# Patient Record
Sex: FEMALE | Race: BLACK OR AFRICAN AMERICAN | NOT HISPANIC OR LATINO | Employment: OTHER | URBAN - METROPOLITAN AREA
[De-identification: names, ages, dates, MRNs, and addresses within clinical notes are randomized per-mention and may not be internally consistent; named-entity substitution may affect disease eponyms.]

---

## 2017-01-02 ENCOUNTER — PATIENT MESSAGE (OUTPATIENT)
Dept: FAMILY MEDICINE | Facility: CLINIC | Age: 52
End: 2017-01-02

## 2017-01-02 ENCOUNTER — PATIENT MESSAGE (OUTPATIENT)
Dept: ENDOCRINOLOGY | Facility: CLINIC | Age: 52
End: 2017-01-02

## 2017-01-03 ENCOUNTER — HOSPITAL ENCOUNTER (OUTPATIENT)
Dept: RADIOLOGY | Facility: HOSPITAL | Age: 52
Discharge: HOME | End: 2017-01-03
Attending: FAMILY MEDICINE
Payer: COMMERCIAL

## 2017-01-03 DIAGNOSIS — Z12.31 ENCOUNTER FOR SCREENING MAMMOGRAM FOR BREAST CANCER: ICD-10-CM

## 2017-01-03 PROCEDURE — 77067 SCR MAMMO BI INCL CAD: CPT | Mod: TC

## 2017-01-03 PROCEDURE — 77067 SCR MAMMO BI INCL CAD: CPT | Mod: 26,,, | Performed by: RADIOLOGY

## 2017-01-03 PROCEDURE — 77063 BREAST TOMOSYNTHESIS BI: CPT | Mod: 26,,, | Performed by: RADIOLOGY

## 2017-01-05 ENCOUNTER — PATIENT MESSAGE (OUTPATIENT)
Dept: ENDOCRINOLOGY | Facility: CLINIC | Age: 52
End: 2017-01-05

## 2017-01-06 DIAGNOSIS — E04.1 THYROID NODULE: Primary | ICD-10-CM

## 2017-01-07 RX ORDER — IBUPROFEN 600 MG/1
TABLET ORAL
Qty: 60 TABLET | Refills: 0 | Status: SHIPPED | OUTPATIENT
Start: 2017-01-07 | End: 2017-01-30 | Stop reason: SDUPTHER

## 2017-01-09 RX ORDER — AMLODIPINE BESYLATE 10 MG/1
TABLET ORAL
Qty: 90 TABLET | Refills: 1 | Status: SHIPPED | OUTPATIENT
Start: 2017-01-09 | End: 2017-07-06 | Stop reason: SDUPTHER

## 2017-01-09 NOTE — TELEPHONE ENCOUNTER
----- Message from Hodan Pope sent at 1/4/2017  4:24 PM CST -----  Contact: self/[phone]  Patient is following up on her previous message in regards to scheduling a Endocrinology appointment. Thank you.

## 2017-01-18 ENCOUNTER — PATIENT MESSAGE (OUTPATIENT)
Dept: FAMILY MEDICINE | Facility: CLINIC | Age: 52
End: 2017-01-18

## 2017-01-25 DIAGNOSIS — E87.6 HYPOKALEMIA: ICD-10-CM

## 2017-01-25 RX ORDER — POTASSIUM BICARBONATE 978 MG/1
TABLET, EFFERVESCENT ORAL
Qty: 120 TABLET | Refills: 0 | Status: SHIPPED | OUTPATIENT
Start: 2017-01-25 | End: 2017-02-25 | Stop reason: SDUPTHER

## 2017-01-30 RX ORDER — IBUPROFEN 600 MG/1
TABLET ORAL
Qty: 60 TABLET | Refills: 0 | Status: SHIPPED | OUTPATIENT
Start: 2017-01-30 | End: 2017-02-18 | Stop reason: SDUPTHER

## 2017-02-09 ENCOUNTER — PATIENT MESSAGE (OUTPATIENT)
Dept: FAMILY MEDICINE | Facility: CLINIC | Age: 52
End: 2017-02-09

## 2017-02-18 RX ORDER — IBUPROFEN 600 MG/1
TABLET ORAL
Qty: 60 TABLET | Refills: 0 | Status: SHIPPED | OUTPATIENT
Start: 2017-02-18 | End: 2017-10-03 | Stop reason: SDUPTHER

## 2017-02-23 DIAGNOSIS — E87.6 HYPOKALEMIA: ICD-10-CM

## 2017-02-23 RX ORDER — SPIRONOLACTONE 25 MG/1
TABLET ORAL
Qty: 180 TABLET | Refills: 0 | Status: SHIPPED | OUTPATIENT
Start: 2017-02-23 | End: 2018-05-14 | Stop reason: SDUPTHER

## 2017-02-25 DIAGNOSIS — E87.6 HYPOKALEMIA: ICD-10-CM

## 2017-02-27 ENCOUNTER — HOSPITAL ENCOUNTER (EMERGENCY)
Facility: OTHER | Age: 52
Discharge: HOME | End: 2017-02-27
Attending: EMERGENCY MEDICINE
Payer: COMMERCIAL

## 2017-02-27 VITALS
SYSTOLIC BLOOD PRESSURE: 125 MMHG | HEART RATE: 89 BPM | WEIGHT: 245 LBS | RESPIRATION RATE: 20 BRPM | OXYGEN SATURATION: 99 % | TEMPERATURE: 99 F | HEIGHT: 64 IN | BODY MASS INDEX: 41.83 KG/M2 | DIASTOLIC BLOOD PRESSURE: 80 MMHG

## 2017-02-27 DIAGNOSIS — R11.2 NON-INTRACTABLE VOMITING WITH NAUSEA, UNSPECIFIED VOMITING TYPE: Primary | ICD-10-CM

## 2017-02-27 PROCEDURE — 99283 EMERGENCY DEPT VISIT LOW MDM: CPT

## 2017-02-27 RX ORDER — ONDANSETRON 4 MG/1
4 TABLET, ORALLY DISINTEGRATING ORAL EVERY 8 HOURS PRN
Qty: 14 TABLET | Refills: 1 | Status: SHIPPED | OUTPATIENT
Start: 2017-02-27 | End: 2017-10-04

## 2017-02-27 RX ORDER — POTASSIUM BICARBONATE 978 MG/1
TABLET, EFFERVESCENT ORAL
Qty: 120 TABLET | Refills: 0 | Status: SHIPPED | OUTPATIENT
Start: 2017-02-27 | End: 2017-03-26 | Stop reason: SDUPTHER

## 2017-02-27 NOTE — DISCHARGE INSTRUCTIONS
"  Vomiting (Adult)  Vomiting is a common symptom that may be due to different causes. These include gastroenteritis ("stomach flu"), food poisoning and gastritis. There are other more serious causes of vomiting which may be hard to diagnose early in the illness. Therefore, it is important to watch for the warning signs listed below.  The main danger from repeated vomiting is dehydration. This is due to excess loss of water and minerals from the body. When this occurs, body fluids must be replaced.  Home care  · If symptoms are severe, rest at home for the next 24 hours.  · Because your symptoms may be from an infection, wash your hands frequently and well, and use alcohol-based  to avoid spreading the infection to others.  · Wash your hands for at least 20 seconds. Hum the happy birthday song twice for the correct length of time.  · Wash your hands after using the toilet, before and after preparing food, before eating food, after changing a diaper, cleaning a wound, caring for a sick person, and blowing your nose, coughing, or sneezing. You should also wash your hands after caring for someone who is sick, touching pet food, or treats, and touching an animal, or animal waste.  · You may use acetaminophen or NSAID medicines like ibuprofen or naproxen to control fever, unless another medicine was prescribed. If you have chronic liver or kidney disease or ever had a stomach ulcer or GI bleeding, talk with your doctor before using these medicines. Aspirin should never be used in anyone under 18 years of age who is ill with a fever. It may cause severe liver damage. Don't use NSAID medicines if you are already taking one for another condition (like arthritis) or are on aspirin (such as for heart disease, or after a stroke)  · Avoid tobacco and alcohol use, which may worsen your symptoms.  · If medicines for vomiting were prescribed, take as directed.  · Once vomiting stops, then follow these guidelines:  During " the first 12 to 24 hours follow the diet below:  · Fruit juices. Apple, grape juice, clear fruit drinks, and electrolyte replacement drinks.  · Beverages. Soft drinks without caffeine; mineral water (plain or flavored), decaffeinated tea and coffee.  · Soups. Clear broth, consommé and bouillon  · Desserts. Plain gelatin, popsicles and fruit juice bars. As you feel better, you may add 6-8 ounces of yogurt per day.  During the next 24 hours you may add the following to the above:  · Hot cereal, plain toast, bread, rolls, crackers  · Plain noodles, rice, mashed potatoes, chicken noodle or rice soup  · Unsweetened canned fruit (avoid pineapple), bananas  · Limit caffeine and chocolate. No spices or seasonings except salt.  During the next 24 hours:  Gradually resume a normal diet, as you feel better and your symptoms lessen.  Follow-up care  Follow up with your healthcare provider, or as advised.  When to seek medical advice  Call your healthcare provider right away if any of these occur:  · Constant right-sided lower abdominal pain or increasing general abdominal pain  · Continued vomiting (unable to keep liquids down) for 24 hours  · Frequent diarrhea (more than 5 times a day); blood (red or black color) or mucus in diarrhea  · Reduced urine output or extreme thirst  · Weakness, dizziness or fainting  · Unusually drowsy or confused  · Fever of 100.4°F (38°C) oral or higher, or as directed  · Yellow color of the eyes or skin  Date Last Reviewed: 11/16/2015 © 2000-2016 [employer]. [address] [place] [address], [place] [address]. All rights reserved. This information is not intended as a substitute for professional medical care. Always follow your healthcare professional's instructions.

## 2017-02-27 NOTE — ED AVS SNAPSHOT
[facility] EMERGENCY DEPARTMENT  [address] [facility] [address]               Niharika Gutierrez   2017 11:30 AM   ED    Description:  Female : 1965   Department:  [facility] Emergency Department           Your Care was Coordinated By:     Provider Role From To    Hollie Valdez MD Attending Provider 17 1137 --      Reason for Visit     Nausea           Diagnoses this Visit        Comments    Non-intractable vomiting with nausea, unspecified vomiting type    -  Primary       ED Disposition     None           To Do List           Follow-up Information     Schedule an appointment as soon as possible for a visit with Janae Schwartz MD.    Specialty:  Family Medicine    Contact information:    [address] [facility] [address]  [phone]          Follow up with [facility] Emergency Department.    Specialty:  Emergency Medicine    Why:  If symptoms worsen    Contact information:    [address] [facility] [address]  [phone]       These Medications        Disp Refills Start End    ondansetron (ZOFRAN-ODT) 4 MG TbDL 14 tablet 1 2017     Take 1 tablet (4 mg total) by mouth every 8 (eight) hours as needed. - Oral    Pharmacy: [facility] [address] [place] [address] AT [facility] Serena Espitia  #: [phone]         [facility]     [facility] Nurse Care Line -  Assistance  Registered nurses in the [facility] On Call Center provide clinical advisement, health education, appointment booking, and other advisory services.  Call for this free service at [phone].             Medications           Message regarding Medications     Verify the changes and/or additions to your medication regime listed below are the same as discussed with your clinician today.  If any of these changes or additions are incorrect, please notify your healthcare provider.        START taking these NEW medications        Refills     ondansetron (ZOFRAN-ODT) 4 MG TbDL 1    Sig: Take 1 tablet (4 mg total) by mouth every 8 (eight) hours as needed.    Class: Print    Route: Oral           Verify that the below list of medications is an accurate representation of the medications you are currently taking.  If none reported, the list may be blank. If incorrect, please contact your healthcare provider. Carry this list with you in case of emergency.           Current Medications     amlodipine (NORVASC) 10 MG tablet TAKE 1 TABLET BY MOUTH DAILY    azelastine (ASTELIN) 137 mcg (0.1 %) nasal spray 1 spray by Nasal route 2 (two) times daily.    cetirizine (ZYRTEC) 10 MG tablet Take 10 mg by mouth daily as needed.    conjugated estrogens (PREMARIN) vaginal cream Place 0.5 g vaginally once daily. X 1 week, then decrease to twice weekly    esomeprazole (NEXIUM) 20 MG capsule Take 1 capsule (20 mg total) by mouth before breakfast.    fexofenadine (ALLEGRA ALLERGY) 180 MG tablet Take 180 mg by mouth daily as needed.    ibuprofen (ADVIL,MOTRIN) 600 MG tablet TAKE 1 TABLET BY MOUTH EVERY 8 HOURS AS NEEDED FOR PAIN    KLOR-CON/EF disintegrating tablet DISSOLVE ONE TABLET IN WATER, THEN DRINK FOUR TIMES DAILY    ondansetron (ZOFRAN-ODT) 4 MG TbDL Take 1 tablet (4 mg total) by mouth every 8 (eight) hours as needed.    potassium chloride SA (K-DUR,KLOR-CON) 20 MEQ tablet TAKE 1 TABLET BY MOUTH THREE TIMES DAILY    spironolactone (ALDACTONE) 25 MG tablet TAKE 1 TABLET BY MOUTH TWICE DAILY    triamcinolone (NASACORT) 55 mcg nasal inhaler 2 sprays by Nasal route once daily.           Clinical Reference Information           Your Vitals Were     BP                   125/80           Allergies as of 2/27/2017     No Known Allergies      Immunizations Administered on Date of Encounter - 2/27/2017     None      ED Micro, Lab, POCT     None      ED Imaging Orders     None        Discharge Instructions         Vomiting (Adult)  Vomiting is a common symptom that may be due  "to different causes. These include gastroenteritis ("stomach flu"), food poisoning and gastritis. There are other more serious causes of vomiting which may be hard to diagnose early in the illness. Therefore, it is important to watch for the warning signs listed below.  The main danger from repeated vomiting is dehydration. This is due to excess loss of water and minerals from the body. When this occurs, body fluids must be replaced.  Home care  · If symptoms are severe, rest at home for the next 24 hours.  · Because your symptoms may be from an infection, wash your hands frequently and well, and use alcohol-based  to avoid spreading the infection to others.  · Wash your hands for at least 20 seconds. Hum the happy birthday song twice for the correct length of time.  · Wash your hands after using the toilet, before and after preparing food, before eating food, after changing a diaper, cleaning a wound, caring for a sick person, and blowing your nose, coughing, or sneezing. You should also wash your hands after caring for someone who is sick, touching pet food, or treats, and touching an animal, or animal waste.  · You may use acetaminophen or NSAID medicines like ibuprofen or naproxen to control fever, unless another medicine was prescribed. If you have chronic liver or kidney disease or ever had a stomach ulcer or GI bleeding, talk with your doctor before using these medicines. Aspirin should never be used in anyone under 18 years of age who is ill with a fever. It may cause severe liver damage. Don't use NSAID medicines if you are already taking one for another condition (like arthritis) or are on aspirin (such as for heart disease, or after a stroke)  · Avoid tobacco and alcohol use, which may worsen your symptoms.  · If medicines for vomiting were prescribed, take as directed.  · Once vomiting stops, then follow these guidelines:  During the first 12 to 24 hours follow the diet below:  · Fruit juices. " Apple, grape juice, clear fruit drinks, and electrolyte replacement drinks.  · Beverages. Soft drinks without caffeine; mineral water (plain or flavored), decaffeinated tea and coffee.  · Soups. Clear broth, consommé and bouillon  · Desserts. Plain gelatin, popsicles and fruit juice bars. As you feel better, you may add 6-8 ounces of yogurt per day.  During the next 24 hours you may add the following to the above:  · Hot cereal, plain toast, bread, rolls, crackers  · Plain noodles, rice, mashed potatoes, chicken noodle or rice soup  · Unsweetened canned fruit (avoid pineapple), bananas  · Limit caffeine and chocolate. No spices or seasonings except salt.  During the next 24 hours:  Gradually resume a normal diet, as you feel better and your symptoms lessen.  Follow-up care  Follow up with your healthcare provider, or as advised.  When to seek medical advice  Call your healthcare provider right away if any of these occur:  · Constant right-sided lower abdominal pain or increasing general abdominal pain  · Continued vomiting (unable to keep liquids down) for 24 hours  · Frequent diarrhea (more than 5 times a day); blood (red or black color) or mucus in diarrhea  · Reduced urine output or extreme thirst  · Weakness, dizziness or fainting  · Unusually drowsy or confused  · Fever of 100.4°F (38°C) oral or higher, or as directed  · Yellow color of the eyes or skin  Date Last Reviewed: 11/16/2015 © 2000-2016 [employer]. [address] [place] [address], [place] [address]. All rights reserved. This information is not intended as a substitute for professional medical care. Always follow your healthcare professional's instructions.          Your Scheduled Appointments     Mar 01, 2017  1:00 PM CST   New Patient Endocrinology with Mary Potts MD   [facility]    [address] [place] [address]  [address] [place] [id number] [phone]               [facility] Emergency Department complies with  applicable Federal civil rights laws and does not discriminate on the basis of race, color, national origin, age, disability, or sex.        Language Assistance Services     ATTENTION: Language assistance services are available, free of charge. Please call [phone].      ATENCIÓN: Si habla viral, tiene a cotton disposición servicios gratuitos de asistencia lingüística. Llame al [phone].     CHÚ Ý: N?u b?n nói Ti?ng Vi?t, có các d?ch v? h? tr? ngôn ng? mi?n phí dành cho b?n. G?i s? [phone].

## 2017-02-27 NOTE — ED PROVIDER NOTES
Encounter Date: 2017       History     Chief Complaint   Patient presents with    Nausea     Pt here with c/o (1) episode of N/V today     Review of patient's allergies indicates:  No Known Allergies  The history is provided by the patient.    52-year-old who had one episode of vomiting and diarrhea around 9 AM.  She said that her symptoms resolved after that.  She said that she had a friend who  from a heart attack after having similar symptoms a few years ago so wanted to be checked.  She denies chest pain.  Patient says that she did have one episode of chest pain to her midsternal area  last night that lasted for about 1 hour that resolved.  However she has history of reflux and says that this is similar.  Patient has no complaints at this time.  Past Medical History:   Diagnosis Date    Hypertension     Hypokalemic syndrome     Thyroid disease     Vitamin D deficiency disease      Past Surgical History:   Procedure Laterality Date     SECTION  2004    X 1    CHOLECYSTECTOMY  2008    LAPAROSCOPIC HYSTERECTOMY  2010    LSO     Family History   Problem Relation Age of Onset    Diabetes Mother     Heart disease Father      CHF    Colon cancer Neg Hx     Colon polyps Neg Hx      Social History   Substance Use Topics    Smoking status: Never Smoker    Smokeless tobacco: Never Used    Alcohol use No     Review of Systems   Constitutional: Negative for fever.   HENT: Positive for postnasal drip.    Respiratory: Positive for cough. Negative for shortness of breath.    Cardiovascular: Positive for chest pain (resolved).   Gastrointestinal: Positive for diarrhea (resolved), nausea (resolved) and vomiting (Resolved).   Genitourinary: Negative for dysuria.   Skin: Positive for rash (face).   Neurological: Negative for dizziness and headaches.       Physical Exam   Initial Vitals   BP Pulse Resp Temp SpO2   17 1059 17 1059 17 1059 17 1059 17 1059   125/80 89 20  99.2 °F (37.3 °C) 99 %     Physical Exam    Nursing note and vitals reviewed.  Constitutional: She appears well-developed and well-nourished.   HENT:   Head: Normocephalic and atraumatic.   Mouth/Throat: No oropharyngeal exudate.   Eyes: Conjunctivae and EOM are normal. Pupils are equal, round, and reactive to light.   Neck: Normal range of motion. Neck supple.   Cardiovascular: Normal rate and regular rhythm.   Pulmonary/Chest: Breath sounds normal.   Abdominal: Soft. There is no tenderness. There is no rebound and no guarding.   Musculoskeletal: Normal range of motion.   Neurological: She is alert and oriented to person, place, and time. She has normal strength.   Skin: Skin is warm and dry.   Psychiatric: She has a normal mood and affect.         ED Course   Procedures  Labs Reviewed - No data to display  EKG Readings: (Independently Interpreted)   Initial Reading: No STEMI. Previous EKG: Compared with most recent EKG Previous EKG Date: 2015.   Normal sinus rhythm, no ST segment elevation, heart rate 78, normal QT, normal axis          Medical Decision Making:   Initial Assessment:   52-year-old lady who complains of nausea, vomiting, diarrhea.  Patient had one episode this morning and then her symptoms completely resolved.  She has no complaints at this time.  ED Management:  No evidence of STEMI on EKG.  I was able to review her old EKG and there were no changes that were significant.  Patient was reassured and prescribed Zofran.  She was given strict return precautions.  She had no chest pain today.                   ED Course     Clinical Impression:   The encounter diagnosis was Non-intractable vomiting with nausea, unspecified vomiting type.          Hollie Valdez MD  02/27/17 2449

## 2017-03-26 DIAGNOSIS — E87.6 HYPOKALEMIA: ICD-10-CM

## 2017-03-26 RX ORDER — POTASSIUM BICARBONATE 978 MG/1
TABLET, EFFERVESCENT ORAL
Qty: 120 TABLET | Refills: 0 | Status: SHIPPED | OUTPATIENT
Start: 2017-03-26 | End: 2017-04-29 | Stop reason: SDUPTHER

## 2017-04-29 DIAGNOSIS — E87.6 HYPOKALEMIA: ICD-10-CM

## 2017-04-29 RX ORDER — POTASSIUM BICARBONATE 978 MG/1
TABLET, EFFERVESCENT ORAL
Qty: 120 TABLET | Refills: 0 | Status: SHIPPED | OUTPATIENT
Start: 2017-04-29 | End: 2017-10-04

## 2017-05-01 ENCOUNTER — HOSPITAL ENCOUNTER (EMERGENCY)
Facility: OTHER | Age: 52
Discharge: HOME | End: 2017-05-01
Attending: EMERGENCY MEDICINE
Payer: COMMERCIAL

## 2017-05-01 VITALS
SYSTOLIC BLOOD PRESSURE: 136 MMHG | BODY MASS INDEX: 40.97 KG/M2 | HEIGHT: 64 IN | RESPIRATION RATE: 18 BRPM | DIASTOLIC BLOOD PRESSURE: 61 MMHG | HEART RATE: 70 BPM | TEMPERATURE: 98 F | OXYGEN SATURATION: 100 % | WEIGHT: 240 LBS

## 2017-05-01 DIAGNOSIS — R19.7 NAUSEA VOMITING AND DIARRHEA: ICD-10-CM

## 2017-05-01 DIAGNOSIS — R42 VERTIGO: Primary | ICD-10-CM

## 2017-05-01 DIAGNOSIS — R11.2 NAUSEA VOMITING AND DIARRHEA: ICD-10-CM

## 2017-05-01 DIAGNOSIS — R07.89 CHEST TIGHTNESS: ICD-10-CM

## 2017-05-01 DIAGNOSIS — R42 DIZZY: ICD-10-CM

## 2017-05-01 LAB
ALBUMIN SERPL-MCNC: 3.3 G/DL (ref 3.3–5.5)
ALP SERPL-CCNC: 59 U/L (ref 42–141)
BILIRUB SERPL-MCNC: 0.4 MG/DL (ref 0.2–1.6)
BUN SERPL-MCNC: 19 MG/DL (ref 7–22)
CALCIUM SERPL-MCNC: 9.4 MG/DL (ref 8–10.3)
CHLORIDE SERPL-SCNC: 105 MMOL/L (ref 98–108)
CREAT SERPL-MCNC: 0.7 MG/DL (ref 0.6–1.2)
GLUCOSE SERPL-MCNC: 104 MG/DL (ref 73–118)
POC ALT (SGPT): 16 U/L (ref 10–47)
POC AST (SGOT): 18 U/L (ref 11–38)
POC CARDIAC TROPONIN I: 0 NG/ML
POC TCO2: 28 MMOL/L (ref 18–33)
POTASSIUM BLD-SCNC: 3.4 MMOL/L (ref 3.6–5.1)
PROTEIN, POC: 8.4 G/DL (ref 6.4–8.1)
SAMPLE: NORMAL
SODIUM BLD-SCNC: 139 MMOL/L (ref 128–145)

## 2017-05-01 PROCEDURE — 99284 EMERGENCY DEPT VISIT MOD MDM: CPT | Mod: 25

## 2017-05-01 PROCEDURE — 80053 COMPREHEN METABOLIC PANEL: CPT

## 2017-05-01 PROCEDURE — 84484 ASSAY OF TROPONIN QUANT: CPT

## 2017-05-01 PROCEDURE — 93005 ELECTROCARDIOGRAM TRACING: CPT

## 2017-05-01 PROCEDURE — 25000003 PHARM REV CODE 250: Performed by: EMERGENCY MEDICINE

## 2017-05-01 PROCEDURE — 93010 ELECTROCARDIOGRAM REPORT: CPT | Mod: ,,, | Performed by: INTERNAL MEDICINE

## 2017-05-01 PROCEDURE — 85025 COMPLETE CBC W/AUTO DIFF WBC: CPT

## 2017-05-01 RX ORDER — ONDANSETRON 4 MG/1
4 TABLET, ORALLY DISINTEGRATING ORAL
Status: COMPLETED | OUTPATIENT
Start: 2017-05-01 | End: 2017-05-01

## 2017-05-01 RX ORDER — MECLIZINE HYDROCHLORIDE 25 MG/1
25 TABLET ORAL
Status: COMPLETED | OUTPATIENT
Start: 2017-05-01 | End: 2017-05-01

## 2017-05-01 RX ORDER — ONDANSETRON 4 MG/1
4 TABLET, ORALLY DISINTEGRATING ORAL EVERY 8 HOURS PRN
Qty: 14 TABLET | Refills: 1 | Status: SHIPPED | OUTPATIENT
Start: 2017-05-01 | End: 2017-10-04

## 2017-05-01 RX ORDER — MECLIZINE HYDROCHLORIDE 25 MG/1
25 TABLET ORAL 3 TIMES DAILY PRN
Qty: 20 TABLET | Refills: 0 | Status: SHIPPED | OUTPATIENT
Start: 2017-05-01 | End: 2017-10-04

## 2017-05-01 RX ADMIN — MECLIZINE HYDROCHLORIDE 25 MG: 25 TABLET ORAL at 08:05

## 2017-05-01 RX ADMIN — ONDANSETRON 4 MG: 4 TABLET, ORALLY DISINTEGRATING ORAL at 08:05

## 2017-05-01 RX ADMIN — LIDOCAINE HYDROCHLORIDE: 20 SOLUTION ORAL; TOPICAL at 08:05

## 2017-05-01 NOTE — ED PROVIDER NOTES
"Encounter Date: 2017       History     Chief Complaint   Patient presents with    Nausea     NVD, states NVD, chest pain started on arrival     Chest Pain     Review of patient's allergies indicates:  No Known Allergies  The history is provided by the patient and medical records.    52-year-old who complains of feeling dizzy.  Patient says that she feels the room spinning.  This began this morning.  She vomited twice and had one episode of diarrhea.  Patient has tightness to her chest as well.  This began this morning as well.  No shortness of breath.  Patient's dizziness worsens with quick movements of her head.  She denies headache or change in vision.  She said that she feels "flushed every other day for the last 2 months.  Patient was seen here 2 months ago for nausea as well as chest pain at that time.  She did not follow-up with her primary care physician regarding the symptoms as directed.  She has a history of reflux and stopped taking her Nexium 2 weeks ago.    Past Medical History:   Diagnosis Date    Hypertension     Hypokalemic syndrome     Thyroid disease     Vitamin D deficiency disease      Past Surgical History:   Procedure Laterality Date     SECTION  2004    X 1    CHOLECYSTECTOMY  2008    LAPAROSCOPIC HYSTERECTOMY  2010    LSO     Family History   Problem Relation Age of Onset    Diabetes Mother     Heart disease Father      CHF    Colon cancer Neg Hx     Colon polyps Neg Hx      Social History   Substance Use Topics    Smoking status: Never Smoker    Smokeless tobacco: Never Used    Alcohol use No     Review of Systems   Constitutional: Negative for fever.   HENT: Negative for sore throat.    Eyes: Negative for pain.   Respiratory: Positive for chest tightness. Negative for shortness of breath.    Cardiovascular: Positive for chest pain.   Gastrointestinal: Positive for diarrhea, nausea and vomiting. Negative for abdominal pain.   Genitourinary: Negative for dysuria. "   Musculoskeletal: Positive for gait problem. Negative for back pain.   Skin: Negative for rash.   Neurological: Positive for dizziness. Negative for headaches.   Hematological:        No bleeding       Physical Exam   Initial Vitals   BP Pulse Resp Temp SpO2   05/01/17 0729 05/01/17 0729 05/01/17 0729 05/01/17 0729 05/01/17 0729   145/75 72 18 97.6 °F (36.4 °C) 100 %     Physical Exam    Nursing note and vitals reviewed.  Constitutional: She appears well-developed and well-nourished.   HENT:   Head: Normocephalic and atraumatic.   Dull TM with wax but no impaction   Eyes: Conjunctivae and EOM are normal. Pupils are equal, round, and reactive to light.   Neck: Normal range of motion. Neck supple.   Cardiovascular: Normal rate and regular rhythm.   Pulmonary/Chest: Breath sounds normal.   Abdominal: Soft. There is no tenderness. There is no rebound and no guarding.   Musculoskeletal: Normal range of motion. She exhibits no edema or tenderness.   Neurological: She is alert and oriented to person, place, and time. She has normal strength. No cranial nerve deficit.   Skin: Skin is warm and dry.   Psychiatric: She has a normal mood and affect.         ED Course   Procedures  Labs Reviewed   POCT CBC   POCT CMP   POCT TROPONIN     EKG Readings: (Independently Interpreted)   Previous EKG Date: 02/2017.   NSR: 75 no ST segment elevation, normal QT, normal NV, normal axis          Medical Decision Making:   Initial Assessment:   52 yr old who complains of feeling the room spin, nausea, diarrhea, chest tightness.  Patient vomited twice.  On exam she has no neurological deficits.  EKG is unchanged from previous.  No STEMI  Differential Diagnosis:   ACS, vertigo, viral syndrome, anxiety, GERD  ED Management:  No evidence of ischemia on patient's EKG.  I saw this patient 2 months ago for almost the same symptoms except for the dizziness.  She reports anxiety secondary to a friend of her dying from the same symptoms years ago.   She had the same concern in February.  She did not follow up with her primary care physician as instructed.  Patient will be given Zofran and meclizine.  CBC, CMP and troponin will be checked.  No significant lab abnormalities were found.  Patient was told that she must follow-up with her primary care physician.  She will be discharged on meclizine and Zofran and advised she should start her Nexium again.  She will be referred back to her GI doctor as well.                   ED Course     Clinical Impression:   The primary encounter diagnosis was Vertigo. Diagnoses of Dizzy, Nausea vomiting and diarrhea, and Chest tightness were also pertinent to this visit.          Hollie Valdez MD  05/01/17 0870

## 2017-05-01 NOTE — ED AVS SNAPSHOT
[facility] EMERGENCY DEPARTMENT  [address] [facility] [address]               Niharika Gutierrez   2017  7:12 AM   ED    Description:  Female : 1965   Department:  [facility] Emergency Department           Your Care was Coordinated By:     Provider Role From To    Hollie Valdez MD Attending Provider 17 0712 --      Reason for Visit     Nausea     Chest Pain           Diagnoses this Visit        Comments    Vertigo    -  Primary     Dizzy         Nausea vomiting and diarrhea         Chest tightness           ED Disposition     None           To Do List           Follow-up Information     Follow up with Janae Schwartz MD. Call today.    Specialty:  Family Medicine    Contact information:    [address] [facility] [address]  [phone]          Follow up with [facility] Emergency Department.    Specialty:  Emergency Medicine    Why:  If symptoms worsen    Contact information:    [address] [facility] [facility] [address]  [phone]        Follow up with Gonsalo Johnson PA-C. Call today.    Specialty:  Gastroenterology    Contact information:    [address]  [facility] [address]  [phone]         These Medications        Disp Refills Start End    meclizine (ANTIVERT) 25 mg tablet 20 tablet 0 2017     Take 1 tablet (25 mg total) by mouth 3 (three) times daily as needed. - Oral    Pharmacy: [facility] [address] [place] [address] [facility] [facility] Ph #: [phone]       ondansetron (ZOFRAN-ODT) 4 MG TbDL 14 tablet 1 2017     Take 1 tablet (4 mg total) by mouth every 8 (eight) hours as needed. - Oral    Pharmacy: [facility] [address] [place] [address] [address] [facility] AT [facility] Ph #: [phone]         [facility]     [facility] Nurse Care Line -  Assistance  Unless otherwise directed by your provider, please contact [facility], our nurse care  line that is available for 24/7 assistance.     Registered nurses in the [facility] provide: appointment scheduling, clinical advisement, health education, and other advisory services.  Call: [phone] (toll free)               Medications           Message regarding Medications     Verify the changes and/or additions to your medication regime listed below are the same as discussed with your clinician today.  If any of these changes or additions are incorrect, please notify your healthcare provider.        START taking these NEW medications        Refills    meclizine (ANTIVERT) 25 mg tablet 0    Sig: Take 1 tablet (25 mg total) by mouth 3 (three) times daily as needed.    Class: Print    Route: Oral    ondansetron (ZOFRAN-ODT) 4 MG TbDL 1    Sig: Take 1 tablet (4 mg total) by mouth every 8 (eight) hours as needed.    Class: Print    Route: Oral      These medications were administered today        Dose Freq    meclizine tablet 25 mg 25 mg ED 1 Time    Sig: Take 1 tablet (25 mg total) by mouth ED 1 Time.    Class: Normal    Route: Oral    ondansetron disintegrating tablet 4 mg 4 mg ED 1 Time    Sig: Take 1 tablet (4 mg total) by mouth ED 1 Time.    Class: Normal    Route: Oral    (pyxis) gi cocktail (mylanta 30 mL, lidocaine 2 % viscous 10 mL, dicyclomine 10 mL) 50 mL  ED 1 Time    Sig: Take by mouth ED 1 Time.    Class: Normal    Route: Oral           Verify that the below list of medications is an accurate representation of the medications you are currently taking.  If none reported, the list may be blank. If incorrect, please contact your healthcare provider. Carry this list with you in case of emergency.           Current Medications     amlodipine (NORVASC) 10 MG tablet TAKE 1 TABLET BY MOUTH DAILY    azelastine (ASTELIN) 137 mcg (0.1 %) nasal spray 1 spray by Nasal route 2 (two) times daily.    cetirizine (ZYRTEC) 10 MG tablet Take 10 mg by mouth daily as needed.    conjugated estrogens  "(PREMARIN) vaginal cream Place 0.5 g vaginally once daily. X 1 week, then decrease to twice weekly    esomeprazole (NEXIUM) 20 MG capsule Take 1 capsule (20 mg total) by mouth before breakfast.    fexofenadine (ALLEGRA ALLERGY) 180 MG tablet Take 180 mg by mouth daily as needed.    ibuprofen (ADVIL,MOTRIN) 600 MG tablet TAKE 1 TABLET BY MOUTH EVERY 8 HOURS AS NEEDED FOR PAIN    KLOR-CON/EF disintegrating tablet DISSOLVE ONE TABLET IN WATER AND DRINK FOUR TIMES DAILY    meclizine (ANTIVERT) 25 mg tablet Take 1 tablet (25 mg total) by mouth 3 (three) times daily as needed.    meclizine tablet 25 mg Take 1 tablet (25 mg total) by mouth ED 1 Time.    ondansetron (ZOFRAN-ODT) 4 MG TbDL Take 1 tablet (4 mg total) by mouth every 8 (eight) hours as needed.    ondansetron (ZOFRAN-ODT) 4 MG TbDL Take 1 tablet (4 mg total) by mouth every 8 (eight) hours as needed.    potassium chloride SA (K-DUR,KLOR-CON) 20 MEQ tablet TAKE 1 TABLET BY MOUTH THREE TIMES DAILY    spironolactone (ALDACTONE) 25 MG tablet TAKE 1 TABLET BY MOUTH TWICE DAILY    triamcinolone (NASACORT) 55 mcg nasal inhaler 2 sprays by Nasal route once daily.           Clinical Reference Information           Your Vitals Were     BP Pulse Temp Resp Height Weight    144/71 (BP Location: Right arm, Patient Position: Standing, BP Method: Automatic) 71 97.6 °F (36.4 °C) (Temporal) 18 5' 4" (1.626 m) 108.9 kg (240 lb)    SpO2 BMI             100% 41.2 kg/m2         Allergies as of 5/1/2017     No Known Allergies      Immunizations Administered on Date of Encounter - 5/1/2017     None      ED Micro, Lab, POCT     Start Ordered       Status Ordering Provider    05/01/17 0811 05/01/17 0811  Troponin ISTAT  Once      Final result     05/01/17 0802 05/01/17 0802  POCT CMP  Once      Final result     05/01/17 0740 05/01/17 0739  POCT CBC  Once      Acknowledged     05/01/17 0740 05/01/17 0739  POCT CMP  Once      Completed     05/01/17 0740 05/01/17 0739  POCT Troponin  Once   " "   Completed       ED Imaging Orders     None        Discharge Instructions       START NEXIUM TODAY  Vomiting (Adult)  Vomiting is a common symptom that may be due to different causes. These include gastroenteritis ("stomach flu"), food poisoning and gastritis. There are other more serious causes of vomiting which may be hard to diagnose early in the illness. Therefore, it is important to watch for the warning signs listed below.  The main danger from repeated vomiting is dehydration. This is due to excess loss of water and minerals from the body. When this occurs, body fluids must be replaced.  Home care  · If symptoms are severe, rest at home for the next 24 hours.  · Because your symptoms may be from an infection, wash your hands frequently and well, and use alcohol-based  to avoid spreading the infection to others.  · Wash your hands for at least 20 seconds. Hum the happy birthday song twice for the correct length of time.  · Wash your hands after using the toilet, before and after preparing food, before eating food, after changing a diaper, cleaning a wound, caring for a sick person, and blowing your nose, coughing, or sneezing. You should also wash your hands after caring for someone who is sick, touching pet food, or treats, and touching an animal, or animal waste.  · You may use acetaminophen or NSAID medicines like ibuprofen or naproxen to control fever, unless another medicine was prescribed. If you have chronic liver or kidney disease or ever had a stomach ulcer or GI bleeding, talk with your doctor before using these medicines. Aspirin should never be used in anyone under 18 years of age who is ill with a fever. It may cause severe liver damage. Don't use NSAID medicines if you are already taking one for another condition (like arthritis) or are on aspirin (such as for heart disease, or after a stroke)  · Avoid tobacco and alcohol use, which may worsen your symptoms.  · If medicines for " vomiting were prescribed, take as directed.  · Once vomiting stops, then follow these guidelines:  During the first 12 to 24 hours follow the diet below:  · Fruit juices. Apple, grape juice, clear fruit drinks, and electrolyte replacement drinks.  · Beverages. Soft drinks without caffeine; mineral water (plain or flavored), decaffeinated tea and coffee.  · Soups. Clear broth, consommé and bouillon  · Desserts. Plain gelatin, popsicles and fruit juice bars. As you feel better, you may add 6-8 ounces of yogurt per day.  During the next 24 hours you may add the following to the above:  · Hot cereal, plain toast, bread, rolls, crackers  · Plain noodles, rice, mashed potatoes, chicken noodle or rice soup  · Unsweetened canned fruit (avoid pineapple), bananas  · Limit caffeine and chocolate. No spices or seasonings except salt.  During the next 24 hours:  Gradually resume a normal diet, as you feel better and your symptoms lessen.  Follow-up care  Follow up with your healthcare provider, or as advised.  When to seek medical advice  Call your healthcare provider right away if any of these occur:  · Constant right-sided lower abdominal pain or increasing general abdominal pain  · Continued vomiting (unable to keep liquids down) for 24 hours  · Frequent diarrhea (more than 5 times a day); blood (red or black color) or mucus in diarrhea  · Reduced urine output or extreme thirst  · Weakness, dizziness or fainting  · Unusually drowsy or confused  · Fever of 100.4°F (38°C) oral or higher, or as directed  · Yellow color of the eyes or skin  Date Last Reviewed: 11/16/2015  © 9578-8890 [employer]. [address] [place] [address], [place] [address]. All rights reserved. This information is not intended as a substitute for professional medical care. Always follow your healthcare professional's instructions.          Vertigo (Unknown Cause)    In addition to helping with hearing, the inner ear is part of the balance center of  your body. Problems with the inner ear can a false feeling of motion. This is called vertigo. Often, it feels as if you or the room is spinning. A vertigo attack may cause sudden nausea, vomiting and heavy sweating. Severe vertigo causes a loss of balance and can cause you to fall. During vertigo, small head movements and changes in body position will often make the symptoms worse. You may also have ringing in the ears called tinnitus.  An episode of vertigo may last seconds, minutes or hours. Once you are over the first episode, it may never come back. However, symptoms may return off and on.  The cause of your vertigo is not yet known. Possible causes of vertigo include:  · Inflammation of the inner ear  · Disease of the nerves to the inner ear  · Movement of calcium particles in the inner ear  · Poor blood flow to the balance centers of the brain  · Migraine headaches  Home care  · If symptoms are severe, rest quietly in bed. Change positions very slowly. There is usually one position that will feel best, such as lying on one side or lying on your back with your head slightly raised on pillows.  · Do not drive a car or work with dangerous machinery until symptoms have been gone for at least one week.  · Take medicine as prescribed to relieve your symptoms. Unless another medicine was prescribed for symptoms of nausea, vomiting, and dizziness, you may use over-the-counter motion sickness pills. Ask your pharmacist for suggestions.  Follow-up care  Follow up with your healthcare provider or as directed. If you are referred to a specialist or for testing, make the appointment promptly.  When to seek medical advice  Call your healthcare provider if any of the following occur:  · Fever of 100.4°F (38ºC) or higher, or as directed by your healthcare provider  · Vertigo worsens or is not controlled by prescribed medicine   · Repeated vomiting not relieved by prescribed medicine   · Severe  headache  · Confusion  · Weakness of an arm or leg or one side of the face  · Difficulty with speech or vision  · Loss of consciousness   · Seizure  Date Last Reviewed: 8/16/2015  © 2472-1938 [employer]. [address] [place] [address], [place] [address]. All rights reserved. This information is not intended as a substitute for professional medical care. Always follow your healthcare professional's instructions.           [facility] complies with applicable Federal civil rights laws and does not discriminate on the basis of race, color, national origin, age, disability, or sex.        Language Assistance Services     ATTENTION: Language assistance services are available, free of charge. Please call [phone].      ATENCIÓN: Si habla español, tiene a cotton disposición servicios gratuitos de asistencia lingüística. Llame al [phone].     CHÚ Ý: N?u b?n nói Ti?ng Vi?t, có các d?ch v? h? tr? ngôn ng? mi?n phí dành cho b?n. G?i s? [phone].

## 2017-05-01 NOTE — DISCHARGE INSTRUCTIONS
"START NEXIUM TODAY  Vomiting (Adult)  Vomiting is a common symptom that may be due to different causes. These include gastroenteritis ("stomach flu"), food poisoning and gastritis. There are other more serious causes of vomiting which may be hard to diagnose early in the illness. Therefore, it is important to watch for the warning signs listed below.  The main danger from repeated vomiting is dehydration. This is due to excess loss of water and minerals from the body. When this occurs, body fluids must be replaced.  Home care  · If symptoms are severe, rest at home for the next 24 hours.  · Because your symptoms may be from an infection, wash your hands frequently and well, and use alcohol-based  to avoid spreading the infection to others.  · Wash your hands for at least 20 seconds. Hum the happy birthday song twice for the correct length of time.  · Wash your hands after using the toilet, before and after preparing food, before eating food, after changing a diaper, cleaning a wound, caring for a sick person, and blowing your nose, coughing, or sneezing. You should also wash your hands after caring for someone who is sick, touching pet food, or treats, and touching an animal, or animal waste.  · You may use acetaminophen or NSAID medicines like ibuprofen or naproxen to control fever, unless another medicine was prescribed. If you have chronic liver or kidney disease or ever had a stomach ulcer or GI bleeding, talk with your doctor before using these medicines. Aspirin should never be used in anyone under 18 years of age who is ill with a fever. It may cause severe liver damage. Don't use NSAID medicines if you are already taking one for another condition (like arthritis) or are on aspirin (such as for heart disease, or after a stroke)  · Avoid tobacco and alcohol use, which may worsen your symptoms.  · If medicines for vomiting were prescribed, take as directed.  · Once vomiting stops, then follow these " guidelines:  During the first 12 to 24 hours follow the diet below:  · Fruit juices. Apple, grape juice, clear fruit drinks, and electrolyte replacement drinks.  · Beverages. Soft drinks without caffeine; mineral water (plain or flavored), decaffeinated tea and coffee.  · Soups. Clear broth, consommé and bouillon  · Desserts. Plain gelatin, popsicles and fruit juice bars. As you feel better, you may add 6-8 ounces of yogurt per day.  During the next 24 hours you may add the following to the above:  · Hot cereal, plain toast, bread, rolls, crackers  · Plain noodles, rice, mashed potatoes, chicken noodle or rice soup  · Unsweetened canned fruit (avoid pineapple), bananas  · Limit caffeine and chocolate. No spices or seasonings except salt.  During the next 24 hours:  Gradually resume a normal diet, as you feel better and your symptoms lessen.  Follow-up care  Follow up with your healthcare provider, or as advised.  When to seek medical advice  Call your healthcare provider right away if any of these occur:  · Constant right-sided lower abdominal pain or increasing general abdominal pain  · Continued vomiting (unable to keep liquids down) for 24 hours  · Frequent diarrhea (more than 5 times a day); blood (red or black color) or mucus in diarrhea  · Reduced urine output or extreme thirst  · Weakness, dizziness or fainting  · Unusually drowsy or confused  · Fever of 100.4°F (38°C) oral or higher, or as directed  · Yellow color of the eyes or skin  Date Last Reviewed: 11/16/2015 © 2000-2016 [employer]. [address] [place] [address]. All rights reserved. This information is not intended as a substitute for professional medical care. Always follow your healthcare professional's instructions.          Vertigo (Unknown Cause)    In addition to helping with hearing, the inner ear is part of the balance center of your body. Problems with the inner ear can a false feeling of motion. This is called  vertigo. Often, it feels as if you or the room is spinning. A vertigo attack may cause sudden nausea, vomiting and heavy sweating. Severe vertigo causes a loss of balance and can cause you to fall. During vertigo, small head movements and changes in body position will often make the symptoms worse. You may also have ringing in the ears called tinnitus.  An episode of vertigo may last seconds, minutes or hours. Once you are over the first episode, it may never come back. However, symptoms may return off and on.  The cause of your vertigo is not yet known. Possible causes of vertigo include:  · Inflammation of the inner ear  · Disease of the nerves to the inner ear  · Movement of calcium particles in the inner ear  · Poor blood flow to the balance centers of the brain  · Migraine headaches  Home care  · If symptoms are severe, rest quietly in bed. Change positions very slowly. There is usually one position that will feel best, such as lying on one side or lying on your back with your head slightly raised on pillows.  · Do not drive a car or work with dangerous machinery until symptoms have been gone for at least one week.  · Take medicine as prescribed to relieve your symptoms. Unless another medicine was prescribed for symptoms of nausea, vomiting, and dizziness, you may use over-the-counter motion sickness pills. Ask your pharmacist for suggestions.  Follow-up care  Follow up with your healthcare provider or as directed. If you are referred to a specialist or for testing, make the appointment promptly.  When to seek medical advice  Call your healthcare provider if any of the following occur:  · Fever of 100.4°F (38ºC) or higher, or as directed by your healthcare provider  · Vertigo worsens or is not controlled by prescribed medicine   · Repeated vomiting not relieved by prescribed medicine   · Severe headache  · Confusion  · Weakness of an arm or leg or one side of the face  · Difficulty with speech or  vision  · Loss of consciousness   · Seizure  Date Last Reviewed: 8/16/2015  © 9490-5381 [employer], [employer]. [address] [place] [address], [place] [address]. All rights reserved. This information is not intended as a substitute for professional medical care. Always follow your healthcare professional's instructions.

## 2017-05-01 NOTE — ED NOTES
"BP (S) (!) 144/71 (BP Location: (S) Right arm, Patient Position: (S) Standing, BP Method: (S) Automatic)  Pulse (S) 71  Temp 97.6 °F (36.4 °C) (Temporal)   Resp 18  Ht 5' 4" (1.626 m)  Wt 108.9 kg (240 lb)  SpO2 100%  BMI 41.2 kg/m2    General Appearance:  Patient to room 7, c/o NVD, dizziness, intermittent chest pressure, onset this am.    Alert, awake, calm, cooperative, no distress, appears stated age   Head:    Normocephalic, without obvious abnormality, atraumatic   Eyes:    PERRL, conjunctiva/corneas clear, EOM's intact, fundi     benign, both eyes   Ears:    Normal TM's and external ear canals, both ears   Nose:   Nares normal, septum midline, mucosa normal, no drainage     or sinus tenderness   Throat:   Lips, mucosa, and tongue normal; teeth and gums normal   Neck:   Supple, symmetrical, trachea midline, no adenopathy;     thyroid:  no enlargement/tenderness/nodules; no carotid    bruit or JVD   Back:     Symmetric, no curvature, ROM normal, no CVA tenderness   Lungs:     Clear to auscultation bilaterally in all lobes, respirations unlabored   Chest Wall:    No tenderness or deformity    Heart:    Regular rate and rhythm, S1 and S2 normal, no murmur, rub    or gallop to auscultation, denies chest pressure at present.   Abdomen:     C/O nausea, vomiting, and diarrhea, abd soft, non-tender to touch, bowel sounds active all four quadrants, no vomiting, no diarrhea at present.    no masses, no organomegaly   Genitalia:    Normal female without lesion, discharge or tenderness   Rectal:    Normal tone, normal prostate, no masses or tenderness;    guaiac negative stool   Extremities:   Extremities normal, atraumatic, no cyanosis or edema   Pulses:   2+ and symmetric all extremities   Skin:   Skin color, texture, turgor normal, no rashes or lesions   Neurologic:   PERRLA, no dizziness at present, normal strength, sensation and reflexes in all ext, negative orthostatics in ER            "

## 2017-06-16 DIAGNOSIS — E87.6 HYPOKALEMIA: ICD-10-CM

## 2017-06-18 RX ORDER — POTASSIUM BICARBONATE 978 MG/1
TABLET, EFFERVESCENT ORAL
Qty: 120 TABLET | Refills: 0 | Status: SHIPPED | OUTPATIENT
Start: 2017-06-18 | End: 2017-07-28 | Stop reason: SDUPTHER

## 2017-07-06 RX ORDER — AMLODIPINE BESYLATE 10 MG/1
TABLET ORAL
Qty: 90 TABLET | Refills: 0 | Status: SHIPPED | OUTPATIENT
Start: 2017-07-06 | End: 2017-10-02 | Stop reason: SDUPTHER

## 2017-07-28 DIAGNOSIS — E87.6 HYPOKALEMIA: ICD-10-CM

## 2017-07-28 RX ORDER — POTASSIUM BICARBONATE 978 MG/1
TABLET, EFFERVESCENT ORAL
Qty: 120 TABLET | Refills: 0 | Status: SHIPPED | OUTPATIENT
Start: 2017-07-28 | End: 2017-10-04

## 2017-09-12 DIAGNOSIS — E87.6 HYPOKALEMIA: ICD-10-CM

## 2017-09-12 RX ORDER — POTASSIUM BICARBONATE 978 MG/1
TABLET, EFFERVESCENT ORAL
Qty: 120 TABLET | Refills: 0 | Status: SHIPPED | OUTPATIENT
Start: 2017-09-12 | End: 2017-10-04

## 2017-10-02 ENCOUNTER — LAB VISIT (OUTPATIENT)
Dept: LAB | Facility: HOSPITAL | Age: 52
End: 2017-10-02
Attending: INTERNAL MEDICINE
Payer: COMMERCIAL

## 2017-10-02 ENCOUNTER — OFFICE VISIT (OUTPATIENT)
Dept: FAMILY MEDICINE | Facility: CLINIC | Age: 52
End: 2017-10-02
Payer: COMMERCIAL

## 2017-10-02 VITALS
WEIGHT: 249.25 LBS | TEMPERATURE: 98 F | DIASTOLIC BLOOD PRESSURE: 88 MMHG | HEIGHT: 64 IN | SYSTOLIC BLOOD PRESSURE: 130 MMHG | BODY MASS INDEX: 42.55 KG/M2 | HEART RATE: 86 BPM | OXYGEN SATURATION: 98 %

## 2017-10-02 DIAGNOSIS — Q89.2 THYROGLOSSAL DUCT CYST: Primary | ICD-10-CM

## 2017-10-02 DIAGNOSIS — R31.21 ASYMPTOMATIC MICROSCOPIC HEMATURIA: ICD-10-CM

## 2017-10-02 LAB
BACTERIA #/AREA URNS AUTO: ABNORMAL /HPF
BILIRUB UR QL STRIP: NEGATIVE
CLARITY UR REFRACT.AUTO: ABNORMAL
COLOR UR AUTO: YELLOW
GLUCOSE UR QL STRIP: NEGATIVE
HGB UR QL STRIP: ABNORMAL
KETONES UR QL STRIP: NEGATIVE
LEUKOCYTE ESTERASE UR QL STRIP: NEGATIVE
MICROSCOPIC COMMENT: ABNORMAL
NITRITE UR QL STRIP: NEGATIVE
PH UR STRIP: 7 [PH] (ref 5–8)
PROT UR QL STRIP: NEGATIVE
RBC #/AREA URNS AUTO: 7 /HPF (ref 0–4)
SP GR UR STRIP: 1.02 (ref 1–1.03)
SQUAMOUS #/AREA URNS AUTO: 3 /HPF
URN SPEC COLLECT METH UR: ABNORMAL
UROBILINOGEN UR STRIP-ACNC: NEGATIVE EU/DL
WBC #/AREA URNS AUTO: 1 /HPF (ref 0–5)

## 2017-10-02 PROCEDURE — 99999 PR PBB SHADOW E&M-EST. PATIENT-LVL III: CPT | Mod: PBBFAC,,, | Performed by: INTERNAL MEDICINE

## 2017-10-02 PROCEDURE — 87086 URINE CULTURE/COLONY COUNT: CPT

## 2017-10-02 PROCEDURE — 3075F SYST BP GE 130 - 139MM HG: CPT | Mod: S$GLB,,, | Performed by: INTERNAL MEDICINE

## 2017-10-02 PROCEDURE — 81001 URINALYSIS AUTO W/SCOPE: CPT

## 2017-10-02 PROCEDURE — 3079F DIAST BP 80-89 MM HG: CPT | Mod: S$GLB,,, | Performed by: INTERNAL MEDICINE

## 2017-10-02 PROCEDURE — 3008F BODY MASS INDEX DOCD: CPT | Mod: S$GLB,,, | Performed by: INTERNAL MEDICINE

## 2017-10-02 PROCEDURE — 99214 OFFICE O/P EST MOD 30 MIN: CPT | Mod: S$GLB,,, | Performed by: INTERNAL MEDICINE

## 2017-10-02 RX ORDER — CEPHALEXIN 500 MG/1
500 CAPSULE ORAL EVERY 8 HOURS
Qty: 21 CAPSULE | Refills: 0 | Status: SHIPPED | OUTPATIENT
Start: 2017-10-02 | End: 2017-10-09

## 2017-10-02 RX ORDER — AMLODIPINE BESYLATE 10 MG/1
10 TABLET ORAL DAILY
Qty: 90 TABLET | Refills: 0 | Status: SHIPPED | OUTPATIENT
Start: 2017-10-02 | End: 2018-01-04 | Stop reason: SDUPTHER

## 2017-10-02 NOTE — PROGRESS NOTES
This note was created by combination of typed  and Dragon dictation.  Transcription errors may be present.  If there are any questions, please contact me.    Assessment & Plan  Thyroglossal duct cyst - improving, rx sent for keflex but she would like to see if she has UTI first and see if an abx can cover both.  In the interim, cook packs, avoid irritating it.  -     cephALEXin (KEFLEX) 500 MG capsule; Take 1 capsule (500 mg total) by mouth every 8 (eight) hours.  Dispense: 21 capsule; Refill: 0    Asymptomatic microscopic hematuria - check UA UCx. Hx of hematuria with hx of renal stone. No symptoms c/w stone currently.  If UA abn and cx negative, needs to f/u with urology.  -     Urinalysis; Future; Expected date: 10/02/2017  -     Urine culture; Future; Expected date: 10/02/2017    There are no discontinued medications.    Follow-up: No Follow-up on file.      =================================================================      Chief Complaint   Patient presents with    lump under neck       HPI  SALONI is a 52 y.o. female, last appointment with this clinic was Visit date not found.    No LMP recorded. Patient has had a hysterectomy.    Saturday awoke with painful lump in the submandibular area.  Was initially more painful and swollen and seems to be reducing in size.  No fever no chills.  But does feel hot.  No pain with chewing or swallowing.  9/22 Td and flu shot.  No new medications.     And now with some dysuria, chalked it up to dehydration.  Darker than normal. Urinary issues started about 2 weeks ago.  No hx of stones; no abd pain; yes some worsening of chronic pedal edema.  Hx of renal stones in the past, hx of urology evaluation for hematuria 2014.    Recent abx for dental procedure; 10 day course of abx in September.    Notes acute worsening of edema.  Does not have chest pain or dyspnea, possibly a bit more elevated salt with prepackaged dinners but not severe.  Hx of compression  stockings, not using.    Patient Care Team:  Janae Schwartz MD as PCP - General (Family Medicine)    Patient Active Problem List    Diagnosis Date Noted    Gastroesophageal reflux disease without esophagitis 2015    Thyroid nodule 2014    Morbid obesity with BMI of 40.0-44.9, adult 2014    Vitamin D deficiency disease 2013    Essential hypertension        PAST MEDICAL HISTORY:  Past Medical History:   Diagnosis Date    Hypertension     Hypokalemic syndrome     Thyroid disease     Vitamin D deficiency disease        PAST SURGICAL HISTORY:  Past Surgical History:   Procedure Laterality Date     SECTION  2004    X 1    CHOLECYSTECTOMY  2008    LAPAROSCOPIC HYSTERECTOMY  2010    LSO       SOCIAL HISTORY:  Social History     Social History    Marital status:      Spouse name: N/A    Number of children: N/A    Years of education: N/A     Occupational History     [employer]     Social History Main Topics    Smoking status: Never Smoker    Smokeless tobacco: Never Used    Alcohol use No    Drug use: No    Sexual activity: Yes     Partners: Male     Birth control/ protection: Surgical     Other Topics Concern    Not on file     Social History Narrative    No narrative on file       ALLERGIES AND MEDICATIONS: updated and reviewed.  Review of patient's allergies indicates:  No Known Allergies  Current Outpatient Prescriptions   Medication Sig Dispense Refill    amlodipine (NORVASC) 10 MG tablet Take 1 tablet (10 mg total) by mouth once daily. 90 tablet 0    azelastine (ASTELIN) 137 mcg (0.1 %) nasal spray 1 spray by Nasal route 2 (two) times daily.      KLOR-CON/EF disintegrating tablet DISSOLVE ONE TABLET IN WATER AND DINK FOUR TIMES DAILY 120 tablet 0    spironolactone (ALDACTONE) 25 MG tablet TAKE 1 TABLET BY MOUTH TWICE DAILY 180 tablet 0    triamcinolone (NASACORT) 55 mcg nasal inhaler 2 sprays by Nasal route once daily. 17 g 3    cetirizine  "(ZYRTEC) 10 MG tablet Take 10 mg by mouth daily as needed.      conjugated estrogens (PREMARIN) vaginal cream Place 0.5 g vaginally once daily. X 1 week, then decrease to twice weekly 30 g 5    esomeprazole (NEXIUM) 20 MG capsule Take 1 capsule (20 mg total) by mouth before breakfast. 30 capsule 2    fexofenadine (ALLEGRA ALLERGY) 180 MG tablet Take 180 mg by mouth daily as needed.      ibuprofen (ADVIL,MOTRIN) 600 MG tablet TAKE 1 TABLET BY MOUTH EVERY 8 HOURS AS NEEDED FOR PAIN 60 tablet 0    KLOR-CON/EF disintegrating tablet DISSOLVE ONE TABLET IN WATER AND DRINK FOUR TIMES DAILY 120 tablet 0    KLOR-CON/EF disintegrating tablet DISSOLVE ONE TABLET IN WATER AND DINK FOUR TIMES DAILY 120 tablet 0    meclizine (ANTIVERT) 25 mg tablet Take 1 tablet (25 mg total) by mouth 3 (three) times daily as needed. 20 tablet 0    ondansetron (ZOFRAN-ODT) 4 MG TbDL Take 1 tablet (4 mg total) by mouth every 8 (eight) hours as needed. 14 tablet 1    ondansetron (ZOFRAN-ODT) 4 MG TbDL Take 1 tablet (4 mg total) by mouth every 8 (eight) hours as needed. 14 tablet 1    potassium chloride SA (K-DUR,KLOR-CON) 20 MEQ tablet TAKE 1 TABLET BY MOUTH THREE TIMES DAILY 270 tablet 5     No current facility-administered medications for this visit.        Review of Systems   All other systems reviewed and are negative.      Physical Exam   Vitals:    10/02/17 1429   BP: 130/88   Pulse: 86   Temp: 98.1 °F (36.7 °C)   SpO2: 98%   Weight: 113 kg (249 lb 3.7 oz)   Height: 5' 4" (1.626 m)    Body mass index is 42.78 kg/m².  Weight: 113 kg (249 lb 3.7 oz)   Height: 5' 4" (162.6 cm)     Physical Exam   Constitutional: She is oriented to person, place, and time. She appears well-developed and well-nourished. No distress.   Eyes: EOM are normal.   Neck:   Submandibular area with firm but mobile subcutaneous nodule, midline, nonfluctuant, no overlying comedone or skin erythema   Cardiovascular: Normal rate, regular rhythm and normal heart " sounds.    No murmur heard.  Pulmonary/Chest: Effort normal and breath sounds normal.   Abdominal: Soft. She exhibits no distension. There is no tenderness.   Musculoskeletal: Normal range of motion. She exhibits edema.   1+ to ankles bilaterally   Neurological: She is alert and oriented to person, place, and time. Coordination normal.   Skin: Skin is warm and dry.   Psychiatric: She has a normal mood and affect. Her behavior is normal. Thought content normal.     POC UA with protein, 250+ blood, LE positive. Nitrite negative.

## 2017-10-02 NOTE — TELEPHONE ENCOUNTER
----- Message from Alejandra Lawton sent at 10/2/2017  9:16 AM CDT -----  Contact: self  Pt requesting to speak to a nurse regarding thyroid. Please call [phone] or [phone].

## 2017-10-03 ENCOUNTER — PATIENT MESSAGE (OUTPATIENT)
Dept: FAMILY MEDICINE | Facility: CLINIC | Age: 52
End: 2017-10-03

## 2017-10-03 LAB
BACTERIA UR CULT: NORMAL
BACTERIA UR CULT: NORMAL

## 2017-10-03 RX ORDER — IBUPROFEN 600 MG/1
TABLET ORAL
Qty: 60 TABLET | Refills: 0 | Status: SHIPPED | OUTPATIENT
Start: 2017-10-03 | End: 2017-10-22 | Stop reason: SDUPTHER

## 2017-10-04 ENCOUNTER — PATIENT MESSAGE (OUTPATIENT)
Dept: FAMILY MEDICINE | Facility: CLINIC | Age: 52
End: 2017-10-04

## 2017-10-04 DIAGNOSIS — R31.29 MICROHEMATURIA: Primary | ICD-10-CM

## 2017-10-04 NOTE — TELEPHONE ENCOUNTER
Problems   This clinical information was not verified.   Medications   This clinical information was not verified, but there are updates pending review:   No Longer Applicable Medications Start Date Reported By  Comments   fexofenadine 180 MG tablet  Niharikasyeda Mcdaniel [place]    cetirizine 10 MG tablet  Niharika Jonas [place]    triamcinolone 55 mcg nasal inhaler 4/3/2013 Niharika Jonas [place]    potassium chloride SA 20 MEQ tablet 11/11/2014 Niharika [place]    conjugated estrogens vaginal cream 11/23/2015 Niharika Mcdaniel [place]    azelastine 137 mcg (0.1 %) nasal spray  Niharika Jonas [place]    ondansetron 4 MG Tbdl 2/27/2017 Niharika Jonas [place]    ondansetron 4 MG Tbdl 5/1/2017 Niharika Mcdaniel [place]    meclizine 25 mg tablet 5/1/2017 Niharika Jonas [place]    KLOR-CON/EF disintegrating tablet 4/29/2017 Niharika Jonas [place]    KLOR-CON/EF disintegrating tablet 7/28/2017 Niharikasyeda Mcdaniel [place]    Allergies   This clinical information was not verified.      medcard updated.

## 2017-10-10 ENCOUNTER — TELEPHONE (OUTPATIENT)
Dept: FAMILY MEDICINE | Facility: CLINIC | Age: 52
End: 2017-10-10

## 2017-10-10 NOTE — LETTER
October 10, 2017    Niharika Gutierrez  [address]  [place] [address]             [facility]  [address] [facility] [place] [address]  Phone: [phone]  Fax: [phone] Dear Ms. Gutierrez:    Sorry we were unable to contact you to schedule your urology appointment. Please give the referral office a call to schedule. [phone].        If you have any questions or concerns, please don't hesitate to call.    Sincerely,        Ashwini Mayers MA

## 2017-10-12 ENCOUNTER — OFFICE VISIT (OUTPATIENT)
Dept: UROLOGY | Facility: CLINIC | Age: 52
End: 2017-10-12
Payer: COMMERCIAL

## 2017-10-12 VITALS
WEIGHT: 254 LBS | HEIGHT: 64 IN | SYSTOLIC BLOOD PRESSURE: 128 MMHG | DIASTOLIC BLOOD PRESSURE: 72 MMHG | TEMPERATURE: 99 F | BODY MASS INDEX: 43.36 KG/M2

## 2017-10-12 DIAGNOSIS — N20.0 NEPHROLITHIASIS: ICD-10-CM

## 2017-10-12 DIAGNOSIS — R31.29 MICROHEMATURIA: Primary | ICD-10-CM

## 2017-10-12 LAB
BILIRUB SERPL-MCNC: NEGATIVE MG/DL
BLOOD URINE, POC: ABNORMAL
COLOR, POC UA: ABNORMAL
GLUCOSE UR QL STRIP: NORMAL
KETONES UR QL STRIP: NEGATIVE
LEUKOCYTE ESTERASE URINE, POC: NEGATIVE
NITRITE, POC UA: NEGATIVE
PH, POC UA: 8
PROTEIN, POC: ABNORMAL
SPECIFIC GRAVITY, POC UA: 1
UROBILINOGEN, POC UA: 1

## 2017-10-12 PROCEDURE — 88112 CYTOPATH CELL ENHANCE TECH: CPT | Mod: 26,,,

## 2017-10-12 PROCEDURE — 88112 CYTOPATH CELL ENHANCE TECH: CPT

## 2017-10-12 PROCEDURE — 81002 URINALYSIS NONAUTO W/O SCOPE: CPT | Mod: S$GLB,,, | Performed by: UROLOGY

## 2017-10-12 PROCEDURE — 99214 OFFICE O/P EST MOD 30 MIN: CPT | Mod: 25,S$GLB,, | Performed by: UROLOGY

## 2017-10-12 PROCEDURE — 99999 PR PBB SHADOW E&M-EST. PATIENT-LVL III: CPT | Mod: PBBFAC,,, | Performed by: UROLOGY

## 2017-10-12 NOTE — LETTER
October 12, 2017      Jensen Jane MD  [address] [place] [address]           [facility]  [address] [facility] [address]  [facility] [address]  Phone: [phone]          Patient: Niharika Gutierrez   MR Number: [id number]   YOB: 1965   Date of Visit: 10/12/2017       Dear Dr. Jensen Jane:    Thank you for referring Niharika Gutierrez to me for evaluation. Attached you will find relevant portions of my assessment and plan of care.    If you have questions, please do not hesitate to call me. I look forward to following Niharika Gutierrez along with you.    Sincerely,    Daina Romero MD    Enclosure  CC:  No Recipients    If you would like to receive this communication electronically, please contact [email or web address] or [phone] to request more information on [employer] Link access.    For providers and/or their staff who would like to refer a patient to [facility], please contact us through our one-stop-shop provider referral line, [facility], at [phone].    If you feel you have received this communication in error or would no longer like to receive these types of communications, please e-mail [email or web address]

## 2017-10-12 NOTE — PROGRESS NOTES
"  Subjective:       Niharika Gutierrez is a 52 y.o. female who is an established patient with Dr De La Torre, though new to me was referred by Dr Jane for evaluation of microhematuria.      She is a pt of Dr De La Torre who was referred back to urology for microhematuria. Dr De La Torre performed cystoscopy with b/l RPG in OR on 2/14 that was normal. At time of her workup, she was noted to have small R LP calculi. KUB in 5/15 did not show any radio-opaque stones.     She remains low risk for  malignancy - nonsmoker, no environmental exposure, no family history.    UA micro 10/17 - 7 RBCs. UCx - contaminant.      The following portions of the patient's history were reviewed and updated as appropriate: allergies, current medications, past family history, past medical history, past social history, past surgical history and problem list.    Review of Systems  Constitutional: no fever or chills  ENT: no nasal congestion or sore throat  Respiratory: no cough or shortness of breath  Cardiovascular: no chest pain or palpitations  Gastrointestinal: no nausea or vomiting, tolerating diet  Genitourinary: as per HPI  Hematologic/Lymphatic: no easy bruising or lymphadenopathy  Musculoskeletal: no arthralgias or myalgias  Skin: no rashes or lesions  Neurological: no seizures or tremors  Behavioral/Psych: no auditory or visual hallucinations       Objective:    Vitals: /72 (BP Location: Right arm, Patient Position: Sitting, BP Method: Large (Manual))   Temp 98.6 °F (37 °C) (Oral)   Ht 5' 4" (1.626 m)   Wt 115.2 kg (253 lb 15.5 oz)   BMI 43.59 kg/m²     Physical Exam   General: well developed, well nourished in no acute distress  Head: normocephalic, atraumatic  Neck: supple, trachea midline, no obvious enlargement of thyroid  HEENT: EOMI, mucus membranes moist, sclera anicteric, no hearing impairment  Lungs: symmetric expansion, non-labored breathing  Cardiovascular: regular rate and rhythm, normal pulses  Abdomen: soft, non " tender, non distended, no palpable masses, no hepatosplenomegaly, no hernias, no CVA tenderness  Musculoskeletal: no peripheral edema, normal ROM in bilateral upper and lower extremities  Lymphatics: no cervical or inguinal lymphadenopathy  Skin: no rashes or lesions  Neuro: alert and oriented x 3, no gross deficits  Psych: normal judgment and insight, normal mood/affect and non-anxious  Genitourinary:   patient declined exam      Lab Review   Urine analysis today in clinic shows positive for red blood cells 250, protein 30    Lab Results   Component Value Date    WBC 9.65 12/02/2016    HGB 12.0 12/02/2016    HCT 37.1 12/02/2016    MCV 88 12/02/2016     (H) 12/02/2016     Lab Results   Component Value Date    CREATININE 0.8 12/02/2016    BUN 12 12/02/2016         Imaging  Images and reports were personally reviewed by me and discussed with patient  KUB reviewed        Assessment/Plan:      1. Microhematuria    - Discussed etiology and workup of hematuria   - UCx - recently neg (contaminated specimen)   - Cytology - will check for surveillance   - She is s/p complete workup in 2015 by Dr De La Torre. Due to her low risk for  malignancy, no need for repeat workup today.     2. Nephrolithiasis   - Reported R LP stones on previous imaging, unsure size   - Offered KUB/ALBAN to better assess stones - pt declined      Follow up PRN

## 2017-10-19 ENCOUNTER — PATIENT MESSAGE (OUTPATIENT)
Dept: UROLOGY | Facility: CLINIC | Age: 52
End: 2017-10-19

## 2017-10-23 RX ORDER — IBUPROFEN 600 MG/1
TABLET ORAL
Qty: 60 TABLET | Refills: 0 | Status: SHIPPED | OUTPATIENT
Start: 2017-10-23 | End: 2017-11-11 | Stop reason: SDUPTHER

## 2017-11-12 RX ORDER — IBUPROFEN 600 MG/1
TABLET ORAL
Qty: 60 TABLET | Refills: 0 | Status: SHIPPED | OUTPATIENT
Start: 2017-11-12 | End: 2018-01-31 | Stop reason: SDUPTHER

## 2017-11-21 DIAGNOSIS — E87.6 HYPOKALEMIA: ICD-10-CM

## 2017-11-21 RX ORDER — POTASSIUM BICARBONATE 978 MG/1
TABLET, EFFERVESCENT ORAL
Qty: 120 TABLET | Refills: 0 | Status: SHIPPED | OUTPATIENT
Start: 2017-11-21 | End: 2017-12-22 | Stop reason: SDUPTHER

## 2017-12-22 DIAGNOSIS — E87.6 HYPOKALEMIA: ICD-10-CM

## 2017-12-22 RX ORDER — POTASSIUM BICARBONATE 978 MG/1
TABLET, EFFERVESCENT ORAL
Qty: 120 TABLET | Refills: 0 | Status: SHIPPED | OUTPATIENT
Start: 2017-12-22 | End: 2018-02-10 | Stop reason: SDUPTHER

## 2018-01-05 ENCOUNTER — HOSPITAL ENCOUNTER (OUTPATIENT)
Dept: RADIOLOGY | Facility: HOSPITAL | Age: 53
Discharge: HOME | End: 2018-01-05
Attending: ORTHOPAEDIC SURGERY
Payer: COMMERCIAL

## 2018-01-05 ENCOUNTER — OFFICE VISIT (OUTPATIENT)
Dept: ORTHOPEDICS | Facility: CLINIC | Age: 53
End: 2018-01-05
Payer: COMMERCIAL

## 2018-01-05 VITALS — WEIGHT: 250 LBS | BODY MASS INDEX: 42.68 KG/M2 | HEIGHT: 64 IN

## 2018-01-05 DIAGNOSIS — M19.079 ARTHRITIS OF MIDFOOT: ICD-10-CM

## 2018-01-05 DIAGNOSIS — M79.672 BILATERAL FOOT PAIN: ICD-10-CM

## 2018-01-05 DIAGNOSIS — M25.571 BILATERAL ANKLE PAIN, UNSPECIFIED CHRONICITY: Primary | ICD-10-CM

## 2018-01-05 DIAGNOSIS — M79.671 BILATERAL FOOT PAIN: ICD-10-CM

## 2018-01-05 DIAGNOSIS — M25.572 BILATERAL ANKLE PAIN, UNSPECIFIED CHRONICITY: Primary | ICD-10-CM

## 2018-01-05 DIAGNOSIS — M76.829 TIBIALIS POSTERIOR TENDINITIS, UNSPECIFIED LATERALITY: ICD-10-CM

## 2018-01-05 DIAGNOSIS — M25.572 BILATERAL ANKLE PAIN, UNSPECIFIED CHRONICITY: ICD-10-CM

## 2018-01-05 DIAGNOSIS — M25.571 BILATERAL ANKLE PAIN, UNSPECIFIED CHRONICITY: ICD-10-CM

## 2018-01-05 PROCEDURE — 99203 OFFICE O/P NEW LOW 30 MIN: CPT | Mod: S$GLB,,, | Performed by: ORTHOPAEDIC SURGERY

## 2018-01-05 PROCEDURE — 99999 PR PBB SHADOW E&M-EST. PATIENT-LVL III: CPT | Mod: PBBFAC,,, | Performed by: ORTHOPAEDIC SURGERY

## 2018-01-05 PROCEDURE — 73610 X-RAY EXAM OF ANKLE: CPT | Mod: 50,TC

## 2018-01-05 PROCEDURE — 73630 X-RAY EXAM OF FOOT: CPT | Mod: 50,TC

## 2018-01-05 PROCEDURE — 73610 X-RAY EXAM OF ANKLE: CPT | Mod: 26,50,, | Performed by: RADIOLOGY

## 2018-01-05 PROCEDURE — 73630 X-RAY EXAM OF FOOT: CPT | Mod: 26,50,, | Performed by: RADIOLOGY

## 2018-01-05 RX ORDER — AMLODIPINE BESYLATE 10 MG/1
10 TABLET ORAL DAILY
Qty: 30 TABLET | Refills: 0 | Status: SHIPPED | OUTPATIENT
Start: 2018-01-05 | End: 2018-02-10 | Stop reason: SDUPTHER

## 2018-01-05 NOTE — PROGRESS NOTES
DATE: 2018  PATIENT: Niharika Gutierrez    CHIEF COMPLAINT: left foot pain    HISTORY:  Niharika Gutierrez is a 52 y.o. female   here for initial evaluation of left medial foot pain. Referred by Dr. Shaikh. The pain has been present for about 6 months. There is no history of trauma. The patient describes the pain as achy.  The pain is worse with activity and improved by rest. Pain worse with flat shoes and better with shoes with lifted heels. There is no associated numbness and tingling.  Has not had prior treatments, although takes ibuprofen 600 mg BID to TID for knee arthritis.       PAST MEDICAL/SURGICAL HISTORY:  Past Medical History:   Diagnosis Date    Hypertension     Hypokalemic syndrome     Thyroid disease     Vitamin D deficiency disease      Past Surgical History:   Procedure Laterality Date     SECTION  2004    X 1    CHOLECYSTECTOMY  2008    LAPAROSCOPIC HYSTERECTOMY  2010    LSO       Current Medications:   Current Outpatient Prescriptions:     amLODIPine (NORVASC) 10 MG tablet, Take 1 tablet (10 mg total) by mouth once daily. Needs appointment for future refills, Disp: 30 tablet, Rfl: 0    ibuprofen (ADVIL,MOTRIN) 600 MG tablet, TAKE 1 TABLET BY MOUTH EVERY 8 HOURS AS NEEDED FOR PAIN, Disp: 60 tablet, Rfl: 0    KLOR-CON/EF disintegrating tablet, DISSOLVE ONE TABLET IN WATER DAILY AND DRINK FOUR TIMES DAILY, Disp: 120 tablet, Rfl: 0    spironolactone (ALDACTONE) 25 MG tablet, TAKE 1 TABLET BY MOUTH TWICE DAILY, Disp: 180 tablet, Rfl: 0    Social History:   Social History     Social History    Marital status:      Spouse name: N/A    Number of children: N/A    Years of education: N/A     Occupational History     [employer]     Social History Main Topics    Smoking status: Never Smoker    Smokeless tobacco: Never Used    Alcohol use No    Drug use: No    Sexual activity: Yes     Partners: Male     Birth control/ protection:  "Surgical     Other Topics Concern    Not on file     Social History Narrative    No narrative on file       REVIEW OF SYSTEMS:  Constitution: Negative. Negative for chills, fever and night sweats.   Cardiovascular: Negative for chest pain and syncope.   Respiratory: Negative for cough and shortness of breath.   Gastrointestinal: See HPI. Negative for nausea/vomiting. Negative for abdominal pain.  Genitourinary: See HPI. Negative for discoloration or dysuria.  Skin: Negative for dry skin, itching and rash.   Hematologic/Lymphatic: Negative for bleeding problem. Does not bruise/bleed easily.   Musculoskeletal: Negative for falls and muscle weakness.   Neurological: See HPI. No seizures.   Endocrine: Negative for polydipsia, polyphagia and polyuria.   Allergic/Immunologic: Negative for hives and persistent infections.    PHYSICAL EXAMINATION:    Ht 5' 4" (1.626 m)   Wt 113.4 kg (250 lb)   BMI 42.91 kg/m²     General: The patient is a  52 y.o. female in no apparent distress, the patient is orientatied to person, place and time.   Psych: Normal mood and affect  HEENT:  NCAT  Lungs:  Respirations are equal and unlabored.  CV:  2+ bilateral upper and lower extremity pulses.  Skin:  Intact throughout.    MSK:  LLE:   Skin intact throughout  Flat foot deformity bilaterally  Mild tenderness over medial navicular near insertion of PTT  Painless and full ROM of ankle and subtalar joint  Able to single heel rise  2+ DP  SILT throughout    IMAGING:     Radiographs of the bilateral foot and ankle were ordered and personally reviewed with the patient today.  They show midfoot joints of left foot  Flatfoot deformity bilaterally    ASSESSMENT/PLAN:    1. Bilateral ankle pain, unspecified chronicity  X-Ray Ankle Complete Bilateral   2. Bilateral foot pain  X-Ray Foot Complete Bilateral   3. Arthritis of midfoot     4. Tibialis posterior tendinitis, unspecified laterality       Rec:  - OTC orthotics with full length arch support  - " Continue NSAIDs  - Recommended activity and weight loss  - Offered follow-up in a few months, but patient elected to follow-up PRN    I have personally taken the history and examined this patient and agree with the residents note as stated above.

## 2018-01-05 NOTE — LETTER
January 8, 2018      Guille Shaikh MD  [address]  [address]  [facility] [address]           [facility]  [address]  [facility] [address]  Phone: [phone]          Patient: Niharika Gutierrez   MR Number: [id number]   YOB: 1965   Date of Visit: 1/5/2018       Dear Dr. Guille Shaikh:    Thank you for referring Niharika Gutierrez to me for evaluation. Attached you will find relevant portions of my assessment and plan of care.    If you have questions, please do not hesitate to call me. I look forward to following Niharika Gutierrez along with you.    Sincerely,    Reinier Casas MD    Enclosure  CC:  No Recipients    If you would like to receive this communication electronically, please contact [email or web address] or [phone] to request more information on [employer] Link access.    For providers and/or their staff who would like to refer a patient to [facility], please contact us through our one-stop-shop provider referral line, [facility] , at [phone].    If you feel you have received this communication in error or would no longer like to receive these types of communications, please e-mail [email or web address]

## 2018-01-31 RX ORDER — IBUPROFEN 600 MG/1
TABLET ORAL
Qty: 60 TABLET | Refills: 0 | Status: SHIPPED | OUTPATIENT
Start: 2018-01-31 | End: 2018-04-04 | Stop reason: SDUPTHER

## 2018-02-09 ENCOUNTER — PATIENT MESSAGE (OUTPATIENT)
Dept: FAMILY MEDICINE | Facility: CLINIC | Age: 53
End: 2018-02-09

## 2018-02-09 ENCOUNTER — HOSPITAL ENCOUNTER (EMERGENCY)
Facility: OTHER | Age: 53
Discharge: HOME | End: 2018-02-09
Attending: EMERGENCY MEDICINE
Payer: COMMERCIAL

## 2018-02-09 VITALS
HEART RATE: 76 BPM | TEMPERATURE: 98 F | BODY MASS INDEX: 42.91 KG/M2 | OXYGEN SATURATION: 100 % | RESPIRATION RATE: 17 BRPM | DIASTOLIC BLOOD PRESSURE: 82 MMHG | SYSTOLIC BLOOD PRESSURE: 152 MMHG | WEIGHT: 250 LBS

## 2018-02-09 DIAGNOSIS — R07.9 CHEST PAIN: ICD-10-CM

## 2018-02-09 DIAGNOSIS — K21.9 ACID REFLUX: ICD-10-CM

## 2018-02-09 DIAGNOSIS — K21.9 GASTROESOPHAGEAL REFLUX DISEASE, ESOPHAGITIS PRESENCE NOT SPECIFIED: Primary | ICD-10-CM

## 2018-02-09 LAB
ALBUMIN SERPL-MCNC: 3.6 G/DL (ref 3.3–5.5)
ALP SERPL-CCNC: 85 U/L (ref 42–141)
BILIRUB SERPL-MCNC: 0.4 MG/DL (ref 0.2–1.6)
BILIRUBIN, POC UA: NEGATIVE
BLOOD, POC UA: ABNORMAL
BUN SERPL-MCNC: 10 MG/DL (ref 7–22)
CALCIUM SERPL-MCNC: 9.4 MG/DL (ref 8–10.3)
CHLORIDE SERPL-SCNC: 98 MMOL/L (ref 98–108)
CLARITY, POC UA: CLEAR
COLOR, POC UA: YELLOW
CREAT SERPL-MCNC: 0.8 MG/DL (ref 0.6–1.2)
GLUCOSE SERPL-MCNC: 81 MG/DL (ref 73–118)
GLUCOSE, POC UA: NEGATIVE
KETONES, POC UA: NEGATIVE
LEUKOCYTE EST, POC UA: NEGATIVE
NITRITE, POC UA: NEGATIVE
PH UR STRIP: 7.5 [PH]
POC ALT (SGPT): 39 U/L (ref 10–47)
POC AST (SGOT): 35 U/L (ref 11–38)
POC CARDIAC TROPONIN I: 0 NG/ML
POC PTINR: 1.2 (ref 0.9–1.2)
POC PTWBT: 13.7 SEC (ref 9.7–14.3)
POC TCO2: 28 MMOL/L (ref 18–33)
POTASSIUM BLD-SCNC: 3.8 MMOL/L (ref 3.6–5.1)
PROTEIN, POC UA: ABNORMAL
PROTEIN, POC: 8.9 G/DL (ref 6.4–8.1)
SAMPLE: NORMAL
SAMPLE: NORMAL
SODIUM BLD-SCNC: 143 MMOL/L (ref 128–145)
SPECIFIC GRAVITY, POC UA: 1.02
UROBILINOGEN, POC UA: 0.2 E.U./DL

## 2018-02-09 PROCEDURE — 85610 PROTHROMBIN TIME: CPT

## 2018-02-09 PROCEDURE — 25000003 PHARM REV CODE 250: Performed by: EMERGENCY MEDICINE

## 2018-02-09 PROCEDURE — 80053 COMPREHEN METABOLIC PANEL: CPT

## 2018-02-09 PROCEDURE — 81003 URINALYSIS AUTO W/O SCOPE: CPT

## 2018-02-09 PROCEDURE — 99284 EMERGENCY DEPT VISIT MOD MDM: CPT

## 2018-02-09 PROCEDURE — 85025 COMPLETE CBC W/AUTO DIFF WBC: CPT

## 2018-02-09 PROCEDURE — 93005 ELECTROCARDIOGRAM TRACING: CPT

## 2018-02-09 PROCEDURE — 84484 ASSAY OF TROPONIN QUANT: CPT

## 2018-02-09 PROCEDURE — 93010 ELECTROCARDIOGRAM REPORT: CPT | Mod: ,,, | Performed by: INTERNAL MEDICINE

## 2018-02-09 RX ORDER — ASPIRIN 325 MG
325 TABLET ORAL
Status: COMPLETED | OUTPATIENT
Start: 2018-02-09 | End: 2018-02-09

## 2018-02-09 RX ORDER — ESOMEPRAZOLE MAGNESIUM 40 MG/1
40 CAPSULE, DELAYED RELEASE ORAL
COMMUNITY
End: 2020-09-23

## 2018-02-09 RX ADMIN — ASPIRIN 325 MG ORAL TABLET 325 MG: 325 PILL ORAL at 01:02

## 2018-02-09 RX ADMIN — LIDOCAINE HYDROCHLORIDE: 20 SOLUTION ORAL; TOPICAL at 03:02

## 2018-02-09 NOTE — ED PROVIDER NOTES
Encounter Date: 2018       History     Chief Complaint   Patient presents with    Gastroesophageal Reflux     patietn reports having heartburn and indigestion X1 week    Numbness     patient reoirts having numbness in her left hand and left foot X1 week     Niharika Gutierrez is a 52 y.o. female who presents to the Emergency Department with  heartburn and chest tightness for the last few days.  Patient also feels like she's having hot flashes.  Patient states she feels like her chest is congested.  Patient did eat a hot sausage sandwich this morning before getting worsening heartburn.  Patient states she's been having the symptoms for last few weeks.  Patient states she takes Nexium aldosterone today.  Patient states she has no pain is just a chest tightness a little discomfort.  Patient states the pill she works with made her come to the ED.  Patient just thinks she is having heartburn      The history is provided by the patient.     Review of patient's allergies indicates:  No Known Allergies  Past Medical History:   Diagnosis Date    Hypertension     Hypokalemic syndrome     Thyroid disease     Vitamin D deficiency disease      Past Surgical History:   Procedure Laterality Date     SECTION  2004    X 1    CHOLECYSTECTOMY  2008    LAPAROSCOPIC HYSTERECTOMY  2010    LSO     Family History   Problem Relation Age of Onset    Diabetes Mother     Heart disease Father      CHF    Colon cancer Neg Hx     Colon polyps Neg Hx      Social History   Substance Use Topics    Smoking status: Never Smoker    Smokeless tobacco: Never Used    Alcohol use No     Review of Systems   Constitutional: Negative for activity change, appetite change and fever.   HENT: Negative for sore throat.    Respiratory: Negative for shortness of breath.    Cardiovascular: Positive for chest pain.   Gastrointestinal: Positive for abdominal pain. Negative for abdominal distention and nausea.   Genitourinary: Negative  for dysuria.   Musculoskeletal: Negative for back pain.   Skin: Negative for rash.   Neurological: Negative for weakness.   Hematological: Does not bruise/bleed easily.   All other systems reviewed and are negative.      Physical Exam     Initial Vitals [02/09/18 1045]   BP Pulse Resp Temp SpO2   127/67 88 18 97.8 °F (36.6 °C) 100 %      MAP       87         Physical Exam    Nursing note and vitals reviewed.  Constitutional: She appears well-developed and well-nourished.   HENT:   Head: Normocephalic and atraumatic.   Right Ear: External ear normal.   Left Ear: External ear normal.   Nose: Nose normal.   Eyes: Conjunctivae and EOM are normal. Pupils are equal, round, and reactive to light. Right eye exhibits no discharge. Left eye exhibits no discharge.   Neck: Normal range of motion. Neck supple. JVD present.   Cardiovascular: Normal rate, regular rhythm, normal heart sounds and intact distal pulses. Exam reveals no gallop and no friction rub.    No murmur heard.  Pulmonary/Chest: Breath sounds normal. No respiratory distress. She has no wheezes. She has no rhonchi. She has no rales. She exhibits no tenderness.   Abdominal: Soft. Bowel sounds are normal. She exhibits no distension and no mass. There is no tenderness. There is no rebound and no guarding.   Musculoskeletal: Normal range of motion. She exhibits no edema or tenderness.   Lymphadenopathy:     She has no cervical adenopathy.   Neurological: She is alert and oriented to person, place, and time. She has normal strength and normal reflexes. She displays normal reflexes. No cranial nerve deficit or sensory deficit.   Skin: Skin is warm and dry. No rash noted. No pallor.   Psychiatric: She has a normal mood and affect.         ED Course   Procedures  Labs Reviewed   POCT URINALYSIS W/O SCOPE - Abnormal; Notable for the following:        Result Value    Glucose, UA Negative (*)     Bilirubin, UA Negative (*)     Ketones, UA Negative (*)     Blood, UA  Trace-intact (*)     Protein, UA 1+ (*)     Nitrite, UA Negative (*)     Leukocytes, UA Negative (*)     All other components within normal limits   POCT CMP - Abnormal; Notable for the following:     POC Chloride 98 (*)     Protein 8.9 (*)     All other components within normal limits   TROPONIN ISTAT   POCT CBC   POCT URINALYSIS W/O SCOPE   POCT CMP   POCT PROTIME-INR   POCT TROPONIN   ISTAT PROCEDURE     EKG Readings: (Independently Interpreted)   Initial Reading: No STEMI. Rhythm: Normal Sinus Rhythm. Heart Rate: 74. Axis: Left Axis Deviation. Clinical Impression: Normal Sinus Rhythm Other Impression: Abnormal EKG, LVH by criteria.  QTc 461     Imaging Results          X-Ray Chest PA And Lateral (Final result)  Result time 02/09/18 13:12:38    Final result by Viola Francisco MD (02/09/18 13:12:38)                 Impression:     No acute cardiopulmonary process, hypoventilatory exam..          Electronically signed by: VIOLA FRANCISCO MD  Date:     02/09/18  Time:    13:12              Narrative:    Chest PA and lateral    Indication:Chest pain    Comparison:10/25/2010    Findings:  The cardiomediastinal silhouette is not enlarged, noting calcification of the arch.  There is no pleural effusion.  The trachea is midline.  The lungs are symmetrically expanded bilaterally mild coarse interstitial attenuation, accentuated by shallow inspiratory effort. No large focal consolidation seen.  There is no pneumothorax.  The osseous structures are remarkable for degenerative changes.  Postsurgical change overlies the right upper quadrant.                                                   ED Course        Medical decision making   Chief complaint: Heartburn, hot flashes, and chest tightness  Differential diagnosis: STEMI, NSTEMI, GERD, and hypertension  Treatment in the ED Physical Exam, aspirin and GI cocktail  Patient reports feeling better after medication.    Discussed labs, and imaging results.    Reviewed patient's  EGD with her.  That shows strictures and a hiatal hernia.    I recommended patient follow-up with GI in the next week.  Fill and take prescriptions as directed.  Return to the ED if symptoms worsen or do not resolve.   Answered questions and discussed discharge plan.    Patient feels much better and is ready for discharge.  Follow up with PCP in 1 days.      Clinical Impression:   Diagnoses of Acid reflux and Chest pain were pertinent to this visit.                           Briana Casper DO  02/09/18 [id number]

## 2018-02-10 DIAGNOSIS — E87.6 HYPOKALEMIA: ICD-10-CM

## 2018-02-12 RX ORDER — AMLODIPINE BESYLATE 10 MG/1
10 TABLET ORAL DAILY
Qty: 30 TABLET | Refills: 0 | Status: SHIPPED | OUTPATIENT
Start: 2018-02-12 | End: 2018-03-13 | Stop reason: SDUPTHER

## 2018-02-20 ENCOUNTER — PATIENT MESSAGE (OUTPATIENT)
Dept: FAMILY MEDICINE | Facility: CLINIC | Age: 53
End: 2018-02-20

## 2018-03-13 DIAGNOSIS — E87.6 HYPOKALEMIA: ICD-10-CM

## 2018-03-13 RX ORDER — AMLODIPINE BESYLATE 10 MG/1
10 TABLET ORAL DAILY
Qty: 30 TABLET | Refills: 0 | Status: SHIPPED | OUTPATIENT
Start: 2018-03-13 | End: 2018-04-14 | Stop reason: SDUPTHER

## 2018-04-03 ENCOUNTER — TELEPHONE (OUTPATIENT)
Dept: FAMILY MEDICINE | Facility: CLINIC | Age: 53
End: 2018-04-03

## 2018-04-03 ENCOUNTER — TELEPHONE (OUTPATIENT)
Dept: GASTROENTEROLOGY | Facility: CLINIC | Age: 53
End: 2018-04-03

## 2018-04-03 DIAGNOSIS — Z12.31 ENCOUNTER FOR SCREENING MAMMOGRAM FOR BREAST CANCER: ICD-10-CM

## 2018-04-03 DIAGNOSIS — K21.9 GASTROESOPHAGEAL REFLUX DISEASE, ESOPHAGITIS PRESENCE NOT SPECIFIED: Primary | ICD-10-CM

## 2018-04-03 NOTE — TELEPHONE ENCOUNTER
----- Message from Beatrice Pena sent at 4/3/2018 10:56 AM CDT -----  Contact: Self   Patient is requesting SARA orders. Please call [phone].

## 2018-04-03 NOTE — TELEPHONE ENCOUNTER
----- Message from Char Gamez sent at 4/3/2018 10:52 AM CDT -----  Contact: Self- [phone]  Mode- pt called to schedule an ep appt- this would be a f/u for reflux- please call pt back at [phone]

## 2018-04-03 NOTE — TELEPHONE ENCOUNTER
Patient requesting mammogram,ref[facility] (), to follow up on 2015 endoscope,experiencing some really bad  heartburn lately  and lab orders prior to appt. Scheduled w/ Dr. Schwartz on 4/11/18 @ 4:00pm.

## 2018-04-04 RX ORDER — IBUPROFEN 600 MG/1
600 TABLET ORAL EVERY 8 HOURS PRN
Qty: 60 TABLET | Refills: 0 | Status: SHIPPED | OUTPATIENT
Start: 2018-04-04 | End: 2018-04-11 | Stop reason: SDUPTHER

## 2018-04-05 ENCOUNTER — HOSPITAL ENCOUNTER (OUTPATIENT)
Dept: RADIOLOGY | Facility: HOSPITAL | Age: 53
Discharge: HOME | End: 2018-04-05
Attending: FAMILY MEDICINE
Payer: COMMERCIAL

## 2018-04-05 DIAGNOSIS — Z12.31 ENCOUNTER FOR SCREENING MAMMOGRAM FOR BREAST CANCER: ICD-10-CM

## 2018-04-05 PROCEDURE — 77063 BREAST TOMOSYNTHESIS BI: CPT | Mod: 26,,, | Performed by: RADIOLOGY

## 2018-04-05 PROCEDURE — 77067 SCR MAMMO BI INCL CAD: CPT | Mod: TC,PO

## 2018-04-05 PROCEDURE — 77067 SCR MAMMO BI INCL CAD: CPT | Mod: 26,,, | Performed by: RADIOLOGY

## 2018-04-10 ENCOUNTER — TELEPHONE (OUTPATIENT)
Dept: GASTROENTEROLOGY | Facility: CLINIC | Age: 53
End: 2018-04-10

## 2018-04-10 NOTE — TELEPHONE ENCOUNTER
----- Message from Gabriella Red MA sent at 4/10/2018  2:27 PM CDT -----  Contact: self  [phone]  States she is returning your call from today, 4-10-18 to schedule an appointment.

## 2018-04-10 NOTE — TELEPHONE ENCOUNTER
----- Message from Marily Brian sent at 4/10/2018 11:18 AM CDT -----  Contact: pt [phone]  Mode Chao states she is returning Suzanne's call regarding scheduling a follow up appt. Please call pt.

## 2018-04-11 ENCOUNTER — OFFICE VISIT (OUTPATIENT)
Dept: FAMILY MEDICINE | Facility: CLINIC | Age: 53
End: 2018-04-11
Payer: COMMERCIAL

## 2018-04-11 VITALS
SYSTOLIC BLOOD PRESSURE: 120 MMHG | BODY MASS INDEX: 43.1 KG/M2 | OXYGEN SATURATION: 96 % | TEMPERATURE: 98 F | HEART RATE: 95 BPM | DIASTOLIC BLOOD PRESSURE: 80 MMHG | HEIGHT: 64 IN | WEIGHT: 252.44 LBS

## 2018-04-11 DIAGNOSIS — I10 ESSENTIAL HYPERTENSION: Chronic | ICD-10-CM

## 2018-04-11 DIAGNOSIS — M25.569 ARTHRALGIA OF KNEE, UNSPECIFIED LATERALITY: ICD-10-CM

## 2018-04-11 DIAGNOSIS — E55.9 VITAMIN D DEFICIENCY DISEASE: Chronic | ICD-10-CM

## 2018-04-11 DIAGNOSIS — E04.1 THYROID NODULE: Chronic | ICD-10-CM

## 2018-04-11 DIAGNOSIS — Z00.00 ANNUAL PHYSICAL EXAM: Primary | ICD-10-CM

## 2018-04-11 DIAGNOSIS — E66.01 MORBID OBESITY WITH BMI OF 40.0-44.9, ADULT: Chronic | ICD-10-CM

## 2018-04-11 DIAGNOSIS — K21.9 GASTROESOPHAGEAL REFLUX DISEASE, ESOPHAGITIS PRESENCE NOT SPECIFIED: ICD-10-CM

## 2018-04-11 PROCEDURE — 3079F DIAST BP 80-89 MM HG: CPT | Mod: CPTII,S$GLB,, | Performed by: FAMILY MEDICINE

## 2018-04-11 PROCEDURE — 99999 PR PBB SHADOW E&M-EST. PATIENT-LVL III: CPT | Mod: PBBFAC,,, | Performed by: FAMILY MEDICINE

## 2018-04-11 PROCEDURE — 3074F SYST BP LT 130 MM HG: CPT | Mod: CPTII,S$GLB,, | Performed by: FAMILY MEDICINE

## 2018-04-11 PROCEDURE — 99396 PREV VISIT EST AGE 40-64: CPT | Mod: S$GLB,,, | Performed by: FAMILY MEDICINE

## 2018-04-11 RX ORDER — IBUPROFEN 600 MG/1
600 TABLET ORAL EVERY 8 HOURS PRN
Qty: 60 TABLET | Refills: 0 | Status: SHIPPED | OUTPATIENT
Start: 2018-04-11 | End: 2018-06-27 | Stop reason: SDUPTHER

## 2018-04-11 RX ORDER — DICLOFENAC SODIUM 10 MG/G
2 GEL TOPICAL 4 TIMES DAILY
Qty: 100 G | Refills: 1 | Status: SHIPPED | OUTPATIENT
Start: 2018-04-11 | End: 2019-03-20

## 2018-04-11 NOTE — PATIENT INSTRUCTIONS
Exercise for a Healthier Heart  You may wonder how you can improve the health of your heart. If youre thinking about exercise, youre on the right track. You dont need to become an athlete, but you do need a certain amount of brisk exercise to help strengthen your heart. If you have been diagnosed with a heart condition, your doctor may recommend exercise to help stabilize your condition. To help make exercise a habit, choose safe, fun activities.     Exercise with a friend. When activity is fun, you're more likely to stick with it.     Be sure to check with your healthcare provider before starting an exercise program.   Why exercise?  Exercising regularly offers many healthy rewards. It can help you do all of the following:  · Improve your blood cholesterol level to help prevent further heart trouble  · Lower your blood pressure to help prevent a stroke or heart attack  · Control diabetes, or reduce your risk of getting this disease  · Improve your heart and lung function  · Reach and maintain a healthy weight  · Make your muscles stronger and more limber so you can stay active  · Prevent falls and fractures by slowing the loss of bone mass (osteoporosis)  · Manage stress better  · Reduce your blood pressure  · Improve your sense of self and your body image  Exercise tips  Ease into your routine. Set small goals. Then build on them.  Exercise on most days. Aim for a total of 150 or more minutes of moderate to  vigorous intensity activity each week. Consider 40 minutes, 3 to 4 times a week. For best results, activity should last for 40 minutes on average. It is OK to work up to the 40 minute period over time. Examples of moderate-intensity activity is walking 1 mile in 15 minutes or 30 to 45 minutes of yard work.  Step up your daily activity level. Along with your exercise program, try being more active throughout the day. Walk instead of drive. Do more household tasks or yard work.  Choose one or more  activities you enjoy. Walking is one of the easiest things you can do. You can also try swimming, riding a bike, dancing, or taking an exercise class.  Stop exercising and call your doctor if you:  · Have chest pain or feel dizzy or lightheaded  · Feel burning, tightness, pressure, or heaviness in your chest, neck, shoulders, back, or arms  · Have unusual shortness of breath  · Have increased joint or muscle pain  · Have palpitations or an irregular heartbeat   Date Last Reviewed: 5/1/2016  © 8608-7843 [employer]. [address] [place] [address]. All rights reserved. This information is not intended as a substitute for professional medical care. Always follow your healthcare professional's instructions.

## 2018-04-12 DIAGNOSIS — E87.6 HYPOKALEMIA: ICD-10-CM

## 2018-04-12 PROBLEM — M25.569 ARTHRALGIA OF KNEE: Status: ACTIVE | Noted: 2018-04-12

## 2018-04-12 RX ORDER — POTASSIUM BICARBONATE 978 MG/1
TABLET, EFFERVESCENT ORAL
Qty: 120 TABLET | Refills: 0 | Status: SHIPPED | OUTPATIENT
Start: 2018-04-12 | End: 2018-06-12 | Stop reason: SDUPTHER

## 2018-04-12 NOTE — PROGRESS NOTES
Office Visit for Annual Exam    Patient Name: Niharika Gutierrez    : 1965  MRN: [id number]    Subjective:  Niharika is a 53 y.o. female who presents today for     1. Annual physical   2. GERD - chronic condition for patient - pt has hx of gastroesophagel dilation and hernia. She states she was referred to [facility] GI who wanted to scope her but she felt more comfortable going to GI doctor she has seen in the past and will f/u with Dr. Coello. She c/o abdominal discomfort and bloating not alleviated with PPI. She sometimes feels her food becomes stuck in her esophagus. Food does eventually go down. No vomiting / weight loss     Review of Systems   Constitutional: Negative for chills and fever.   HENT: Negative for congestion.    Eyes: Negative for blurred vision.   Respiratory: Negative for cough.    Cardiovascular: Negative for chest pain.   Gastrointestinal: Negative for abdominal pain, constipation, diarrhea, heartburn, nausea and vomiting.   Genitourinary: Negative for dysuria.   Musculoskeletal: Negative for myalgias.   Skin: Negative for itching and rash.   Neurological: Negative for dizziness and headaches.   Psychiatric/Behavioral: Negative for depression.       Active Problem List  Patient Active Problem List   Diagnosis    Essential hypertension    Vitamin D deficiency disease    Microhematuria    Thyroid nodule    Morbid obesity with BMI of 40.0-44.9, adult    Gastroesophageal reflux disease without esophagitis    Arthralgia of knee       Past Surgical History  Past Surgical History:   Procedure Laterality Date    BREAST BIOPSY       SECTION  2004    X 1    CHOLECYSTECTOMY  2008    HYSTERECTOMY      LAPAROSCOPIC HYSTERECTOMY  2010    LSO       Family History  Family History   Problem Relation Age of Onset    Diabetes Mother     Heart disease Father      CHF    Colon cancer Neg Hx     Colon polyps Neg Hx        Social History  Social History     Social History    Marital  "status:      Spouse name: N/A    Number of children: N/A    Years of education: N/A     Occupational History     [employer]     Social History Main Topics    Smoking status: Never Smoker    Smokeless tobacco: Never Used    Alcohol use No    Drug use: No    Sexual activity: Yes     Partners: Male     Birth control/ protection: Surgical     Other Topics Concern    Not on file     Social History Narrative    No narrative on file       Medications and Allergies  Reviewed and updated.   Current Outpatient Prescriptions   Medication Sig    amLODIPine (NORVASC) 10 MG tablet Take 1 tablet (10 mg total) by mouth once daily. Due for an appt with PCP    esomeprazole (NEXIUM) 40 MG capsule Take 40 mg by mouth before breakfast.    ibuprofen (ADVIL,MOTRIN) 600 MG tablet Take 1 tablet (600 mg total) by mouth every 8 (eight) hours as needed.    POT BICARB/POTASSIUM CIT/CA (POTASSIUM BICARBONATE ORAL) Take by mouth.    spironolactone (ALDACTONE) 25 MG tablet TAKE 1 TABLET BY MOUTH TWICE DAILY    diclofenac sodium 1 % Gel Apply 2 g topically 4 (four) times daily.    KLOR-CON/EF disintegrating tablet DISSOLVE ONE TABLET IN WATER, THEN DRINK FOUR TIMES DAILY. DUE FOR AN APPOINTMENT WITH PCP     No current facility-administered medications for this visit.        Physical Exam  /80 (BP Location: Right arm, Patient Position: Sitting, BP Method: Large (Manual))   Pulse 95   Temp 98 °F (36.7 °C) (Oral)   Ht 5' 4" (1.626 m)   Wt 114.5 kg (252 lb 6.8 oz)   SpO2 96%   BMI 43.33 kg/m²   Physical Exam   Constitutional: She is oriented to person, place, and time. She appears well-developed and well-nourished.   HENT:   Head: Normocephalic and atraumatic.   Right Ear: Hearing, tympanic membrane, external ear and ear canal normal.   Left Ear: Hearing, tympanic membrane, external ear and ear canal normal.   Eyes: Conjunctivae and EOM are normal. Pupils are equal, round, and reactive to light.   Neck: " Normal range of motion. Neck supple.   Cardiovascular: Normal rate, regular rhythm and normal heart sounds.  Exam reveals no gallop and no friction rub.    No murmur heard.  Pulmonary/Chest: Breath sounds normal. No respiratory distress.   Abdominal: Soft. Bowel sounds are normal. She exhibits no distension. There is no tenderness.   Musculoskeletal: Normal range of motion.   Lymphadenopathy:     She has no cervical adenopathy.   Neurological: She is alert and oriented to person, place, and time.   Skin: Skin is warm.   Psychiatric: She has a normal mood and affect.         Assessment/Plan:  Niharika Gutierrez is a 53 y.o. female who presents today for :    Annual physical exam  Health Maintenance       Date Due Completion Date    Mammogram 04/05/2020 4/5/2018    Lipid Panel 12/02/2021 12/2/2016    Override on 2/15/2010: Done    Colonoscopy 06/18/2025 6/18/2015    TETANUS VACCINE 09/22/2027 9/22/2017 (Done)    Override on 9/22/2017: Done      I addressed all major concerns as it related to health maintenance.  All were ordered and scheduled based on the patients wishes.  Any additional health maintenance will be readdressed at the next physical if declined or deferred by the patient.    Thyroid nodule  -     US Soft Tissue Head Neck Thyroid; Future; Expected date: 04/11/2018  Noted in chart  Will obtain another ultrasound for monitoring    Essential hypertension  -     Lipid panel; Future; Expected date: 04/11/2018  -     TSH; Future; Expected date: 04/11/2018  The current medical regimen is effective;  continue present plan and medications.    Morbid obesity with BMI of 40.0-44.9, adult  The patient is asked to make an attempt to improve diet and exercise patterns to aid in medical management of this problem.    Vitamin D deficiency disease  -     Vitamin D; Future; Expected date: 04/11/2018    Arthralgia of knee, unspecified laterality  -     ibuprofen (ADVIL,MOTRIN) 600 MG tablet; Take 1 tablet (600 mg  total) by mouth every 8 (eight) hours as needed.  Dispense: 60 tablet; Refill: 0  -     diclofenac sodium 1 % Gel; Apply 2 g topically 4 (four) times daily.  Dispense: 100 g; Refill: 1  Recommend to decrease ibuprofen - may be cause of gastritis / gerd  Will try diclofenac to help decrease GI symptoms.     GERD  Continue f/u with Dr. Coello          Follow-up in about 6 months (around 10/11/2018) for yearly exam.

## 2018-04-13 ENCOUNTER — LAB VISIT (OUTPATIENT)
Dept: LAB | Facility: HOSPITAL | Age: 53
End: 2018-04-13
Attending: FAMILY MEDICINE
Payer: COMMERCIAL

## 2018-04-13 DIAGNOSIS — I10 ESSENTIAL HYPERTENSION: Chronic | ICD-10-CM

## 2018-04-13 DIAGNOSIS — E55.9 VITAMIN D DEFICIENCY DISEASE: Chronic | ICD-10-CM

## 2018-04-13 LAB
25(OH)D3+25(OH)D2 SERPL-MCNC: 18 NG/ML
CHOLEST SERPL-MCNC: 123 MG/DL
CHOLEST/HDLC SERPL: 2.5 {RATIO}
HDLC SERPL-MCNC: 50 MG/DL
HDLC SERPL: 40.7 %
LDLC SERPL CALC-MCNC: 58.2 MG/DL
NONHDLC SERPL-MCNC: 73 MG/DL
TRIGL SERPL-MCNC: 74 MG/DL
TSH SERPL DL<=0.005 MIU/L-ACNC: 1.4 UIU/ML

## 2018-04-13 PROCEDURE — 82306 VITAMIN D 25 HYDROXY: CPT

## 2018-04-13 PROCEDURE — 36415 COLL VENOUS BLD VENIPUNCTURE: CPT | Mod: PO

## 2018-04-13 PROCEDURE — 80061 LIPID PANEL: CPT

## 2018-04-13 PROCEDURE — 84443 ASSAY THYROID STIM HORMONE: CPT

## 2018-04-14 RX ORDER — AMLODIPINE BESYLATE 10 MG/1
TABLET ORAL
Qty: 30 TABLET | Refills: 0 | Status: SHIPPED | OUTPATIENT
Start: 2018-04-14 | End: 2018-06-12 | Stop reason: SDUPTHER

## 2018-04-19 ENCOUNTER — PATIENT MESSAGE (OUTPATIENT)
Dept: FAMILY MEDICINE | Facility: CLINIC | Age: 53
End: 2018-04-19

## 2018-04-29 ENCOUNTER — OFFICE VISIT (OUTPATIENT)
Dept: URGENT CARE | Facility: CLINIC | Age: 53
End: 2018-04-29
Payer: COMMERCIAL

## 2018-04-29 VITALS
WEIGHT: 250 LBS | HEIGHT: 64 IN | SYSTOLIC BLOOD PRESSURE: 139 MMHG | HEART RATE: 86 BPM | BODY MASS INDEX: 42.68 KG/M2 | DIASTOLIC BLOOD PRESSURE: 85 MMHG | OXYGEN SATURATION: 98 % | TEMPERATURE: 97 F

## 2018-04-29 DIAGNOSIS — J02.9 SORE THROAT: Primary | ICD-10-CM

## 2018-04-29 LAB
CTP QC/QA: YES
S PYO RRNA THROAT QL PROBE: NEGATIVE

## 2018-04-29 PROCEDURE — 99214 OFFICE O/P EST MOD 30 MIN: CPT | Mod: S$GLB,,, | Performed by: NURSE PRACTITIONER

## 2018-04-29 PROCEDURE — 3075F SYST BP GE 130 - 139MM HG: CPT | Mod: CPTII,S$GLB,, | Performed by: NURSE PRACTITIONER

## 2018-04-29 PROCEDURE — 87880 STREP A ASSAY W/OPTIC: CPT | Mod: QW,S$GLB,, | Performed by: NURSE PRACTITIONER

## 2018-04-29 PROCEDURE — 3079F DIAST BP 80-89 MM HG: CPT | Mod: CPTII,S$GLB,, | Performed by: NURSE PRACTITIONER

## 2018-04-29 RX ORDER — PREDNISONE 20 MG/1
20 TABLET ORAL DAILY
Qty: 3 TABLET | Refills: 0 | Status: SHIPPED | OUTPATIENT
Start: 2018-04-29 | End: 2018-05-02

## 2018-04-29 RX ORDER — AMOXICILLIN 875 MG/1
875 TABLET, FILM COATED ORAL 2 TIMES DAILY
Qty: 20 TABLET | Refills: 0 | Status: SHIPPED | OUTPATIENT
Start: 2018-04-29 | End: 2018-05-09

## 2018-04-29 NOTE — PROGRESS NOTES
"Subjective:       Patient ID: Niharika Gutierrez is a 53 y.o. female.    Vitals:  height is 5' 4" (1.626 m) and weight is 113.4 kg (250 lb). Her temperature is 97.1 °F (36.2 °C). Her blood pressure is 139/85 and her pulse is 86. Her oxygen saturation is 98%.     Chief Complaint: Sore Throat    Sore Throat    This is a new problem. The current episode started in the past 7 days. The problem has been gradually worsening. Associated symptoms include congestion, diarrhea, headaches and swollen glands. Pertinent negatives include no abdominal pain, coughing, ear pain, hoarse voice or shortness of breath. She has tried NSAIDs for the symptoms. The treatment provided mild relief.     Review of Systems   Constitution: Negative for chills, fever and malaise/fatigue.   HENT: Positive for congestion and sore throat. Negative for ear pain and hoarse voice.    Eyes: Negative for discharge and redness.   Cardiovascular: Negative for chest pain, dyspnea on exertion and leg swelling.   Respiratory: Negative for cough, shortness of breath, sputum production and wheezing.    Musculoskeletal: Negative for myalgias.   Gastrointestinal: Positive for diarrhea and nausea. Negative for abdominal pain.   Neurological: Positive for headaches.   All other systems reviewed and are negative.      Objective:      Physical Exam   Constitutional: She is oriented to person, place, and time. She appears well-developed and well-nourished. She is cooperative.  Non-toxic appearance. She does not appear ill. No distress.   HENT:   Head: Normocephalic and atraumatic.   Right Ear: Hearing, tympanic membrane, external ear and ear canal normal.   Left Ear: Hearing, tympanic membrane, external ear and ear canal normal.   Nose: Nose normal. No mucosal edema, rhinorrhea or nasal deformity. No epistaxis. Right sinus exhibits no maxillary sinus tenderness and no frontal sinus tenderness. Left sinus exhibits no maxillary sinus tenderness and no frontal " sinus tenderness.   Mouth/Throat: Uvula is midline and mucous membranes are normal. No trismus in the jaw. Normal dentition. No uvula swelling. Oropharyngeal exudate, posterior oropharyngeal edema and posterior oropharyngeal erythema present. No tonsillar abscesses. Tonsils are 3+ on the right. Tonsils are 3+ on the left. Tonsillar exudate.   Eyes: Conjunctivae and lids are normal. No scleral icterus.   Sclera clear bilat   Neck: Trachea normal, full passive range of motion without pain and phonation normal. Neck supple.   Cardiovascular: Normal rate, regular rhythm, normal heart sounds, intact distal pulses and normal pulses.    Pulmonary/Chest: Effort normal and breath sounds normal. No respiratory distress.   Abdominal: Soft. Normal appearance and bowel sounds are normal. She exhibits no distension. There is no tenderness.   Musculoskeletal: Normal range of motion. She exhibits no edema or deformity.   Lymphadenopathy:        Head (right side): No submental, no submandibular, no tonsillar, no preauricular and no posterior auricular adenopathy present.        Head (left side): No submental, no submandibular, no tonsillar, no preauricular and no posterior auricular adenopathy present.   Neurological: She is alert and oriented to person, place, and time. She exhibits normal muscle tone. Coordination normal.   Skin: Skin is warm, dry and intact. She is not diaphoretic. No pallor.   Psychiatric: She has a normal mood and affect. Her speech is normal and behavior is normal. Judgment and thought content normal. Cognition and memory are normal.   Nursing note and vitals reviewed.      Assessment:       1. Sore throat        Plan:         Sore throat  -     POCT rapid strep A  -     amoxicillin (AMOXIL) 875 MG tablet; Take 1 tablet (875 mg total) by mouth 2 (two) times daily.  Dispense: 20 tablet; Refill: 0  -     predniSONE (DELTASONE) 20 MG tablet; Take 1 tablet (20 mg total) by mouth once daily.  Dispense: 3 tablet;  Refill: 0  -     Aerobic culture

## 2018-04-29 NOTE — PATIENT INSTRUCTIONS

## 2018-05-01 ENCOUNTER — TELEPHONE (OUTPATIENT)
Dept: FAMILY MEDICINE | Facility: CLINIC | Age: 53
End: 2018-05-01

## 2018-05-01 ENCOUNTER — HOSPITAL ENCOUNTER (OUTPATIENT)
Dept: RADIOLOGY | Facility: HOSPITAL | Age: 53
Discharge: HOME | End: 2018-05-01
Attending: FAMILY MEDICINE
Payer: COMMERCIAL

## 2018-05-01 DIAGNOSIS — E04.1 THYROID NODULE: Primary | ICD-10-CM

## 2018-05-01 DIAGNOSIS — E04.1 THYROID NODULE: Chronic | ICD-10-CM

## 2018-05-01 PROCEDURE — 76536 US EXAM OF HEAD AND NECK: CPT | Mod: TC

## 2018-05-01 PROCEDURE — 76536 US EXAM OF HEAD AND NECK: CPT | Mod: 26,,, | Performed by: RADIOLOGY

## 2018-05-01 NOTE — TELEPHONE ENCOUNTER
----- Message from Janae Schwartz MD sent at 5/1/2018 12:07 PM CDT -----  Please contact patient.   Thyroid nodule is a little larger than previous ultrasound. I will refer you to interventional radiology for a fine needle biopsy.

## 2018-05-02 ENCOUNTER — PATIENT MESSAGE (OUTPATIENT)
Dept: FAMILY MEDICINE | Facility: CLINIC | Age: 53
End: 2018-05-02

## 2018-05-02 DIAGNOSIS — E04.1 THYROID NODULE: Primary | ICD-10-CM

## 2018-05-06 ENCOUNTER — TELEPHONE (OUTPATIENT)
Dept: URGENT CARE | Facility: CLINIC | Age: 53
End: 2018-05-06

## 2018-05-06 LAB
BACTERIA SPEC AEROBE CULT: ABNORMAL
BACTERIA SPEC CULT: ABNORMAL
BACTERIA SPEC CULT: ABNORMAL
OTHER ANTIBIOTIC SUSC ISLT: ABNORMAL

## 2018-05-14 DIAGNOSIS — E87.6 HYPOKALEMIA: ICD-10-CM

## 2018-05-15 RX ORDER — SPIRONOLACTONE 25 MG/1
TABLET ORAL
Qty: 180 TABLET | Refills: 0 | Status: SHIPPED | OUTPATIENT
Start: 2018-05-15 | End: 2018-08-11 | Stop reason: SDUPTHER

## 2018-05-16 ENCOUNTER — OFFICE VISIT (OUTPATIENT)
Dept: ENDOCRINOLOGY | Facility: CLINIC | Age: 53
End: 2018-05-16
Payer: COMMERCIAL

## 2018-05-16 ENCOUNTER — HOSPITAL ENCOUNTER (OUTPATIENT)
Dept: INTERVENTIONAL RADIOLOGY/VASCULAR | Facility: HOSPITAL | Age: 53
Discharge: HOME | End: 2018-05-16
Attending: FAMILY MEDICINE
Payer: COMMERCIAL

## 2018-05-16 VITALS
HEIGHT: 64 IN | HEART RATE: 84 BPM | DIASTOLIC BLOOD PRESSURE: 80 MMHG | WEIGHT: 252 LBS | SYSTOLIC BLOOD PRESSURE: 128 MMHG | BODY MASS INDEX: 43.02 KG/M2

## 2018-05-16 DIAGNOSIS — E87.6 HYPOKALEMIA: ICD-10-CM

## 2018-05-16 DIAGNOSIS — E04.1 THYROID NODULE: Primary | Chronic | ICD-10-CM

## 2018-05-16 DIAGNOSIS — E04.1 THYROID NODULE: ICD-10-CM

## 2018-05-16 DIAGNOSIS — I10 ESSENTIAL HYPERTENSION: Chronic | ICD-10-CM

## 2018-05-16 PROCEDURE — 76942 ECHO GUIDE FOR BIOPSY: CPT | Mod: 26,,, | Performed by: RADIOLOGY

## 2018-05-16 PROCEDURE — 10022 IR FNA WITH ULTRASOUND: CPT

## 2018-05-16 PROCEDURE — 88172 CYTP DX EVAL FNA 1ST EA SITE: CPT | Mod: 26,,, | Performed by: PATHOLOGY

## 2018-05-16 PROCEDURE — 99244 OFF/OP CNSLTJ NEW/EST MOD 40: CPT | Mod: S$GLB,,, | Performed by: INTERNAL MEDICINE

## 2018-05-16 PROCEDURE — 99999 PR PBB SHADOW E&M-EST. PATIENT-LVL III: CPT | Mod: PBBFAC,,, | Performed by: INTERNAL MEDICINE

## 2018-05-16 PROCEDURE — 10022 IR FNA WITH ULTRASOUND: CPT | Mod: LT,,, | Performed by: RADIOLOGY

## 2018-05-16 PROCEDURE — 88172 CYTP DX EVAL FNA 1ST EA SITE: CPT | Performed by: PATHOLOGY

## 2018-05-16 PROCEDURE — 88173 CYTOPATH EVAL FNA REPORT: CPT | Mod: 26,,, | Performed by: PATHOLOGY

## 2018-05-16 RX ORDER — STAVUDINE 15 MG/1
15 CAPSULE ORAL EVERY 12 HOURS
COMMUNITY
End: 2019-06-13

## 2018-05-16 NOTE — OP NOTE
[facility]  Interventional Radiology  Procedure - Outpatient    Date: 05/16/2018 Time: 11:10 AM    Pre-Op Diagnosis: Left thyroid nodule    Post-Op Diagnosis: same    Procedure Performed by: Jamal Wade MD    Assistant: none    Procedure: U/S guided FNA of left thyroid nodule    Specimen/Tissue Removed: 4 x 25 gauge needle FNA passes    Estimated Blood Loss: Less than 5 mL    Procedure Note/Findings: U/S guided FNA of left lobe thyroid nodule performed with 25 gauge needles. Pathologist indicated that specimen likely adequate.    Please refer to dictated report for additional details.

## 2018-05-16 NOTE — H&P
[facility]  History & Physical - Short Stay  Interventional Radiology    SUBJECTIVE:     Chief Complaint/Reason for Admission: Left thyroid nodule    Informant(s):  self and Electronic Health Record    History of Present Illness:  Niharika Gutierrez is a 53 y.o. female with a history of enlarging left thyroid nodule.    Patient presents for U/S guided FNA of left thyroid nodule.    Scheduled Meds:   Continuous Infusions:   PRN Meds:     Review of patient's allergies indicates:  No Known Allergies    Past Medical History:   Diagnosis Date    Hypertension     Hypokalemic syndrome     Thyroid disease     Thyroid nodule     Vitamin D deficiency disease      Past Surgical History:   Procedure Laterality Date    BREAST BIOPSY       SECTION  2004    X 1    CHOLECYSTECTOMY  2008    HYSTERECTOMY      LAPAROSCOPIC HYSTERECTOMY  2010    LSO     Family History   Problem Relation Age of Onset    Diabetes Mother     Heart disease Father         CHF    Colon cancer Neg Hx     Colon polyps Neg Hx      Social History   Substance Use Topics    Smoking status: Never Smoker    Smokeless tobacco: Never Used    Alcohol use No        Review of Systems:  ROS not obtained    OBJECTIVE:     Vital Signs (Most Recent):       Physical Exam:  alert and oriented    Laboratory  CBC:   Lab Results   Component Value Date/Time    WBC 9.65 2016 07:59 AM    RBC 4.24 2016 07:59 AM    HGB 12.0 2016 07:59 AM    HCT 37.1 2016 07:59 AM     (H) 2016 07:59 AM    MCV 88 2016 07:59 AM    MCH 28.3 2016 07:59 AM    MCHC 32.3 2016 07:59 AM       ASSESSMENT/PLAN:     Enlarging left thyroid nodule.    Patient will undergo U/S guided FNA of left thyroid nodule.    Sedation Plan: 1% lidocaine local only

## 2018-05-16 NOTE — LETTER
May 16, 2018      Janae Schwartz MD  [address]  [place] [address]           [facility] - Endo/Diab/Metab  [address]  [facility] [address]  Phone: [phone]  Fax: [phone]          Patient: Niharika Gutierrez   MR Number: [id number]   YOB: 1965   Date of Visit: 5/16/2018       Dear Dr. Janae Schwartz:    Thank you for referring Niharika Gutierrez to me for evaluation. Attached you will find relevant portions of my assessment and plan of care.    If you have questions, please do not hesitate to call me. I look forward to following Niharika Gutierrez along with you.    Sincerely,    J Carlos Case MD    Enclosure  CC:  No Recipients    If you would like to receive this communication electronically, please contact [email or web address] or [phone] to request more information on [employer] Link access.    For providers and/or their staff who would like to refer a patient to [facility], please contact us through our one-stop-shop provider referral line, [facility], at [phone].    If you feel you have received this communication in error or would no longer like to receive these types of communications, please e-mail [email or web address]

## 2018-05-16 NOTE — PROGRESS NOTES
Subjective:      Patient ID: Niharika Gutierrez is a 53 y.o. female.    Chief Complaint:  Thyroid Nodule    Referral from Dr. Schwartz    History of Present Illness  Ms. Gutierrez presents for evaluation of thyroid nodules. Last seen by Dr. Post in 8/2014. Has also seen Dr. Marcum, Dr. Marie and Katlyn in the past.     Has active history of thyroid nodules, hypertension and hypokalemia.    First noted to have thyroid nodules in 2014. Was initially palpated on exam then confirmed with ultrasound.     Denies family history of thyroid cancer.  TSH WNL  No personal history of head or neck irradiation    Has GERD which causes some occ difficulty with swallowing. No hoarseness or voice changes.    Previous thyroid FNA results ion 2014.  FINAL PATHOLOGIC DIAGNOSIS  THE [place] SYSTEM FOR REPORTING THYROID CYTOPATHOLOGY  II BENIGN  A MODERATE BUT NOT EXCESSIVE NUMBER OF INNOCUOUS FOLLICULAR CLUSTERS. COLLOID  PRESENT. SOME DISTENDED HISTIOCYTES.    Most recent thyroid USS dated 5/1/2018:  Nodule in the inferior pole of the left lobe of the thyroid, slightly more prominent when compared to prior examination.  This nodule meets criteria for fine-needle aspiration.       Low potassium present since at least 2010 years with extensive evaluation in the past without a formal diagnosis.   Review of Labs 2/12  Prior workup suggested renal potassium loss  Aldosterone 27.3 to 9.9   Currently taking spironolactone 25 mg PO BID and potassium 25 mEq 3 to 4 times daily      Review of Systems   Constitutional: Negative for unexpected weight change.   HENT: Negative for voice change.    Eyes: Negative for visual disturbance.   Respiratory: Negative for shortness of breath.    Cardiovascular: Negative for chest pain.   Gastrointestinal: Negative for abdominal pain.   Endocrine: Negative for cold intolerance.   Genitourinary: Positive for frequency.   Musculoskeletal: Negative for myalgias.   Skin: Negative for rash.       Objective:    Physical Exam   Constitutional: She is oriented to person, place, and time. She appears well-developed and well-nourished.   HENT:   Head: Normocephalic and atraumatic.   Right Ear: External ear normal.   Left Ear: External ear normal.   Nose: Nose normal.   Neck: No tracheal deviation present. No thyromegaly present.   Cardiovascular: Normal rate, regular rhythm and normal heart sounds.    No edema   Pulmonary/Chest: Effort normal and breath sounds normal.   Abdominal: Soft. Bowel sounds are normal. There is no tenderness.   Musculoskeletal:   Normal gait, no cyanosis or clubbing   Neurological: She is alert and oriented to person, place, and time. She has normal reflexes.   Vibration sense intact   Skin: Skin is warm and dry. No rash noted.   No nodules, no ulcers   Psychiatric: She has a normal mood and affect. Judgment normal.   Vitals reviewed.      Lab Review:   Results for SALONI ESCOBAR (MRN [id number]) as of 5/16/2018 07:41   Ref. Range 7/22/2014 16:28 11/12/2014 08:34 11/24/2015 08:50 12/2/2016 07:59 4/13/2018 07:51   TSH Latest Ref Range: 0.400 - 4.000 uIU/mL 0.803 1.007 1.167 0.985 1.395   T3, Total Latest Ref Range: 60 - 180 ng/dL 127       T3, Free Latest Ref Range: 2.3 - 4.2 pg/mL 2.8       T4 Total Latest Ref Range: 4.5 - 11.5 ug/dL 7.3       Free T4 Latest Ref Range: 0.71 - 1.51 ng/dL 1.08         Results for SALONI ESCOBAR (MRN [id number]) as of 5/16/2018 07:41   Ref. Range 4/17/2009 15:08   Aldosterone Latest Ref Range: 0 - 21 ng/dL 16   Cortisol Latest Units: ug/dl 3.6   11-Deoxycortisol Latest Ref Range: 10 - 79 ng/dL 17     Results for SALONI ESCOBAR (MRN [id number]) as of 5/16/2018 07:41   Ref. Range 4/17/2009 15:08   Renin Activity,Plasma Latest Units: NG/DL/H 87     Results for SALONI ESCOBAR (MRN [id number]) as of 5/16/2018 07:41   Ref. Range 8/4/2014 14:45   Renin Activity Latest Units: ng/mL/h 1.7       Assessment:     1. Thyroid nodule    2. Hypokalemia     3. Essential hypertension      Plan:     1.) Patient with multiple thyroid nodules  --TSH WNL  --No risk factors for thyroid cancer  --Having some mild dysphagia but unclear if related to the thyroid nodule  --Had benign FNA of large left lobe nodule in 2014  --Independently reviewed ultrasound images with the patient  --The left lobe dominant nodule has increased in size and repeat FNA should be performed  --Scheduled to undergo FNA today and will follow up results  --Discussed indications for surgery vs continued monitoring    2.) Extensive w/u in the past by multiple physicians without and obvious diagnosis  --Potassium and bp has been stable on the current regimen  --Offered to increase spironolactone to see if we could wean some of the potassium but she wishes to stay on the same regimen at this time    3.) Bp stable on current regimen    RTC in 1 year with repeat thyroid ultrasound at that time    Copy to Dr. Efren Case M.D. Staff Endocrinology

## 2018-05-16 NOTE — DISCHARGE SUMMARY
Radiology Discharge Summary      Admit date: 5/16/2018  9:56 AM  Discharge date: May 16, 2018    Instructions Given to patient: YesVerbal    Diet: Regular    Activity:NO Restrictions    Medications on discharge (List): Refer to Discharge Medication List    Hospital Course: U/S guided thyroid FNA    Description of Condition on Discharge: stable    Discharge Disposition: Home    Discharge Diagnosis: Left thyroid nodule    Follow up with Dr. Schwartz as scheduled.

## 2018-05-18 ENCOUNTER — PATIENT MESSAGE (OUTPATIENT)
Dept: ENDOCRINOLOGY | Facility: CLINIC | Age: 53
End: 2018-05-18

## 2018-05-24 ENCOUNTER — TELEPHONE (OUTPATIENT)
Dept: FAMILY MEDICINE | Facility: CLINIC | Age: 53
End: 2018-05-24

## 2018-05-24 NOTE — TELEPHONE ENCOUNTER
----- Message from Janae Schwartz MD sent at 5/24/2018  3:42 PM CDT -----  Please contact patient.   Biopsy shows - benign follicular nodule - no further work up at this time.

## 2018-05-25 ENCOUNTER — TELEPHONE (OUTPATIENT)
Dept: GASTROENTEROLOGY | Facility: CLINIC | Age: 53
End: 2018-05-25

## 2018-05-29 ENCOUNTER — TELEPHONE (OUTPATIENT)
Dept: GASTROENTEROLOGY | Facility: CLINIC | Age: 53
End: 2018-05-29

## 2018-05-29 NOTE — TELEPHONE ENCOUNTER
----- Message from Char Gamez sent at 5/29/2018 11:27 AM CDT -----  Contact: Self- [phone]  Mode- pt is returning a call to Suzanne in regards to rescheduling her appt with Dr. Coello that had to be canceled- please contact pt at [phone]

## 2018-05-29 NOTE — TELEPHONE ENCOUNTER
Spoke with patient.  Rescheduled her appointment from 5/28 with Dr.James Coello to 6/27 for 8:00 with Dr.James Coello.  Patient aware  is out of the office for the next 2 weeks and that is the first available time I can offer her.

## 2018-05-31 ENCOUNTER — PATIENT MESSAGE (OUTPATIENT)
Dept: FAMILY MEDICINE | Facility: CLINIC | Age: 53
End: 2018-05-31

## 2018-06-12 DIAGNOSIS — E87.6 HYPOKALEMIA: ICD-10-CM

## 2018-06-12 RX ORDER — POTASSIUM BICARBONATE 978 MG/1
TABLET, EFFERVESCENT ORAL
Qty: 120 TABLET | Refills: 0 | Status: SHIPPED | OUTPATIENT
Start: 2018-06-12 | End: 2018-07-10 | Stop reason: SDUPTHER

## 2018-06-12 RX ORDER — AMLODIPINE BESYLATE 10 MG/1
TABLET ORAL
Qty: 30 TABLET | Refills: 0 | Status: SHIPPED | OUTPATIENT
Start: 2018-06-12 | End: 2018-07-10 | Stop reason: SDUPTHER

## 2018-06-20 ENCOUNTER — PATIENT MESSAGE (OUTPATIENT)
Dept: FAMILY MEDICINE | Facility: CLINIC | Age: 53
End: 2018-06-20

## 2018-06-27 DIAGNOSIS — M25.569 ARTHRALGIA OF KNEE, UNSPECIFIED LATERALITY: ICD-10-CM

## 2018-06-27 RX ORDER — IBUPROFEN 600 MG/1
TABLET ORAL
Qty: 60 TABLET | Refills: 0 | Status: SHIPPED | OUTPATIENT
Start: 2018-06-27 | End: 2018-07-15 | Stop reason: SDUPTHER

## 2018-07-10 DIAGNOSIS — E87.6 HYPOKALEMIA: ICD-10-CM

## 2018-07-10 RX ORDER — AMLODIPINE BESYLATE 10 MG/1
TABLET ORAL
Qty: 30 TABLET | Refills: 0 | Status: SHIPPED | OUTPATIENT
Start: 2018-07-10 | End: 2018-08-13 | Stop reason: SDUPTHER

## 2018-07-10 RX ORDER — POTASSIUM BICARBONATE 978 MG/1
TABLET, EFFERVESCENT ORAL
Qty: 120 TABLET | Refills: 0 | Status: SHIPPED | OUTPATIENT
Start: 2018-07-10 | End: 2018-08-13 | Stop reason: SDUPTHER

## 2018-07-15 DIAGNOSIS — M25.569 ARTHRALGIA OF KNEE, UNSPECIFIED LATERALITY: ICD-10-CM

## 2018-07-15 RX ORDER — IBUPROFEN 600 MG/1
TABLET ORAL
Qty: 60 TABLET | Refills: 0 | Status: SHIPPED | OUTPATIENT
Start: 2018-07-15 | End: 2018-07-21

## 2018-07-21 ENCOUNTER — OFFICE VISIT (OUTPATIENT)
Dept: URGENT CARE | Facility: CLINIC | Age: 53
End: 2018-07-21
Payer: COMMERCIAL

## 2018-07-21 VITALS
OXYGEN SATURATION: 98 % | WEIGHT: 251 LBS | HEART RATE: 94 BPM | HEIGHT: 64 IN | SYSTOLIC BLOOD PRESSURE: 137 MMHG | BODY MASS INDEX: 42.85 KG/M2 | DIASTOLIC BLOOD PRESSURE: 87 MMHG | TEMPERATURE: 98 F

## 2018-07-21 DIAGNOSIS — M25.562 CHRONIC PAIN OF LEFT KNEE: Primary | ICD-10-CM

## 2018-07-21 DIAGNOSIS — G89.29 CHRONIC PAIN OF LEFT KNEE: Primary | ICD-10-CM

## 2018-07-21 PROCEDURE — 96372 THER/PROPH/DIAG INJ SC/IM: CPT | Mod: S$GLB,,, | Performed by: NURSE PRACTITIONER

## 2018-07-21 PROCEDURE — 3079F DIAST BP 80-89 MM HG: CPT | Mod: CPTII,S$GLB,, | Performed by: NURSE PRACTITIONER

## 2018-07-21 PROCEDURE — 99214 OFFICE O/P EST MOD 30 MIN: CPT | Mod: 25,S$GLB,, | Performed by: NURSE PRACTITIONER

## 2018-07-21 PROCEDURE — 3008F BODY MASS INDEX DOCD: CPT | Mod: CPTII,S$GLB,, | Performed by: NURSE PRACTITIONER

## 2018-07-21 PROCEDURE — 3075F SYST BP GE 130 - 139MM HG: CPT | Mod: CPTII,S$GLB,, | Performed by: NURSE PRACTITIONER

## 2018-07-21 RX ORDER — KETOROLAC TROMETHAMINE 30 MG/ML
30 INJECTION, SOLUTION INTRAMUSCULAR; INTRAVENOUS ONCE
Status: COMPLETED | OUTPATIENT
Start: 2018-07-21 | End: 2018-07-21

## 2018-07-21 RX ORDER — ETODOLAC 400 MG/1
400 TABLET, FILM COATED ORAL 2 TIMES DAILY
Qty: 20 TABLET | Refills: 0 | Status: SHIPPED | OUTPATIENT
Start: 2018-07-21 | End: 2018-07-31

## 2018-07-21 RX ADMIN — KETOROLAC TROMETHAMINE 30 MG: 30 INJECTION, SOLUTION INTRAMUSCULAR; INTRAVENOUS at 02:07

## 2018-07-21 NOTE — PATIENT INSTRUCTIONS
Arthralgia    Arthralgia is the term for pain in or around the joint. It is a symptom, not a disease. This pain may involve one or more joints. In some cases, the pain moves from joint to joint.  There are many causes for joint pain. These include:  · Injury  · Osteoarthritis (wearing out of the joint surface)  · Gout (inflammation of the joint due to crystals in the joint fluid)  · Infection inside the joint    · Bursitis (inflammation of the fluid-filled sacs around the joint)  · Autoimmune disorders such as rheumatoid arthritis or lupus  · Tendonitis (inflammation of chords that attach muscle to bone)  Home care  · Rest the involved joint(s) until your symptoms improve.   · You may be prescribed pain medicine. If none is prescribed, you may use acetaminophen or ibuprofen to control pain and inflammation.  Follow-up care  Follow up with your healthcare provider or as advised.  When to seek medical advice  Contact your healthcare provider right away if any of the following occurs:  · Pain, swelling, or redness of joint increases  · Pain worsens or recurs after a period of improvement  · Pain moves to other joints  · You cannot bear weight on the affected joint   · You cannot move the affected joint  · Joint appears deformed  · New rash appears  · Fever of 100.4ºF (38ºC) or higher, or as directed by your healthcare provider  Date Last Reviewed: 3/1/2017  © 4851-7502 [employer]. [address] [place] [address], [place] [id number]. All rights reserved. This information is not intended as a substitute for professional medical care. Always follow your healthcare professional's instructions.        ACE Wrap  Minor muscle or joint injuries are often treated with an elastic bandage. The bandage provides support and compression to the injured area. An elastic bandage is a stretchy, rolled bandage. Elastic bandages range in width from 2 to 6 inches. They can be used for a variety of injuries. The bandages are often  called ACE bandages, after the most common brand name.  If used correctly, elastic bandages help control swelling and ease pain. An elastic bandage is also a good reminder not to overuse the injured area. However, elastic bandages do not provide a lot of support and will not prevent reinjury.  Home care    To apply an elastic bandage:  · Check the skin before wrapping the injury. It should be clean, dry, and free of drainage.  · Start wrapping below the injury and work your way toward the body. For an ankle sprain, start wrapping around the foot and work up toward the calf. This will help control swelling.  · Overlap the edges of the bandage so it stays snuggly in place.  · Wrap the bandage firmly, but not too tightly. A tight bandage can increase swelling on either end of the bandage. Make sure the bandage is wrinkle free.  · Leave fingers and toes exposed.  · Secure ends of the bandage (even self-sticking ones) with clips or tape.  · Check frequently to ensure adequate circulation, especially in the fingers and toes. Loosen the bandage if there is local swelling, numbness, tingling, discomfort, coldness, or discoloration (skin pale or bluish in color).  · Rewrap the bandage as needed during the day. Reroll the bandage as you unwind it.  Continue using the elastic bandage until the pain and swelling are gone or as your healthcare provider advises.  If you have been told to ice the area, the ice can be secured in place with the elastic bandage. Wrap the ice pack with a thin towel to protect the skin. Do not put ice or an ice pack directly on the skin.  Ice the area for no more than 20 minutes at a time.    Follow-up care  Follow up with your healthcare provider, as advised.  When to seek medical advice  Call your healthcare provider for any of the following:  · Pain and swelling that doesn't get better or gets worse  · Trouble moving injured area  · Skin discoloration, numbness, or tingling that doesnt go away  after bandage is removed  Date Last Reviewed: 9/13/2015  © 2975-6445 [employer]. [address] [place] [address], [place] [address]. All rights reserved. This information is not intended as a substitute for professional medical care. Always follow your healthcare professional's instructions.        Knee Pain  Knee pain is very common. Its especially common in active people who put a lot of pressure on their knees, like runners. It affects women more often than men.  Your kneecap (patella) is a thick, round bone. It covers and protects the front portion of your knee joint. It moves along a groove in your thighbone (femur) as part of the patellofemoral joint. A layer of cartilage surrounds the underside of your kneecap. This layer protects it from grinding against your femur.  When this cartilage softens and breaks down, it can cause knee pain. This is partly because of repetitive stress. The stress irritates the lining of the joint. This causes pain in the underlying bone.  What causes knee pain?  Many things can cause knee pain. You may have more than one cause. Some of these include:  · Overuse of the knee joint  · The kneecap doesnt line up with the tissue around it  · Damage to small nerves in the area  · Damage to the ligament-like structure that holds the kneecap in place (retinaculum)  · Breakdown of the bone under the cartilage  · Swelling in the soft tissues around the kneecap  · Injury  You might be more likely to have knee pain if you:  · Exercise a lot  · Recently increased the intensity of your workouts  · Have a body mass index (BMI) greater than 25  · Have poor alignment of your kneecap  · Walk with your feet turned overly outward or inward  · Have weakness in surrounding muscle groups (inner quad or hip adductor muscles)  · Have too much tightness in surrounding muscle groups (hamstrings or iliotibial band)  · Have a recent history of injury to the area  · Are female  Symptoms of knee  pain  This type of knee pain is a dull, aching pain in the front of the knee in the area under and around the kneecap. This pain may start quickly or slowly. Your pain might be worse when you squat, run, or sit for a long time. You might also sometimes feel like your knee is giving out. You may have symptoms in one or both of your knees.  Diagnosing knee pain  Your healthcare provider will ask about your medical history and your symptoms. Be sure to describe any activities that make your knee pain worse. He or she will look at your knee. This will include tests of your range of motion, strength, and areas of pain of your knee. Your knee alignment will be checked.  Your healthcare provider will need to rule out other causes of your knee pain, such as arthritis. You may need an imaging test, such as an X-ray or MRI.  Treatment for knee pain  Treatments that can help ease your symptoms may include:  · Avoiding activities for a while that make your pain worse, returning to activity over time  · Icing the outside of your knee when it causes you pain  · Taking over-the-counter pain medicine  · Wearing a knee brace or taping your knee to support it  · Wearing special shoe inserts to help keep your feet in the proper alignment  · Doing special exercises to stretch and strengthen the muscles around your hip and your knee  These steps help most people manage knee pain. But some cases of knee pain need to be treated with surgery. You may need surgery right away. Or you may need it later if other treatments dont work. Your healthcare provider may refer you to an orthopedic surgeon. He or she will talk with you about your choices.  Preventing knee pain  Losing weight and correcting excess muscle tightness or muscle weakness may help lower your risk.  In some cases, you can prevent knee pain. To help prevent a flare-up of knee pain, you do these things:  · Regularly do all the exercises your doctor or physical therapist  advises  · Support your knee as advised by your doctor or physical therapist  · Increase training gradually, and ease up on training when needed  · Have an expert check your gait for running or other sporting activities  · Stretch properly before and after exercise  · Replace your running shoes regularly  · Lose excess weight     When to call your healthcare provider  Call your healthcare provider right away if:  · Your symptoms dont get better after a few weeks of treatment  · You have any new symptoms   Date Last Reviewed: 4/1/2017 © 2000-2017 [employer]. [address] [place], [place] [id number]. All rights reserved. This information is not intended as a substitute for professional medical care. Always follow your healthcare professional's instructions.        RICE     Rest an injury, elevate it, and use ice and compression as directed.   RICE stands for rest, ice, compression, and elevation. These can limit pain and swelling after an injury. RICE may be recommended to help treat fractures, sprains, strains, and bruises or bumps.   Home care  The following explain the details of RICE:  · Rest. Limit the use of the injured body part. This helps prevent further damage to the body part and gives it time to heal. In some cases, you may need a sling, brace, splint, or cast to help keep the body part still until it has healed.  · Ice. Applying ice right after an injury helps relieve pain and swelling. Wrap a bag of ice in a thin towel. Then, place it over the injured area. Do this for 10 to 15 minutes every 3 to 4 hours. Continue for the next 1 to 3 days or until your symptoms improve. Never put ice directly on your skin or ice an area longer than 15 minutes at a time.  · Compression. Putting pressure on an injury helps reduce swelling and provides support. Wrap the injured area firmly with an elastic bandage/wrap. Make sure not to wrap the bandage too tightly or you will cut off blood flow to the  injured area. If your bandage loosens, rewrap it.  · Elevation. Keeping an injury raised above the level of your heart reduces swelling, pain, and throbbing. For instance, if you have a broken leg, it may help to rest your leg on several pillows when sitting or lying down. Try to keep the injured area elevated for at least 2 to 3 hours per day.  Follow-up care  Follow up with your healthcare provider, or as advised.  When to seek medical advice  Call your healthcare provider right away if any of these occur:  · Fever of 100.4°F (38°C) or higher, or as directed by your healthcare provider  · Increased pain or swelling in the injured body part  · Injured body part becomes cold, blue, numb, or tingly  · Signs of infection. These include warmth in the skin, redness, drainage, or bad smell coming from the injured body part.  Date Last Reviewed: 1/18/2016  © 5760-6011 [employer]. [address] [place] [address]. All rights reserved. This information is not intended as a substitute for professional medical care. Always follow your healthcare professional's instructions.      -Knee brace applied today. Wear while out of bed until seeing Ortho on 08/02.  -Toradol shot today in the clinic.  -NSAID 2 times a day for the next 10 days.  -Rest, Ice, and Elevation.  -Follow up with Orthopedics.  Please follow up with your Primary care provider within 2-5 days if your signs and symptoms have not resolved or worsen.     If your condition worsens or fails to improve we recommend that you receive another evaluation at the emergency room immediately or contact your primary medical clinic to discuss your concerns.   You must understand that you have received an Urgent Care treatment only and that you may be released before all of your medical problems are known or treated. You, the patient, will arrange for follow up care as instructed.

## 2018-07-21 NOTE — PROGRESS NOTES
"Subjective:       Patient ID: Niharika Gutierrez is a 53 y.o. female.    Vitals:  height is 5' 4" (1.626 m) and weight is 113.9 kg (251 lb). Her temperature is 98.2 °F (36.8 °C). Her blood pressure is 137/87 and her pulse is 94. Her oxygen saturation is 98%.     Chief Complaint: Knee Pain    Pt stated that she has an appointment with Ortho next month   Pt would like something until then      Knee Pain    The incident occurred more than 1 week ago. There was no injury mechanism. The pain is present in the left knee. The quality of the pain is described as aching. The pain is at a severity of 8/10. The pain is moderate. She reports no foreign bodies present. She has tried NSAIDs and ice for the symptoms. The treatment provided no relief.     Review of Systems   Constitution: Negative for chills and fever.   HENT: Negative for sore throat.    Eyes: Negative for blurred vision.   Cardiovascular: Negative for chest pain.   Respiratory: Negative for shortness of breath.    Skin: Negative for rash.   Musculoskeletal: Positive for joint pain. Negative for back pain.   Gastrointestinal: Negative for abdominal pain, diarrhea, nausea and vomiting.   Neurological: Negative for headaches.   Psychiatric/Behavioral: The patient is not nervous/anxious.    All other systems reviewed and are negative.      Objective:      Physical Exam   Constitutional: She is oriented to person, place, and time. She appears well-developed and well-nourished. She is cooperative.  Non-toxic appearance. She does not appear ill. No distress.   HENT:   Head: Normocephalic and atraumatic. Head is without abrasion, without contusion and without laceration.   Right Ear: Hearing, tympanic membrane, external ear and ear canal normal. No hemotympanum.   Left Ear: Hearing, tympanic membrane, external ear and ear canal normal. No hemotympanum.   Nose: Nose normal. No mucosal edema, rhinorrhea or nasal deformity. No epistaxis. Right sinus exhibits no " maxillary sinus tenderness and no frontal sinus tenderness. Left sinus exhibits no maxillary sinus tenderness and no frontal sinus tenderness.   Mouth/Throat: Uvula is midline, oropharynx is clear and moist and mucous membranes are normal. No trismus in the jaw. Normal dentition. No uvula swelling. No posterior oropharyngeal erythema.   Eyes: Conjunctivae, EOM and lids are normal. Pupils are equal, round, and reactive to light. Right eye exhibits no discharge. Left eye exhibits no discharge. No scleral icterus.   Sclera clear bilat   Neck: Trachea normal, normal range of motion, full passive range of motion without pain and phonation normal. Neck supple. No spinous process tenderness and no muscular tenderness present. No neck rigidity. No tracheal deviation present.   Cardiovascular: Normal rate, regular rhythm, normal heart sounds, intact distal pulses and normal pulses.    Pulses:       Dorsalis pedis pulses are 2+ on the right side, and 2+ on the left side.        Posterior tibial pulses are 2+ on the right side, and 2+ on the left side.   Pulmonary/Chest: Effort normal and breath sounds normal. No respiratory distress.   Abdominal: Soft. Normal appearance and bowel sounds are normal. She exhibits no distension, no pulsatile midline mass and no mass. There is no tenderness.   Musculoskeletal: She exhibits no edema or deformity.        Left knee: She exhibits decreased range of motion (decreased ROM in left knee. Pain with flexion and extension ) and swelling. She exhibits no effusion, no ecchymosis, no deformity, no laceration, no erythema, normal alignment, no LCL laxity, normal patellar mobility, no bony tenderness, normal meniscus and no MCL laxity. Tenderness found. MCL tenderness noted.        Legs:  Neurological: She is alert and oriented to person, place, and time. She has normal strength. No cranial nerve deficit or sensory deficit. She exhibits normal muscle tone. She displays no seizure activity.  Coordination normal. GCS eye subscore is 4. GCS verbal subscore is 5. GCS motor subscore is 6.   Skin: Skin is warm, dry and intact. Capillary refill takes less than 2 seconds. No abrasion, no bruising, no burn, no ecchymosis and no laceration noted. She is not diaphoretic. No pallor.   Psychiatric: She has a normal mood and affect. Her speech is normal and behavior is normal. Judgment and thought content normal. Cognition and memory are normal.   Nursing note and vitals reviewed.      Assessment:       1. Chronic pain of left knee        Plan:         Chronic pain of left knee  -     HME - OTHER  -     ketorolac injection 30 mg; Inject 1 mL (30 mg total) into the muscle once.  -     etodolac (LODINE) 400 MG tablet; Take 1 tablet (400 mg total) by mouth 2 (two) times daily. for 10 days  Dispense: 20 tablet; Refill: 0      Patient Instructions       Arthralgia    Arthralgia is the term for pain in or around the joint. It is a symptom, not a disease. This pain may involve one or more joints. In some cases, the pain moves from joint to joint.  There are many causes for joint pain. These include:  · Injury  · Osteoarthritis (wearing out of the joint surface)  · Gout (inflammation of the joint due to crystals in the joint fluid)  · Infection inside the joint    · Bursitis (inflammation of the fluid-filled sacs around the joint)  · Autoimmune disorders such as rheumatoid arthritis or lupus  · Tendonitis (inflammation of chords that attach muscle to bone)  Home care  · Rest the involved joint(s) until your symptoms improve.   · You may be prescribed pain medicine. If none is prescribed, you may use acetaminophen or ibuprofen to control pain and inflammation.  Follow-up care  Follow up with your healthcare provider or as advised.  When to seek medical advice  Contact your healthcare provider right away if any of the following occurs:  · Pain, swelling, or redness of joint increases  · Pain worsens or recurs after a period  of improvement  · Pain moves to other joints  · You cannot bear weight on the affected joint   · You cannot move the affected joint  · Joint appears deformed  · New rash appears  · Fever of 100.4ºF (38ºC) or higher, or as directed by your healthcare provider  Date Last Reviewed: 3/1/2017  © 7704-1526 [employer]. [address] [place] [address], [place] [address]. All rights reserved. This information is not intended as a substitute for professional medical care. Always follow your healthcare professional's instructions.        ACE Wrap  Minor muscle or joint injuries are often treated with an elastic bandage. The bandage provides support and compression to the injured area. An elastic bandage is a stretchy, rolled bandage. Elastic bandages range in width from 2 to 6 inches. They can be used for a variety of injuries. The bandages are often called ACE bandages, after the most common brand name.  If used correctly, elastic bandages help control swelling and ease pain. An elastic bandage is also a good reminder not to overuse the injured area. However, elastic bandages do not provide a lot of support and will not prevent reinjury.  Home care    To apply an elastic bandage:  · Check the skin before wrapping the injury. It should be clean, dry, and free of drainage.  · Start wrapping below the injury and work your way toward the body. For an ankle sprain, start wrapping around the foot and work up toward the calf. This will help control swelling.  · Overlap the edges of the bandage so it stays snuggly in place.  · Wrap the bandage firmly, but not too tightly. A tight bandage can increase swelling on either end of the bandage. Make sure the bandage is wrinkle free.  · Leave fingers and toes exposed.  · Secure ends of the bandage (even self-sticking ones) with clips or tape.  · Check frequently to ensure adequate circulation, especially in the fingers and toes. Loosen the bandage if there is local swelling, numbness,  tingling, discomfort, coldness, or discoloration (skin pale or bluish in color).  · Rewrap the bandage as needed during the day. Reroll the bandage as you unwind it.  Continue using the elastic bandage until the pain and swelling are gone or as your healthcare provider advises.  If you have been told to ice the area, the ice can be secured in place with the elastic bandage. Wrap the ice pack with a thin towel to protect the skin. Do not put ice or an ice pack directly on the skin.  Ice the area for no more than 20 minutes at a time.    Follow-up care  Follow up with your healthcare provider, as advised.  When to seek medical advice  Call your healthcare provider for any of the following:  · Pain and swelling that doesn't get better or gets worse  · Trouble moving injured area  · Skin discoloration, numbness, or tingling that doesnt go away after bandage is removed  Date Last Reviewed: 9/13/2015 © 2000-2017 [employer]. [address] [place] [address]. All rights reserved. This information is not intended as a substitute for professional medical care. Always follow your healthcare professional's instructions.        Knee Pain  Knee pain is very common. Its especially common in active people who put a lot of pressure on their knees, like runners. It affects women more often than men.  Your kneecap (patella) is a thick, round bone. It covers and protects the front portion of your knee joint. It moves along a groove in your thighbone (femur) as part of the patellofemoral joint. A layer of cartilage surrounds the underside of your kneecap. This layer protects it from grinding against your femur.  When this cartilage softens and breaks down, it can cause knee pain. This is partly because of repetitive stress. The stress irritates the lining of the joint. This causes pain in the underlying bone.  What causes knee pain?  Many things can cause knee pain. You may have more than one cause. Some of  these include:  · Overuse of the knee joint  · The kneecap doesnt line up with the tissue around it  · Damage to small nerves in the area  · Damage to the ligament-like structure that holds the kneecap in place (retinaculum)  · Breakdown of the bone under the cartilage  · Swelling in the soft tissues around the kneecap  · Injury  You might be more likely to have knee pain if you:  · Exercise a lot  · Recently increased the intensity of your workouts  · Have a body mass index (BMI) greater than 25  · Have poor alignment of your kneecap  · Walk with your feet turned overly outward or inward  · Have weakness in surrounding muscle groups (inner quad or hip adductor muscles)  · Have too much tightness in surrounding muscle groups (hamstrings or iliotibial band)  · Have a recent history of injury to the area  · Are female  Symptoms of knee pain  This type of knee pain is a dull, aching pain in the front of the knee in the area under and around the kneecap. This pain may start quickly or slowly. Your pain might be worse when you squat, run, or sit for a long time. You might also sometimes feel like your knee is giving out. You may have symptoms in one or both of your knees.  Diagnosing knee pain  Your healthcare provider will ask about your medical history and your symptoms. Be sure to describe any activities that make your knee pain worse. He or she will look at your knee. This will include tests of your range of motion, strength, and areas of pain of your knee. Your knee alignment will be checked.  Your healthcare provider will need to rule out other causes of your knee pain, such as arthritis. You may need an imaging test, such as an X-ray or MRI.  Treatment for knee pain  Treatments that can help ease your symptoms may include:  · Avoiding activities for a while that make your pain worse, returning to activity over time  · Icing the outside of your knee when it causes you pain  · Taking over-the-counter pain  medicine  · Wearing a knee brace or taping your knee to support it  · Wearing special shoe inserts to help keep your feet in the proper alignment  · Doing special exercises to stretch and strengthen the muscles around your hip and your knee  These steps help most people manage knee pain. But some cases of knee pain need to be treated with surgery. You may need surgery right away. Or you may need it later if other treatments dont work. Your healthcare provider may refer you to an orthopedic surgeon. He or she will talk with you about your choices.  Preventing knee pain  Losing weight and correcting excess muscle tightness or muscle weakness may help lower your risk.  In some cases, you can prevent knee pain. To help prevent a flare-up of knee pain, you do these things:  · Regularly do all the exercises your doctor or physical therapist advises  · Support your knee as advised by your doctor or physical therapist  · Increase training gradually, and ease up on training when needed  · Have an expert check your gait for running or other sporting activities  · Stretch properly before and after exercise  · Replace your running shoes regularly  · Lose excess weight     When to call your healthcare provider  Call your healthcare provider right away if:  · Your symptoms dont get better after a few weeks of treatment  · You have any new symptoms   Date Last Reviewed: 4/1/2017  © 6032-3519 [employer]. [address] [place] [address], [place] [id number]. All rights reserved. This information is not intended as a substitute for professional medical care. Always follow your healthcare professional's instructions.        RICE     Rest an injury, elevate it, and use ice and compression as directed.   RICE stands for rest, ice, compression, and elevation. These can limit pain and swelling after an injury. RICE may be recommended to help treat fractures, sprains, strains, and bruises or bumps.   Home care  The following explain  the details of RICE:  · Rest. Limit the use of the injured body part. This helps prevent further damage to the body part and gives it time to heal. In some cases, you may need a sling, brace, splint, or cast to help keep the body part still until it has healed.  · Ice. Applying ice right after an injury helps relieve pain and swelling. Wrap a bag of ice in a thin towel. Then, place it over the injured area. Do this for 10 to 15 minutes every 3 to 4 hours. Continue for the next 1 to 3 days or until your symptoms improve. Never put ice directly on your skin or ice an area longer than 15 minutes at a time.  · Compression. Putting pressure on an injury helps reduce swelling and provides support. Wrap the injured area firmly with an elastic bandage/wrap. Make sure not to wrap the bandage too tightly or you will cut off blood flow to the injured area. If your bandage loosens, rewrap it.  · Elevation. Keeping an injury raised above the level of your heart reduces swelling, pain, and throbbing. For instance, if you have a broken leg, it may help to rest your leg on several pillows when sitting or lying down. Try to keep the injured area elevated for at least 2 to 3 hours per day.  Follow-up care  Follow up with your healthcare provider, or as advised.  When to seek medical advice  Call your healthcare provider right away if any of these occur:  · Fever of 100.4°F (38°C) or higher, or as directed by your healthcare provider  · Increased pain or swelling in the injured body part  · Injured body part becomes cold, blue, numb, or tingly  · Signs of infection. These include warmth in the skin, redness, drainage, or bad smell coming from the injured body part.  Date Last Reviewed: 1/18/2016  © 7207-8453 [email or web address]. [address] [place] [address], [place] [address]. All rights reserved. This information is not intended as a substitute for professional medical care. Always follow your healthcare professional's  instructions.      -Knee brace applied today. Wear while out of bed until seeing Ortho on 08/02.  -Toradol shot today in the clinic.  -NSAID 2 times a day for the next 10 days.  -Rest, Ice, and Elevation.  -Follow up with Orthopedics.  Please follow up with your Primary care provider within 2-5 days if your signs and symptoms have not resolved or worsen.     If your condition worsens or fails to improve we recommend that you receive another evaluation at the emergency room immediately or contact your primary medical clinic to discuss your concerns.   You must understand that you have received an Urgent Care treatment only and that you may be released before all of your medical problems are known or treated. You, the patient, will arrange for follow up care as instructed.

## 2018-08-03 DIAGNOSIS — M25.569 ARTHRALGIA OF KNEE, UNSPECIFIED LATERALITY: ICD-10-CM

## 2018-08-03 RX ORDER — IBUPROFEN 600 MG/1
TABLET ORAL
Qty: 60 TABLET | Refills: 0 | Status: SHIPPED | OUTPATIENT
Start: 2018-08-03 | End: 2018-08-31 | Stop reason: SDUPTHER

## 2018-08-11 DIAGNOSIS — E87.6 HYPOKALEMIA: ICD-10-CM

## 2018-08-11 RX ORDER — SPIRONOLACTONE 25 MG/1
TABLET ORAL
Qty: 180 TABLET | Refills: 0 | Status: SHIPPED | OUTPATIENT
Start: 2018-08-11 | End: 2018-11-09 | Stop reason: SDUPTHER

## 2018-08-13 DIAGNOSIS — E87.6 HYPOKALEMIA: ICD-10-CM

## 2018-08-13 RX ORDER — POTASSIUM BICARBONATE 978 MG/1
TABLET, EFFERVESCENT ORAL
Qty: 120 TABLET | Refills: 0 | Status: SHIPPED | OUTPATIENT
Start: 2018-08-13 | End: 2018-09-10 | Stop reason: SDUPTHER

## 2018-08-13 RX ORDER — AMLODIPINE BESYLATE 10 MG/1
TABLET ORAL
Qty: 30 TABLET | Refills: 0 | Status: SHIPPED | OUTPATIENT
Start: 2018-08-13 | End: 2018-09-10 | Stop reason: SDUPTHER

## 2018-08-31 DIAGNOSIS — M25.569 ARTHRALGIA OF KNEE, UNSPECIFIED LATERALITY: ICD-10-CM

## 2018-08-31 RX ORDER — IBUPROFEN 600 MG/1
TABLET ORAL
Qty: 60 TABLET | Refills: 0 | Status: SHIPPED | OUTPATIENT
Start: 2018-08-31 | End: 2018-11-09 | Stop reason: SDUPTHER

## 2018-09-10 DIAGNOSIS — E87.6 HYPOKALEMIA: ICD-10-CM

## 2018-09-10 RX ORDER — POTASSIUM BICARBONATE 978 MG/1
TABLET, EFFERVESCENT ORAL
Qty: 120 TABLET | Refills: 0 | Status: SHIPPED | OUTPATIENT
Start: 2018-09-10 | End: 2018-10-08 | Stop reason: SDUPTHER

## 2018-09-10 RX ORDER — AMLODIPINE BESYLATE 10 MG/1
TABLET ORAL
Qty: 30 TABLET | Refills: 0 | Status: SHIPPED | OUTPATIENT
Start: 2018-09-10 | End: 2018-10-08 | Stop reason: SDUPTHER

## 2018-10-08 DIAGNOSIS — E87.6 HYPOKALEMIA: ICD-10-CM

## 2018-10-08 RX ORDER — POTASSIUM BICARBONATE 978 MG/1
TABLET, EFFERVESCENT ORAL
Qty: 120 TABLET | Refills: 0 | Status: SHIPPED | OUTPATIENT
Start: 2018-10-08 | End: 2018-11-09 | Stop reason: SDUPTHER

## 2018-10-08 RX ORDER — AMLODIPINE BESYLATE 10 MG/1
TABLET ORAL
Qty: 30 TABLET | Refills: 0 | Status: SHIPPED | OUTPATIENT
Start: 2018-10-08 | End: 2018-11-09 | Stop reason: SDUPTHER

## 2018-11-09 ENCOUNTER — HOSPITAL ENCOUNTER (EMERGENCY)
Facility: HOSPITAL | Age: 53
Discharge: HOME | End: 2018-11-09
Attending: EMERGENCY MEDICINE
Payer: COMMERCIAL

## 2018-11-09 VITALS
RESPIRATION RATE: 16 BRPM | HEART RATE: 105 BPM | WEIGHT: 250 LBS | HEIGHT: 64 IN | BODY MASS INDEX: 42.68 KG/M2 | OXYGEN SATURATION: 100 % | DIASTOLIC BLOOD PRESSURE: 81 MMHG | SYSTOLIC BLOOD PRESSURE: 141 MMHG | TEMPERATURE: 99 F

## 2018-11-09 DIAGNOSIS — E87.6 HYPOKALEMIA: ICD-10-CM

## 2018-11-09 DIAGNOSIS — T88.7XXA NON-DOSE-RELATED ADVERSE EFFECT OF MEDICATION, INITIAL ENCOUNTER: ICD-10-CM

## 2018-11-09 DIAGNOSIS — M17.0 PRIMARY OSTEOARTHRITIS OF BOTH KNEES: Primary | ICD-10-CM

## 2018-11-09 PROCEDURE — 99283 EMERGENCY DEPT VISIT LOW MDM: CPT

## 2018-11-09 RX ORDER — SPIRONOLACTONE 25 MG/1
TABLET ORAL
Qty: 180 TABLET | Refills: 0 | Status: SHIPPED | OUTPATIENT
Start: 2018-11-09 | End: 2019-02-06 | Stop reason: SDUPTHER

## 2018-11-09 RX ORDER — AMLODIPINE BESYLATE 10 MG/1
TABLET ORAL
Qty: 30 TABLET | Refills: 0 | Status: SHIPPED | OUTPATIENT
Start: 2018-11-09 | End: 2018-12-08 | Stop reason: SDUPTHER

## 2018-11-09 RX ORDER — POTASSIUM BICARBONATE 978 MG/1
TABLET, EFFERVESCENT ORAL
Qty: 120 TABLET | Refills: 0 | Status: SHIPPED | OUTPATIENT
Start: 2018-11-09 | End: 2019-01-15 | Stop reason: SDUPTHER

## 2018-11-09 RX ORDER — ETODOLAC 300 MG/1
300 CAPSULE ORAL 2 TIMES DAILY
Start: 2018-11-09 | End: 2019-03-20

## 2018-11-09 NOTE — ED PROVIDER NOTES
"EM PHYSICIAN NOTE    HPI  This patient presents with a complaint of   Chief Complaint   Patient presents with    Joint Swelling     c/o left knee swelling. Patient reports "had a Synovis injection Wednesday [facility].       HPI:  This is a 53-year-old woman who presents the emergency department with complaint of left knee swelling. Two days ago the patient had a bilateral knee injection for chronic arthritis.  She has had these injections in the past and never problems.  She is concerned because she is now feeling is if the left knee is swollen and painful with range of motion and painful with ambulation.  There has been no fevers.  There has been no recent trauma. There has been no redness.  Patient tried 1 dose of etodolac which she has been prescribed in the past.    REVIEW of PMH, SOC History and Family History:  Past Medical History:   Diagnosis Date    Hypertension     Hypokalemic syndrome     Thyroid disease     Thyroid nodule     Vitamin D deficiency disease      Past Surgical History:   Procedure Laterality Date    BREAST BIOPSY       SECTION  2004    X 1    CHOLECYSTECTOMY  2008    COLONOSCOPY N/A 2015    [facility]    CYSTOSCOPY, WITH RETROGRADE PYELOGRAM Bilateral 2014    Per[facility] DEL [facility]    ESOPHAGOGASTRODUODENOSCOPY (EGD) N/A 2015    [facility] ([facility])    HYSTERECTOMY      LAPAROSCOPIC HYSTERECTOMY  2010    [facility]     Social History     Socioeconomic History    Marital status:      Spouse name: None    Number of children: None    Years of education: None    Highest education level: None   Social Needs    Financial resource strain: None    Food insecurity - worry: None    Food insecurity - inability: None    Transportation needs - medical: None    Transportation needs - non-medical: None   Occupational History     " Employer: [employer]   Tobacco Use    Smoking status: Never Smoker    Smokeless tobacco: Never Used   Substance and Sexual Activity    Alcohol use: No    Drug use: No    Sexual activity: Yes     Partners: Male     Birth control/protection: Surgical   Other Topics Concern    None   Social History Narrative    None     Patient Active Problem List   Diagnosis    Essential hypertension    Hypokalemia    Vitamin D deficiency disease    Microhematuria    Thyroid nodule    Morbid obesity with BMI of 40.0-44.9, adult    Gastroesophageal reflux disease without esophagitis    Arthralgia of knee     No current facility-administered medications for this encounter.      Current Outpatient Medications   Medication Sig Dispense Refill    amLODIPine (NORVASC) 10 MG tablet TAKE 1 TABLET(10 MG) BY MOUTH EVERY DAY 30 tablet 0    diclofenac sodium 1 % Gel Apply 2 g topically 4 (four) times daily. 100 g 1    esomeprazole (NEXIUM) 40 MG capsule Take 40 mg by mouth before breakfast.      ibuprofen (ADVIL,MOTRIN) 600 MG tablet TAKE 1 TABLET(600 MG) BY MOUTH EVERY 8 HOURS AS NEEDED 60 tablet 0    KLOR-CON/EF disintegrating tablet DISSOLVE 1 TABLET IN OUNCES OF WATER AND DRINK FOUR TIMES DAILY 120 tablet 0    POT BICARB/POTASSIUM CIT/CA (POTASSIUM BICARBONATE ORAL) Take by mouth.      spironolactone (ALDACTONE) 25 MG tablet TAKE 1 TABLET BY MOUTH TWICE DAILY 180 tablet 0    stavudine (ZERIT) 15 MG capsule Take 15 mg by mouth every 12 (twelve) hours.       Review of patient's allergies indicates:  No Known Allergies  Immunization History   Administered Date(s) Administered    Influenza 12/07/2011, 01/03/2015    Influenza - Quadrivalent 11/23/2015    Influenza - Quadrivalent - PF 01/22/2014, 12/01/2016     Family History   Problem Relation Age of Onset    Diabetes Mother     Heart disease Father         CHF    Colon cancer Neg Hx     Colon polyps Neg Hx        REVIEW of SYSTEMS  Source:  Patient  The  "nurse's notes and triage vital signs were reviewed.  GENERAL/CONSTITUTIONAL: There is no report of fever, fatigue, weakness, or unexplained weight loss.  CARDIOVASCULAR: There is no report of chest pain   RESPIRATORY: There is no report of cough or SOB  GASTROINTESTINAL: There is no report of nausea, vomiting, diarrhea  MUSCULOSKELETAL:  See HPI  SKIN AND BREASTS: There is no report of easy bruising, skin redness, skin rash.  HEMATOLOGIC/LYMPHATIC: There is no report of anemia, bleeding or clotting defects. There is no report of anticoagulant use.  The remainder of the ROS is negative.    PHYSICAL EXAMINATION    ED Triage Vitals [11/09/18 1035]   Enc Vitals Group      BP (!) 136/92      Pulse (!) 117      Resp 16      Temp 99 °F (37.2 °C)      Temp src Oral      SpO2 100 %      Weight 250 lb      Height 5' 4"      Head Circumference       Peak Flow       Pain Score       Pain Loc       Pain Edu?       Excl. in GC?      Vital signs and Pulse Ox reviewed in clinical context. Abnormalities noted: Hypertension and tachycardia noted and patient will be referred to primary care physician for close follow-up  Pt's level of consciousness is alert, and the patient is in moderate distress.  Skin: warm, pink and dry  Mucosa:moist  Head and Neck: WNL  Cardiac exam: RRR  Pulmonary exam: unlabored    Abd Exam:  Deferred  Musculoskeletal:  There is no evidence of effusion.  There is no warmth.  The patient has full range of motion although reports pain. There is mild joint cracking with range of motion, which the patient reports is new.  Neurologic: GCS 15. 5 over 5 strength, antalgic gait, cranial nerves intact, neck supple     Medical decision making: Nursing notes reviewed and incorporated  Impression:  Joint inflammation status post injection  Plan:  Anti-inflammatories and an Ace.  Follow up with PCP  Jose Ray MD, 11:12 AM 11/9/2018    This note was created using [employer].  This program may occasionally " misinterpret certain words and phrases.                   Jose Ray MD  11/09/18 2367

## 2018-11-09 NOTE — DISCHARGE INSTRUCTIONS
Watch carefully for any sign of infection, including redness, worse pain were swelling or fever.  If any of these occur you should report to the nearest emergency department or call your orthopedist for a recheck.

## 2018-11-10 ENCOUNTER — HOSPITAL ENCOUNTER (EMERGENCY)
Facility: HOSPITAL | Age: 53
Discharge: HOME | End: 2018-11-10
Attending: EMERGENCY MEDICINE
Payer: COMMERCIAL

## 2018-11-10 DIAGNOSIS — M79.604 PAIN IN BOTH LOWER EXTREMITIES: Primary | ICD-10-CM

## 2018-11-10 DIAGNOSIS — M79.605 PAIN IN BOTH LOWER EXTREMITIES: Primary | ICD-10-CM

## 2018-11-10 LAB
ALBUMIN SERPL BCP-MCNC: 3.1 G/DL
ALP SERPL-CCNC: 97 U/L
ALT SERPL W/O P-5'-P-CCNC: 27 U/L
ANION GAP SERPL CALC-SCNC: 8 MMOL/L
AST SERPL-CCNC: 32 U/L
BASOPHILS # BLD AUTO: 0.03 K/UL
BASOPHILS NFR BLD: 0.2 %
BILIRUB SERPL-MCNC: 0.3 MG/DL
BUN SERPL-MCNC: 11 MG/DL
CALCIUM SERPL-MCNC: 9.3 MG/DL
CHLORIDE SERPL-SCNC: 105 MMOL/L
CK SERPL-CCNC: 60 U/L
CO2 SERPL-SCNC: 28 MMOL/L
CREAT SERPL-MCNC: 0.8 MG/DL
DIFFERENTIAL METHOD: ABNORMAL
EOSINOPHIL # BLD AUTO: 0.3 K/UL
EOSINOPHIL NFR BLD: 2.1 %
ERYTHROCYTE [DISTWIDTH] IN BLOOD BY AUTOMATED COUNT: 15.7 %
EST. GFR  (AFRICAN AMERICAN): >60 ML/MIN/1.73 M^2
EST. GFR  (NON AFRICAN AMERICAN): >60 ML/MIN/1.73 M^2
GLUCOSE SERPL-MCNC: 118 MG/DL
HCT VFR BLD AUTO: 33.9 %
HGB BLD-MCNC: 11.1 G/DL
LYMPHOCYTES # BLD AUTO: 2.1 K/UL
LYMPHOCYTES NFR BLD: 14.5 %
MCH RBC QN AUTO: 28.2 PG
MCHC RBC AUTO-ENTMCNC: 32.7 G/DL
MCV RBC AUTO: 86 FL
MONOCYTES # BLD AUTO: 1 K/UL
MONOCYTES NFR BLD: 7 %
NEUTROPHILS # BLD AUTO: 11 K/UL
NEUTROPHILS NFR BLD: 76.2 %
PLATELET # BLD AUTO: 394 K/UL
PMV BLD AUTO: 8.8 FL
POTASSIUM SERPL-SCNC: 3.6 MMOL/L
PROT SERPL-MCNC: 8.3 G/DL
RBC # BLD AUTO: 3.93 M/UL
SODIUM SERPL-SCNC: 141 MMOL/L
TSH SERPL DL<=0.005 MIU/L-ACNC: 1.71 UIU/ML
WBC # BLD AUTO: 14.48 K/UL

## 2018-11-10 PROCEDURE — S0028 INJECTION, FAMOTIDINE, 20 MG: HCPCS | Performed by: EMERGENCY MEDICINE

## 2018-11-10 PROCEDURE — 85025 COMPLETE CBC W/AUTO DIFF WBC: CPT

## 2018-11-10 PROCEDURE — 99284 EMERGENCY DEPT VISIT MOD MDM: CPT | Mod: 25

## 2018-11-10 PROCEDURE — 96375 TX/PRO/DX INJ NEW DRUG ADDON: CPT

## 2018-11-10 PROCEDURE — 82550 ASSAY OF CK (CPK): CPT

## 2018-11-10 PROCEDURE — 84443 ASSAY THYROID STIM HORMONE: CPT

## 2018-11-10 PROCEDURE — 63600175 PHARM REV CODE 636 W HCPCS: Performed by: EMERGENCY MEDICINE

## 2018-11-10 PROCEDURE — 96374 THER/PROPH/DIAG INJ IV PUSH: CPT

## 2018-11-10 PROCEDURE — 25000003 PHARM REV CODE 250: Performed by: EMERGENCY MEDICINE

## 2018-11-10 PROCEDURE — 80053 COMPREHEN METABOLIC PANEL: CPT

## 2018-11-10 RX ORDER — PREDNISONE 50 MG/1
50 TABLET ORAL DAILY
Qty: 5 TABLET | Refills: 0 | Status: SHIPPED | OUTPATIENT
Start: 2018-11-10 | End: 2018-11-15

## 2018-11-10 RX ORDER — KETOROLAC TROMETHAMINE 30 MG/ML
15 INJECTION, SOLUTION INTRAMUSCULAR; INTRAVENOUS
Status: COMPLETED | OUTPATIENT
Start: 2018-11-10 | End: 2018-11-10

## 2018-11-10 RX ORDER — DEXAMETHASONE SODIUM PHOSPHATE 4 MG/ML
12 INJECTION, SOLUTION INTRA-ARTICULAR; INTRALESIONAL; INTRAMUSCULAR; INTRAVENOUS; SOFT TISSUE
Status: COMPLETED | OUTPATIENT
Start: 2018-11-10 | End: 2018-11-10

## 2018-11-10 RX ORDER — DIPHENHYDRAMINE HYDROCHLORIDE 50 MG/ML
50 INJECTION INTRAMUSCULAR; INTRAVENOUS
Status: COMPLETED | OUTPATIENT
Start: 2018-11-10 | End: 2018-11-10

## 2018-11-10 RX ORDER — CYCLOBENZAPRINE HCL 10 MG
10 TABLET ORAL 3 TIMES DAILY PRN
Qty: 30 TABLET | Refills: 0 | Status: SHIPPED | OUTPATIENT
Start: 2018-11-10 | End: 2018-11-15

## 2018-11-10 RX ORDER — FAMOTIDINE 10 MG/ML
20 INJECTION INTRAVENOUS
Status: COMPLETED | OUTPATIENT
Start: 2018-11-10 | End: 2018-11-10

## 2018-11-10 RX ADMIN — DEXAMETHASONE SODIUM PHOSPHATE 12 MG: 4 INJECTION, SOLUTION INTRA-ARTICULAR; INTRALESIONAL; INTRAMUSCULAR; INTRAVENOUS; SOFT TISSUE at 08:11

## 2018-11-10 RX ADMIN — DIPHENHYDRAMINE HYDROCHLORIDE 50 MG: 50 INJECTION, SOLUTION INTRAMUSCULAR; INTRAVENOUS at 08:11

## 2018-11-10 RX ADMIN — KETOROLAC TROMETHAMINE 15 MG: 30 INJECTION, SOLUTION INTRAMUSCULAR at 08:11

## 2018-11-10 RX ADMIN — FAMOTIDINE 20 MG: 10 INJECTION, SOLUTION INTRAVENOUS at 08:11

## 2018-11-11 VITALS
BODY MASS INDEX: 40.97 KG/M2 | SYSTOLIC BLOOD PRESSURE: 139 MMHG | TEMPERATURE: 99 F | HEART RATE: 94 BPM | WEIGHT: 240 LBS | HEIGHT: 64 IN | OXYGEN SATURATION: 95 % | DIASTOLIC BLOOD PRESSURE: 70 MMHG | RESPIRATION RATE: 18 BRPM

## 2018-11-11 NOTE — ED PROVIDER NOTES
"Encounter Date: 11/10/2018    SCRIBE #1 NOTE: I, Rosendo Parrish, am scribing for, and in the presence of,  Cory Gutierrez MD. I have scribed the following portions of the note - Other sections scribed: HPI .       History     Chief Complaint   Patient presents with    Knee Pain     Pt states she got injections in both knees on Wed for arthritis. Pt seen yesterday at ED for pain in L knee. Today pain in both knees with increasing stiffness      CC: Knee Pain    HPI  The patient is a 52 y/o female with past medical hx of HTN, hypokalemic syndrome, thyroid disease, thyroid nodule, and vitamin D deficiency that presents for emergent consideration of worsening,  bilateral thigh pain. She reports hx of Synovis injections, noting no previous complications. Her most recent injection was x3 days ago, with onset of her symptoms beginning the next morning. She notes that the pain started in the L knee but is now in both knees. She describes the pain as a "stiffiness up my thighs", that is exacerbated by movement. She was evaluated by her orthopedist and an [facility] ED yesterday, both doctors stating that they believed it was just inflammation. Pt has been taking diclofenac and tylenol for her pain with some relief. The pt is also c/o diarrhea that began yesterday. She denies fevers, nausea, vomiting, or abdominal pain.       The history is provided by the patient. No  was used.     Review of patient's allergies indicates:  No Known Allergies  Past Medical History:   Diagnosis Date    Hypertension     Hypokalemic syndrome     Thyroid disease     Thyroid nodule     Vitamin D deficiency disease      Past Surgical History:   Procedure Laterality Date    BREAST BIOPSY       SECTION  2004    X 1    CHOLECYSTECTOMY  2008    COLONOSCOPY N/A 2015    [facility] at [facility] ([facility])    CYSTOSCOPY, WITH RETROGRADE PYELOGRAM Bilateral 2014    Performed by MARIA DEL CARMEN Martinez " MD Britt at [facility]    ESOPHAGOGASTRODUODENOSCOPY (EGD) N/A 6/18/2015    Performed [facility] (4TH FLR)    HYSTERECTOMY      LAPAROSCOPIC HYSTERECTOMY  2010    [facility]     Family History   Problem Relation Age of Onset    Diabetes Mother     Heart disease Father         CHF    Colon cancer Neg Hx     Colon polyps Neg Hx      Social History     Tobacco Use    Smoking status: Never Smoker    Smokeless tobacco: Never Used   Substance Use Topics    Alcohol use: No    Drug use: No     Review of Systems   Constitutional: Negative for appetite change, chills and fever.   HENT: Negative for congestion, rhinorrhea and sore throat.    Eyes: Negative for visual disturbance.   Respiratory: Negative for cough and shortness of breath.    Cardiovascular: Negative for chest pain.   Gastrointestinal: Positive for diarrhea. Negative for abdominal pain, nausea and vomiting.   Genitourinary: Negative for dysuria.   Musculoskeletal: Positive for myalgias (Bilateral knees and thighs). Negative for back pain, gait problem and neck pain.   Skin: Negative for rash.   Neurological: Negative for syncope and weakness.   All other systems reviewed and are negative.      Physical Exam     Initial Vitals [11/10/18 1927]   BP Pulse Resp Temp SpO2   138/80 110 18 98.4 °F (36.9 °C) 100 %      MAP       --         Physical Exam    Nursing note and vitals reviewed.  Constitutional: She appears well-developed and well-nourished.   HENT:   Head: Normocephalic and atraumatic.   Eyes: Conjunctivae and EOM are normal. Pupils are equal, round, and reactive to light.   Neck: Normal range of motion.   Cardiovascular: Normal rate.   Pulmonary/Chest: No respiratory distress.   Abdominal: She exhibits no distension.   Musculoskeletal: Normal range of motion.   Neurological: She is alert. No cranial nerve deficit. GCS score is 15. GCS eye subscore is 4. GCS verbal subscore is 5. GCS motor subscore is 6.   Skin: Skin is warm and  dry.   Psychiatric: She has a normal mood and affect. Thought content normal.     ttp proximal thigh muscles  Passive rom intact b/l LE, no erythema/cellulitis b/l knees    ED Course   Procedures  Labs Reviewed   CBC W/ AUTO DIFFERENTIAL   COMPREHENSIVE METABOLIC PANEL   CK   TSH          Imaging Results    None          Medical Decision Making:   Initial Assessment:   53 y.o. female here for evaluation of b/l thigh pain after recent intrajoint Synovis knee injections at ortho  ED Management:  Clinically does not have septic joint. May represent myositis/med sensitivity. Labs reviewed and non acute. Pt feeling much better. Will discharge on steroids, have her continue nsaids. F/u with her ortho. Return precautions.            Scribe Attestation:   Scribe #1: I performed the above scribed service and the documentation accurately describes the services I performed. I attest to the accuracy of the note.    Attending Attestation:           Physician Attestation for Scribe:  Physician Attestation Statement for Scribe #1: I, Cory Gutierrez MD, reviewed documentation, as scribed by Rosendo Parrish in my presence, and it is both accurate and complete.           Labs Reviewed   CBC W/ AUTO DIFFERENTIAL - Abnormal; Notable for the following components:       Result Value    WBC 14.48 (*)     RBC 3.93 (*)     Hemoglobin 11.1 (*)     Hematocrit 33.9 (*)     RDW 15.7 (*)     Platelets 394 (*)     MPV 8.8 (*)     Gran # (ANC) 11.0 (*)     Gran% 76.2 (*)     Lymph% 14.5 (*)     All other components within normal limits   COMPREHENSIVE METABOLIC PANEL - Abnormal; Notable for the following components:    Glucose 118 (*)     Albumin 3.1 (*)     All other components within normal limits   CK   TSH                Clinical Impression:   There were no encounter diagnoses.    Leg pain                         Cory Gutierrez MD  11/10/18 3422

## 2018-11-11 NOTE — ED TRIAGE NOTES
Pt arrived to the ED due to lower leg extremity and knee pain bilaterally that started yesterday and worsened today. Pt reports a hx of arthritis.

## 2018-11-15 ENCOUNTER — OFFICE VISIT (OUTPATIENT)
Dept: URGENT CARE | Facility: CLINIC | Age: 53
End: 2018-11-15
Payer: COMMERCIAL

## 2018-11-15 VITALS
WEIGHT: 240 LBS | HEART RATE: 64 BPM | OXYGEN SATURATION: 97 % | HEIGHT: 64 IN | DIASTOLIC BLOOD PRESSURE: 80 MMHG | BODY MASS INDEX: 40.97 KG/M2 | SYSTOLIC BLOOD PRESSURE: 134 MMHG | TEMPERATURE: 98 F

## 2018-11-15 DIAGNOSIS — K21.9 GASTROESOPHAGEAL REFLUX DISEASE, ESOPHAGITIS PRESENCE NOT SPECIFIED: Primary | ICD-10-CM

## 2018-11-15 PROCEDURE — 99213 OFFICE O/P EST LOW 20 MIN: CPT | Mod: S$GLB,,, | Performed by: NURSE PRACTITIONER

## 2018-11-15 RX ORDER — DIPHENHYDRAMINE HCL 12.5MG/5ML
12.5 ELIXIR ORAL
Status: DISCONTINUED | OUTPATIENT
Start: 2018-11-15 | End: 2018-11-15

## 2018-11-15 RX ORDER — MAG HYDROX/ALUMINUM HYD/SIMETH 200-200-20
30 SUSPENSION, ORAL (FINAL DOSE FORM) ORAL
Status: COMPLETED | OUTPATIENT
Start: 2018-11-15 | End: 2018-11-15

## 2018-11-15 RX ORDER — LIDOCAINE HYDROCHLORIDE 20 MG/ML
2 SOLUTION OROPHARYNGEAL
Status: COMPLETED | OUTPATIENT
Start: 2018-11-15 | End: 2018-11-15

## 2018-11-15 RX ORDER — DIPHENHYDRAMINE HCL 12.5MG/5ML
12.5 LIQUID (ML) ORAL
Status: COMPLETED | OUTPATIENT
Start: 2018-11-15 | End: 2018-11-15

## 2018-11-15 RX ADMIN — Medication 12.5 MG: at 01:11

## 2018-11-15 RX ADMIN — Medication 30 ML: at 01:11

## 2018-11-15 RX ADMIN — LIDOCAINE HYDROCHLORIDE 2 ML: 20 SOLUTION OROPHARYNGEAL at 01:11

## 2018-11-15 NOTE — PROGRESS NOTES
"Subjective:       Patient ID: Niharika Gutierrez is a 53 y.o. female.    Vitals:  height is 5' 4" (1.626 m) and weight is 108.9 kg (240 lb). Her temperature is 98.3 °F (36.8 °C). Her blood pressure is 134/80 and her pulse is 64. Her oxygen saturation is 97%.     Chief Complaint: Gastroesophageal Reflux    Pt reports having increase in heartburn and increase in gas , reports the nexium is not working , she reports she is on new meds for her knee and is not sure if it is causing the increase in reflux      Heartburn   She complains of heartburn. She reports no chest pain, no nausea or no wheezing. This is a new problem. The current episode started in the past 7 days.       Constitution: Negative. Negative for activity change and appetite change.   HENT: Negative.    Neck: negative.   Cardiovascular: Negative.  Negative for chest pain, palpitations and sob on exertion.   Eyes: Negative.    Respiratory: Negative.  Negative for chest tightness, shortness of breath and wheezing.    Gastrointestinal: Positive for abdominal bloating and heartburn. Negative for nausea, vomiting and diarrhea.   Genitourinary: Negative.    Musculoskeletal: Negative.  Negative for pain.   Allergic/Immunologic: Negative.    Neurological: Negative.  Negative for dizziness, headaches, numbness and tingling.       Objective:      Physical Exam   Constitutional: She is oriented to person, place, and time. She appears well-developed and well-nourished. She is cooperative.  Non-toxic appearance. She does not appear ill. No distress.   HENT:   Head: Normocephalic and atraumatic.   Right Ear: Hearing, tympanic membrane, external ear and ear canal normal.   Left Ear: Hearing, tympanic membrane, external ear and ear canal normal.   Nose: Nose normal. No mucosal edema, rhinorrhea or nasal deformity. No epistaxis. Right sinus exhibits no maxillary sinus tenderness and no frontal sinus tenderness. Left sinus exhibits no maxillary sinus tenderness and " no frontal sinus tenderness.   Mouth/Throat: Uvula is midline, oropharynx is clear and moist and mucous membranes are normal. No trismus in the jaw. Normal dentition. No uvula swelling. No posterior oropharyngeal erythema.   Eyes: Conjunctivae and lids are normal. Right eye exhibits no discharge. Left eye exhibits no discharge. No scleral icterus.   Sclera clear bilat   Neck: Trachea normal, normal range of motion, full passive range of motion without pain and phonation normal. Neck supple.   Cardiovascular: Normal rate, regular rhythm, normal heart sounds, intact distal pulses and normal pulses.   Pulmonary/Chest: Effort normal and breath sounds normal. No respiratory distress.   Abdominal: Soft. Normal appearance and bowel sounds are normal. She exhibits no distension, no pulsatile midline mass and no mass. There is no tenderness.   Musculoskeletal: Normal range of motion. She exhibits no edema or deformity.   Neurological: She is alert and oriented to person, place, and time. She exhibits normal muscle tone. Coordination normal.   Skin: Skin is warm, dry and intact. She is not diaphoretic. No pallor.   Psychiatric: She has a normal mood and affect. Her speech is normal and behavior is normal. Judgment and thought content normal. Cognition and memory are normal.   Nursing note and vitals reviewed.      Assessment:       1. Gastroesophageal reflux disease, esophagitis presence not specified        Plan:         Gastroesophageal reflux disease, esophagitis presence not specified  -     (pyxis) gi cocktail (mylanta 30 mL, lidocaine 2 % viscous 10 mL, dicyclomine 10 mL) 50 mL  -     ranitidine (ZANTAC) 300 MG tablet; Take 1 tablet (300 mg total) by mouth 2 (two) times daily.  Dispense: 30 tablet; Refill: 1  -     lidocaine HCl 2% oral solution 2 mL  -     aluminum-magnesium hydroxide-simethicone 200-200-20 mg/5 mL suspension 30 mL  -     Discontinue: diphenhydrAMINE 12.5 mg/5 mL elixir 12.5 mg    Other orders  -      diphenhydrAMINE 12.5 mg/5 mL liquid 12.5 mg      Lifestyle Changes for Controlling GERD  When you have GERD, stomach acid feels as if its backing up toward your mouth. Whether or not you take medicine to control your GERD, your symptoms can often be improved with lifestyle changes. Talk to your healthcare provider about the following suggestions. These suggestions may help you get relief from your symptoms.      Raise your head  Reflux is more likely to strike when youre lying down flat, because stomach fluid can flow backward more easily. Raising the head of your bed 4 to 6 inches can help. To do this:  · Slide blocks or books under the legs at the head of your bed. Or, place a wedge under the mattress. Many [email or web address] can make a suitable wedge for you. The wedge should run from your waist to the top of your head.  · Dont just prop your head on several pillows. This increases pressure on your stomach. It can make GERD worse.  Watch your eating habits  Certain foods may increase the acid in your stomach or relax the lower esophageal sphincter. This makes GERD more likely. Its best to avoid the following if they cause you symptoms:  · Coffee, tea, and carbonated drinks (with and without caffeine)  · Fatty, fried, or spicy food  · Mint, chocolate, onions, and tomatoes  · Peppermint  · Any other foods that seem to irritate your stomach or cause you pain  Relieve the pressure  Tips include the following:  · Eat smaller meals, even if you have to eat more often.  · Dont lie down right after you eat. Wait a few hours for your stomach to empty.  · Avoid tight belts and tight-fitting clothes.  · Lose excess weight.  Tobacco and alcohol  Avoid smoking tobacco and drinking alcohol. They can make GERD symptoms worse.  Date Last Reviewed: 7/1/2016  © 6118-0297 [employer]. [address] [place] [address], [place] [address]. All rights reserved. This information is not intended as a substitute for professional medical  care. Always follow your healthcare professional's instructions.      Please follow up with your Primary care provider within 2-5 days if your signs and symptoms have not resolved or worsen.     If your condition worsens or fails to improve we recommend that you receive another evaluation at the emergency room immediately or contact your primary medical clinic to discuss your concerns.   You must understand that you have received an Urgent Care treatment only and that you may be released before all of your medical problems are known or treated. You, the patient, will arrange for follow up care as instructed.

## 2018-11-15 NOTE — PATIENT INSTRUCTIONS
Lifestyle Changes for Controlling GERD  When you have GERD, stomach acid feels as if its backing up toward your mouth. Whether or not you take medicine to control your GERD, your symptoms can often be improved with lifestyle changes. Talk to your healthcare provider about the following suggestions. These suggestions may help you get relief from your symptoms.      Raise your head  Reflux is more likely to strike when youre lying down flat, because stomach fluid can flow backward more easily. Raising the head of your bed 4 to 6 inches can help. To do this:  · Slide blocks or books under the legs at the head of your bed. Or, place a wedge under the mattress. Many [employer] can make a suitable wedge for you. The wedge should run from your waist to the top of your head.  · Dont just prop your head on several pillows. This increases pressure on your stomach. It can make GERD worse.  Watch your eating habits  Certain foods may increase the acid in your stomach or relax the lower esophageal sphincter. This makes GERD more likely. Its best to avoid the following if they cause you symptoms:  · Coffee, tea, and carbonated drinks (with and without caffeine)  · Fatty, fried, or spicy food  · Mint, chocolate, onions, and tomatoes  · Peppermint  · Any other foods that seem to irritate your stomach or cause you pain  Relieve the pressure  Tips include the following:  · Eat smaller meals, even if you have to eat more often.  · Dont lie down right after you eat. Wait a few hours for your stomach to empty.  · Avoid tight belts and tight-fitting clothes.  · Lose excess weight.  Tobacco and alcohol  Avoid smoking tobacco and drinking alcohol. They can make GERD symptoms worse.  Date Last Reviewed: 7/1/2016  © 9639-4794 [employer]. [address] [place] [address], [place] [address]. All rights reserved. This information is not intended as a substitute for professional medical care. Always follow your healthcare  professional's instructions.      Please follow up with your Primary care provider within 2-5 days if your signs and symptoms have not resolved or worsen.     If your condition worsens or fails to improve we recommend that you receive another evaluation at the emergency room immediately or contact your primary medical clinic to discuss your concerns.   You must understand that you have received an Urgent Care treatment only and that you may be released before all of your medical problems are known or treated. You, the patient, will arrange for follow up care as instructed.

## 2018-12-04 ENCOUNTER — OFFICE VISIT (OUTPATIENT)
Dept: URGENT CARE | Facility: CLINIC | Age: 53
End: 2018-12-04
Payer: COMMERCIAL

## 2018-12-04 VITALS
TEMPERATURE: 99 F | DIASTOLIC BLOOD PRESSURE: 84 MMHG | HEIGHT: 64 IN | SYSTOLIC BLOOD PRESSURE: 136 MMHG | OXYGEN SATURATION: 98 % | BODY MASS INDEX: 40.97 KG/M2 | WEIGHT: 240 LBS | HEART RATE: 86 BPM

## 2018-12-04 DIAGNOSIS — J02.9 SORE THROAT: ICD-10-CM

## 2018-12-04 DIAGNOSIS — J32.9 SINUSITIS, UNSPECIFIED CHRONICITY, UNSPECIFIED LOCATION: Primary | ICD-10-CM

## 2018-12-04 LAB
CTP QC/QA: YES
S PYO RRNA THROAT QL PROBE: NEGATIVE

## 2018-12-04 PROCEDURE — 3008F BODY MASS INDEX DOCD: CPT | Mod: CPTII,S$GLB,, | Performed by: NURSE PRACTITIONER

## 2018-12-04 PROCEDURE — 3079F DIAST BP 80-89 MM HG: CPT | Mod: CPTII,S$GLB,, | Performed by: NURSE PRACTITIONER

## 2018-12-04 PROCEDURE — 87880 STREP A ASSAY W/OPTIC: CPT | Mod: QW,S$GLB,, | Performed by: NURSE PRACTITIONER

## 2018-12-04 PROCEDURE — 3075F SYST BP GE 130 - 139MM HG: CPT | Mod: CPTII,S$GLB,, | Performed by: NURSE PRACTITIONER

## 2018-12-04 PROCEDURE — 99214 OFFICE O/P EST MOD 30 MIN: CPT | Mod: S$GLB,,, | Performed by: NURSE PRACTITIONER

## 2018-12-04 RX ORDER — FLUTICASONE PROPIONATE 50 MCG
1 SPRAY, SUSPENSION (ML) NASAL DAILY
Qty: 1 BOTTLE | Refills: 0 | Status: SHIPPED | OUTPATIENT
Start: 2018-12-04 | End: 2019-03-20

## 2018-12-04 NOTE — PATIENT INSTRUCTIONS

## 2018-12-04 NOTE — PROGRESS NOTES
"Subjective:       Patient ID: Niharika Gutierrez is a 53 y.o. female.    Vitals:  height is 5' 4" (1.626 m) and weight is 108.9 kg (240 lb). Her temperature is 98.6 °F (37 °C). Her blood pressure is 136/84 and her pulse is 86. Her oxygen saturation is 98%.     Chief Complaint: Sinus Problem    Pt reports for 3 days having cough and congestion and sinus pressure       Sinus Problem   This is a new problem. The current episode started in the past 7 days. There has been no fever. She is experiencing no pain. Associated symptoms include congestion, coughing, sinus pressure, sneezing and a sore throat. Pertinent negatives include no chills, diaphoresis or shortness of breath. Treatments tried: zyrtec. The treatment provided mild relief.       Constitution: Negative for chills, sweating, fatigue and fever.   HENT: Positive for congestion, postnasal drip, sinus pain, sinus pressure and sore throat. Negative for voice change.    Neck: Negative for painful lymph nodes.   Eyes: Negative for eye redness.   Respiratory: Positive for cough and sputum production. Negative for chest tightness, bloody sputum, COPD, shortness of breath, stridor, wheezing and asthma.    Gastrointestinal: Negative for nausea and vomiting.   Musculoskeletal: Negative for muscle ache.   Skin: Negative for rash.   Allergic/Immunologic: Positive for sneezing. Negative for seasonal allergies and asthma.   Hematologic/Lymphatic: Negative for swollen lymph nodes.       Objective:      Physical Exam   Constitutional: She is oriented to person, place, and time. She appears well-developed and well-nourished. She is cooperative.  Non-toxic appearance. She does not appear ill. No distress.   HENT:   Head: Normocephalic and atraumatic.   Right Ear: Hearing and ear canal normal. A middle ear effusion is present.   Left Ear: Hearing and ear canal normal. A middle ear effusion is present.   Nose: No rhinorrhea or nasal deformity. No epistaxis. Right sinus " exhibits frontal sinus tenderness. Right sinus exhibits no maxillary sinus tenderness. Left sinus exhibits frontal sinus tenderness. Left sinus exhibits no maxillary sinus tenderness.   Mouth/Throat: Uvula is midline and mucous membranes are normal. No trismus in the jaw. Normal dentition. No uvula swelling. Posterior oropharyngeal edema and posterior oropharyngeal erythema present.   Eyes: Conjunctivae and lids are normal. Right eye exhibits no discharge. Left eye exhibits no discharge. No scleral icterus.   Sclera clear bilat   Neck: Trachea normal, normal range of motion, full passive range of motion without pain and phonation normal. Neck supple.   Cardiovascular: Normal rate, regular rhythm and normal pulses.   Pulmonary/Chest: Effort normal and breath sounds normal. No respiratory distress.   Abdominal: Soft. Normal appearance and bowel sounds are normal. She exhibits no distension, no pulsatile midline mass and no mass. There is no tenderness.   Musculoskeletal: Normal range of motion. She exhibits no edema or deformity.   Neurological: She is alert and oriented to person, place, and time. She exhibits normal muscle tone. Coordination normal.   Skin: Skin is warm, dry and intact. She is not diaphoretic. No pallor.   Psychiatric: She has a normal mood and affect. Her speech is normal and behavior is normal. Judgment and thought content normal. Cognition and memory are normal.   Nursing note and vitals reviewed.      Assessment:       1. Sinusitis, unspecified chronicity, unspecified location    2. Sore throat        Plan:       Patient Instructions     Sinusitis (No Antibiotics)    The sinuses are air-filled spaces within the bones of the face. They connect to the inside of the nose. Sinusitis is an inflammation of the tissue lining the sinus cavity. Sinus inflammation can occur during a cold. It can also be due to allergies to pollens and other particles in the air. It can cause symptoms such as sinus  congestion, headache, sore throat, facial swelling and fullness. It may also cause a low-grade fever. No infection is present, and no antibiotic treatment is needed.  Home care  · Drink plenty of water, hot tea, and other liquids. This may help thin mucus. It also may promote sinus drainage.  · Heat may help soothe painful areas of the face. Use a towel soaked in hot water. Or,  the shower and direct the hot spray onto your face. Using a vaporizer along with a menthol rub at night may also help.   · An expectorant containing guaifenesin may help thin the mucus and promote drainage from the sinuses.  · Over-the-counter decongestants may be used unless a similar medicine was prescribed. Nasal sprays work the fastest. Use one that contains phenylephrine or oxymetazoline. First blow the nose gently. Then use the spray. Do not use these medicines more often than directed on the label or symptoms may get worse. You may also use tablets containing pseudoephedrine. Avoid products that combine ingredients, because side effects may be increased. Read labels. You can also ask the pharmacist for help. (NOTE: Persons with high blood pressure should not use decongestants. They can raise blood pressure.)  · Over-the-counter antihistamines may help if allergies contributed to your sinusitis.    · Use acetaminophen or ibuprofen to control pain, unless another pain medicine was prescribed. (If you have chronic liver or kidney disease or ever had a stomach ulcer, talk with your doctor before using these medicines. Aspirin should never be used in anyone under 18 years of age who is ill with a fever. It may cause severe liver damage.)  · Use nasal rinses or irrigation as instructed by your health care provider.  · Don't smoke. This can worsen symptoms.  Follow-up care  Follow up with your healthcare provider or our staff if you are not improving within the next week.  When to seek medical advice  Call your healthcare provider if  any of these occur:  · Green or yellow discharge from the nose or into the throat  · Facial pain or headache becoming more severe  · Stiff neck  · Unusual drowsiness or confusion  · Swelling of the forehead or eyelids  · Vision problems, including blurred or double vision  · Fever of 100.4ºF (38ºC) or higher, or as directed by your healthcare provider  · Seizure  · Breathing problems  · Symptoms not resolving within 10 days  Date Last Reviewed: 4/13/2015 © 2000-2017 [employer]. [address] [place] [address]. All rights reserved. This information is not intended as a substitute for professional medical care. Always follow your healthcare professional's instructions.            Sinusitis, unspecified chronicity, unspecified location    Sore throat  -     POCT rapid strep A    Other orders  -     fluticasone (FLONASE) 50 mcg/actuation nasal spray; 1 spray (50 mcg total) by Each Nare route once daily.  Dispense: 1 Bottle; Refill: 0

## 2018-12-07 ENCOUNTER — OFFICE VISIT (OUTPATIENT)
Dept: URGENT CARE | Facility: CLINIC | Age: 53
End: 2018-12-07
Payer: COMMERCIAL

## 2018-12-07 VITALS
RESPIRATION RATE: 16 BRPM | BODY MASS INDEX: 41.2 KG/M2 | HEART RATE: 60 BPM | OXYGEN SATURATION: 100 % | TEMPERATURE: 98 F | WEIGHT: 240 LBS

## 2018-12-07 DIAGNOSIS — J06.9 VIRAL URI WITH COUGH: Primary | ICD-10-CM

## 2018-12-07 PROCEDURE — 99214 OFFICE O/P EST MOD 30 MIN: CPT | Mod: 25,S$GLB,, | Performed by: NURSE PRACTITIONER

## 2018-12-07 PROCEDURE — 96372 THER/PROPH/DIAG INJ SC/IM: CPT | Mod: S$GLB,,, | Performed by: FAMILY MEDICINE

## 2018-12-07 PROCEDURE — 3008F BODY MASS INDEX DOCD: CPT | Mod: CPTII,S$GLB,, | Performed by: NURSE PRACTITIONER

## 2018-12-07 RX ORDER — PROMETHAZINE HYDROCHLORIDE AND DEXTROMETHORPHAN HYDROBROMIDE 6.25; 15 MG/5ML; MG/5ML
5 SYRUP ORAL NIGHTLY PRN
Qty: 120 ML | Refills: 0 | Status: SHIPPED | OUTPATIENT
Start: 2018-12-07 | End: 2018-12-17

## 2018-12-07 RX ORDER — AZELASTINE 1 MG/ML
1 SPRAY, METERED NASAL 2 TIMES DAILY
Qty: 30 ML | Refills: 0 | Status: SHIPPED | OUTPATIENT
Start: 2018-12-07 | End: 2021-10-21

## 2018-12-07 RX ORDER — BETAMETHASONE SODIUM PHOSPHATE AND BETAMETHASONE ACETATE 3; 3 MG/ML; MG/ML
6 INJECTION, SUSPENSION INTRA-ARTICULAR; INTRALESIONAL; INTRAMUSCULAR; SOFT TISSUE
Status: COMPLETED | OUTPATIENT
Start: 2018-12-07 | End: 2018-12-07

## 2018-12-07 RX ADMIN — BETAMETHASONE SODIUM PHOSPHATE AND BETAMETHASONE ACETATE 6 MG: 3; 3 INJECTION, SUSPENSION INTRA-ARTICULAR; INTRALESIONAL; INTRAMUSCULAR; SOFT TISSUE at 04:12

## 2018-12-07 NOTE — LETTER
December 7, 2018      [facility] [address] [address]  Phone: [phone]  Fax: [phone]       Patient: Niharika Gutierrez   YOB: 1965  Date of Visit: 12/07/2018    To Whom It May Concern:    LAWRENCE Gutierrez  was at [facility] on 12/07/2018. She may return to work/school on 12/10/2018 with no restrictions. If you have any questions or concerns, or if I can be of further assistance, please do not hesitate to contact me.    Sincerely,    Yves Howe, NP

## 2018-12-07 NOTE — PATIENT INSTRUCTIONS
CONTINUE YOUR OTC REGIMEN    ONLY TAKE COUGH SYRUP AT NIGHT- WILL MAKE YOU DROWSY    USE ASTELIN AND FLONASE BOTH MORNING AND NIGHT    Viral Upper Respiratory Illness (Adult)  You have a viral upper respiratory illness (URI), which is another term for the common cold. This illness is contagious during the first few days. It is spread through the air by coughing and sneezing. It may also be spread by direct contact (touching the sick person and then touching your own eyes, nose, or mouth). Frequent handwashing will decrease risk of spread. Most viral illnesses go away within 7 to 10 days with rest and simple home remedies. Sometimes the illness may last for several weeks. Antibiotics will not kill a virus, and they are generally not prescribed for this condition.    Home care  · If symptoms are severe, rest at home for the first 2 to 3 days. When you resume activity, don't let yourself get too tired.  · Avoid being exposed to cigarette smoke (yours or others).  · You may use acetaminophen or ibuprofen to control pain and fever, unless another medicine was prescribed. (Note: If you have chronic liver or kidney disease, have ever had a stomach ulcer or gastrointestinal bleeding, or are taking blood-thinning medicines, talk with your healthcare provider before using these medicines.) Aspirin should never be given to anyone under 18 years of age who is ill with a viral infection or fever. It may cause severe liver or brain damage.  · Your appetite may be poor, so a light diet is fine. Avoid dehydration by drinking 6 to 8 glasses of fluids per day (water, soft drinks, juices, tea, or soup). Extra fluids will help loosen secretions in the nose and lungs.  · Over-the-counter cold medicines will not shorten the length of time youre sick, but they may be helpful for the following symptoms: cough, sore throat, and nasal and sinus congestion. (Note: Do not use decongestants if you have high blood pressure.)  Follow-up  care  Follow up with your healthcare provider, or as advised.  When to seek medical advice  Call your healthcare provider right away if any of these occur:  · Cough with lots of colored sputum (mucus)  · Severe headache; face, neck, or ear pain  · Difficulty swallowing due to throat pain  · Fever of 100.4°F (38°C)  Call 911, or get immediate medical care  Call emergency services right away if any of these occur:  · Chest pain, shortness of breath, wheezing, or difficulty breathing  · Coughing up blood  · Inability to swallow due to throat pain  Date Last Reviewed: 9/13/2015  © 2225-9345 [employer]. [address] [place] [address], [place] [address]. All rights reserved. This information is not intended as a substitute for professional medical care. Always follow your healthcare professional's instructions.

## 2018-12-07 NOTE — PROGRESS NOTES
Subjective:       Patient ID: Niharika Gutierrez is a 53 y.o. female.    Vitals:  weight is 108.9 kg (240 lb). Her temperature is 98.4 °F (36.9 °C). Her pulse is 60. Her respiration is 16 and oxygen saturation is 100%.     Chief Complaint: Cough    Pt was seen on Tuesday and given flonase. Pt reports last night she began coughing up yellow sputum. Pt reports symptoms have occurred for 6 days now.       Cough   This is a new problem. The current episode started yesterday. The problem has been gradually worsening. The problem occurs constantly. The cough is productive of purulent sputum. The symptoms are aggravated by lying down. She has tried steroid inhaler for the symptoms. The treatment provided no relief.       HENT: Positive for congestion.    Respiratory: Positive for cough.        Objective:      Physical Exam   Constitutional: She is oriented to person, place, and time. She appears well-developed and well-nourished. She is cooperative.  Non-toxic appearance. She does not appear ill. No distress.   HENT:   Head: Normocephalic and atraumatic.   Right Ear: Hearing, tympanic membrane, external ear and ear canal normal.   Left Ear: Hearing, tympanic membrane, external ear and ear canal normal.   Nose: Mucosal edema and rhinorrhea present. No nasal deformity. No epistaxis. Right sinus exhibits no maxillary sinus tenderness and no frontal sinus tenderness. Left sinus exhibits no maxillary sinus tenderness and no frontal sinus tenderness.   Mouth/Throat: Uvula is midline, oropharynx is clear and moist and mucous membranes are normal. No trismus in the jaw. Normal dentition. No uvula swelling. No oropharyngeal exudate, posterior oropharyngeal edema or posterior oropharyngeal erythema.   Eyes: Conjunctivae and lids are normal. No scleral icterus.   Sclera clear bilat   Neck: Trachea normal, full passive range of motion without pain and phonation normal. Neck supple.   Cardiovascular: Normal rate, regular rhythm,  normal heart sounds, intact distal pulses and normal pulses.   Pulmonary/Chest: Effort normal and breath sounds normal. No stridor. No respiratory distress. She has no decreased breath sounds. She has no wheezes.   Abdominal: Soft. Normal appearance and bowel sounds are normal. She exhibits no distension. There is no tenderness.   Musculoskeletal: Normal range of motion. She exhibits no edema or deformity.   Neurological: She is alert and oriented to person, place, and time. She exhibits normal muscle tone. Coordination normal.   Skin: Skin is warm, dry and intact. She is not diaphoretic. No pallor.   Psychiatric: She has a normal mood and affect. Her speech is normal and behavior is normal. Judgment and thought content normal. Cognition and memory are normal.   Nursing note and vitals reviewed.      Assessment:       1. Viral URI with cough        Plan:         Viral URI with cough  -     azelastine (ASTELIN) 137 mcg (0.1 %) nasal spray; 1 spray (137 mcg total) by Nasal route 2 (two) times daily.  Dispense: 30 mL; Refill: 0  -     betamethasone acetate-betamethasone sodium phosphate injection 6 mg  -     promethazine-dextromethorphan (PROMETHAZINE-DM) 6.25-15 mg/5 mL Syrp; Take 5 mLs by mouth nightly as needed.  Dispense: 120 mL; Refill: 0      Patient Instructions       CONTINUE YOUR OTC REGIMEN    ONLY TAKE COUGH SYRUP AT NIGHT- WILL MAKE YOU DROWSY    USE ASTELIN AND FLONASE BOTH MORNING AND NIGHT    Viral Upper Respiratory Illness (Adult)  You have a viral upper respiratory illness (URI), which is another term for the common cold. This illness is contagious during the first few days. It is spread through the air by coughing and sneezing. It may also be spread by direct contact (touching the sick person and then touching your own eyes, nose, or mouth). Frequent handwashing will decrease risk of spread. Most viral illnesses go away within 7 to 10 days with rest and simple home remedies. Sometimes the illness may  last for several weeks. Antibiotics will not kill a virus, and they are generally not prescribed for this condition.    Home care  · If symptoms are severe, rest at home for the first 2 to 3 days. When you resume activity, don't let yourself get too tired.  · Avoid being exposed to cigarette smoke (yours or others).  · You may use acetaminophen or ibuprofen to control pain and fever, unless another medicine was prescribed. (Note: If you have chronic liver or kidney disease, have ever had a stomach ulcer or gastrointestinal bleeding, or are taking blood-thinning medicines, talk with your healthcare provider before using these medicines.) Aspirin should never be given to anyone under 18 years of age who is ill with a viral infection or fever. It may cause severe liver or brain damage.  · Your appetite may be poor, so a light diet is fine. Avoid dehydration by drinking 6 to 8 glasses of fluids per day (water, soft drinks, juices, tea, or soup). Extra fluids will help loosen secretions in the nose and lungs.  · Over-the-counter cold medicines will not shorten the length of time youre sick, but they may be helpful for the following symptoms: cough, sore throat, and nasal and sinus congestion. (Note: Do not use decongestants if you have high blood pressure.)  Follow-up care  Follow up with your healthcare provider, or as advised.  When to seek medical advice  Call your healthcare provider right away if any of these occur:  · Cough with lots of colored sputum (mucus)  · Severe headache; face, neck, or ear pain  · Difficulty swallowing due to throat pain  · Fever of 100.4°F (38°C)  Call 911, or get immediate medical care  Call emergency services right away if any of these occur:  · Chest pain, shortness of breath, wheezing, or difficulty breathing  · Coughing up blood  · Inability to swallow due to throat pain  Date Last Reviewed: 9/13/2015  © 1465-7511 [email or web address]. [address] [place] [address], [place] [address].  All rights reserved. This information is not intended as a substitute for professional medical care. Always follow your healthcare professional's instructions.

## 2018-12-10 RX ORDER — AMLODIPINE BESYLATE 10 MG/1
TABLET ORAL
Qty: 30 TABLET | Refills: 2 | Status: SHIPPED | OUTPATIENT
Start: 2018-12-10 | End: 2019-03-13 | Stop reason: SDUPTHER

## 2018-12-19 DIAGNOSIS — K21.9 GASTROESOPHAGEAL REFLUX DISEASE, ESOPHAGITIS PRESENCE NOT SPECIFIED: ICD-10-CM

## 2019-01-15 DIAGNOSIS — E87.6 HYPOKALEMIA: ICD-10-CM

## 2019-01-15 RX ORDER — POTASSIUM BICARBONATE 978 MG/1
TABLET, EFFERVESCENT ORAL
Qty: 120 TABLET | Refills: 0 | Status: SHIPPED | OUTPATIENT
Start: 2019-01-15 | End: 2019-02-20 | Stop reason: SDUPTHER

## 2019-02-06 DIAGNOSIS — E87.6 HYPOKALEMIA: ICD-10-CM

## 2019-02-06 RX ORDER — SPIRONOLACTONE 25 MG/1
25 TABLET ORAL 2 TIMES DAILY
Qty: 180 TABLET | Refills: 0 | Status: SHIPPED | OUTPATIENT
Start: 2019-02-06 | End: 2019-05-12 | Stop reason: SDUPTHER

## 2019-02-20 DIAGNOSIS — E87.6 HYPOKALEMIA: ICD-10-CM

## 2019-02-20 RX ORDER — POTASSIUM BICARBONATE 978 MG/1
TABLET, EFFERVESCENT ORAL
Qty: 120 TABLET | Refills: 0 | Status: SHIPPED | OUTPATIENT
Start: 2019-02-20 | End: 2019-04-16 | Stop reason: SDUPTHER

## 2019-03-08 ENCOUNTER — PATIENT MESSAGE (OUTPATIENT)
Dept: FAMILY MEDICINE | Facility: CLINIC | Age: 54
End: 2019-03-08

## 2019-03-13 RX ORDER — AMLODIPINE BESYLATE 10 MG/1
TABLET ORAL
Qty: 30 TABLET | Refills: 0 | Status: SHIPPED | OUTPATIENT
Start: 2019-03-13 | End: 2019-04-12 | Stop reason: SDUPTHER

## 2019-03-18 NOTE — TELEPHONE ENCOUNTER
Left message for patient to call the office. Needs an appt with Dr Schwartz with blood work done before

## 2019-03-20 ENCOUNTER — OFFICE VISIT (OUTPATIENT)
Dept: FAMILY MEDICINE | Facility: CLINIC | Age: 54
End: 2019-03-20
Payer: COMMERCIAL

## 2019-03-20 VITALS
OXYGEN SATURATION: 98 % | BODY MASS INDEX: 39.78 KG/M2 | SYSTOLIC BLOOD PRESSURE: 128 MMHG | TEMPERATURE: 98 F | DIASTOLIC BLOOD PRESSURE: 68 MMHG | HEIGHT: 64 IN | HEART RATE: 89 BPM | WEIGHT: 233 LBS

## 2019-03-20 DIAGNOSIS — K21.9 GASTROESOPHAGEAL REFLUX DISEASE WITHOUT ESOPHAGITIS: Chronic | ICD-10-CM

## 2019-03-20 DIAGNOSIS — E04.1 THYROID NODULE: Chronic | ICD-10-CM

## 2019-03-20 DIAGNOSIS — I10 ESSENTIAL HYPERTENSION: Chronic | ICD-10-CM

## 2019-03-20 DIAGNOSIS — J01.90 ACUTE RHINOSINUSITIS: Primary | ICD-10-CM

## 2019-03-20 PROCEDURE — 3008F PR BODY MASS INDEX (BMI) DOCUMENTED: ICD-10-PCS | Mod: CPTII,S$GLB,, | Performed by: NURSE PRACTITIONER

## 2019-03-20 PROCEDURE — 3074F SYST BP LT 130 MM HG: CPT | Mod: CPTII,S$GLB,, | Performed by: NURSE PRACTITIONER

## 2019-03-20 PROCEDURE — 99214 PR OFFICE/OUTPT VISIT, EST, LEVL IV, 30-39 MIN: ICD-10-PCS | Mod: S$GLB,,, | Performed by: NURSE PRACTITIONER

## 2019-03-20 PROCEDURE — 99214 OFFICE O/P EST MOD 30 MIN: CPT | Mod: S$GLB,,, | Performed by: NURSE PRACTITIONER

## 2019-03-20 PROCEDURE — 3078F PR MOST RECENT DIASTOLIC BLOOD PRESSURE < 80 MM HG: ICD-10-PCS | Mod: CPTII,S$GLB,, | Performed by: NURSE PRACTITIONER

## 2019-03-20 PROCEDURE — 99999 PR PBB SHADOW E&M-EST. PATIENT-LVL IV: ICD-10-PCS | Mod: PBBFAC,,, | Performed by: NURSE PRACTITIONER

## 2019-03-20 PROCEDURE — 99999 PR PBB SHADOW E&M-EST. PATIENT-LVL IV: CPT | Mod: PBBFAC,,, | Performed by: NURSE PRACTITIONER

## 2019-03-20 PROCEDURE — 3008F BODY MASS INDEX DOCD: CPT | Mod: CPTII,S$GLB,, | Performed by: NURSE PRACTITIONER

## 2019-03-20 PROCEDURE — 3074F PR MOST RECENT SYSTOLIC BLOOD PRESSURE < 130 MM HG: ICD-10-PCS | Mod: CPTII,S$GLB,, | Performed by: NURSE PRACTITIONER

## 2019-03-20 PROCEDURE — 3078F DIAST BP <80 MM HG: CPT | Mod: CPTII,S$GLB,, | Performed by: NURSE PRACTITIONER

## 2019-03-20 RX ORDER — IPRATROPIUM BROMIDE 21 UG/1
2 SPRAY, METERED NASAL 3 TIMES DAILY
Qty: 30 ML | Refills: 0 | Status: SHIPPED | OUTPATIENT
Start: 2019-03-20 | End: 2019-03-30

## 2019-03-20 NOTE — MEDICAL/APP STUDENT
Subjective:       Patient ID: Niharika Gutierrez is a 54 y.o. female.    Chief Complaint: Congestion and sore throat  Niharika Gutierrez is a 55 yo F with a medical history as listed below who presents today with complaints of congestion and sore throat x 1 week. Also reports mild, dry cough. She states she has been taking Dayquil/Nyquil combo and Astelin nasal spray for sneezing. Ms. Gutierrez states she started taking Zyrtec yesterday since the other medications were not very effective.  She denies nausea, vomiting, fevers, and body aches. She has no other complaints and is otherwise healthy at today's visit.      Review of Systems   Constitutional: Negative for fever.   HENT: Positive for congestion, sneezing and sore throat. Negative for ear pain, postnasal drip and sinus pressure.    Respiratory: Positive for cough. Negative for shortness of breath.    Cardiovascular: Negative for chest pain.   Gastrointestinal: Positive for constipation.   Allergic/Immunologic: Positive for environmental allergies.   Neurological: Negative for headaches.       Objective:      Physical Exam   Constitutional: Vital signs are normal. She appears well-developed. No distress.   HENT:   Right Ear: Tympanic membrane and ear canal normal.   Left Ear: Tympanic membrane and ear canal normal.   Mouth/Throat: Uvula is midline. No oropharyngeal exudate or posterior oropharyngeal erythema. Tonsils are 3+ on the right. Tonsils are 3+ on the left. No tonsillar exudate.   Swollen nasal turbinates   Eyes: Conjunctivae are normal.   Neck: Neck supple. No tracheal deviation present.   Cardiovascular: Normal rate, regular rhythm and normal heart sounds. Exam reveals no gallop and no friction rub.   No murmur heard.  Pulmonary/Chest: Effort normal and breath sounds normal. No respiratory distress. She has no wheezes. She has no rales.   BBS CTA   Lymphadenopathy:     She has no cervical adenopathy.   Neurological: She is alert.   Skin: She is not  diaphoretic.       Assessment:       1. Acute rhinosinusitis    2. Essential hypertension    3. Thyroid nodule    4. Gastroesophageal reflux disease without esophagitis        Plan:       Acute rhinosinusitis  Continue antihistamine daily, choose Zyrtec, Claritin, or Allegra.  Add Flonase nasal spray.  Add Atrovent for nasal congestion.  Stop Dayquil/Nyquil combo.  Increase fluid intake, rest.  ER Precautions discussed.  Follow-up at the end of the week if symptoms do not improve.  -     ipratropium (ATROVENT) 0.03 % nasal spray; 2 sprays by Nasal route 3 (three) times daily. for 10 days  Dispense: 30 mL; Refill: 0    Essential hypertension  Continue current therapy. Current regimen effective.    Thyroid nodule  Due for follow-up in May 2019. Contact Endo to schedule follow-up appointment with ultrasound.    Gastroesophageal reflux disease without esophagitis  Reports history of scope at outside facility since 2015.  Encouraged to schedule follow-up appointment with GI regarding need for multimodal treatment r/t reflux (taking both Nexium and Zantac).    Patient has verbalized understanding and is in agreement with plan of care.    Follow-up if symptoms worsen or fail to improve.    I have read and agree with NP student documentation.  I have personally examined and interviewed patient and agree with assessment and plan.

## 2019-03-20 NOTE — PATIENT INSTRUCTIONS
Take one of the following daily: Xyzal, Zyrtec, Claritin, or Allegra.  Take one of the following daily: Flonase or Nasacort.  Take the Atrovent nasal spray three times daily which was sent to the pharmacy.

## 2019-03-20 NOTE — PROGRESS NOTES
I have read and agree with NP student documentation.  I have personally examined and interviewed patient and agree with assessment and plan.

## 2019-04-12 RX ORDER — AMLODIPINE BESYLATE 10 MG/1
TABLET ORAL
Qty: 30 TABLET | Refills: 0 | Status: SHIPPED | OUTPATIENT
Start: 2019-04-12 | End: 2019-05-13 | Stop reason: SDUPTHER

## 2019-04-16 DIAGNOSIS — E87.6 HYPOKALEMIA: ICD-10-CM

## 2019-04-16 RX ORDER — POTASSIUM BICARBONATE 978 MG/1
TABLET, EFFERVESCENT ORAL
Qty: 120 TABLET | Refills: 0 | Status: SHIPPED | OUTPATIENT
Start: 2019-04-16 | End: 2019-05-21 | Stop reason: SDUPTHER

## 2019-05-12 DIAGNOSIS — E87.6 HYPOKALEMIA: ICD-10-CM

## 2019-05-12 RX ORDER — SPIRONOLACTONE 25 MG/1
25 TABLET ORAL 2 TIMES DAILY
Qty: 60 TABLET | Refills: 0 | Status: SHIPPED | OUTPATIENT
Start: 2019-05-12 | End: 2019-06-13 | Stop reason: SDUPTHER

## 2019-05-13 RX ORDER — AMLODIPINE BESYLATE 10 MG/1
TABLET ORAL
Qty: 30 TABLET | Refills: 0 | Status: SHIPPED | OUTPATIENT
Start: 2019-05-13 | End: 2019-06-13

## 2019-05-15 DIAGNOSIS — E87.6 HYPOKALEMIA: ICD-10-CM

## 2019-05-15 RX ORDER — POTASSIUM BICARBONATE 978 MG/1
TABLET, EFFERVESCENT ORAL
Qty: 120 TABLET | Refills: 0 | OUTPATIENT
Start: 2019-05-15

## 2019-05-21 DIAGNOSIS — E87.6 HYPOKALEMIA: ICD-10-CM

## 2019-06-07 ENCOUNTER — OFFICE VISIT (OUTPATIENT)
Dept: URGENT CARE | Facility: CLINIC | Age: 54
End: 2019-06-07
Payer: COMMERCIAL

## 2019-06-07 VITALS
OXYGEN SATURATION: 98 % | HEIGHT: 64 IN | SYSTOLIC BLOOD PRESSURE: 130 MMHG | WEIGHT: 233 LBS | DIASTOLIC BLOOD PRESSURE: 70 MMHG | HEART RATE: 68 BPM | BODY MASS INDEX: 39.78 KG/M2 | TEMPERATURE: 98 F

## 2019-06-07 DIAGNOSIS — B35.4 RINGWORM OF BODY: Primary | ICD-10-CM

## 2019-06-07 PROCEDURE — 3008F BODY MASS INDEX DOCD: CPT | Mod: CPTII,S$GLB,, | Performed by: FAMILY MEDICINE

## 2019-06-07 PROCEDURE — 3078F PR MOST RECENT DIASTOLIC BLOOD PRESSURE < 80 MM HG: ICD-10-PCS | Mod: CPTII,S$GLB,, | Performed by: FAMILY MEDICINE

## 2019-06-07 PROCEDURE — 3075F PR MOST RECENT SYSTOLIC BLOOD PRESS GE 130-139MM HG: ICD-10-PCS | Mod: CPTII,S$GLB,, | Performed by: FAMILY MEDICINE

## 2019-06-07 PROCEDURE — 3075F SYST BP GE 130 - 139MM HG: CPT | Mod: CPTII,S$GLB,, | Performed by: FAMILY MEDICINE

## 2019-06-07 PROCEDURE — 3008F PR BODY MASS INDEX (BMI) DOCUMENTED: ICD-10-PCS | Mod: CPTII,S$GLB,, | Performed by: FAMILY MEDICINE

## 2019-06-07 PROCEDURE — 3078F DIAST BP <80 MM HG: CPT | Mod: CPTII,S$GLB,, | Performed by: FAMILY MEDICINE

## 2019-06-07 PROCEDURE — 99214 OFFICE O/P EST MOD 30 MIN: CPT | Mod: S$GLB,,, | Performed by: FAMILY MEDICINE

## 2019-06-07 PROCEDURE — 99214 PR OFFICE/OUTPT VISIT, EST, LEVL IV, 30-39 MIN: ICD-10-PCS | Mod: S$GLB,,, | Performed by: FAMILY MEDICINE

## 2019-06-07 RX ORDER — KETOCONAZOLE 20 MG/ML
SHAMPOO, SUSPENSION TOPICAL
Qty: 120 ML | Refills: 0 | Status: SHIPPED | OUTPATIENT
Start: 2019-06-10 | End: 2020-10-19

## 2019-06-07 RX ORDER — KETOCONAZOLE 20 MG/G
CREAM TOPICAL DAILY
Qty: 30 G | Refills: 0 | Status: SHIPPED | OUTPATIENT
Start: 2019-06-07 | End: 2020-10-19

## 2019-06-07 NOTE — PATIENT INSTRUCTIONS
Fungal Skin Infection (Tinea)  A fungal infection occurs when too much fungus grows on or in the body. Fungus normally lives on the skin in small amounts and does not cause harm. But when too much grows on the skin, it causes an infection. This is also known as tinea. Fungal skin infections are common and not usually serious.  The infection often starts as a small red area the size of a pea. The skin may turn dry and flaky. The area may itch. As the fungus grows, it spreads out in a red [place]. Because of how it looks, fungal skin infection is often called ringworm, but it is not caused by a worm. Fungal skin infections can occur on many parts of the body. They can grow on the head, chest, arms, or legs. They can occur on the buttocks. On the feet, fungal infection is known as athletes foot. It causes itchy, sometimes painful sores between the toes and the bottom or sides of the feet. In the groin, the rash is called jock itch.  People with weak immune systems can get a fungal infection more easily. This includes people with diabetes or HIV, or who are being treated for cancer. In these cases, the fungal infection can spread and cause severe illness. Fungal infections are also more common in people who are overweight.  In most cases, treatment is done with antifungal cream or ointment. If the infection is on your scalp, you may take oral medicine. In some cases, a tiny piece of the skin (biopsy) may be taken. This is so it can be tested in a lab.  Common fungal infections are treated with creams on the skin or oral medicine.  Home care  Follow all instructions when using antifungal cream or ointment on your skin. Your healthcare provider may advise using cornstarch powder to keep your skin dry or petroleum jelly to provide a barrier.  General care:  · If you were prescribed an oral medicine, read the patient information. Talk with your healthcare provider about the risks and side effects.  · Let your skin dry  completely after bathing. Carefully dry your feet and between your toes.  · Dress in loose cotton clothing.  · Dont scratch the affected area. This can delay healing and may spread the infection. It can also cause a bacterial infection.  · Keep your skin clean, but dont wash the skin too much. This can irritate your skin.  · Keep in mind that it may take a week before the fungus starts to go away. It can take 2 to 4 weeks to fully clear. To prevent it from coming back, use the medicine until the rash is all gone.  Follow-up care  Follow up with your healthcare provider if the rash does not get better after 10 days of treatment. Also follow up if the rash spreads to other parts of your body.  When to seek medical advice  Call your healthcare provider right away if any of these occur:  · Fever of 100.4°F (38°C) or higher  · Redness or swelling that gets worse  · Pain that gets worse  · Foul-smelling fluid leaking from the skin  Date Last Reviewed: 11/1/2016  © 7932-0438 [employer]. [address] [place] [address], [place] [address]. All rights reserved. This information is not intended as a substitute for professional medical care. Always follow your healthcare professional's instructions.

## 2019-06-07 NOTE — PROGRESS NOTES
"Subjective:       Patient ID: Niharika Gutierrez is a 54 y.o. female.    Vitals:  height is 5' 4" (1.626 m) and weight is 105.7 kg (233 lb). Her temperature is 98.1 °F (36.7 °C). Her blood pressure is 130/70 and her pulse is 68. Her oxygen saturation is 98%.     Chief Complaint: Rash    Pt  Reports for 3 days she has had a rash on the back of her neck, she states she has used otc anti itch cream. Denies burning or itching pain. Unsure as to the duration    Rash   This is a new problem. The current episode started in the past 7 days. The affected locations include the neck. The rash is characterized by dryness, swelling and itchiness. Pertinent negatives include no cough, fever or sore throat. Past treatments include anti-itch cream. The treatment provided mild relief.       Constitution: Negative for chills and fever.   HENT: Negative for facial swelling and sore throat.    Neck: Negative for painful lymph nodes.   Eyes: Negative for eye itching and eyelid swelling.   Respiratory: Negative for cough.    Musculoskeletal: Negative for joint pain and joint swelling.   Skin: Positive for rash. Negative for color change, pale, wound, abrasion, laceration, lesion, skin thickening/induration, puncture wound, erythema, bruising, abscess, avulsion and hives.   Allergic/Immunologic: Negative for environmental allergies, immunocompromised state and hives.   Hematologic/Lymphatic: Negative for swollen lymph nodes.       Objective:      Physical Exam   Constitutional: She appears well-developed and well-nourished.   HENT:   Head: Normocephalic and atraumatic.   Eyes: Pupils are equal, round, and reactive to light. EOM are normal.   Neck: Normal range of motion. Neck supple.   Cardiovascular: Normal rate, regular rhythm and normal heart sounds.   Pulmonary/Chest: Effort normal.   Lymphadenopathy:        Head (right side): Posterior auricular (right sided - tender) and occipital (tender) adenopathy present.   Skin: Rash (2 " cm diameter circular rash right lateral neck with central erythema noted) noted. No erythema.   Nursing note and vitals reviewed.      Assessment:       1. Ringworm of body        Plan:         Ringworm of body  -     ketoconazole (NIZORAL) 2 % cream; Apply topically once daily.  Dispense: 30 g; Refill: 0  -     ketoconazole (NIZORAL) 2 % shampoo; Apply topically twice a week.  Dispense: 120 mL; Refill: 0

## 2019-06-13 ENCOUNTER — HOSPITAL ENCOUNTER (OUTPATIENT)
Dept: RADIOLOGY | Facility: HOSPITAL | Age: 54
Discharge: HOME | End: 2019-06-13
Attending: FAMILY MEDICINE
Payer: COMMERCIAL

## 2019-06-13 ENCOUNTER — OFFICE VISIT (OUTPATIENT)
Dept: FAMILY MEDICINE | Facility: CLINIC | Age: 54
End: 2019-06-13
Payer: COMMERCIAL

## 2019-06-13 VITALS
TEMPERATURE: 98 F | WEIGHT: 237.63 LBS | DIASTOLIC BLOOD PRESSURE: 74 MMHG | HEART RATE: 92 BPM | BODY MASS INDEX: 40.57 KG/M2 | HEIGHT: 64 IN | OXYGEN SATURATION: 97 % | SYSTOLIC BLOOD PRESSURE: 128 MMHG

## 2019-06-13 DIAGNOSIS — E66.01 MORBID OBESITY WITH BMI OF 40.0-44.9, ADULT: ICD-10-CM

## 2019-06-13 DIAGNOSIS — E55.9 VITAMIN D DEFICIENCY DISEASE: ICD-10-CM

## 2019-06-13 DIAGNOSIS — R73.9 BLOOD GLUCOSE ELEVATED: ICD-10-CM

## 2019-06-13 DIAGNOSIS — E04.1 THYROID NODULE: ICD-10-CM

## 2019-06-13 DIAGNOSIS — Z12.31 ENCOUNTER FOR SCREENING MAMMOGRAM FOR BREAST CANCER: ICD-10-CM

## 2019-06-13 DIAGNOSIS — Z00.00 ANNUAL PHYSICAL EXAM: Primary | ICD-10-CM

## 2019-06-13 DIAGNOSIS — E87.6 HYPOKALEMIA: ICD-10-CM

## 2019-06-13 DIAGNOSIS — I10 ESSENTIAL HYPERTENSION: ICD-10-CM

## 2019-06-13 PROCEDURE — 3074F SYST BP LT 130 MM HG: CPT | Mod: CPTII,S$GLB,, | Performed by: FAMILY MEDICINE

## 2019-06-13 PROCEDURE — 3078F DIAST BP <80 MM HG: CPT | Mod: CPTII,S$GLB,, | Performed by: FAMILY MEDICINE

## 2019-06-13 PROCEDURE — 99396 PR PREVENTIVE VISIT,EST,40-64: ICD-10-PCS | Mod: S$GLB,,, | Performed by: FAMILY MEDICINE

## 2019-06-13 PROCEDURE — 99396 PREV VISIT EST AGE 40-64: CPT | Mod: S$GLB,,, | Performed by: FAMILY MEDICINE

## 2019-06-13 PROCEDURE — 3074F PR MOST RECENT SYSTOLIC BLOOD PRESSURE < 130 MM HG: ICD-10-PCS | Mod: CPTII,S$GLB,, | Performed by: FAMILY MEDICINE

## 2019-06-13 PROCEDURE — 77067 SCR MAMMO BI INCL CAD: CPT | Mod: TC,PO

## 2019-06-13 PROCEDURE — 77067 MAMMO DIGITAL SCREENING BILAT WITH TOMOSYNTHESIS_CAD: ICD-10-PCS | Mod: 26,,, | Performed by: RADIOLOGY

## 2019-06-13 PROCEDURE — 77063 BREAST TOMOSYNTHESIS BI: CPT | Mod: 26,,, | Performed by: RADIOLOGY

## 2019-06-13 PROCEDURE — 3078F PR MOST RECENT DIASTOLIC BLOOD PRESSURE < 80 MM HG: ICD-10-PCS | Mod: CPTII,S$GLB,, | Performed by: FAMILY MEDICINE

## 2019-06-13 PROCEDURE — 77063 MAMMO DIGITAL SCREENING BILAT WITH TOMOSYNTHESIS_CAD: ICD-10-PCS | Mod: 26,,, | Performed by: RADIOLOGY

## 2019-06-13 PROCEDURE — 99999 PR PBB SHADOW E&M-EST. PATIENT-LVL IV: ICD-10-PCS | Mod: PBBFAC,,, | Performed by: FAMILY MEDICINE

## 2019-06-13 PROCEDURE — 99999 PR PBB SHADOW E&M-EST. PATIENT-LVL IV: CPT | Mod: PBBFAC,,, | Performed by: FAMILY MEDICINE

## 2019-06-13 PROCEDURE — 77067 SCR MAMMO BI INCL CAD: CPT | Mod: 26,,, | Performed by: RADIOLOGY

## 2019-06-13 RX ORDER — SPIRONOLACTONE 25 MG/1
25 TABLET ORAL 2 TIMES DAILY
Qty: 60 TABLET | Refills: 0 | Status: SHIPPED | OUTPATIENT
Start: 2019-06-13 | End: 2019-06-13 | Stop reason: SDUPTHER

## 2019-06-13 RX ORDER — SPIRONOLACTONE 25 MG/1
25 TABLET ORAL 2 TIMES DAILY
Qty: 180 TABLET | Refills: 3 | Status: SHIPPED | OUTPATIENT
Start: 2019-06-13 | End: 2021-10-21

## 2019-06-13 RX ORDER — AMLODIPINE BESYLATE 10 MG/1
10 TABLET ORAL DAILY
Qty: 30 TABLET | Refills: 0 | Status: SHIPPED | OUTPATIENT
Start: 2019-06-13 | End: 2019-07-20 | Stop reason: SDUPTHER

## 2019-06-13 NOTE — PATIENT INSTRUCTIONS
Prevention Guidelines, Women Ages 50 to 64  Screening tests and vaccines are an important part of managing your health. Health counseling is essential, too. Below are guidelines for these, for women ages 50 to 64. Talk with your healthcare provider to make sure youre up to date on what you need.  Screening Who needs it How often   Type 2 diabetes or prediabetes All adults beginning at age 45 and adults without symptoms at any age who are overweight or obese and have 1 or more additional risk factors for diabetes. At  least every 3 years   Alcohol misuse All women in this age group At routine exams   Blood pressure All women in this age group Every 2 years if your blood pressure is less than 120/80 mm Hg; yearly if your systolic blood pressure is 120 to 139 mm Hg, or your diastolic blood pressure reading is 80 to 89 mm Hg   Breast cancer All women in this age group Yearly mammogram and clinical breast exam1   Cervical cancer All women in this age group, except women who have had a complete hysterectomy Pap test every 3 years or Pap test with human papillomavirus (HPV) test every 5 years   Chlamydia Women at increased risk for infection At routine exams   Colorectal cancer All women in this age group Flexible sigmoidoscopy every 5 years, or colonoscopy every 10 years, or double-contrast barium enema every 5 years; yearly fecal occult blood test or fecal immunochemical test; or a stool DNA test as often as your health care provider advises; talk with your health care provider about which tests are best for you   Depression All women in this age group At routine exams   Gonorrhea Sexually active women at increased risk for infection At routine exams   Hepatitis C Anyone at increased risk; 1 time for those born between 1945 and 1965 At routine exams   High cholesterol or triglycerides All women in this age group who are at risk for coronary artery disease At least every 5 years   HIV All women At routine exams   Lung  cancer Adults age 55 to 80 who have smoked Yearly screening in smokers with 30 pack-year history of smoking or who quit within 15 years   Obesity All women in this age group At routine exams   Osteoporosis Women who are postmenopausal Ask your healthcare provider   Syphilis Women at increased risk for infection - talk with your healthcare provider At routine exams   Tuberculosis Women at increased risk for infection - talk with your healthcare provider Ask your healthcare provider   Vision All women in this age group Ask your healthcare provider   Vaccine Who needs it How often   Chickenpox (varicella) All women in this age group who have no record of this infection or vaccine 2 doses; the second dose should be given at least 4 weeks after the first dose   Hepatitis A Women at increased risk for infection - talk with your healthcare provider 2 doses given at least 6 months apart   Hepatitis B Women at increased risk for infection - talk with your healthcare provider 3 doses over 6 months; second dose should be given 1 month after the first dose; the third dose should be given at least 2 months after the second dose and at least 4 months after the first dose   Haemophilus influenzaeType B (HIB) Women at increased risk for infection - talk with your healthcare provider 1 to 3 doses   Influenza (flu) All women in this age group Once a year   Measles, mumps, rubella (MMR) Women in this age group through their late 50s who have no record of these infections or vaccines 1 dose   Meningococcal Women at increased risk for infection - talk with your healthcare provider 1 or more doses   Pneumococcal conjugate vaccine (PCV13) and pneumococcal polysaccharide vaccine (PPSV23) Women at increased risk for infection - talk with your healthcare provider PCV13: 1 dose ages 19 to 65 (protects against 13 types of pneumococcal bacteria)  PPSV23: 1 to 2 doses through age 64, or 1 dose at 65 or older (protects against 23 types of  pneumococcal bacteria)   Tetanus/diphtheria/pertussis (Td/Tdap) booster All women in this age group Td every 10 years, or a one-time dose of Tdap instead of a Td booster after age 18, then Td every 10 years   Zoster All women ages 60 and older 1 dose   Counseling Who needs it How often   BRCA gene mutation testing for breast and ovarian cancer susceptibility Women with increased risk for having gene mutation When your risk is known   Breast cancer and chemoprevention Women at high risk for breast cancer When your risk is known   Diet and exercise Women who are overweight or obese When diagnosed, and then at routine exams   Sexually transmitted infection prevention Women at increased risk for infection - talk with your healthcare provider At routine exams   Use of daily aspirin Women ages 55 and up in this age group who are at risk for cardiovascular health problems such as stroke When your risk is known   Use of tobacco and the health effects it can cause All women in this age group Every exam   1American [employer] Society  Date Last Reviewed: 1/26/2016  © 7627-5167 [employer]. [address] [place] [address], [place] [address]. All rights reserved. This information is not intended as a substitute for professional medical care. Always follow your healthcare professional's instructions.

## 2019-06-13 NOTE — PROGRESS NOTES
Office Visit for Annual Exam    Patient Name: Niharika Gutierrez    : 1965  MRN: [id number]    Subjective:  Niharika is a 54 y.o. female who presents today for     1. Annual physical - pt has been doing well. She has no issues/complaints.   2. Hypokalemia - has followed up with endocrine   3. Thyroid nodule - needs ultrasound - has follow-up with endocrine   4. Right sided lymphadenopathy - had recent skin infection; no pain     Review of Systems   Constitutional: Negative for chills and fever.   HENT: Negative for congestion.    Eyes: Negative for blurred vision.   Respiratory: Negative for cough.    Cardiovascular: Negative for chest pain.   Gastrointestinal: Negative for abdominal pain, constipation, diarrhea, heartburn, nausea and vomiting.   Genitourinary: Negative for dysuria.   Musculoskeletal: Negative for myalgias.   Skin: Negative for itching and rash.   Neurological: Negative for dizziness and headaches.   Psychiatric/Behavioral: Negative for depression.       Active Problem List  Patient Active Problem List   Diagnosis    Essential hypertension    Hypokalemia    Vitamin D deficiency disease    Microhematuria    Thyroid nodule    Morbid obesity with BMI of 40.0-44.9, adult    Gastroesophageal reflux disease without esophagitis    Arthralgia of knee       Past Surgical History  Past Surgical History:   Procedure Laterality Date    BREAST BIOPSY       SECTION  2004    X 1    CHOLECYSTECTOMY  2008    COLONOSCOPY N/A 2015    Performed by [facility] (4TH F[facility])    CYSTOSCOPY, WITH RETROGRADE PYELOGRAM Bilateral 2014    Performed by MARIA DEL CARMEN [facility] Torre [facility] at [facility]    ESOPHAGOGASTRODUODENOSCOPY (EGD) N/A 2015    Performed by [facility] at [facility] (4TH FLR)    HYSTERECTOMY      LAPAROSCOPIC HYSTERECTOMY      LSO       Family History  Family History   Problem Relation Age of Onset    Diabetes Mother     Heart disease Father          CHF    Breast cancer Maternal Aunt     Breast cancer Paternal Aunt     Breast cancer Maternal Aunt     Breast cancer Paternal Aunt     Colon cancer Neg Hx     Colon polyps Neg Hx        Social History  Social History     Socioeconomic History    Marital status:      Spouse name: Not on file    Number of children: Not on file    Years of education: Not on file    Highest education level: Not on file   Occupational History     Employer: [employer]   Social Needs    Financial resource strain: Not on file    Food insecurity:     Worry: Not on file     Inability: Not on file    Transportation needs:     Medical: Not on file     Non-medical: Not on file   Tobacco Use    Smoking status: Never Smoker    Smokeless tobacco: Never Used   Substance and Sexual Activity    Alcohol use: No    Drug use: No    Sexual activity: Yes     Partners: Male     Birth control/protection: Surgical   Lifestyle    Physical activity:     Days per week: Not on file     Minutes per session: Not on file    Stress: Not on file   Relationships    Social connections:     Talks on phone: Not on file     Gets together: Not on file     Attends Sikh service: Not on file     Active member of club or organization: Not on file     Attends meetings of clubs or organizations: Not on file     Relationship status: Not on file   Other Topics Concern    Not on file   Social History Narrative    Not on file       Medications and Allergies  Reviewed and updated.   Current Outpatient Medications   Medication Sig    azelastine (ASTELIN) 137 mcg (0.1 %) nasal spray 1 spray (137 mcg total) by Nasal route 2 (two) times daily.    esomeprazole (NEXIUM) 40 MG capsule Take 40 mg by mouth before breakfast.    ketoconazole (NIZORAL) 2 % cream Apply topically once daily.    ketoconazole (NIZORAL) 2 % shampoo Apply topically twice a week.    spironolactone (ALDACTONE) 25 MG tablet Take 1 tablet (25 mg total) by mouth 2 (two)  "times daily. Appt needed with PCP for future refills    amLODIPine (NORVASC) 10 MG tablet Take 1 tablet (10 mg total) by mouth once daily.    potassium bicarbonate (K-LYTE) disintegrating tablet Take 1 tablet (25 mEq total) by mouth 3 (three) times daily.     No current facility-administered medications for this visit.        Physical Exam  /74 (BP Location: Right arm, Patient Position: Sitting, BP Method: Large (Manual))   Pulse 92   Temp 98.1 °F (36.7 °C) (Oral)   Ht 5' 4" (1.626 m)   Wt 107.8 kg (237 lb 10.5 oz)   SpO2 97%   BMI 40.79 kg/m²   Physical Exam   Constitutional: She is oriented to person, place, and time. She appears well-developed and well-nourished.   HENT:   Head: Normocephalic and atraumatic.   Eyes: Pupils are equal, round, and reactive to light. Conjunctivae and EOM are normal.   Neck: Normal range of motion. Neck supple.   Cardiovascular: Normal rate, regular rhythm and normal heart sounds. Exam reveals no gallop and no friction rub.   No murmur heard.  Pulmonary/Chest: Breath sounds normal. No respiratory distress.   Abdominal: Soft. Bowel sounds are normal. She exhibits no distension. There is no tenderness.   Musculoskeletal: Normal range of motion.   Lymphadenopathy:     She has cervical adenopathy.        Right cervical: Superficial cervical adenopathy present.   Neurological: She is alert and oriented to person, place, and time.   Skin: Skin is warm.   Psychiatric: She has a normal mood and affect.         Assessment/Plan:  Niharika Gutierrez is a 54 y.o. female who presents today for :    Annual physical exam  -     CBC auto differential; Future; Expected date: 06/13/2019  -     Comprehensive metabolic panel; Future; Expected date: 06/13/2019  -     Lipid panel; Future; Expected date: 06/13/2019  -     TSH; Future; Expected date: 06/13/2019  -     Vitamin D; Future; Expected date: 06/13/2019  -     Hemoglobin A1c; Future; Expected date: 06/13/2019  Health Maintenance  "      Date Due Completion Date    Shingles Vaccine (1 of 2) 02/22/2015 ---    Influenza Vaccine 08/01/2019 3/10/2019    Override on 3/3/2019: Done    Override on 9/22/2017: Done    Mammogram 04/05/2020 4/5/2018    Lipid Panel 04/13/2023 4/13/2018    Override on 2/15/2010: Done    Colonoscopy 06/18/2025 6/18/2015    TETANUS VACCINE 09/22/2027 9/22/2017    Override on 9/22/2017: Done        I addressed all major concerns as it related to health maintenance.  All were ordered and scheduled based on the patients wishes.  Any additional health maintenance will be readdressed at the next physical if declined or deferred by the patient.    Hypokalemia  -     potassium bicarbonate (K-LYTE) disintegrating tablet; Take 1 tablet (25 mEq total) by mouth 3 (three) times daily.  Dispense: 270 tablet; Refill: 5  -     spironolactone (ALDACTONE) 25 MG tablet; Take 1 tablet (25 mg total) by mouth 2 (two) times daily. Appt needed with PCP for future refills  Dispense: 180 tablet; Refill: 3  The current medical regimen is effective;  continue present plan and medications.  Pt states she has been worked up by endocrine in the past with no answer as to why she remains hypokalemic. She is stable on current regimen     Blood glucose elevated  -     Hemoglobin A1c; Future; Expected date: 06/13/2019    Thyroid nodule  -     US Soft Tissue Head Neck Thyroid; Future; Expected date: 06/13/2019    Morbid obesity with BMI of 40.0-44.9, adult  The patient is asked to make an attempt to improve diet and exercise patterns to aid in medical management of this problem.    Vitamin D deficiency disease  -     Vitamin D; Future; Expected date: 06/13/2019    Essential hypertension  -     amLODIPine (NORVASC) 10 MG tablet; Take 1 tablet (10 mg total) by mouth once daily.  Dispense: 30 tablet; Refill: 0  The current medical regimen is effective;  continue present plan and medications.    Encounter for screening mammogram for breast cancer  -     Mammo  Digital Screening Bilat; Future; Expected date: 06/13/2019        Follow up in about 1 year (around 6/13/2020), or if symptoms worsen or fail to improve, for yearly exam.

## 2019-06-14 DIAGNOSIS — E87.6 HYPOKALEMIA: ICD-10-CM

## 2019-06-14 RX ORDER — AMLODIPINE BESYLATE 10 MG/1
TABLET ORAL
Qty: 90 TABLET | Refills: 1 | Status: SHIPPED | OUTPATIENT
Start: 2019-06-14 | End: 2019-12-06

## 2019-06-14 RX ORDER — SPIRONOLACTONE 25 MG/1
TABLET ORAL
Qty: 60 TABLET | Refills: 0 | OUTPATIENT
Start: 2019-06-14

## 2019-06-20 ENCOUNTER — TELEPHONE (OUTPATIENT)
Dept: FAMILY MEDICINE | Facility: CLINIC | Age: 54
End: 2019-06-20

## 2019-06-20 NOTE — TELEPHONE ENCOUNTER
----- Message from Karli Renee sent at 6/20/2019 12:47 PM CDT -----  Contact: self  [phone]  .Type: Patient Call Back    Who called: self     What is the request in detail: Pt states that she's still wait for Thyroid  U/S orders     Can the clinic reply by [facility]? Call back     Would the patient rather a call back or a response via [facility]?  Call back     Best call back number: [phone]

## 2019-06-25 ENCOUNTER — HOSPITAL ENCOUNTER (OUTPATIENT)
Dept: RADIOLOGY | Facility: HOSPITAL | Age: 54
Discharge: HOME | End: 2019-06-25
Attending: FAMILY MEDICINE
Payer: COMMERCIAL

## 2019-06-25 DIAGNOSIS — E04.1 THYROID NODULE: ICD-10-CM

## 2019-06-25 PROCEDURE — 76536 US SOFT TISSUE HEAD NECK THYROID: ICD-10-PCS | Mod: 26,,, | Performed by: RADIOLOGY

## 2019-06-25 PROCEDURE — 76536 US EXAM OF HEAD AND NECK: CPT | Mod: 26,,, | Performed by: RADIOLOGY

## 2019-06-25 PROCEDURE — 76536 US EXAM OF HEAD AND NECK: CPT | Mod: TC

## 2019-06-28 ENCOUNTER — PATIENT MESSAGE (OUTPATIENT)
Dept: ENDOCRINOLOGY | Facility: CLINIC | Age: 54
End: 2019-06-28

## 2019-07-12 ENCOUNTER — OFFICE VISIT (OUTPATIENT)
Dept: URGENT CARE | Facility: CLINIC | Age: 54
End: 2019-07-12
Payer: COMMERCIAL

## 2019-07-12 VITALS
BODY MASS INDEX: 40.46 KG/M2 | WEIGHT: 237 LBS | HEART RATE: 62 BPM | HEIGHT: 64 IN | SYSTOLIC BLOOD PRESSURE: 130 MMHG | DIASTOLIC BLOOD PRESSURE: 70 MMHG | TEMPERATURE: 97 F | OXYGEN SATURATION: 98 %

## 2019-07-12 DIAGNOSIS — R53.83 FATIGUE, UNSPECIFIED TYPE: Primary | ICD-10-CM

## 2019-07-12 PROCEDURE — 3008F PR BODY MASS INDEX (BMI) DOCUMENTED: ICD-10-PCS | Mod: CPTII,S$GLB,, | Performed by: PHYSICIAN ASSISTANT

## 2019-07-12 PROCEDURE — 3075F PR MOST RECENT SYSTOLIC BLOOD PRESS GE 130-139MM HG: ICD-10-PCS | Mod: CPTII,S$GLB,, | Performed by: PHYSICIAN ASSISTANT

## 2019-07-12 PROCEDURE — 3078F DIAST BP <80 MM HG: CPT | Mod: CPTII,S$GLB,, | Performed by: PHYSICIAN ASSISTANT

## 2019-07-12 PROCEDURE — 3078F PR MOST RECENT DIASTOLIC BLOOD PRESSURE < 80 MM HG: ICD-10-PCS | Mod: CPTII,S$GLB,, | Performed by: PHYSICIAN ASSISTANT

## 2019-07-12 PROCEDURE — 3008F BODY MASS INDEX DOCD: CPT | Mod: CPTII,S$GLB,, | Performed by: PHYSICIAN ASSISTANT

## 2019-07-12 PROCEDURE — 99214 PR OFFICE/OUTPT VISIT, EST, LEVL IV, 30-39 MIN: ICD-10-PCS | Mod: S$GLB,,, | Performed by: PHYSICIAN ASSISTANT

## 2019-07-12 PROCEDURE — 3075F SYST BP GE 130 - 139MM HG: CPT | Mod: CPTII,S$GLB,, | Performed by: PHYSICIAN ASSISTANT

## 2019-07-12 PROCEDURE — 99214 OFFICE O/P EST MOD 30 MIN: CPT | Mod: S$GLB,,, | Performed by: PHYSICIAN ASSISTANT

## 2019-07-12 NOTE — PROGRESS NOTES
"Subjective:       Patient ID: Niharika Gutierrez is a 54 y.o. female.    Vitals:  height is 5' 4" (1.626 m) and weight is 107.5 kg (237 lb). Her temperature is 97 °F (36.1 °C). Her blood pressure is 130/70 and her pulse is 62. Her oxygen saturation is 98%.     Chief Complaint: Menopause    Pt reports having off and on hot and cold feeling. She has a gyn appt in a 2 weeks. She had a partial hysterectomy about 4 years ago and only has 1 ovary left. She is complaining of fatigue, bloating and cramping last week and is waking up with sweats at night.    Fatigue   This is a new problem. The current episode started 1 to 4 weeks ago. The problem occurs constantly. The problem has been gradually worsening. Pertinent negatives include no arthralgias, chest pain, chills, congestion, coughing, fatigue, fever, headaches, joint swelling, myalgias, nausea, rash, sore throat, vertigo, vomiting or weakness. She has tried nothing for the symptoms.       Constitution: Negative for chills, fatigue and fever.   HENT: Negative for congestion and sore throat.    Neck: Negative for painful lymph nodes.   Cardiovascular: Negative for chest pain and leg swelling.   Eyes: Negative for double vision and blurred vision.   Respiratory: Negative for cough and shortness of breath.    Gastrointestinal: Negative for nausea, vomiting and diarrhea.   Genitourinary: Negative for dysuria, frequency, urgency and history of kidney stones.   Musculoskeletal: Negative for joint pain, joint swelling, muscle cramps and muscle ache.   Skin: Negative for color change, pale, rash and bruising.   Allergic/Immunologic: Negative for seasonal allergies.   Neurological: Negative for dizziness, history of vertigo, light-headedness, passing out and headaches.   Hematologic/Lymphatic: Negative for swollen lymph nodes.   Psychiatric/Behavioral: Negative for nervous/anxious, sleep disturbance and depression. The patient is not nervous/anxious.        Objective:    "   Physical Exam   Constitutional: She is oriented to person, place, and time. She appears well-developed and well-nourished. She is cooperative.  Non-toxic appearance. She does not appear ill. No distress.   HENT:   Head: Normocephalic and atraumatic.   Right Ear: Hearing, tympanic membrane, external ear and ear canal normal.   Left Ear: Hearing, tympanic membrane, external ear and ear canal normal.   Nose: Nose normal. No mucosal edema, rhinorrhea or nasal deformity. No epistaxis. Right sinus exhibits no maxillary sinus tenderness and no frontal sinus tenderness. Left sinus exhibits no maxillary sinus tenderness and no frontal sinus tenderness.   Mouth/Throat: Uvula is midline, oropharynx is clear and moist and mucous membranes are normal. No trismus in the jaw. Normal dentition. No uvula swelling. No posterior oropharyngeal erythema.   Eyes: Conjunctivae and lids are normal. Right eye exhibits no discharge. Left eye exhibits no discharge. No scleral icterus.   Sclera clear bilat   Neck: Trachea normal, normal range of motion, full passive range of motion without pain and phonation normal. Neck supple.   Cardiovascular: Normal rate, regular rhythm, normal heart sounds, intact distal pulses and normal pulses.   Pulmonary/Chest: Effort normal and breath sounds normal. No respiratory distress.   Abdominal: Soft. Normal appearance and bowel sounds are normal. She exhibits no distension, no pulsatile midline mass and no mass. There is no tenderness.   Musculoskeletal: Normal range of motion. She exhibits no edema or deformity.   Neurological: She is alert and oriented to person, place, and time. She exhibits normal muscle tone. Coordination normal.   Skin: Skin is warm, dry and intact. She is not diaphoretic. No pallor.   Psychiatric: She has a normal mood and affect. Her speech is normal and behavior is normal. Judgment and thought content normal. Cognition and memory are normal.   Nursing note and vitals reviewed.         Assessment:       1. Fatigue, unspecified type        Plan:         Fatigue, unspecified type      Patient Instructions     General Discharge Instructions   If you were prescribed a narcotic or controlled medication, do not drive or operate heavy equipment or machinery while taking these medications.  If you were prescribed antibiotics, please take them to completion.  You must understand that you've received an Urgent Care treatment only and that you may be released before all your medical problems are known or treated. You, the patient, will arrange for follow up care as instructed.  Follow up with your PCP or specialty clinic as directed in the next 1-2 weeks if not improved or as needed.  You can call [phone] to schedule an appointment with the appropriate provider.  If your condition worsens we recommend that you receive another evaluation at the emergency room immediately or contact your primary medical clinics after hours call service to discuss your concerns.  Please return here or go to the Emergency Department for any concerns or worsening of condition.      Understanding Menopause  Menopause marks the point where youve gone 12 months in a row without a period. The average age for this is around 51, but it can happen at younger or older ages. During the months or years before menopause, your body goes through many changes. It may be helpful to understand these changes and what you can do about the symptoms that result.     Use a portable fan to help stay cool.    Symptoms  Perimenopause is sometimes called the menopause transition. It happens in the months or years before menopause. It may begin when you reach your mid-40s. During this time, your estrogen levels go up and down and then decrease. As a result, you may notice some of the following symptoms:  · Menstrual periods that come more or less often than usual  · Menstrual periods that are lighter or heavier than normal  · Increased  premenstrual syndrome (PMS) symptoms  · Hot flashes  · Night sweats  · Mood swings  · Vaginal dryness with possible painful sexual activity  · Difficulty going to sleep or staying asleep  · Decreased sexual drive and function  · Urinating frequently  It is important to remember that you could become pregnant until 12 months have passed since your last menstrual period. Ask your healthcare provider about birth control choices.   Controlling symptoms  Your healthcare provider may suggest pills or an intrauterine device (IUD) that contain the hormone progesterone. This can make your periods more regular and prevent excess bleeding. If you have symptoms due to lower estrogen levels, your healthcare provider may suggest pills that contain estrogen and/or progesterone. This is called hormone therapy (HT).  There are also other prescription medicines that help control some of the bothersome symptoms, like hot flashes, mood swings, and vaginal dryness.  Other ways for you to deal with symptoms are listed below.  · Hot flashes. Wear layers that you can remove. Try all-cotton clothing, sheets, and blankets. Keep a glass of cold water by your bed.  · Pain during sex. You can buy a water-based lubricant or vaginal moisturizer in the drugstore that may help. Your healthcare provider may also prescribe an estrogen cream for your vagina.  · Mood swings. Talking to friends who are going through the same changes can sometimes help.  Date Last Reviewed: 12/1/2016  © 0160-9793 [employer]. [address] [place] [address]. All rights reserved. This information is not intended as a substitute for professional medical care. Always follow your healthcare professional's instructions.        Hormone Changes During Menopause  Menopause is not a sudden change. During the months or years before menopause (perimenopause), your ovaries begin to run out of eggs. Your body makes less estrogen and progesterone. This may  bring on symptoms such as hot flashes. Youve reached menopause when you have not had a period for 1 year. From that point on, you are in postmenopause.  Perimenopause  In the years leading up to menopause, your ovaries make less estrogen. You release fewer eggs and your periods become less regular.  Symptoms you may have:  · Heavier or lighter periods  · Longer or shorter time between periods  · Hot flashes  · Mood swings  · Night sweats  · Insomnia  · Vaginal dryness  · Urinary changes including incontinence and frequency  Postmenopause  After menopause, you make very little estrogen. As a result, the uterine lining does not thicken and your periods have ended.  Symptoms you may have:  · No periods  · Vaginal dryness  · Hot flashes  · Mood swings  · Night sweats  · Insomnia  Surgical menopause  Menopause can occur after a surgical removal of the uterus (hysterectomy) if the ovaries are also removed. Estrogen and progesterone levels decrease quickly. This may cause sudden and severe symptoms.   Date Last Reviewed: 5/13/2015  © 0135-0281 [employer]. [address] [place] [address]. All rights reserved. This information is not intended as a substitute for professional medical care. Always follow your healthcare professional's instructions.      Menopause: Effects of Low Estrogen Levels  When your estrogen level falls, you may have symptoms. You also may be at a greater risk for osteoporosis and heart disease. Your diet, family health history, lifestyle, and other factors affect your symptoms and risks.  Symptoms of low estrogen  · Flashes, flushes, and night sweats are the most common symptoms of low estrogen. At times, blood rushes to your skins surface. This can give you a feeling of warmth (hot flash). Your face may look flushed. Hot flashes while you are sleeping are called night sweats.  · Mood swings are another effect of low estrogen. You may feel sad, anxious, or frustrated. Shifting  hormone levels and night sweats may disrupt your sleep. This can cause fatigue, which may make mood swings worse.  · Thinning tissues may cause discomfort. Skin may appear more wrinkled. Thinning in the urinary tract may lead to bladder infections. You may also have an urgent need to urinate. Or, you may lose bladder control (incontinence). Thinning of the vagina may cause dryness and painful sex.  Major health risks of low estrogen  Osteoporosis: Estrogen helps maintain strong bones by preventing calcium loss. Too little calcium can increase the risk of fractures in the spine, hips, and leg and arm bones. Women who drink a lot of alcohol, who smoke, who are not active, and who are thin or petite are at greater risk. A family history of osteoporosis may also increase risk.  Heart disease: Estrogen made by the body seems to protect against heart disease. It may do this by raising the level of HDL (good) cholesterol in the blood. After menopause, the risk of heart disease rises sharply. Talk to your doctor about ways to protect your heart health.  How HT helps  Hormone therapy (HT) replaces hormones your body no longer produces. Because of this, it reduces some symptoms of menopause that are linked to low hormone levels. HT may also help prevent osteoporosis in some women. But it may increase the risk of other health conditions, including heart disease, breast cancer, and stroke. You may not need HT--not all women do. Talk to your doctor about whether or not HT is right for you.   Date Last Reviewed: 5/13/2015  © 3909-0873 [employer]. [address] [place] [address], [place] [address]. All rights reserved. This information is not intended as a substitute for professional medical care. Always follow your healthcare professional's instructions.

## 2019-07-12 NOTE — PATIENT INSTRUCTIONS
General Discharge Instructions   If you were prescribed a narcotic or controlled medication, do not drive or operate heavy equipment or machinery while taking these medications.  If you were prescribed antibiotics, please take them to completion.  You must understand that you've received an Urgent Care treatment only and that you may be released before all your medical problems are known or treated. You, the patient, will arrange for follow up care as instructed.  Follow up with your PCP or specialty clinic as directed in the next 1-2 weeks if not improved or as needed.  You can call [phone] to schedule an appointment with the appropriate provider.  If your condition worsens we recommend that you receive another evaluation at the emergency room immediately or contact your primary medical clinics after hours call service to discuss your concerns.  Please return here or go to the Emergency Department for any concerns or worsening of condition.      Understanding Menopause  Menopause marks the point where youve gone 12 months in a row without a period. The average age for this is around 51, but it can happen at younger or older ages. During the months or years before menopause, your body goes through many changes. It may be helpful to understand these changes and what you can do about the symptoms that result.     Use a portable fan to help stay cool.    Symptoms  Perimenopause is sometimes called the menopause transition. It happens in the months or years before menopause. It may begin when you reach your mid-40s. During this time, your estrogen levels go up and down and then decrease. As a result, you may notice some of the following symptoms:  · Menstrual periods that come more or less often than usual  · Menstrual periods that are lighter or heavier than normal  · Increased premenstrual syndrome (PMS) symptoms  · Hot flashes  · Night sweats  · Mood swings  · Vaginal dryness with possible painful sexual  activity  · Difficulty going to sleep or staying asleep  · Decreased sexual drive and function  · Urinating frequently  It is important to remember that you could become pregnant until 12 months have passed since your last menstrual period. Ask your healthcare provider about birth control choices.   Controlling symptoms  Your healthcare provider may suggest pills or an intrauterine device (IUD) that contain the hormone progesterone. This can make your periods more regular and prevent excess bleeding. If you have symptoms due to lower estrogen levels, your healthcare provider may suggest pills that contain estrogen and/or progesterone. This is called hormone therapy (HT).  There are also other prescription medicines that help control some of the bothersome symptoms, like hot flashes, mood swings, and vaginal dryness.  Other ways for you to deal with symptoms are listed below.  · Hot flashes. Wear layers that you can remove. Try all-cotton clothing, sheets, and blankets. Keep a glass of cold water by your bed.  · Pain during sex. You can buy a water-based lubricant or vaginal moisturizer in the drugstore that may help. Your healthcare provider may also prescribe an estrogen cream for your vagina.  · Mood swings. Talking to friends who are going through the same changes can sometimes help.  Date Last Reviewed: 12/1/2016  © 2253-2873 [employer]. [address] [place] [address], [place] [address]. All rights reserved. This information is not intended as a substitute for professional medical care. Always follow your healthcare professional's instructions.        Hormone Changes During Menopause  Menopause is not a sudden change. During the months or years before menopause (perimenopause), your ovaries begin to run out of eggs. Your body makes less estrogen and progesterone. This may bring on symptoms such as hot flashes. Youve reached menopause when you have not had a period for 1 year. From that point on, you are  in postmenopause.  Perimenopause  In the years leading up to menopause, your ovaries make less estrogen. You release fewer eggs and your periods become less regular.  Symptoms you may have:  · Heavier or lighter periods  · Longer or shorter time between periods  · Hot flashes  · Mood swings  · Night sweats  · Insomnia  · Vaginal dryness  · Urinary changes including incontinence and frequency  Postmenopause  After menopause, you make very little estrogen. As a result, the uterine lining does not thicken and your periods have ended.  Symptoms you may have:  · No periods  · Vaginal dryness  · Hot flashes  · Mood swings  · Night sweats  · Insomnia  Surgical menopause  Menopause can occur after a surgical removal of the uterus (hysterectomy) if the ovaries are also removed. Estrogen and progesterone levels decrease quickly. This may cause sudden and severe symptoms.   Date Last Reviewed: 5/13/2015  © 4348-0734 [employer]. [address] [place] [address]. All rights reserved. This information is not intended as a substitute for professional medical care. Always follow your healthcare professional's instructions.      Menopause: Effects of Low Estrogen Levels  When your estrogen level falls, you may have symptoms. You also may be at a greater risk for osteoporosis and heart disease. Your diet, family health history, lifestyle, and other factors affect your symptoms and risks.  Symptoms of low estrogen  · Flashes, flushes, and night sweats are the most common symptoms of low estrogen. At times, blood rushes to your skins surface. This can give you a feeling of warmth (hot flash). Your face may look flushed. Hot flashes while you are sleeping are called night sweats.  · Mood swings are another effect of low estrogen. You may feel sad, anxious, or frustrated. Shifting hormone levels and night sweats may disrupt your sleep. This can cause fatigue, which may make mood swings worse.  · Thinning tissues  may cause discomfort. Skin may appear more wrinkled. Thinning in the urinary tract may lead to bladder infections. You may also have an urgent need to urinate. Or, you may lose bladder control (incontinence). Thinning of the vagina may cause dryness and painful sex.  Major health risks of low estrogen  Osteoporosis: Estrogen helps maintain strong bones by preventing calcium loss. Too little calcium can increase the risk of fractures in the spine, hips, and leg and arm bones. Women who drink a lot of alcohol, who smoke, who are not active, and who are thin or petite are at greater risk. A family history of osteoporosis may also increase risk.  Heart disease: Estrogen made by the body seems to protect against heart disease. It may do this by raising the level of HDL (good) cholesterol in the blood. After menopause, the risk of heart disease rises sharply. Talk to your doctor about ways to protect your heart health.  How HT helps  Hormone therapy (HT) replaces hormones your body no longer produces. Because of this, it reduces some symptoms of menopause that are linked to low hormone levels. HT may also help prevent osteoporosis in some women. But it may increase the risk of other health conditions, including heart disease, breast cancer, and stroke. You may not need HT--not all women do. Talk to your doctor about whether or not HT is right for you.   Date Last Reviewed: 5/13/2015  © 3715-5440 [employer]. [address] [place] [address], [place] [address]. All rights reserved. This information is not intended as a substitute for professional medical care. Always follow your healthcare professional's instructions.

## 2019-07-20 DIAGNOSIS — E87.6 HYPOKALEMIA: ICD-10-CM

## 2019-07-20 DIAGNOSIS — I10 ESSENTIAL HYPERTENSION: ICD-10-CM

## 2019-07-20 RX ORDER — AMLODIPINE BESYLATE 10 MG/1
TABLET ORAL
Qty: 30 TABLET | Refills: 0 | Status: SHIPPED | OUTPATIENT
Start: 2019-07-20 | End: 2019-12-06 | Stop reason: SDUPTHER

## 2019-07-20 RX ORDER — SPIRONOLACTONE 25 MG/1
TABLET ORAL
Qty: 60 TABLET | Refills: 0 | Status: SHIPPED | OUTPATIENT
Start: 2019-07-20 | End: 2021-10-21

## 2019-07-23 ENCOUNTER — OFFICE VISIT (OUTPATIENT)
Dept: OBSTETRICS AND GYNECOLOGY | Facility: CLINIC | Age: 54
End: 2019-07-23
Payer: COMMERCIAL

## 2019-07-23 ENCOUNTER — LAB VISIT (OUTPATIENT)
Dept: LAB | Facility: HOSPITAL | Age: 54
End: 2019-07-23
Attending: OBSTETRICS & GYNECOLOGY
Payer: COMMERCIAL

## 2019-07-23 VITALS — BODY MASS INDEX: 41.29 KG/M2 | HEIGHT: 64 IN | WEIGHT: 241.88 LBS

## 2019-07-23 DIAGNOSIS — N95.1 MENOPAUSAL VASOMOTOR SYNDROME: Primary | ICD-10-CM

## 2019-07-23 DIAGNOSIS — N95.1 MENOPAUSAL VASOMOTOR SYNDROME: ICD-10-CM

## 2019-07-23 DIAGNOSIS — E55.9 VITAMIN D INSUFFICIENCY: ICD-10-CM

## 2019-07-23 PROCEDURE — 3008F PR BODY MASS INDEX (BMI) DOCUMENTED: ICD-10-PCS | Mod: CPTII,S$GLB,, | Performed by: OBSTETRICS & GYNECOLOGY

## 2019-07-23 PROCEDURE — 99999 PR PBB SHADOW E&M-EST. PATIENT-LVL III: CPT | Mod: PBBFAC,,, | Performed by: OBSTETRICS & GYNECOLOGY

## 2019-07-23 PROCEDURE — 99203 PR OFFICE/OUTPT VISIT, NEW, LEVL III, 30-44 MIN: ICD-10-PCS | Mod: S$GLB,,, | Performed by: OBSTETRICS & GYNECOLOGY

## 2019-07-23 PROCEDURE — 36415 COLL VENOUS BLD VENIPUNCTURE: CPT

## 2019-07-23 PROCEDURE — 83002 ASSAY OF GONADOTROPIN (LH): CPT

## 2019-07-23 PROCEDURE — 82670 ASSAY OF TOTAL ESTRADIOL: CPT

## 2019-07-23 PROCEDURE — 83001 ASSAY OF GONADOTROPIN (FSH): CPT

## 2019-07-23 PROCEDURE — 99999 PR PBB SHADOW E&M-EST. PATIENT-LVL III: ICD-10-PCS | Mod: PBBFAC,,, | Performed by: OBSTETRICS & GYNECOLOGY

## 2019-07-23 PROCEDURE — 3008F BODY MASS INDEX DOCD: CPT | Mod: CPTII,S$GLB,, | Performed by: OBSTETRICS & GYNECOLOGY

## 2019-07-23 PROCEDURE — 99203 OFFICE O/P NEW LOW 30 MIN: CPT | Mod: S$GLB,,, | Performed by: OBSTETRICS & GYNECOLOGY

## 2019-07-23 RX ORDER — IBUPROFEN 600 MG/1
TABLET ORAL
Refills: 0 | COMMUNITY
Start: 2019-06-25 | End: 2020-10-19

## 2019-07-23 RX ORDER — CHLORHEXIDINE GLUCONATE ORAL RINSE 1.2 MG/ML
SOLUTION DENTAL
Refills: 2 | COMMUNITY
Start: 2019-06-24 | End: 2021-10-21

## 2019-07-23 RX ORDER — ERGOCALCIFEROL 1.25 MG/1
50000 CAPSULE ORAL
Qty: 52 CAPSULE | Refills: 0 | Status: SHIPPED | OUTPATIENT
Start: 2019-07-23 | End: 2020-01-21 | Stop reason: SDUPTHER

## 2019-07-23 NOTE — PROGRESS NOTES
History & Physical  Gynecology      SUBJECTIVE:     Chief Complaint: Consult (cramping, hot flashes, tired ) and Menopause       History of Present Illness:  Ms. Gutierrez is a 55 y/o female who presents to clinic c/o bloating, feeling tired, and hot. She reports that has been having night sweats, hot flashes, vaginal dryness, and lack of energy. Patient has a h/o thyroid nodules but recent TSH was normal. Vitamin D was low but she has not had been using required supplemental vitamin D. She is s/p Laparoscopic Hysterectomy/LSO.       Review of patient's allergies indicates:  No Known Allergies    Past Medical History:   Diagnosis Date    Hypertension     Hypokalemic syndrome     Thyroid disease     Thyroid nodule     Vitamin D deficiency disease      Past Surgical History:   Procedure Laterality Date    BREAST BIOPSY       SECTION  2004    X 1    CHOLECYSTECTOMY  2008    COLONOSCOPY N/A 2015    Performed by [facility] (4[facility])    CYSTOSCOPY, WITH RETROGRADE PYELOGRAM Bilateral 2014    Per[facility] DEL [facility]    ESOPHAGOGASTRODUODENOSCOPY (EGD) N/A 2015    Per[facility] by [facility] at [facility] END[facility] (4TH FLR)    HYSTERECTOMY      LAPAROSCOPIC HYSTERECTOMY      LSO     OB History        1    Para   1    Term   1            AB        Living           SAB        TAB        Ectopic        Multiple        Live Births                   Family History   Problem Relation Age of Onset    Diabetes Mother     Heart disease Father         CHF    Breast cancer Maternal Aunt     Breast cancer Paternal Aunt     Breast cancer Maternal Aunt     Breast cancer Paternal Aunt     Colon cancer Neg Hx     Colon polyps Neg Hx      Social History     Tobacco Use    Smoking status: Never Smoker    Smokeless tobacco: Never Used   Substance Use Topics    Alcohol use: No    Drug use: No       Current Outpatient Medications   Medication Sig     amLODIPine (NORVASC) 10 MG tablet TAKE 1 TABLET BY MOUTH EVERY DAY    amLODIPine (NORVASC) 10 MG tablet TAKE 1 TABLET(10 MG) BY MOUTH EVERY DAY    azelastine (ASTELIN) 137 mcg (0.1 %) nasal spray 1 spray (137 mcg total) by Nasal route 2 (two) times daily.    chlorhexidine (PERIDEX) 0.12 % solution SWISH WITH 1 CAPFUL FOR 30 SECONDS AND EXPCTORATE UTD 2 TO 3 TIMES A DAY    esomeprazole (NEXIUM) 40 MG capsule Take 40 mg by mouth before breakfast.    ibuprofen (ADVIL,MOTRIN) 600 MG tablet TK 1 T PO  Q 6 H PRN    ketoconazole (NIZORAL) 2 % cream Apply topically once daily.    ketoconazole (NIZORAL) 2 % shampoo Apply topically twice a week.    potassium bicarbonate (K-LYTE) disintegrating tablet Take 1 tablet (25 mEq total) by mouth 3 (three) times daily.    spironolactone (ALDACTONE) 25 MG tablet Take 1 tablet (25 mg total) by mouth 2 (two) times daily. Appt needed with PCP for future refills    spironolactone (ALDACTONE) 25 MG tablet TAKE 1 TABLET BY MOUTH TWICE DAILY    ergocalciferol (ERGOCALCIFEROL) 50,000 unit Cap Take 1 capsule (50,000 Units total) by mouth every 7 days.     No current facility-administered medications for this visit.        Review of Systems:  Review of Systems   Constitutional: Positive for fatigue. Negative for chills and fever.   Respiratory: Negative for cough and wheezing.    Cardiovascular: Negative for chest pain and palpitations.   Gastrointestinal: Negative for abdominal pain, nausea and vomiting.   Genitourinary: Positive for hot flashes. Negative for dysuria, frequency, hematuria, pelvic pain, vaginal bleeding, vaginal discharge and vaginal pain.        OBJECTIVE:     Physical Exam:  Physical Exam   Constitutional: She is oriented to person, place, and time. She appears well-developed and well-nourished.   Cardiovascular: Normal rate.   Pulmonary/Chest: Effort normal.   Neurological: She is alert and oriented to person, place, and time.   Skin: Skin is warm and dry.    Psychiatric: She has a normal mood and affect.   Nursing note and vitals reviewed.        ASSESSMENT:       ICD-10-CM ICD-9-CM    1. Menopausal vasomotor syndrome N95.1 627.2 Follicle stimulating hormone      Estradiol      Luteinizing hormone   2. Vitamin D insufficiency E55.9 268.9 ergocalciferol (ERGOCALCIFEROL) 50,000 unit Cap          Plan:      Niharika was seen today for consult and menopause.    Diagnoses and all orders for this visit:    Menopausal vasomotor syndrome  -     Follicle stimulating hormone; Future  -     Estradiol; Future  -     Luteinizing hormone; Future  - Would prefer to have hormones checked and start vitamin D supplement to see if that improves symptoms before starting hormones.    Vitamin D insufficiency  -     ergocalciferol (ERGOCALCIFEROL) 50,000 unit Cap; Take 1 capsule (50,000 Units total) by mouth every 7 days.        Orders Placed This Encounter   Procedures    Follicle stimulating hormone    Estradiol    Luteinizing hormone       Follow up if symptoms worsen or fail to improve.    Counseling time: 15 minutes    Simón Mckeon

## 2019-07-24 LAB
ESTRADIOL SERPL-MCNC: 20 PG/ML
FSH SERPL-ACNC: 28.6 MIU/ML
LH SERPL-ACNC: 14.7 MIU/ML

## 2019-10-03 ENCOUNTER — PATIENT MESSAGE (OUTPATIENT)
Dept: FAMILY MEDICINE | Facility: CLINIC | Age: 54
End: 2019-10-03

## 2019-10-03 ENCOUNTER — PATIENT MESSAGE (OUTPATIENT)
Dept: ENDOCRINOLOGY | Facility: CLINIC | Age: 54
End: 2019-10-03

## 2019-12-06 DIAGNOSIS — I10 ESSENTIAL HYPERTENSION: ICD-10-CM

## 2019-12-06 RX ORDER — AMLODIPINE BESYLATE 10 MG/1
TABLET ORAL
Qty: 90 TABLET | Refills: 0 | Status: SHIPPED | OUTPATIENT
Start: 2019-12-06 | End: 2020-03-09

## 2020-01-17 NOTE — PROGRESS NOTES
Subjective:      Patient ID: Niharika Gutierrez is a 54 y.o. female.    Chief Complaint:  Thyroid Nodule    History of Present Illness  Niharika Gutierrez is here for follow up of thyroid nodule.  Previously seen by Dr. Case.  Last seen 5/16/18.  This is their first visit with me.      With regards to thyroid nodule:    Found: 2014    Thyroid US:   6/25/19  Right lobe of the thyroid measures 5.5 x 1.7 x 2.4 cm.  Left lobe of the thyroid measures 6.8 x 2.2 x 2.7 cm.  There is a 1.0 cm nodule in the lower pole of the right lobe-solid, isoechoic, wider than tall, with ill-defined margins (TI rads 3-does not meet ACR criteria for FNA or follow-up).  Lesion grossly stable from prior.  3 cm nodule in the lower pole of the left lobe.  This underwent FNA in May 2018, reported benign etiology.  There is an additional 1.3 cm nodule in the interpolar region of the left lobe which is solid, isoechoic, with ill-defined margins (TI rads 3-does not meet ACR criteria for FNA or follow-up).  Slightly increased in size from prior, where measured 1.1 cm.  No new nodules identified.  No enlarged cervical lymph nodes identified    FNA:   8/21/14  Left Thyroid  THE [place] [facility] FOR REPORTING THYROID CYTOPATHOLOGY  II BENIGN  A MODERATE BUT NOT EXCESSIVE NUMBER OF INNOCUOUS FOLLICULAR CLUSTERS. COLLOID  PRESENT. SOME DISTENDED HISTIOCYTES.  5/16/18  Left Thyroid Nodule.  Specimen submitted as FNA thyroid nodule:  [place] [facility] Thyroid Cytology Category: II - Benign  -Consistent with a benign follicular nodule (includes adenomatoiod nodule, colloid nodule, etc.)  -The specimen contains groups of uniform follicular cells without nuclear atypia.    Signs or Symptoms:   Difficulty breathing: Denies  Difficulty swallowing: + solids  Voice Changes: Denies  FH of thyroid cancer: + Cousin  Personal history of radiation treatment or exposure: Denies    Lab Results   Component Value Date    TSH 0.915 06/13/2019    U1VDJDS 127  "07/22/2014    L9VWGDJ 7.3 07/22/2014    FREET4 1.08 07/22/2014     Denies signs or symptoms of hyper or hypothyroidism.    With regards to Vitamin D Deficiency:  Vit D, 25-Hydroxy   Date Value Ref Range Status   06/13/2019 24 (L) 30 - 96 ng/mL Final     Comment:     Vitamin D deficiency.........<10 ng/mL                              Vitamin D insufficiency......10-29 ng/mL       Vitamin D sufficiency........> or equal to 30 ng/mL  Vitamin D toxicity............>100 ng/mL       Current Meds: Ergo 50,000 weekly     Review of Systems   Constitutional: Negative for fatigue.   HENT: Positive for trouble swallowing.    Eyes: Negative for visual disturbance.   Respiratory: Negative for shortness of breath.    Cardiovascular: Negative for chest pain.   Gastrointestinal: Negative for abdominal pain.   Musculoskeletal: Negative for arthralgias.   Skin: Negative for wound.   Neurological: Negative for headaches.   Hematological: Does not bruise/bleed easily.   Psychiatric/Behavioral: Negative for sleep disturbance.     Objective:   Physical Exam   Neck: Thyromegaly (L>R) present.   Cardiovascular: Normal rate.   No edema present   Pulmonary/Chest: Effort normal.   Abdominal: Soft.   Vitals reviewed.    Visit Vitals  /76 (BP Location: Right arm, Patient Position: Sitting, BP Method: Medium (Manual))   Resp 16   Ht 5' 4" (1.626 m)   Wt 108.7 kg (239 lb 10.2 oz)   BMI 41.13 kg/m²     Body mass index is 41.13 kg/m².    Lab Review:   Lab Results   Component Value Date    HGBA1C 5.3 06/13/2019    HGBA1C 5.2 12/02/2016       Lab Results   Component Value Date    CHOL 132 06/13/2019    HDL 47 06/13/2019    LDLCALC 73.8 06/13/2019    TRIG 56 06/13/2019    CHOLHDL 35.6 06/13/2019     Lab Results   Component Value Date     06/13/2019    K 3.7 06/13/2019     06/13/2019    CO2 29 06/13/2019    GLU 86 06/13/2019    BUN 15 06/13/2019    CREATININE 0.8 06/13/2019    CALCIUM 9.7 06/13/2019    PROT 8.3 06/13/2019    ALBUMIN " 3.5 06/13/2019    BILITOT 0.2 06/13/2019    ALKPHOS 93 06/13/2019    AST 16 06/13/2019    ALT 11 06/13/2019    ANIONGAP 10 06/13/2019    ESTGFRAFRICA >60.0 06/13/2019    EGFRNONAA >60.0 06/13/2019    TSH 0.915 06/13/2019     Vit D, 25-Hydroxy   Date Value Ref Range Status   06/13/2019 24 (L) 30 - 96 ng/mL Final     Comment:     Vitamin D deficiency.........<10 ng/mL                              Vitamin D insufficiency......10-29 ng/mL       Vitamin D sufficiency........> or equal to 30 ng/mL  Vitamin D toxicity............>100 ng/mL       Assessment and Plan     1. Thyroid nodule  TSH    Comprehensive metabolic panel    US Soft Tissue Head Neck Thyroid   2. Vitamin D deficiency disease     3. Essential hypertension     4. Morbid obesity with BMI of 40.0-44.9, adult     5. Vitamin D insufficiency  ergocalciferol (ERGOCALCIFEROL) 50,000 unit Cap     Thyroid nodule  -- I have reviewed management options including observation, FNA or surgery.  -- All of the patients questions were answered.  -- Will proceed with thyroid US.  -- Discussed indications for repeat FNA as well as surgical indications (abnormal FNA, compressive symptoms or interval change).  -- Discussed possible results of FNA- Benign, Malignant, FLUS, or insufficient cells.   -- Check TFTs.     Vitamin D deficiency disease  -- CONTINUE: Ergo 50,000 weekly.    Essential hypertension  -- Controlled on current medications.     Morbid obesity with BMI of 40.0-44.9, adult  -- Encouraged dietary and lifestyle modifications.  -- Emphasized weight loss goals.    Follow up in about 1 year (around 1/21/2021).

## 2020-01-21 ENCOUNTER — OFFICE VISIT (OUTPATIENT)
Dept: ENDOCRINOLOGY | Facility: CLINIC | Age: 55
End: 2020-01-21
Payer: COMMERCIAL

## 2020-01-21 VITALS
DIASTOLIC BLOOD PRESSURE: 76 MMHG | WEIGHT: 239.63 LBS | SYSTOLIC BLOOD PRESSURE: 120 MMHG | HEIGHT: 64 IN | BODY MASS INDEX: 40.91 KG/M2 | RESPIRATION RATE: 16 BRPM

## 2020-01-21 DIAGNOSIS — E55.9 VITAMIN D DEFICIENCY DISEASE: Chronic | ICD-10-CM

## 2020-01-21 DIAGNOSIS — E04.1 THYROID NODULE: Primary | Chronic | ICD-10-CM

## 2020-01-21 DIAGNOSIS — I10 ESSENTIAL HYPERTENSION: Chronic | ICD-10-CM

## 2020-01-21 DIAGNOSIS — E55.9 VITAMIN D INSUFFICIENCY: ICD-10-CM

## 2020-01-21 DIAGNOSIS — E66.01 MORBID OBESITY WITH BMI OF 40.0-44.9, ADULT: Chronic | ICD-10-CM

## 2020-01-21 PROCEDURE — 99214 PR OFFICE/OUTPT VISIT, EST, LEVL IV, 30-39 MIN: ICD-10-PCS | Mod: S$GLB,,, | Performed by: NURSE PRACTITIONER

## 2020-01-21 PROCEDURE — 3074F PR MOST RECENT SYSTOLIC BLOOD PRESSURE < 130 MM HG: ICD-10-PCS | Mod: CPTII,S$GLB,, | Performed by: NURSE PRACTITIONER

## 2020-01-21 PROCEDURE — 3078F DIAST BP <80 MM HG: CPT | Mod: CPTII,S$GLB,, | Performed by: NURSE PRACTITIONER

## 2020-01-21 PROCEDURE — 3074F SYST BP LT 130 MM HG: CPT | Mod: CPTII,S$GLB,, | Performed by: NURSE PRACTITIONER

## 2020-01-21 PROCEDURE — 99214 OFFICE O/P EST MOD 30 MIN: CPT | Mod: S$GLB,,, | Performed by: NURSE PRACTITIONER

## 2020-01-21 PROCEDURE — 99999 PR PBB SHADOW E&M-EST. PATIENT-LVL IV: CPT | Mod: PBBFAC,,, | Performed by: NURSE PRACTITIONER

## 2020-01-21 PROCEDURE — 99999 PR PBB SHADOW E&M-EST. PATIENT-LVL IV: ICD-10-PCS | Mod: PBBFAC,,, | Performed by: NURSE PRACTITIONER

## 2020-01-21 PROCEDURE — 3008F BODY MASS INDEX DOCD: CPT | Mod: CPTII,S$GLB,, | Performed by: NURSE PRACTITIONER

## 2020-01-21 PROCEDURE — 3008F PR BODY MASS INDEX (BMI) DOCUMENTED: ICD-10-PCS | Mod: CPTII,S$GLB,, | Performed by: NURSE PRACTITIONER

## 2020-01-21 PROCEDURE — 3078F PR MOST RECENT DIASTOLIC BLOOD PRESSURE < 80 MM HG: ICD-10-PCS | Mod: CPTII,S$GLB,, | Performed by: NURSE PRACTITIONER

## 2020-01-21 RX ORDER — ERGOCALCIFEROL 1.25 MG/1
50000 CAPSULE ORAL
Qty: 12 CAPSULE | Refills: 3 | Status: SHIPPED | OUTPATIENT
Start: 2020-01-21 | End: 2021-04-12 | Stop reason: SDUPTHER

## 2020-01-21 NOTE — ASSESSMENT & PLAN NOTE
-- I have reviewed management options including observation, FNA or surgery.  -- All of the patients questions were answered.  -- Will proceed with thyroid US.  -- Discussed indications for repeat FNA as well as surgical indications (abnormal FNA, compressive symptoms or interval change).  -- Discussed possible results of FNA- Benign, Malignant, FLUS, or insufficient cells.   -- Check TFTs.

## 2020-02-04 ENCOUNTER — OFFICE VISIT (OUTPATIENT)
Dept: URGENT CARE | Facility: CLINIC | Age: 55
End: 2020-02-04
Payer: COMMERCIAL

## 2020-02-04 VITALS
BODY MASS INDEX: 41.02 KG/M2 | TEMPERATURE: 101 F | HEART RATE: 105 BPM | SYSTOLIC BLOOD PRESSURE: 124 MMHG | OXYGEN SATURATION: 98 % | DIASTOLIC BLOOD PRESSURE: 76 MMHG | WEIGHT: 239 LBS

## 2020-02-04 DIAGNOSIS — J10.1 INFLUENZA A: Primary | ICD-10-CM

## 2020-02-04 LAB
CTP QC/QA: YES
FLUAV AG NPH QL: POSITIVE
FLUBV AG NPH QL: NEGATIVE

## 2020-02-04 PROCEDURE — 87804 POCT INFLUENZA A/B: ICD-10-PCS | Mod: 59,QW,S$GLB, | Performed by: NURSE PRACTITIONER

## 2020-02-04 PROCEDURE — 99213 OFFICE O/P EST LOW 20 MIN: CPT | Mod: 25,S$GLB,, | Performed by: NURSE PRACTITIONER

## 2020-02-04 PROCEDURE — 99213 PR OFFICE/OUTPT VISIT, EST, LEVL III, 20-29 MIN: ICD-10-PCS | Mod: 25,S$GLB,, | Performed by: NURSE PRACTITIONER

## 2020-02-04 PROCEDURE — 87804 INFLUENZA ASSAY W/OPTIC: CPT | Mod: QW,S$GLB,, | Performed by: NURSE PRACTITIONER

## 2020-02-04 RX ORDER — IBUPROFEN 600 MG/1
600 TABLET ORAL EVERY 6 HOURS PRN
Qty: 60 TABLET | Refills: 0 | Status: SHIPPED | OUTPATIENT
Start: 2020-02-04 | End: 2020-10-19

## 2020-02-04 RX ORDER — ACETAMINOPHEN 325 MG/1
650 TABLET ORAL
Status: COMPLETED | OUTPATIENT
Start: 2020-02-04 | End: 2020-02-04

## 2020-02-04 RX ORDER — GUAIFENESIN 600 MG/1
600 TABLET, EXTENDED RELEASE ORAL 2 TIMES DAILY
Qty: 60 TABLET | Refills: 0 | Status: SHIPPED | OUTPATIENT
Start: 2020-02-04 | End: 2021-02-03

## 2020-02-04 RX ORDER — LEVOCETIRIZINE DIHYDROCHLORIDE 5 MG/1
5 TABLET, FILM COATED ORAL DAILY
Qty: 30 TABLET | Refills: 0 | Status: SHIPPED | OUTPATIENT
Start: 2020-02-04 | End: 2020-10-19

## 2020-02-04 RX ORDER — FLUTICASONE PROPIONATE 50 MCG
2 SPRAY, SUSPENSION (ML) NASAL DAILY
Qty: 16 ML | Refills: 0 | Status: SHIPPED | OUTPATIENT
Start: 2020-02-04 | End: 2021-10-21

## 2020-02-04 RX ORDER — IBUPROFEN 200 MG
600 TABLET ORAL
Status: DISCONTINUED | OUTPATIENT
Start: 2020-02-04 | End: 2020-02-04

## 2020-02-04 RX ORDER — OSELTAMIVIR PHOSPHATE 75 MG/1
75 CAPSULE ORAL 2 TIMES DAILY
Qty: 10 CAPSULE | Refills: 0 | Status: SHIPPED | OUTPATIENT
Start: 2020-02-04 | End: 2020-02-09

## 2020-02-04 RX ORDER — PROMETHAZINE HYDROCHLORIDE AND DEXTROMETHORPHAN HYDROBROMIDE 6.25; 15 MG/5ML; MG/5ML
5 SYRUP ORAL
Qty: 180 ML | Refills: 0 | Status: SHIPPED | OUTPATIENT
Start: 2020-02-04 | End: 2020-02-09

## 2020-02-04 RX ORDER — ONDANSETRON 4 MG/1
8 TABLET, ORALLY DISINTEGRATING ORAL EVERY 8 HOURS PRN
Qty: 20 TABLET | Refills: 0 | Status: SHIPPED | OUTPATIENT
Start: 2020-02-04 | End: 2020-10-19

## 2020-02-04 RX ADMIN — ACETAMINOPHEN 650 MG: 325 TABLET ORAL at 10:02

## 2020-02-04 NOTE — LETTER
February 4, 2020      [facility] [address] [address] [facility] [address] [address]  Phone: [phone]  Fax: [phone]       Patient: Niharika Gutierrez   YOB: 1965  Date of Visit: 02/04/2020    To Whom It May Concern:    LAWRENCE Gutierrez  was at [facility] on 02/04/2020. She may return to work/school on 02/07/2020 with no restrictions. If you have any questions or concerns, or if I can be of further assistance, please do not hesitate to contact me.    Sincerely,      Jannette Torres NP

## 2020-02-04 NOTE — PROGRESS NOTES
Subjective:       Patient ID: Niharika Gutierrez is a 54 y.o. female.    Vitals:  weight is 108.4 kg (239 lb). Her temperature is 101.2 °F (38.4 °C) (abnormal). Her blood pressure is 124/76 and her pulse is 105. Her oxygen saturation is 98%.     Chief Complaint: URI    Pt states that she started with uri symptoms 2 days and is taking mucinex and tylenol.     URI    This is a new problem. The current episode started in the past 7 days. The problem has been unchanged. Associated symptoms include congestion, coughing, headaches, nausea, rhinorrhea and sneezing. Pertinent negatives include no ear pain, rash, sinus pain, sore throat, vomiting or wheezing. She has tried decongestant and acetaminophen for the symptoms. The treatment provided mild relief.       Constitution: Positive for fatigue and fever. Negative for chills and sweating.   HENT: Positive for congestion. Negative for ear pain, sinus pain, sinus pressure, sore throat and voice change.    Neck: Negative for painful lymph nodes.   Eyes: Negative for eye redness.   Respiratory: Positive for cough and sputum production. Negative for chest tightness, bloody sputum, COPD, shortness of breath, stridor, wheezing and asthma.    Gastrointestinal: Positive for nausea. Negative for vomiting.   Musculoskeletal: Positive for muscle ache.   Skin: Negative for rash.   Allergic/Immunologic: Positive for sneezing. Negative for seasonal allergies and asthma.   Neurological: Positive for headaches.   Hematologic/Lymphatic: Negative for swollen lymph nodes.       Objective:      Physical Exam   Constitutional: She is oriented to person, place, and time. She appears well-developed and well-nourished. She is cooperative.  Non-toxic appearance. She does not appear ill. No distress.   HENT:   Head: Normocephalic and atraumatic.   Right Ear: Hearing, tympanic membrane, external ear and ear canal normal.   Left Ear: Hearing, tympanic membrane, external ear and ear canal normal.    Nose: Mucosal edema, rhinorrhea and sinus tenderness present. No nasal deformity. No epistaxis. Right sinus exhibits no maxillary sinus tenderness and no frontal sinus tenderness. Left sinus exhibits no maxillary sinus tenderness and no frontal sinus tenderness.   Mouth/Throat: Uvula is midline and mucous membranes are normal. No trismus in the jaw. Normal dentition. No uvula swelling. Posterior oropharyngeal edema and posterior oropharyngeal erythema present. Tonsils are 2+ on the right. Tonsils are 2+ on the left. No tonsillar exudate.   Eyes: Conjunctivae and lids are normal. Right eye exhibits no discharge. Left eye exhibits no discharge. No scleral icterus.   Neck: Trachea normal, normal range of motion, full passive range of motion without pain and phonation normal. Neck supple.   Cardiovascular: Normal rate, regular rhythm, normal heart sounds, intact distal pulses and normal pulses.   Pulmonary/Chest: Effort normal and breath sounds normal. No respiratory distress.   Abdominal: Soft. Normal appearance and bowel sounds are normal. She exhibits no distension, no pulsatile midline mass and no mass. There is no tenderness.   Musculoskeletal: Normal range of motion. She exhibits no edema or deformity.   Neurological: She is alert and oriented to person, place, and time. She exhibits normal muscle tone. Coordination normal.   Skin: Skin is warm, dry, intact, not diaphoretic and not pale.   Psychiatric: She has a normal mood and affect. Her speech is normal and behavior is normal. Judgment and thought content normal. Cognition and memory are normal.   Nursing note and vitals reviewed.        Assessment:       1. Influenza A        Plan:         Influenza A  -     POCT Influenza A/B  -     Discontinue: ibuprofen tablet 600 mg  -     acetaminophen tablet 650 mg  -     ibuprofen (ADVIL,MOTRIN) 600 MG tablet; Take 1 tablet (600 mg total) by mouth every 6 (six) hours as needed.  Dispense: 60 tablet; Refill: 0  -      levocetirizine (XYZAL) 5 MG tablet; Take 1 tablet (5 mg total) by mouth once daily.  Dispense: 30 tablet; Refill: 0  -     promethazine-dextromethorphan (PROMETHAZINE-DM) 6.25-15 mg/5 mL Syrp; Take 5 mLs by mouth every 4 to 6 hours as needed.  Dispense: 180 mL; Refill: 0  -     fluticasone propionate (FLONASE) 50 mcg/actuation nasal spray; 2 sprays (100 mcg total) by Each Nostril route once daily.  Dispense: 16 mL; Refill: 0  -     guaiFENesin (MUCINEX) 600 mg 12 hr tablet; Take 1 tablet (600 mg total) by mouth 2 (two) times daily.  Dispense: 60 tablet; Refill: 0  -     oseltamivir (TAMIFLU) 75 MG capsule; Take 1 capsule (75 mg total) by mouth 2 (two) times daily. for 5 days  Dispense: 10 capsule; Refill: 0  -     ondansetron (ZOFRAN-ODT) 4 MG TbDL; Take 2 tablets (8 mg total) by mouth every 8 (eight) hours as needed (nausea/vomiting).  Dispense: 20 tablet; Refill: 0          Results for orders placed or performed in visit on 02/04/20   POCT Influenza A/B   Result Value Ref Range    Rapid Influenza A Ag Positive (A) Negative    Rapid Influenza B Ag Negative Negative     Acceptable Yes        Please follow up with your Primary care provider within 2-5 days if your signs and symptoms have not resolved or worsen.     If your condition worsens or fails to improve we recommend that you receive another evaluation at the emergency room immediately or contact your primary medical clinic to discuss your concerns.   You must understand that you have received an Urgent Care treatment only and that you may be released before all of your medical problems are known or treated. You, the patient, will arrange for follow up care as instructed.     RED FLAGS/WARNING SYMPTOMS DISCUSSED WITH PATIENT THAT WOULD WARRANT EMERGENT MEDICAL ATTENTION. PATIENT VERBALIZED UNDERSTANDING.       Influenza (Adult)    Influenza is also called the flu. It is a viral illness that affects the air passages of your lungs. It is different  from the common cold. The flu can easily be passed from one to person to another. It may be spread through the air by coughing and sneezing. Or it can be spread by touching the sick person and then touching your own eyes, nose, or mouth.  The flu starts 1 to 3 days after you are exposed to the flu virus. It may last for 1 to 2 weeks but many people feel tired or fatigued for many weeks afterward. You usually dont need to take antibiotics unless you have a complication. This might be an ear or sinus infection or pneumonia.  Symptoms of the flu may be mild or severe. They can include extreme tiredness (wanting to stay in bed all day), chills, fevers, muscle aches, soreness with eye movement, headache, and a dry, hacking cough.  Home care  Follow these guidelines when caring for yourself at home:  · Avoid being around cigarette smoke, whether yours or other peoples.  · Acetaminophen or ibuprofen will help ease your fever, muscle aches, and headache. Dont give aspirin to anyone younger than 18 who has the flu. Aspirin can harm the liver.  · Nausea and loss of appetite are common with the flu. Eat light meals. Drink 6 to 8 glasses of liquids every day. Good choices are water, sport drinks, soft drinks without caffeine, juices, tea, and soup. Extra fluids will also help loosen secretions in your nose and lungs.  · Over-the-counter cold medicines will not make the flu go away faster. But the medicines may help with coughing, sore throat, and congestion in your nose and sinuses. Dont use a decongestant if you have high blood pressure.  · Stay home until your fever has been gone for at least 24 hours without using medicine to reduce fever.  Follow-up care  Follow up with your healthcare provider, or as advised, if you are not getting better over the next week.  If you are age 65 or older, talk with your provider about getting a pneumococcal vaccine every 5 years. You should also get this vaccine if you have chronic  asthma or COPD. All adults should get a flu vaccine every fall. Ask your provider about this.  When to seek medical advice  Call your healthcare provider right away if any of these occur:  · Cough with lots of colored mucus (sputum) or blood in your mucus  · Chest pain, shortness of breath, wheezing, or trouble breathing  · Severe headache, or face, neck, or ear pain  · New rash with fever  · Fever of 100.4°F (38°C) or higher, or as directed by your healthcare provider  · Confusion, behavior change, or seizure  · Severe weakness or dizziness  · You get a new fever or cough after getting better for a few days  Date Last Reviewed: 1/1/2017  © 3239-7114 [employer]. [address] [place] [address], [place] [address]. All rights reserved. This information is not intended as a substitute for professional medical care. Always follow your healthcare professional's instructions.

## 2020-02-04 NOTE — PATIENT INSTRUCTIONS
Please follow up with your Primary care provider within 2-5 days if your signs and symptoms have not resolved or worsen.     If your condition worsens or fails to improve we recommend that you receive another evaluation at the emergency room immediately or contact your primary medical clinic to discuss your concerns.   You must understand that you have received an Urgent Care treatment only and that you may be released before all of your medical problems are known or treated. You, the patient, will arrange for follow up care as instructed.     RED FLAGS/WARNING SYMPTOMS DISCUSSED WITH PATIENT THAT WOULD WARRANT EMERGENT MEDICAL ATTENTION. PATIENT VERBALIZED UNDERSTANDING.       Influenza (Adult)    Influenza is also called the flu. It is a viral illness that affects the air passages of your lungs. It is different from the common cold. The flu can easily be passed from one to person to another. It may be spread through the air by coughing and sneezing. Or it can be spread by touching the sick person and then touching your own eyes, nose, or mouth.  The flu starts 1 to 3 days after you are exposed to the flu virus. It may last for 1 to 2 weeks but many people feel tired or fatigued for many weeks afterward. You usually dont need to take antibiotics unless you have a complication. This might be an ear or sinus infection or pneumonia.  Symptoms of the flu may be mild or severe. They can include extreme tiredness (wanting to stay in bed all day), chills, fevers, muscle aches, soreness with eye movement, headache, and a dry, hacking cough.  Home care  Follow these guidelines when caring for yourself at home:  · Avoid being around cigarette smoke, whether yours or other peoples.  · Acetaminophen or ibuprofen will help ease your fever, muscle aches, and headache. Dont give aspirin to anyone younger than 18 who has the flu. Aspirin can harm the liver.  · Nausea and loss of appetite are common with the flu. Eat light  meals. Drink 6 to 8 glasses of liquids every day. Good choices are water, sport drinks, soft drinks without caffeine, juices, tea, and soup. Extra fluids will also help loosen secretions in your nose and lungs.  · Over-the-counter cold medicines will not make the flu go away faster. But the medicines may help with coughing, sore throat, and congestion in your nose and sinuses. Dont use a decongestant if you have high blood pressure.  · Stay home until your fever has been gone for at least 24 hours without using medicine to reduce fever.  Follow-up care  Follow up with your healthcare provider, or as advised, if you are not getting better over the next week.  If you are age 65 or older, talk with your provider about getting a pneumococcal vaccine every 5 years. You should also get this vaccine if you have chronic asthma or COPD. All adults should get a flu vaccine every fall. Ask your provider about this.  When to seek medical advice  Call your healthcare provider right away if any of these occur:  · Cough with lots of colored mucus (sputum) or blood in your mucus  · Chest pain, shortness of breath, wheezing, or trouble breathing  · Severe headache, or face, neck, or ear pain  · New rash with fever  · Fever of 100.4°F (38°C) or higher, or as directed by your healthcare provider  · Confusion, behavior change, or seizure  · Severe weakness or dizziness  · You get a new fever or cough after getting better for a few days  Date Last Reviewed: 1/1/2017  © 2568-5174 [employer]. [address] [place] [address], [place] [address]. All rights reserved. This information is not intended as a substitute for professional medical care. Always follow your healthcare professional's instructions.

## 2020-02-18 ENCOUNTER — HOSPITAL ENCOUNTER (OUTPATIENT)
Dept: ENDOCRINOLOGY | Facility: CLINIC | Age: 55
Discharge: HOME | End: 2020-02-18
Attending: NURSE PRACTITIONER
Payer: COMMERCIAL

## 2020-02-18 ENCOUNTER — TELEPHONE (OUTPATIENT)
Dept: ENDOCRINOLOGY | Facility: CLINIC | Age: 55
End: 2020-02-18

## 2020-02-18 ENCOUNTER — LAB VISIT (OUTPATIENT)
Dept: LAB | Facility: HOSPITAL | Age: 55
End: 2020-02-18
Attending: NURSE PRACTITIONER
Payer: COMMERCIAL

## 2020-02-18 DIAGNOSIS — E04.1 THYROID NODULE: Chronic | ICD-10-CM

## 2020-02-18 DIAGNOSIS — E04.1 THYROID NODULE: Primary | ICD-10-CM

## 2020-02-18 LAB
ALBUMIN SERPL BCP-MCNC: 3.2 G/DL (ref 3.5–5.2)
ALP SERPL-CCNC: 101 U/L (ref 55–135)
ALT SERPL W/O P-5'-P-CCNC: 26 U/L (ref 10–44)
ANION GAP SERPL CALC-SCNC: 7 MMOL/L (ref 8–16)
AST SERPL-CCNC: 17 U/L (ref 10–40)
BILIRUB SERPL-MCNC: 0.3 MG/DL (ref 0.1–1)
BUN SERPL-MCNC: 12 MG/DL (ref 6–20)
CALCIUM SERPL-MCNC: 8.7 MG/DL (ref 8.7–10.5)
CHLORIDE SERPL-SCNC: 103 MMOL/L (ref 95–110)
CO2 SERPL-SCNC: 32 MMOL/L (ref 23–29)
CREAT SERPL-MCNC: 0.7 MG/DL (ref 0.5–1.4)
EST. GFR  (AFRICAN AMERICAN): >60 ML/MIN/1.73 M^2
EST. GFR  (NON AFRICAN AMERICAN): >60 ML/MIN/1.73 M^2
GLUCOSE SERPL-MCNC: 79 MG/DL (ref 70–110)
POTASSIUM SERPL-SCNC: 3.2 MMOL/L (ref 3.5–5.1)
PROT SERPL-MCNC: 8.4 G/DL (ref 6–8.4)
SODIUM SERPL-SCNC: 142 MMOL/L (ref 136–145)
TSH SERPL DL<=0.005 MIU/L-ACNC: 1.04 UIU/ML (ref 0.4–4)

## 2020-02-18 PROCEDURE — 36415 COLL VENOUS BLD VENIPUNCTURE: CPT

## 2020-02-18 PROCEDURE — 76536 US EXAM OF HEAD AND NECK: CPT | Mod: S$GLB,,, | Performed by: INTERNAL MEDICINE

## 2020-02-18 PROCEDURE — 80053 COMPREHEN METABOLIC PANEL: CPT

## 2020-02-18 PROCEDURE — 76536 US SOFT TISSUE HEAD NECK THYROID: ICD-10-PCS | Mod: S$GLB,,, | Performed by: INTERNAL MEDICINE

## 2020-02-18 PROCEDURE — 84443 ASSAY THYROID STIM HORMONE: CPT

## 2020-02-18 NOTE — TELEPHONE ENCOUNTER
----- Message from Lay Mast MA sent at 2/18/2020  3:30 PM CST -----  Contact: self       ----- Message -----  From: Seferino Bunch  Sent: 2/18/2020   3:25 PM CST  To: Cl Castañeda Staff    Pt is returning call from Maddy Smallwood, she is asking for a call back       Contact [facility]- [phone]    [facility]

## 2020-02-18 NOTE — TELEPHONE ENCOUNTER
Attempted to call to discuss thyroid US.    Recommend to repeat biopsy of left inferior nodule measures 3.22 x 3.46 x 2.67 cm.    No answer, left VM to call back.

## 2020-02-18 NOTE — TELEPHONE ENCOUNTER
Spoke with patient about thyroid US results and recommendation to biopsy nodule.  Patient is in agreement.  Patient recalls what the procedure entails but we briefly discussed it again.  Discussed possible results, need to repeat FNA and need for surgery.  Patient verbalized understanding.

## 2020-03-06 ENCOUNTER — TELEPHONE (OUTPATIENT)
Dept: ENDOCRINOLOGY | Facility: CLINIC | Age: 55
End: 2020-03-06

## 2020-03-06 NOTE — TELEPHONE ENCOUNTER
----- Message from John Powell sent at 3/6/2020 12:52 PM CST -----  Contact: self  Pt has a biospy scheduled for 03/12 at 2pm. Pt states this time does not work for her and needs to have it rescheduled for a later time or an earlier time in the morning whichever works. Please contact the pt before scheduling to confirm the time works.     Contact Info

## 2020-03-09 DIAGNOSIS — I10 ESSENTIAL HYPERTENSION: ICD-10-CM

## 2020-03-09 RX ORDER — AMLODIPINE BESYLATE 10 MG/1
TABLET ORAL
Qty: 90 TABLET | Refills: 0 | Status: SHIPPED | OUTPATIENT
Start: 2020-03-09 | End: 2020-06-09

## 2020-03-12 ENCOUNTER — HOSPITAL ENCOUNTER (OUTPATIENT)
Dept: ENDOCRINOLOGY | Facility: CLINIC | Age: 55
Discharge: HOME | End: 2020-03-12
Attending: NURSE PRACTITIONER
Payer: COMMERCIAL

## 2020-03-12 DIAGNOSIS — E04.1 THYROID NODULE: ICD-10-CM

## 2020-03-12 PROCEDURE — 88173 PR  INTERPRETATION OF FNA SMEAR: ICD-10-PCS | Mod: 26,,, | Performed by: PATHOLOGY

## 2020-03-12 PROCEDURE — 10005 US FINE NEEDLE ASPIRATION THYROID, FIRST LESION: ICD-10-PCS | Mod: S$GLB,,, | Performed by: INTERNAL MEDICINE

## 2020-03-12 PROCEDURE — 88173 CYTOPATH EVAL FNA REPORT: CPT | Mod: 26,,, | Performed by: PATHOLOGY

## 2020-03-12 PROCEDURE — 10005 FNA BX W/US GDN 1ST LES: CPT | Mod: S$GLB,,, | Performed by: INTERNAL MEDICINE

## 2020-03-12 PROCEDURE — 88173 CYTOPATH EVAL FNA REPORT: CPT | Performed by: PATHOLOGY

## 2020-03-12 NOTE — TELEPHONE ENCOUNTER
Dory Sesay masked at check-in for Urgent Care visit on 03/12/20.  Patient Dory AKUA Sesay states they have not traveled to [place], [place], [place], [place] or [place] within the last 14 days.   Saman Maradiaga, can you please contact this patient to scheduled her Urology appt. Thank you.

## 2020-03-15 LAB — FINAL PATHOLOGIC DIAGNOSIS: NORMAL

## 2020-03-16 ENCOUNTER — PATIENT MESSAGE (OUTPATIENT)
Dept: ENDOCRINOLOGY | Facility: CLINIC | Age: 55
End: 2020-03-16

## 2020-03-16 ENCOUNTER — PATIENT MESSAGE (OUTPATIENT)
Dept: FAMILY MEDICINE | Facility: CLINIC | Age: 55
End: 2020-03-16

## 2020-03-16 NOTE — TELEPHONE ENCOUNTER
FNA Thyroid (Clinician), L inferior  [facility] Thyroid Cytology Category: Benign  Other findings and comments:  Benign follicular epithelial cells.  Colloid present.

## 2020-03-28 ENCOUNTER — PATIENT MESSAGE (OUTPATIENT)
Dept: FAMILY MEDICINE | Facility: CLINIC | Age: 55
End: 2020-03-28

## 2020-03-30 ENCOUNTER — PATIENT MESSAGE (OUTPATIENT)
Dept: FAMILY MEDICINE | Facility: CLINIC | Age: 55
End: 2020-03-30

## 2020-07-13 ENCOUNTER — OFFICE VISIT (OUTPATIENT)
Dept: URGENT CARE | Facility: CLINIC | Age: 55
End: 2020-07-13
Payer: COMMERCIAL

## 2020-07-13 VITALS
SYSTOLIC BLOOD PRESSURE: 149 MMHG | DIASTOLIC BLOOD PRESSURE: 83 MMHG | TEMPERATURE: 97 F | OXYGEN SATURATION: 98 % | HEART RATE: 81 BPM | RESPIRATION RATE: 16 BRPM

## 2020-07-13 DIAGNOSIS — R11.0 NAUSEA: ICD-10-CM

## 2020-07-13 DIAGNOSIS — R39.9 UTI SYMPTOMS: Primary | ICD-10-CM

## 2020-07-13 DIAGNOSIS — R30.0 DYSURIA: ICD-10-CM

## 2020-07-13 DIAGNOSIS — R19.7 DIARRHEA, UNSPECIFIED TYPE: ICD-10-CM

## 2020-07-13 LAB
BILIRUB UR QL STRIP: NEGATIVE
GLUCOSE UR QL STRIP: NEGATIVE
KETONES UR QL STRIP: NEGATIVE
LEUKOCYTE ESTERASE UR QL STRIP: NEGATIVE
PH, POC UA: 9 (ref 5–8)
POC BLOOD, URINE: NEGATIVE
POC NITRATES, URINE: NEGATIVE
PROT UR QL STRIP: NEGATIVE
SP GR UR STRIP: 1 (ref 1–1.03)
UROBILINOGEN UR STRIP-ACNC: ABNORMAL (ref 0.1–1.1)

## 2020-07-13 PROCEDURE — 99214 OFFICE O/P EST MOD 30 MIN: CPT | Mod: 25,S$GLB,, | Performed by: PHYSICIAN ASSISTANT

## 2020-07-13 PROCEDURE — 81003 URINALYSIS AUTO W/O SCOPE: CPT | Mod: QW,S$GLB,, | Performed by: PHYSICIAN ASSISTANT

## 2020-07-13 PROCEDURE — 99214 PR OFFICE/OUTPT VISIT, EST, LEVL IV, 30-39 MIN: ICD-10-PCS | Mod: 25,S$GLB,, | Performed by: PHYSICIAN ASSISTANT

## 2020-07-13 PROCEDURE — 81003 POCT URINALYSIS, DIPSTICK, AUTOMATED, W/O SCOPE: ICD-10-PCS | Mod: QW,S$GLB,, | Performed by: PHYSICIAN ASSISTANT

## 2020-07-13 RX ORDER — DICYCLOMINE HYDROCHLORIDE 10 MG/1
10 CAPSULE ORAL 4 TIMES DAILY
Qty: 28 CAPSULE | Refills: 0 | Status: SHIPPED | OUTPATIENT
Start: 2020-07-13 | End: 2020-07-20

## 2020-07-13 RX ORDER — ONDANSETRON 4 MG/1
4 TABLET, FILM COATED ORAL EVERY 8 HOURS PRN
Qty: 15 TABLET | Refills: 0 | Status: SHIPPED | OUTPATIENT
Start: 2020-07-13 | End: 2020-07-18

## 2020-07-13 RX ORDER — NITROFURANTOIN 25; 75 MG/1; MG/1
100 CAPSULE ORAL 2 TIMES DAILY
Qty: 14 CAPSULE | Refills: 0 | Status: SHIPPED | OUTPATIENT
Start: 2020-07-13 | End: 2020-07-20

## 2020-07-13 NOTE — PATIENT INSTRUCTIONS
If your condition worsens or fails to improve we recommend that you receive another evaluation at the ER immediately or contact your PCP to discuss your concerns or return here. You must understand that you've received an urgent care treatment only and that you may be released before all your medical problems are known or treated. You the patient will arrange for followup care as instructed.     If you were prescribed antibiotics, please take them to full completion.    If you were prescribed pyridium, you can take this medication as directed as needed for discomfort (Note: one of the side effects of this medication is turning your urine orange).    -  You can take prescribed bentyl as directed as needed for cramping associated with diarrhea.  -  You can take prescribed zofran as directed as needed for nausea.    If you had cultures done it will take 3-5 days to result. We will call you with the result.     If you are are female and on BCP use additional methods to prevent pregnancy while on the antibiotics and for one cycle after.     Cranberry juice may help. Get the 100% cranberry juice and mix 4 oz of juice with 4 oz of water and drink this 8 oz glass of liquid once a day.    Increase fluids and rest is important .  Start off with liquid diet and progress as tolerated (see below)  · Water and clear liquids are important so you do not get dehydrated. Drink a small amount at a time.  · Do not force yourself to eat, especially if you have cramps, vomiting, or diarrhea. When you finally decide to start eating, do not eat large amounts at a time, even if you are hungry.  · If you eat, avoid fatty, greasy, spicy, or fried foods.  · Do not eat dairy products if you have diarrhea; they can make the diarrhea worse  Watch for any increase pain, fever, localized pain to right lower abdomen or continued vomiting or diarrhea.        Urinary Tract Infections in Women    Urinary tract infections (UTIs) are most often caused  by bacteria (germs). These bacteria enter the urinary tract. The bacteria may come from outside the body. Or they may travel from the skin outside the rectum or vagina into the urethra. Female anatomy makes it easy for bacteria from the bowel to enter a womans urinary tract, which is the most common source of UTI. This means women develop UTIs more often than men. Pain in or around the urinary tract is a common UTI symptom. But the only way to know for sure if you have a UTI for the healthcare provider to test your urine. The two tests that may be done are the urinalysis and urine culture.  Types of UTIs  · Cystitis: A bladder infection (cystitis) is the most common UTI in women. You may have urgent or frequent urination. You may also have pain, burning when you urinate, and bloody urine.  · Urethritis: This is an inflamed urethra, which is the tube that carries urine from the bladder to outside the body. You may have lower stomach or back pain. You may also have urgent or frequent urination.  · Pyelonephritis: This is a kidney infection. If not treated, it can be serious and damage your kidneys. In severe cases, you may be hospitalized. You may have a fever and lower back pain.  Medicines to treat a UTI  Most UTIs are treated with antibiotics. These kill the bacteria. The length of time you need to take them depends on the type of infection. It may be as short as 3 days. If you have repeated UTIs, a low-dose antibiotic may be needed for several months. Take antibiotics exactly as directed. Dont stop taking them until all of the medicine is gone. If you stop taking the antibiotic too soon, the infection may not go away, and you may develop a resistance to the antibiotic. This can make it much harder to treat.  Lifestyle changes to treat and prevent UTIs  The lifestyle changes below will help get rid of your UTI. They may also help prevent future UTIs.  · Drink plenty of fluids. This includes water, juice, or other  caffeine-free drinks. Fluids help flush bacteria out of your body.  · Empty your bladder. Always empty your bladder when you feel the urge to urinate. And always urinate before going to sleep. Urine that stays in your bladder can lead to infection. Try to urinate before and after sex as well.  · Practice good personal hygiene. Wipe yourself from front to back after using the toilet. This helps keep bacteria from getting into the urethra.  · Use condoms during sex. These help prevent UTIs caused by sexually transmitted bacteria. Also, avoid using spermicides during sex. These can increase the risk of UTIs. Choose other forms of birth control instead. For women who tend to get UTIs after sex, a low-dose of a preventive antibiotic may be used. Be sure to discuss this option with your healthcare provider.  · Follow up with your healthcare provider as directed. He or she may test to make sure the infection has cleared. If needed, more treatment may be started.  Date Last Reviewed: 1/1/2017 © 2000-2017 [employer]. [address] [place] [address]. All rights reserved. This information is not intended as a substitute for professional medical care. Always follow your healthcare professional's instructions.

## 2020-07-13 NOTE — LETTER
July 13, 2020      [facility] [address] [facility], [place] [address]  Phone: [phone]  Fax: [phone]       Patient: Niharika Gutierrez   YOB: 1965  Date of Visit: 07/13/2020    To Whom It May Concern:    LAWRENCE Gutierrez  was at [facility] on 07/13/2020. She may return to work/school on 07/14/2020 with no restrictions. If you have any questions or concerns, or if I can be of further assistance, please do not hesitate to contact me.    Sincerely,    Lalo Newton PA-C

## 2020-07-13 NOTE — PROGRESS NOTES
Subjective:       Patient ID: Niharika Gutierrez is a 55 y.o. female.    Vitals:  temperature is 97.2 °F (36.2 °C). Her blood pressure is 149/83 (abnormal) and her pulse is 81. Her respiration is 16 and oxygen saturation is 98%.     Chief Complaint: Urinary Tract Infection    Mrs. Gutierrez is a 54yo female with a PMHx of gallbladder removal who presents to the urgent care for evaluation of mild diarrhea (described as 2-3 episodes of soft stools daily) and nausea that started Saturday after eating fried foods and pizza. Associated symptoms include abdominal cramping before BM. States these symptoms frequently occur after eating fried foods since the removal of her gallbladder. She denies blood in stool, vomiting, fever, chills, abdominal pain. She also reports UTI symptoms of dysuria, frequency, and urgency to use the bathroom since Saturday night. States she has had UTIs before and current symptoms feel similar to previous. States she has been taking OTC azo and one dose of pyridium from a previous UTI for symptom, with some relief. She denies fever, chills, flank pain, hematuria, abdominal pain, vomiting, vaginal bleeding, vaginal discharge, cough, congestion, SOB, CP. Denies known exposure to COVID-19.     Urinary Tract Infection   This is a new problem. The current episode started in the past 7 days (2 days). The problem occurs every urination. The problem has been unchanged. The quality of the pain is described as burning. The patient is experiencing no pain. There has been no fever. She is sexually active. There is no history of pyelonephritis. Associated symptoms include frequency, nausea and urgency. Pertinent negatives include no chills, discharge, flank pain, hematuria, hesitancy, possible pregnancy, sweats, vomiting, constipation or rash. Treatments tried: azo, pyridium. The treatment provided mild relief.       Constitution: Negative for chills, fatigue and fever.   HENT: Negative for congestion and  sore throat.    Neck: Negative for painful lymph nodes.   Cardiovascular: Negative for chest pain and leg swelling.   Eyes: Negative for double vision and blurred vision.   Respiratory: Negative for cough and shortness of breath.    Gastrointestinal: Positive for nausea and diarrhea. Negative for vomiting and constipation.   Genitourinary: Positive for frequency and urgency. Negative for dysuria, flank pain, hematuria and history of kidney stones.   Musculoskeletal: Negative for joint pain, joint swelling, muscle cramps and muscle ache.   Skin: Negative for color change, pale, rash and bruising.   Allergic/Immunologic: Negative for seasonal allergies.   Neurological: Negative for dizziness, history of vertigo, light-headedness, passing out and headaches.   Hematologic/Lymphatic: Negative for swollen lymph nodes.   Psychiatric/Behavioral: Negative for nervous/anxious, sleep disturbance and depression. The patient is not nervous/anxious.        Objective:      Physical Exam   Constitutional: She is oriented to person, place, and time. She appears well-developed.  Non-toxic appearance. She does not appear ill. No distress.   Patient is sitting pleasantly on exam table in no acute distress. Nontoxic appearing.    HENT:   Head: Normocephalic and atraumatic.   Right Ear: External ear normal.   Left Ear: External ear normal.   Nose: Nose normal.   Mouth/Throat: Mucous membranes are normal.   Eyes: Pupils are equal, round, and reactive to light. Conjunctivae and lids are normal.   Neck: Trachea normal, normal range of motion and full passive range of motion without pain. Neck supple.   Cardiovascular: Normal rate, regular rhythm, normal heart sounds and normal pulses.   Pulmonary/Chest: Effort normal and breath sounds normal. No respiratory distress. She has no wheezes.   Abdominal: Soft. Normal appearance and bowel sounds are normal. She exhibits no distension, no abdominal bruit, no pulsatile midline mass and no mass.  There is no abdominal tenderness. There is no rebound and no guarding.   Musculoskeletal: Normal range of motion.   Neurological: She is alert and oriented to person, place, and time. She has normal strength.   Skin: Skin is warm, dry, intact, not diaphoretic and not pale.   Psychiatric: Her speech is normal and behavior is normal. Mood, judgment and thought content normal.   Nursing note and vitals reviewed.        Results for orders placed or performed in visit on 07/13/20   POCT Urinalysis, Dipstick, Automated, W/O Scope   Result Value Ref Range    POC Blood, Urine Negative Negative    POC Bilirubin, Urine Negative Negative    POC Urobilinogen, Urine norm 0.1 - 1.1    POC Ketones, Urine Negative Negative    POC Protein, Urine Negative Negative    POC Nitrates, Urine Negative Negative    POC Glucose, Urine Negative Negative    pH, UA 9.0 (A) 5 - 8    POC Specific Gravity, Urine 1.005 1.003 - 1.029    POC Leukocytes, Urine Negative Negative       Patient reports symptoms are similar to previous UTIs. Desir tart on Macrobid pending culture results. Advised on return/follow-up precautions. Advised on ER precautions. Answered all patient questions. Patient verbalized understanding and voiced agreement with current treatment plan.    Assessment:       1. UTI symptoms    2. Dysuria    3. Nausea    4. Diarrhea, unspecified type        Plan:         UTI symptoms  -     Urine culture  -     nitrofurantoin, macrocrystal-monohydrate, (MACROBID) 100 MG capsule; Take 1 capsule (100 mg total) by mouth 2 (two) times daily. for 7 days  Dispense: 14 capsule; Refill: 0    Dysuria  -     POCT Urinalysis, Dipstick, Automated, W/O Scope    Nausea  -     ondansetron (ZOFRAN) 4 MG tablet; Take 1 tablet (4 mg total) by mouth every 8 (eight) hours as needed for Nausea.  Dispense: 15 tablet; Refill: 0    Diarrhea, unspecified type  -     dicyclomine (BENTYL) 10 MG capsule; Take 1 capsule (10 mg total) by mouth 4 (four) times daily. for 7  days  Dispense: 28 capsule; Refill: 0      Patient Instructions     If your condition worsens or fails to improve we recommend that you receive another evaluation at the ER immediately or contact your PCP to discuss your concerns or return here. You must understand that you've received an urgent care treatment only and that you may be released before all your medical problems are known or treated. You the patient will arrange for followup care as instructed.     If you were prescribed antibiotics, please take them to full completion.    If you were prescribed pyridium, you can take this medication as directed as needed for discomfort (Note: one of the side effects of this medication is turning your urine orange).    -  You can take prescribed bentyl as directed as needed for cramping associated with diarrhea.  -  You can take prescribed zofran as directed as needed for nausea.    If you had cultures done it will take 3-5 days to result. We will call you with the result.     If you are are female and on BCP use additional methods to prevent pregnancy while on the antibiotics and for one cycle after.     Cranberry juice may help. Get the 100% cranberry juice and mix 4 oz of juice with 4 oz of water and drink this 8 oz glass of liquid once a day.    Increase fluids and rest is important .  Start off with liquid diet and progress as tolerated (see below)  · Water and clear liquids are important so you do not get dehydrated. Drink a small amount at a time.  · Do not force yourself to eat, especially if you have cramps, vomiting, or diarrhea. When you finally decide to start eating, do not eat large amounts at a time, even if you are hungry.  · If you eat, avoid fatty, greasy, spicy, or fried foods.  · Do not eat dairy products if you have diarrhea; they can make the diarrhea worse  Watch for any increase pain, fever, localized pain to right lower abdomen or continued vomiting or diarrhea.        Urinary Tract Infections  in Women    Urinary tract infections (UTIs) are most often caused by bacteria (germs). These bacteria enter the urinary tract. The bacteria may come from outside the body. Or they may travel from the skin outside the rectum or vagina into the urethra. Female anatomy makes it easy for bacteria from the bowel to enter a womans urinary tract, which is the most common source of UTI. This means women develop UTIs more often than men. Pain in or around the urinary tract is a common UTI symptom. But the only way to know for sure if you have a UTI for the healthcare provider to test your urine. The two tests that may be done are the urinalysis and urine culture.  Types of UTIs  · Cystitis: A bladder infection (cystitis) is the most common UTI in women. You may have urgent or frequent urination. You may also have pain, burning when you urinate, and bloody urine.  · Urethritis: This is an inflamed urethra, which is the tube that carries urine from the bladder to outside the body. You may have lower stomach or back pain. You may also have urgent or frequent urination.  · Pyelonephritis: This is a kidney infection. If not treated, it can be serious and damage your kidneys. In severe cases, you may be hospitalized. You may have a fever and lower back pain.  Medicines to treat a UTI  Most UTIs are treated with antibiotics. These kill the bacteria. The length of time you need to take them depends on the type of infection. It may be as short as 3 days. If you have repeated UTIs, a low-dose antibiotic may be needed for several months. Take antibiotics exactly as directed. Dont stop taking them until all of the medicine is gone. If you stop taking the antibiotic too soon, the infection may not go away, and you may develop a resistance to the antibiotic. This can make it much harder to treat.  Lifestyle changes to treat and prevent UTIs  The lifestyle changes below will help get rid of your UTI. They may also help prevent future  UTIs.  · Drink plenty of fluids. This includes water, juice, or other caffeine-free drinks. Fluids help flush bacteria out of your body.  · Empty your bladder. Always empty your bladder when you feel the urge to urinate. And always urinate before going to sleep. Urine that stays in your bladder can lead to infection. Try to urinate before and after sex as well.  · Practice good personal hygiene. Wipe yourself from front to back after using the toilet. This helps keep bacteria from getting into the urethra.  · Use condoms during sex. These help prevent UTIs caused by sexually transmitted bacteria. Also, avoid using spermicides during sex. These can increase the risk of UTIs. Choose other forms of birth control instead. For women who tend to get UTIs after sex, a low-dose of a preventive antibiotic may be used. Be sure to discuss this option with your healthcare provider.  · Follow up with your healthcare provider as directed. He or she may test to make sure the infection has cleared. If needed, more treatment may be started.  Date Last Reviewed: 1/1/2017  © 1632-4908 [employer]. [address] [place] [address]. All rights reserved. This information is not intended as a substitute for professional medical care. Always follow your healthcare professional's instructions.

## 2020-07-14 ENCOUNTER — OFFICE VISIT (OUTPATIENT)
Dept: URGENT CARE | Facility: CLINIC | Age: 55
End: 2020-07-14
Payer: COMMERCIAL

## 2020-07-14 VITALS
HEART RATE: 80 BPM | OXYGEN SATURATION: 99 % | DIASTOLIC BLOOD PRESSURE: 80 MMHG | SYSTOLIC BLOOD PRESSURE: 113 MMHG | TEMPERATURE: 97 F

## 2020-07-14 DIAGNOSIS — J02.9 SORE THROAT: Primary | ICD-10-CM

## 2020-07-14 DIAGNOSIS — M79.10 MUSCLE PAIN: ICD-10-CM

## 2020-07-14 LAB
CTP QC/QA: YES
MOLECULAR STREP A: NEGATIVE

## 2020-07-14 PROCEDURE — 87651 POCT STREP A MOLECULAR: ICD-10-PCS | Mod: QW,S$GLB,, | Performed by: NURSE PRACTITIONER

## 2020-07-14 PROCEDURE — U0003 INFECTIOUS AGENT DETECTION BY NUCLEIC ACID (DNA OR RNA); SEVERE ACUTE RESPIRATORY SYNDROME CORONAVIRUS 2 (SARS-COV-2) (CORONAVIRUS DISEASE [COVID-19]), AMPLIFIED PROBE TECHNIQUE, MAKING USE OF HIGH THROUGHPUT TECHNOLOGIES AS DESCRIBED BY CMS-2020-01-R: HCPCS

## 2020-07-14 PROCEDURE — 87651 STREP A DNA AMP PROBE: CPT | Mod: QW,S$GLB,, | Performed by: NURSE PRACTITIONER

## 2020-07-14 PROCEDURE — 99214 PR OFFICE/OUTPT VISIT, EST, LEVL IV, 30-39 MIN: ICD-10-PCS | Mod: S$GLB,,, | Performed by: NURSE PRACTITIONER

## 2020-07-14 PROCEDURE — 99214 OFFICE O/P EST MOD 30 MIN: CPT | Mod: S$GLB,,, | Performed by: NURSE PRACTITIONER

## 2020-07-14 NOTE — PROGRESS NOTES
Subjective:       Patient ID: Niharika Gutierrez is a 55 y.o. female.    Vitals:  temperature is 96.5 °F (35.8 °C). Her blood pressure is 113/80 and her pulse is 80. Her oxygen saturation is 99%.     Chief Complaint: Sore Throat    Pt reports sore throat, body aches, and headaches that started yesterday. She took some tylenol and gargled with salt water and sore throat improved with that. She was seen here yesterday for diarrhea, abd pain, dysuria and was dx with UTI. She is still taking the macrobid for UTI but the sore throat and other symptoms developed after her visit yesterday. She is concerned about covid. No fever, chills, CP, SOB, cough. No known exposure.     Sore Throat   This is a new problem. The current episode started today. Pertinent negatives include no congestion, coughing, diarrhea, headaches, shortness of breath or vomiting.       Constitution: Negative for chills, fatigue and fever.   HENT: Positive for sore throat. Negative for congestion.    Neck: Negative for painful lymph nodes.   Cardiovascular: Negative for chest pain and leg swelling.   Eyes: Negative for double vision and blurred vision.   Respiratory: Negative for cough and shortness of breath.    Gastrointestinal: Negative for nausea, vomiting and diarrhea.   Genitourinary: Negative for dysuria, frequency, urgency and history of kidney stones.   Musculoskeletal: Negative for joint pain, joint swelling, muscle cramps and muscle ache.   Skin: Negative for color change, pale, rash and bruising.   Allergic/Immunologic: Negative for seasonal allergies.   Neurological: Negative for dizziness, history of vertigo, light-headedness, passing out and headaches.   Hematologic/Lymphatic: Negative for swollen lymph nodes.   Psychiatric/Behavioral: Negative for nervous/anxious, sleep disturbance and depression. The patient is not nervous/anxious.        Objective:      Physical Exam   Constitutional: She is oriented to person, place, and time.  She appears well-developed. She is cooperative.  Non-toxic appearance. She does not appear ill. No distress.   HENT:   Head: Normocephalic and atraumatic.   Ears:   Right Ear: Hearing, tympanic membrane, external ear and ear canal normal.   Left Ear: Hearing, tympanic membrane, external ear and ear canal normal.   Nose: Nose normal. No mucosal edema, rhinorrhea or nasal deformity. No epistaxis. Right sinus exhibits no maxillary sinus tenderness and no frontal sinus tenderness. Left sinus exhibits no maxillary sinus tenderness and no frontal sinus tenderness.   Mouth/Throat: Uvula is midline, oropharynx is clear and moist and mucous membranes are normal. Mucous membranes are moist. No trismus in the jaw. Normal dentition. No uvula swelling. No oropharyngeal exudate, posterior oropharyngeal edema, posterior oropharyngeal erythema or cobblestoning. Tonsils are 2+ on the right. Tonsils are 2+ on the left. No tonsillar exudate. Oropharynx is clear.   Eyes: Pupils are equal, round, and reactive to light. Conjunctivae and lids are normal. No scleral icterus.   Neck: Trachea normal, normal range of motion, full passive range of motion without pain and phonation normal. Neck supple. No neck rigidity. No edema and no erythema present.   Cardiovascular: Normal rate, regular rhythm, normal heart sounds and normal pulses.   Pulmonary/Chest: Effort normal and breath sounds normal. No respiratory distress. She has no decreased breath sounds. She has no wheezes. She has no rhonchi.   Abdominal: Normal appearance.   Musculoskeletal: Normal range of motion.         General: No deformity.   Lymphadenopathy:     She has no cervical adenopathy.   Neurological: She is alert and oriented to person, place, and time. She exhibits normal muscle tone. Coordination normal.   Skin: Skin is warm, dry, intact, not diaphoretic and not pale. Psychiatric: Her speech is normal and behavior is normal. Judgment and thought content normal.   Nursing  note and vitals reviewed.    Results for orders placed or performed in visit on 07/14/20   POCT Strep A, Molecular   Result Value Ref Range    Molecular Strep A, POC Negative Negative     Acceptable Yes            Assessment:       1. Sore throat    2. Muscle pain        Plan:         Sore throat  -     POCT Strep A, Molecular  -     COVID-19 Routine Screening    Muscle pain  -     COVID-19 Routine Screening         Reviewed previous pertinent office visits, PMH, PSH, fam hx  covid test pending. Isolation guidelines discussed.  Continued meds given at yesterdays visit- macrobid and prn dicyclomine/zofran  Advised on return/follow-up precautions. Advised on ER precautions. Answered all patient questions. Patient verbalized understanding and voiced agreement with current treatment plan.    Patient Instructions   Instructions for Patients with Confirmed or Suspected COVID-19    If you are awaiting your test result, you will either be called or it will be released to the patient portal.  If you have any questions about your test, please visit [email or web address] or call our COVID-19 information line at [phone].      Preventing the Spread of Coronavirus Disease 2019 (COVID-19) in Homes and Residential Communities -- Patients     Prevention steps for people with confirmed or suspected COVID-19 (including persons under investigation) who do not need to be hospitalized and people with confirmed COVID-19 who were hospitalized and determined to be medically stable to go home.      Stay home except to get medical care.    Separate yourself from other people and animals in your home.    Call ahead before visiting your doctor.    Wear a face mask.    Cover your coughs and sneezes.    Clean your hands often.    Avoid sharing personal household items.    Clean all high-touch surfaces every day.    Monitor your symptoms. Seek prompt medical attention if your illness is worsening (e.g.,  difficulty breathing). Before seeking care, call your healthcare provider.    If you have a medical emergency and must call 911, notify the dispatcher that you have or are being evaluated for COVID-19. If possible, put on a face mask before emergency medical services arrive.    Use the following symptom-based strategy to return to normal activity following a suspected or confirmed case of COVID-19. Continue isolation until:   o At least 3 days (72 hours) have passed since recovery defined as resolution of fever without the use of fever-reducing medications and improvement in respiratory symptoms (e.g. cough, shortness of breath), and   o At least 10 days have passed since symptoms first appeared.     Precautions for household members, intimate partners and caregivers in a non-healthcare setting of a patient with symptomatic laboratory-confirmed COVID-19 or a patient under investigation.     Household members, intimate partners and caregivers in a non-healthcare setting may have close contact with a person with symptomatic, laboratory-confirmed COVID-19 or a person under investigation. Close contacts should monitor their health; they should call their healthcare provider right away if they develop symptoms suggestive of COVID-19 (e.g., fever, cough, shortness of breath). Close contacts should also follow these recommendations:     · Stay home for the duration of the time recommended by healthcare provider, except to get medical care. Separate yourself from other people and animals in the home.  · Monitor the patients symptoms. If the patient is getting sicker, call his or her healthcare provider. If the patient has a medical emergency and you need to call 911, notify the dispatch personnel that the patient has or is being evaluated for COVID-19.   · Wear a facemask when around other people such as sharing a room or vehicle and before entering a healthcare provider's office.  · Cover coughs and sneezes with a  tissue. Throw used tissues in a lined trash can immediately and wash hands.  · Clean hands often with soap and water for at least 20 seconds or with an alcohol-based hand , rubbing hands together until they feel dry. Avoid touching your eyes, nose, and mouth with unwashed hands.  · Clean all high-touch; surfaces every day, including counters, tabletops, doorknobs, bathroom fixtures, toilets, phones, keyboards, tablets, bedside tables, etc. Use a household cleaning spray or wipe according to label instructions.  · Avoid sharing personal household items such as dishes, drinking glasses, cups, towels, bedding, etc. After these items are used, they should be washed thoroughly with soap and water.  · Use the following symptom-based strategy to return to normal activity following a suspected or confirmed case of COVID-19. Continue isolation until:   · At least 3 days (72 hours) have passed since recovery defined as resolution of fever without the use of fever-reducing medications and improvement in respiratory symptoms (e.g. cough, shortness of breath), and   · At least 10 days have passed since symptoms first appeared.

## 2020-07-14 NOTE — LETTER
[address] [facility] [address], [address] ? [place], [address] ? Phone [phone] ? Fax [phone]           Return to Work/School    Patient: Niharika Gutierrez  YOB: 1965   Date: 07/14/2020      To Whom It May Concern:     Niharika Gutierrez was in contact with/seen in my office on 07/14/2020. COVID-19 is present in our communities across the state. Not all patients are eligible or appropriate to be tested. In this situation, your employee meets the following criteria:     Niharika Gutierrez has met the criteria for COVID-19 testing based upon symptoms, travel, and/or potential exposure. The test has been completed and is pending results at this time. During this time the employee is not able to work and should be quarantined per the Centers for Disease Control timelines.      If you have any questions or concerns, or if I can be of further assistance, please do not hesitate to contact me.     Sincerely,      Humera Ly NP

## 2020-07-14 NOTE — PATIENT INSTRUCTIONS

## 2020-07-17 ENCOUNTER — TELEPHONE (OUTPATIENT)
Dept: URGENT CARE | Facility: CLINIC | Age: 55
End: 2020-07-17

## 2020-07-17 LAB
BACTERIA UR CULT: NORMAL
BACTERIA UR CULT: NORMAL
SARS-COV-2 RNA RESP QL NAA+PROBE: NOT DETECTED

## 2020-07-17 NOTE — TELEPHONE ENCOUNTER
----- Message from Nita Marrufo PA-C sent at 7/17/2020  8:12 AM CDT -----  Please call the patient about her negative test result(s).

## 2020-09-23 ENCOUNTER — TELEPHONE (OUTPATIENT)
Dept: FAMILY MEDICINE | Facility: CLINIC | Age: 55
End: 2020-09-23

## 2020-09-23 ENCOUNTER — OFFICE VISIT (OUTPATIENT)
Dept: FAMILY MEDICINE | Facility: CLINIC | Age: 55
End: 2020-09-23
Payer: COMMERCIAL

## 2020-09-23 VITALS
HEART RATE: 93 BPM | HEIGHT: 64 IN | BODY MASS INDEX: 41.21 KG/M2 | OXYGEN SATURATION: 97 % | DIASTOLIC BLOOD PRESSURE: 80 MMHG | WEIGHT: 241.38 LBS | SYSTOLIC BLOOD PRESSURE: 120 MMHG | TEMPERATURE: 99 F

## 2020-09-23 DIAGNOSIS — K21.9 GASTROESOPHAGEAL REFLUX DISEASE WITHOUT ESOPHAGITIS: Primary | ICD-10-CM

## 2020-09-23 DIAGNOSIS — N30.00 ACUTE CYSTITIS WITHOUT HEMATURIA: ICD-10-CM

## 2020-09-23 DIAGNOSIS — K22.2 STRICTURE ESOPHAGUS: ICD-10-CM

## 2020-09-23 LAB
BACTERIA #/AREA URNS AUTO: NORMAL /HPF
BILIRUB UR QL STRIP: NEGATIVE
CLARITY UR REFRACT.AUTO: ABNORMAL
COLOR UR AUTO: YELLOW
GLUCOSE UR QL STRIP: NEGATIVE
HGB UR QL STRIP: NEGATIVE
KETONES UR QL STRIP: NEGATIVE
LEUKOCYTE ESTERASE UR QL STRIP: NEGATIVE
MICROSCOPIC COMMENT: NORMAL
NITRITE UR QL STRIP: NEGATIVE
PH UR STRIP: 8 [PH] (ref 5–8)
PROT UR QL STRIP: NEGATIVE
SP GR UR STRIP: 1.01 (ref 1–1.03)
SQUAMOUS #/AREA URNS AUTO: 1 /HPF
URN SPEC COLLECT METH UR: ABNORMAL

## 2020-09-23 PROCEDURE — 3079F PR MOST RECENT DIASTOLIC BLOOD PRESSURE 80-89 MM HG: ICD-10-PCS | Mod: CPTII,S$GLB,, | Performed by: FAMILY MEDICINE

## 2020-09-23 PROCEDURE — 99214 PR OFFICE/OUTPT VISIT, EST, LEVL IV, 30-39 MIN: ICD-10-PCS | Mod: S$GLB,,, | Performed by: FAMILY MEDICINE

## 2020-09-23 PROCEDURE — 3074F PR MOST RECENT SYSTOLIC BLOOD PRESSURE < 130 MM HG: ICD-10-PCS | Mod: CPTII,S$GLB,, | Performed by: FAMILY MEDICINE

## 2020-09-23 PROCEDURE — 81001 URINALYSIS AUTO W/SCOPE: CPT

## 2020-09-23 PROCEDURE — 3079F DIAST BP 80-89 MM HG: CPT | Mod: CPTII,S$GLB,, | Performed by: FAMILY MEDICINE

## 2020-09-23 PROCEDURE — 99214 OFFICE O/P EST MOD 30 MIN: CPT | Mod: S$GLB,,, | Performed by: FAMILY MEDICINE

## 2020-09-23 PROCEDURE — 3074F SYST BP LT 130 MM HG: CPT | Mod: CPTII,S$GLB,, | Performed by: FAMILY MEDICINE

## 2020-09-23 PROCEDURE — 99999 PR PBB SHADOW E&M-EST. PATIENT-LVL V: CPT | Mod: PBBFAC,,, | Performed by: FAMILY MEDICINE

## 2020-09-23 PROCEDURE — 87086 URINE CULTURE/COLONY COUNT: CPT

## 2020-09-23 PROCEDURE — 99999 PR PBB SHADOW E&M-EST. PATIENT-LVL V: ICD-10-PCS | Mod: PBBFAC,,, | Performed by: FAMILY MEDICINE

## 2020-09-23 PROCEDURE — 3008F BODY MASS INDEX DOCD: CPT | Mod: CPTII,S$GLB,, | Performed by: FAMILY MEDICINE

## 2020-09-23 PROCEDURE — 3008F PR BODY MASS INDEX (BMI) DOCUMENTED: ICD-10-PCS | Mod: CPTII,S$GLB,, | Performed by: FAMILY MEDICINE

## 2020-09-23 RX ORDER — SUCRALFATE 1 G/1
1 TABLET ORAL 4 TIMES DAILY
Qty: 56 TABLET | Refills: 0 | Status: SHIPPED | OUTPATIENT
Start: 2020-09-23 | End: 2020-10-05

## 2020-09-23 RX ORDER — FAMOTIDINE 40 MG/1
40 TABLET, FILM COATED ORAL DAILY
Qty: 90 TABLET | Refills: 2 | Status: SHIPPED | OUTPATIENT
Start: 2020-09-23 | End: 2021-06-14

## 2020-09-23 NOTE — TELEPHONE ENCOUNTER
----- Message from Gabriella Correa sent at 9/22/2020  4:43 PM CDT -----  Regarding: pt  Type: Patient Call Back    Who called:pt    What is the request in detailpt is calling for referral to see gastro for heartburn. Call pt    Can the clinic reply by [facility]?    Would the patient rather a call back or a response via [facility]? call    Best call back number:[phone] (home) [phone] (work)[phone]      Additional Information

## 2020-09-23 NOTE — PROGRESS NOTES
Assessment & Plan  Problem List Items Addressed This Visit        GI    Gastroesophageal reflux disease without esophagitis - Primary (Chronic)    Relevant Medications    famotidine (PEPCID) 40 MG tablet    sucralfate (CARAFATE) 1 gram tablet    Other Relevant Orders    Ambulatory referral/consult to Gastroenterology      Other Visit Diagnoses     Stricture esophagus        Relevant Orders    Ambulatory referral/consult to Gastroenterology    Acute cystitis without hematuria        Relevant Orders    Urine culture (Completed)    Urinalysis (Completed)            Health Maintenance reviewed.    Follow-up: No follow-ups on file.    ______________________________________________________________________    Chief Complaint  Chief Complaint   Patient presents with    Urinary Tract Infection    Heartburn       HPI  Niharika Gutierrez is a 55 y.o. female with multiple medical diagnoses as listed in the medical history and problem list that presents for heart burn.  Pt is new to me.     She has been on nexium for some time.  She has been on prilosec in place of nexium.  She reports a palpitations.  She has noted chest tightness.  She would like to get back to exercise.  She has several options at home including a bike, pilates, and treadmill.        PAST MEDICAL HISTORY:  Past Medical History:   Diagnosis Date    Hypertension     Hypokalemic syndrome     Thyroid disease     Thyroid nodule     Vitamin D deficiency disease        PAST SURGICAL HISTORY:  Past Surgical History:   Procedure Laterality Date    BREAST BIOPSY       SECTION  2004    X 1    CHOLECYSTECTOMY  2008    HYSTERECTOMY      LAPAROSCOPIC HYSTERECTOMY  2010    LSO       SOCIAL HISTORY:  Social History     Socioeconomic History    Marital status:      Spouse name: Not on file    Number of children: Not on file    Years of education: Not on file    Highest education level: Not on file   Occupational History     Employer:  [employer]   Social Needs    Financial resource strain: Not on file    Food insecurity     Worry: Not on file     Inability: Not on file    Transportation needs     Medical: Not on file     Non-medical: Not on file   Tobacco Use    Smoking status: Never Smoker    Smokeless tobacco: Never Used   Substance and Sexual Activity    Alcohol use: No    Drug use: No    Sexual activity: Yes     Partners: Male     Birth control/protection: Surgical   Lifestyle    Physical activity     Days per week: Not on file     Minutes per session: Not on file    Stress: Not on file   Relationships    Social connections     Talks on phone: Not on file     Gets together: Not on file     Attends Latter-day service: Not on file     Active member of club or organization: Not on file     Attends meetings of clubs or organizations: Not on file     Relationship status: Not on file   Other Topics Concern    Not on file   Social History Narrative    Not on file       FAMILY HISTORY:  Family History   Problem Relation Age of Onset    Diabetes Mother     Heart disease Father         CHF    Breast cancer Maternal Aunt     Breast cancer Paternal Aunt     Breast cancer Maternal Aunt     Breast cancer Paternal Aunt     Colon cancer Neg Hx     Colon polyps Neg Hx        ALLERGIES AND MEDICATIONS: updated and reviewed.  Review of patient's allergies indicates:  No Known Allergies  Current Outpatient Medications   Medication Sig Dispense Refill    amLODIPine (NORVASC) 10 MG tablet TAKE 1 TABLET(10 MG) BY MOUTH EVERY DAY 90 tablet 0    chlorhexidine (PERIDEX) 0.12 % solution SWISH WITH 1 CAPFUL FOR 30 SECONDS AND EXPCTORATE UTD 2 TO 3 TIMES A DAY  2    ergocalciferol (ERGOCALCIFEROL) 50,000 unit Cap Take 1 capsule (50,000 Units total) by mouth every 7 days. 12 capsule 3    ibuprofen (ADVIL,MOTRIN) 600 MG tablet TK 1 T PO  Q 6 H PRN  0    ketoconazole (NIZORAL) 2 % cream Apply topically once daily. 30 g 0    KLOR-CON/EF  disintegrating tablet TAKE 1 TABLET BY MOUTH THREE TIMES DAILY AS DIRECTED 270 tablet 5    levocetirizine (XYZAL) 5 MG tablet Take 1 tablet (5 mg total) by mouth once daily. 30 tablet 0    ondansetron (ZOFRAN-ODT) 4 MG TbDL Take 2 tablets (8 mg total) by mouth every 8 (eight) hours as needed (nausea/vomiting). 20 tablet 0    spironolactone (ALDACTONE) 25 MG tablet Take 1 tablet (25 mg total) by mouth 2 (two) times daily. Appt needed with PCP for future refills 180 tablet 3    spironolactone (ALDACTONE) 25 MG tablet TAKE 1 TABLET BY MOUTH TWICE DAILY 60 tablet 0    azelastine (ASTELIN) 137 mcg (0.1 %) nasal spray 1 spray (137 mcg total) by Nasal route 2 (two) times daily. 30 mL 0    famotidine (PEPCID) 40 MG tablet Take 1 tablet (40 mg total) by mouth once daily. 90 tablet 2    fluticasone propionate (FLONASE) 50 mcg/actuation nasal spray 2 sprays (100 mcg total) by Each Nostril route once daily. (Patient not taking: Reported on 7/13/2020) 16 mL 0    guaiFENesin (MUCINEX) 600 mg 12 hr tablet Take 1 tablet (600 mg total) by mouth 2 (two) times daily. (Patient not taking: Reported on 7/13/2020) 60 tablet 0    ibuprofen (ADVIL,MOTRIN) 600 MG tablet Take 1 tablet (600 mg total) by mouth every 6 (six) hours as needed. (Patient not taking: Reported on 7/13/2020) 60 tablet 0    ketoconazole (NIZORAL) 2 % shampoo Apply topically twice a week. (Patient not taking: Reported on 1/21/2020) 120 mL 0    sucralfate (CARAFATE) 1 gram tablet Take 1 tablet (1 g total) by mouth 4 (four) times daily. for 14 days 56 tablet 0     No current facility-administered medications for this visit.          ROS  Review of Systems   Constitutional: Negative for activity change, appetite change, fatigue, fever and unexpected weight change.   HENT: Negative.  Negative for ear discharge, ear pain, rhinorrhea and sore throat.    Eyes: Negative.    Respiratory: Negative for apnea, cough, chest tightness, shortness of breath and wheezing.   "  Cardiovascular: Negative for chest pain, palpitations and leg swelling.   Gastrointestinal: Negative for abdominal distention, abdominal pain, constipation, diarrhea and vomiting.   Endocrine: Negative for cold intolerance, heat intolerance, polydipsia and polyuria.   Genitourinary: Negative for decreased urine volume and urgency.   Musculoskeletal: Negative.    Skin: Negative for rash.   Neurological: Negative for dizziness and headaches.   Hematological: Does not bruise/bleed easily.   Psychiatric/Behavioral: Negative for agitation, sleep disturbance and suicidal ideas.           Physical Exam  Vitals:    09/23/20 1151   BP: 120/80   BP Location: Left arm   Patient Position: Sitting   BP Method: Large (Manual)   Pulse: 93   Temp: 98.5 °F (36.9 °C)   TempSrc: Oral   SpO2: 97%   Weight: 109.5 kg (241 lb 6.5 oz)   Height: 5' 4" (1.626 m)    Body mass index is 41.44 kg/m².  Weight: 109.5 kg (241 lb 6.5 oz)   Height: 5' 4" (162.6 cm)   Physical Exam  Vitals signs reviewed.   Constitutional:       Appearance: She is well-developed.   HENT:      Head: Normocephalic and atraumatic.      Right Ear: External ear normal.      Left Ear: External ear normal.      Nose: Nose normal.   Eyes:      Conjunctiva/sclera: Conjunctivae normal.      Pupils: Pupils are equal, round, and reactive to light.   Cardiovascular:      Rate and Rhythm: Normal rate and regular rhythm.      Heart sounds: Normal heart sounds.   Pulmonary:      Effort: Pulmonary effort is normal.      Breath sounds: Normal breath sounds.   Skin:     General: Skin is warm and dry.   Neurological:      Mental Status: She is alert and oriented to person, place, and time.           Health Maintenance       Date Due Completion Date    Shingles Vaccine (1 of 2) 02/22/2015 ---    Influenza Vaccine (1) 08/01/2020 10/17/2019    Mammogram 06/13/2021 6/13/2019    Lipid Panel 06/13/2024 6/13/2019    Override on 2/15/2010: Done    Colorectal Cancer Screening 06/18/2025 " 6/18/2015    TETANUS VACCINE 09/22/2027 9/22/2017    Override on 9/22/2017: Done              Patient note was created using [employer].  Any errors in syntax or even information may not have been identified and edited on initial review prior to signing this note.

## 2020-09-23 NOTE — PATIENT INSTRUCTIONS
Medicines for Acid Reflux  Your healthcare provider has told you that you have acid reflux. This condition causes stomach acid to wash up into your throat. For most people, acid reflux is troubling but not dangerous. But left untreated, acid reflux sometimes damages the esophagus. Medicines can help control acid reflux and limit your risk of future problems.  Medicines for acid reflux  Your healthcare provider may prescribe medicine to help treat your acid reflux. Medicine will be based on your symptoms and any test results. Your provider will explain how to take your medicine. You will also be told about possible side effects.  Reducing stomach acid  Your provider may suggest antacids that you can buy over the counter. Antacids can give fast relief. Or you may be told to take a type of medicine called H2 blockers. These are available over the counter and by prescription (for higher doses).  Blocking stomach acid  In more severe cases, your healthcare provider may suggest stronger medicines such as proton pump inhibitors (PPIs). These keep the stomach from making acid. They are often prescribed for long-term use.  Other medicines  In some cases medicines to reduce or block stomach acid may not work. Then you may be switched to another type of medicine that helps your stomach empty better.     Date Last Reviewed: 10/1/2016  © 9756-4787 [employer]. [address] [place] [address], [place] [address]. All rights reserved. This information is not intended as a substitute for professional medical care. Always follow your healthcare professional's instructions.        Tips to Control Acid Reflux    To control acid reflux, youll need to make some basic diet and lifestyle changes. The simple steps outlined below may be all youll need to ease discomfort.  Watch what you eat  · Avoid fatty foods and spicy foods.  · Eat fewer acidic foods, such as citrus and tomato-based foods. These can increase symptoms.  · Limit  drinking alcohol, caffeine, and fizzy beverages. All increase acid reflux.  · Try limiting chocolate, peppermint, and spearmint. These can worsen acid reflux in some people.  Watch when you eat  · Avoid lying down for 3 hours after eating.  · Do not snack before going to bed.  Raise your head  Raising your head and upper body by 4 to 6 inches helps limit reflux when youre lying down. Put blocks under the head of your bed frame to raise it.  Other changes  · Lose weight, if you need to  · Dont exercise near bedtime  · Avoid tight-fitting clothes  · Limit aspirin and ibuprofen  · Stop smoking   Date Last Reviewed: 7/1/2016 © 2000-2017 [employer]. [address] [place] [address], [place] [address]. All rights reserved. This information is not intended as a substitute for professional medical care. Always follow your healthcare professional's instructions.        GERD (Adult)    The esophagus is a tube that carries food from the mouth to the stomach. A valve at the lower end of the esophagus prevents stomach acid from flowing upward. When this valve doesn't work properly, stomach contents may repeatedly flow back up (reflux) into the esophagus. This is called gastroesophageal reflux disease (GERD). GERD can irritate the esophagus. It can cause problems with swallowing or breathing. In severe cases, GERD can cause recurrent pneumonia or other serious problems.  Symptoms of reflux include burning, pressure or sharp pain in the upper abdomen or mid to lower chest. The pain can spread to the neck, back, or shoulder. There may be belching, an acid taste in the back of the throat, chronic cough, or sore throat or hoarseness. GERD symptoms often occur during the day after a big meal. They can also occur at night when lying down.   Home care  Lifestyle changes can help reduce symptoms. If needed, medicines may be prescribed. Symptoms often improve with treatment, but if treatment is stopped, the symptoms often return  "after a few months. So most persons with GERD will need to continue treatment.  Lifestyle changes  · Limit or avoid fatty, fried, and spicy foods, as well as coffee, chocolate, mint, and foods with high acid content such as tomatoes and citrus fruit and juices (orange, grapefruit, lemon).  · Dont eat large meals, especially at night. Frequent, smaller meals are best. Do not lie down right after eating. And dont eat anything 3 hours before going to bed.  · Avoid drinking alcohol and smoking. As much as possible, stay away from second hand smoke.  · If you are overweight, losing weight will reduce symptoms.   · Avoid wearing tight clothing around your stomach area.  · If your symptoms occur during sleep, use a foam wedge to elevate your upper body (not just your head.) Or, place 4" blocks under the head of your bed.  Medicines  If needed, medicines can help relieve the symptoms of GERD and prevent damage to the esophagus. Discuss a medicine plan with your healthcare provider. This may include one or more of the following medicines:  · Antacids to help neutralize the normal acids in your stomach.  · Acid blockers (H2 blockers) to decrease acid production.  · Acid inhibitors (PPIs) to decrease acid production in a different way than the blockers. They may work better, but can take a little longer to take effect.  Take an antacid 30-60 minutes after eating and at bedtime, but not at the same time as an acid blocker.  Try not to take medicines such as ibuprofen and aspirin. If you are taking aspirin for your heart or other medical reasons, talk to your healthcare provider about stopping it.  Follow-up care  Follow up with your healthcare provider or as advised by our staff.  When to seek medical advice  Call your healthcare provider if any of the following occur:  · Stomach pain gets worse or moves to the lower right abdomen (appendix area)  · Chest pain appears or gets worse, or spreads to the back, neck, shoulder, or " arm  · Frequent vomiting (cant keep down liquids)  · Blood in the stool or vomit (red or black in color)  · Feeling weak or dizzy  · Fever of 100.4ºF (38ºC) or higher, or as directed by your healthcare provider  Date Last Reviewed: 6/23/2015  © 7464-6167 [employer]. [address] [place] [address], [place] [address]. All rights reserved. This information is not intended as a substitute for professional medical care. Always follow your healthcare professional's instructions.        Lifestyle Changes for Controlling GERD  When you have GERD, stomach acid feels as if its backing up toward your mouth. Whether or not you take medicine to control your GERD, your symptoms can often be improved with lifestyle changes. Talk to your healthcare provider about the following suggestions. These suggestions may help you get relief from your symptoms.      Raise your head  Reflux is more likely to strike when youre lying down flat, because stomach fluid can flow backward more easily. Raising the head of your bed 4 to 6 inches can help. To do this:  · Slide blocks or books under the legs at the head of your bed. Or, place a wedge under the mattress. Many [employer] can make a suitable wedge for you. The wedge should run from your waist to the top of your head.  · Dont just prop your head on several pillows. This increases pressure on your stomach. It can make GERD worse.  Watch your eating habits  Certain foods may increase the acid in your stomach or relax the lower esophageal sphincter. This makes GERD more likely. Its best to avoid the following if they cause you symptoms:  · Coffee, tea, and carbonated drinks (with and without caffeine)  · Fatty, fried, or spicy food  · Mint, chocolate, onions, and tomatoes  · Peppermint  · Any other foods that seem to irritate your stomach or cause you pain  Relieve the pressure  Tips include the following:  · Eat smaller meals, even if you have to eat more often.  · Dont lie down  right after you eat. Wait a few hours for your stomach to empty.  · Avoid tight belts and tight-fitting clothes.  · Lose excess weight.  Tobacco and alcohol  Avoid smoking tobacco and drinking alcohol. They can make GERD symptoms worse.  Date Last Reviewed: 7/1/2016  © 4067-6189 [employer]. [address] [place] [address], [place] [address]. All rights reserved. This information is not intended as a substitute for professional medical care. Always follow your healthcare professional's instructions.

## 2020-09-24 LAB — BACTERIA UR CULT: NORMAL

## 2020-09-30 ENCOUNTER — PATIENT OUTREACH (OUTPATIENT)
Dept: ADMINISTRATIVE | Facility: OTHER | Age: 55
End: 2020-09-30

## 2020-09-30 NOTE — PROGRESS NOTES
LINKS immunization registry updated  Care Everywhere updated  Health Maintenance updated  Chart reviewed for overdue Proactive [facility] Encounters (REJI) health maintenance testing (CRS, Breast Ca, Diabetic Eye Exam)   Orders entered:N/A

## 2020-10-01 ENCOUNTER — OFFICE VISIT (OUTPATIENT)
Dept: GASTROENTEROLOGY | Facility: CLINIC | Age: 55
End: 2020-10-01
Payer: COMMERCIAL

## 2020-10-01 VITALS
DIASTOLIC BLOOD PRESSURE: 78 MMHG | HEART RATE: 72 BPM | BODY MASS INDEX: 41.03 KG/M2 | SYSTOLIC BLOOD PRESSURE: 127 MMHG | WEIGHT: 240.31 LBS | HEIGHT: 64 IN

## 2020-10-01 DIAGNOSIS — R15.1 FECAL SMEARING: ICD-10-CM

## 2020-10-01 DIAGNOSIS — K44.9 HIATAL HERNIA: Primary | ICD-10-CM

## 2020-10-01 DIAGNOSIS — K21.9 GASTROESOPHAGEAL REFLUX DISEASE WITHOUT ESOPHAGITIS: ICD-10-CM

## 2020-10-01 DIAGNOSIS — K22.2 SCHATZKI'S RING: ICD-10-CM

## 2020-10-01 PROCEDURE — 3078F DIAST BP <80 MM HG: CPT | Mod: CPTII,S$GLB,, | Performed by: NURSE PRACTITIONER

## 2020-10-01 PROCEDURE — 99999 PR PBB SHADOW E&M-EST. PATIENT-LVL V: CPT | Mod: PBBFAC,,, | Performed by: NURSE PRACTITIONER

## 2020-10-01 PROCEDURE — 99204 OFFICE O/P NEW MOD 45 MIN: CPT | Mod: S$GLB,,, | Performed by: NURSE PRACTITIONER

## 2020-10-01 PROCEDURE — 99204 PR OFFICE/OUTPT VISIT, NEW, LEVL IV, 45-59 MIN: ICD-10-PCS | Mod: S$GLB,,, | Performed by: NURSE PRACTITIONER

## 2020-10-01 PROCEDURE — 3008F BODY MASS INDEX DOCD: CPT | Mod: CPTII,S$GLB,, | Performed by: NURSE PRACTITIONER

## 2020-10-01 PROCEDURE — 3078F PR MOST RECENT DIASTOLIC BLOOD PRESSURE < 80 MM HG: ICD-10-PCS | Mod: CPTII,S$GLB,, | Performed by: NURSE PRACTITIONER

## 2020-10-01 PROCEDURE — 3008F PR BODY MASS INDEX (BMI) DOCUMENTED: ICD-10-PCS | Mod: CPTII,S$GLB,, | Performed by: NURSE PRACTITIONER

## 2020-10-01 PROCEDURE — 99999 PR PBB SHADOW E&M-EST. PATIENT-LVL V: ICD-10-PCS | Mod: PBBFAC,,, | Performed by: NURSE PRACTITIONER

## 2020-10-01 PROCEDURE — 3074F SYST BP LT 130 MM HG: CPT | Mod: CPTII,S$GLB,, | Performed by: NURSE PRACTITIONER

## 2020-10-01 PROCEDURE — 3074F PR MOST RECENT SYSTOLIC BLOOD PRESSURE < 130 MM HG: ICD-10-PCS | Mod: CPTII,S$GLB,, | Performed by: NURSE PRACTITIONER

## 2020-10-01 NOTE — PATIENT INSTRUCTIONS
[email or web address]  Dropping Acid The Reflux Diet Cookbook and Cure -  Bill Rapp M.D.    GERD  Worst Foods for Acid Reflux  Chocolate (milk chocolate worse than dark chocolate)  Soda (all carbonated beverages)  Alcohol (beer, liquor, wine)  Fried foods  Jimenez, sausage, ribs  Cream sauce  Fatty meats (beef)  Butter, margarine, lard, shortening  Coffee, tea  Mint   High fat nuts  Hot sauces and pepper  Citrus fruit/juices      Acidic foods (pH - 1 is MORE acidic, 5 is LESS acidic)     Do not eat or drink these (lower numbers are worse)    Induction diet - For 2 weeks eat nothing below pH 5     Lemon juice 2.3  Grape cranberry juice 2.5  Stomach Acid 2.5  Gelatin Dessert 2.6  Lemon/lime 2.9/2.7  Vinegar 2.9  Gatorade 3.0  Fruits - plums, apricots, strawberries, cherries 3.0  Vitamin C (ascorbic acid) 3.0  Iced tea, Snapple 3.1  Mustard 3.2  Soft drinks 3.3  Nectarines 3.3  Pomegranate 3.3  Applesauce 3.4  Grapefruit 3.4  Kiwi 3.4  Barbecue sauce 3.4  Caesar dressing 3.5  [place] dressing 3.6  Strawberries 3.5  Pineapple juice 3.5  Beer 3.5  Wine 3.5  Grape 3.6  Apples 3.6  Pineapple 3.7  Pickle 3.7  Blackberries, blueberries 3.7  [place] 3.7  Orange 3.8  Cherries 3.9  Red Bull 3.9  Tomatoes 4.2  Coffee 5.1      These are Safe foods:  Agave  Aloe Vera  Apple (only red)  Bagels  Banana (worsens reflux in 1%)  Beans - black, red, lima, lentils  Bread - whole grain, rye  Caramel  Celery  Chamomile tea  Chicken - skinless, never fried  Chicken stock or bouillon  Coffee - one cup/day with milk  Fennel  Fish  Kaylee  Green vegetables (no green peppers)  Herbs  Honey  Melon  Milk - skin, soy, or Lactaid skim milk  Mushrooms  Oatmeal  Olive oil  Parsley  Pasta  Pears  Popcorn  Potatoes  Red bell peppers  Rice  Soups  Tofu  Turkey Breast  Turnip  Vegetables - no onion, tomatoes, peppers  Vinaigrette  Water - non carbonated  Whole grain breads, crackers, breakfast cereals      Best Foods for Acid  Reflux  Whole grain breads  Oatmeal  Aloe Vera  Salad (no tomatoes, onions, cheese, or high fat dressing)  Banana  Melon  Fennel  Chicken and turkey (skinless, never fried)  Fish/seafood (never fried)  Celery  Parsley  Couscous and Rice    Maybe bad foods (Everyone is unique)  Tomatoes  Garlic  Onion  Nuts (macadamia nuts)  Apples (especially green)  Cucumber  Green peppers  Spicy food  Some herbal teas    GERD tips  Change what you eat:  Eat smaller meals  Eat slowly and chew thoroughly until food is almost liquid  Cut down on junk carbohydrates such as sugar and white flour  Use herbs in your cooking  Eat more raw foods (more than 10 ingredients is not a raw food)  Avoid trans fats and partially hydrogenated oils  Eat more fish and switch to grass fed beef  Switch your cooking oil to macadamia nut or olive oil  Watch extremes of salt intake (too high or too low is bad)    If just cutting out acidic foods is not enough, change how you eat:  Large breakfast, medium lunch, light dinner  Dont mix fruit juices, sweet fruits, and refined starches with meats and heavy food  Dont wash your food down with a lot of liquid      Change these habits:  Stop smoking  Eat dinner earlier (3-4 hours before lying down to sleep)  Elevate the head of your bed 6 inches (blocks under the head of the bed are better than pillows) OR [employer] sells a special Metabolomx Reflux relief system  That may be purchased online for a comfortable, individual solution for raising the head of the bed.  Exercise (but wait 2 hours after eating)  Drink more water (between meals)    Take these supplements:  Multi vitamin  Probiotic  Fish oil    Most common food allergens: milk, eggs, peanuts, tree nuts, fish, shellfish, wheat, and soy    All natural immediate relief:  Chew 2-3 soft probiotic capsules - Dr. Llanos's Probiotics 12 Plus  Chew chewable DGL licorice tablet  Chew papaya tablet with high protein meal - [employer]  Drink 2 ounces of aloe  vera juice  Swedish bitters  Prelief- reduce the acid in food to keep it form burning sensitive tissue  [employer]og[employer]  [place]  Drink Chamomile Tea  Teaspoon of baking soda in water  Spoonful of vinegar in water      All natural ulcer healers:  Zinc carnosine - 75.5 mg with food twice a day x 8 weeks   Zoila by Carla - $8 for 60 pills  DGL (deglycyrrhizinated licorice) - 2 tablets before meals. Heals stomach lining   [employer] Factors brand, Enzymatic therapy brand.  Aloe Vera juice  - 2 to 8 ounces a day   [place] or [place]

## 2020-10-01 NOTE — LETTER
October 1, 2020      Soraya Noyola MD  [address]  [place] [address]           [facility] [address] [address]  [address]  [facility] [address]  Phone: [phone]  Fax: [phone]          Patient: Niharika Gutierrez   MR Number: [id number]   YOB: 1965   Date of Visit: 10/1/2020       Dear Dr. Soraya Noyola:    Thank you for referring Niharika Gutierrez to me for evaluation. Attached you will find relevant portions of my assessment and plan of care.    If you have questions, please do not hesitate to call me. I look forward to following Niharika Gutierrez along with you.    Sincerely,    Daylin Hester, REBECA    Enclosure  CC:  No Recipients    If you would like to receive this communication electronically, please contact [email or web address] or [phone] to request more information on [employer] Link access.    For providers and/or their staff who would like to refer a patient to [facility], please contact us through our one-stop-shop provider referral line, [facility], at [phone].    If you feel you have received this communication in error or would no longer like to receive these types of communications, please e-mail [email or web address]

## 2020-10-01 NOTE — PROGRESS NOTES
[facility] Consultation Note    Reason for Consult:  The primary encounter diagnosis was Hiatal hernia. Diagnoses of Gastroesophageal reflux disease without esophagitis, Schatzki's ring, and Fecal smearing were also pertinent to this visit.    PCP:   Janae Schwartz   [address] [place] [id number]    Referring MD:  Soraya Noyola Md  [address] [facility] [place] [address]    Initial History of Present Illness (HPI):  This is a 55 y.o. female here for evaluation of dysphagia  Has been taking OTC Nexium 40 mg QAM since . Tried to wean off. Takes pepcid PRN in the evenings. Weaned herself off the Nexium bc she   Getting chest tightness.     Has been to the ER around march for GI cocktail.  Currently :  Abdominal pain - no  Reflux - well controlled now on Taking pepcid for a couple weeks and sucralfate QID and has not had any symptoms.  Dysphagia - not right now.   Bowel habits - of she eats fried foods may have diarrhea but other than that okay. S/p briana. She may have rectal leakage and unaware it is happening. Occasional pain and hemorrhoids  GI bleeding - none  NSAID usage - rarely      ROS:  Constitutional: No fevers, chills, No weight loss  ENT:  + sore throat, + PND  CV: No chest pain, no palpitation  Pulm: No cough, No shortness of breath, no wheezing  Ophtho: No vision changes  GI: see HPI  Derm: No rash, no itching  Heme: No easy bruising  MSK: + arthritis  : No dysuria, No hematuria  Neuro: No syncope, No seizure  Psych: No uncontrolled anxiety, No uncontrolled depression    Medical History:  has a past medical history of Hypertension, Hypokalemic syndrome, Thyroid disease, Thyroid nodule, and Vitamin D deficiency disease.    Surgical History:  has a past surgical history that includes Laparoscopic hysterectomy (); Cholecystectomy ();  section (); Breast biopsy; and Hysterectomy.    Family History: family history includes Breast cancer in her maternal  aunt, maternal aunt, paternal aunt, and paternal aunt; Diabetes in her mother; Heart disease in her father..     Social History:  reports that she has never smoked. She has never used smokeless tobacco. She reports that she does not drink alcohol or use drugs.    Review of patient's allergies indicates:  No Known Allergies    Medication List with Changes/Refills   Current Medications    AMLODIPINE (NORVASC) 10 MG TABLET    TAKE 1 TABLET(10 MG) BY MOUTH EVERY DAY    AZELASTINE (ASTELIN) 137 MCG (0.1 %) NASAL SPRAY    1 spray (137 mcg total) by Nasal route 2 (two) times daily.    CHLORHEXIDINE (PERIDEX) 0.12 % SOLUTION    SWISH WITH 1 CAPFUL FOR 30 SECONDS AND EXPCTORATE UTD 2 TO 3 TIMES A DAY    ERGOCALCIFEROL (ERGOCALCIFEROL) 50,000 UNIT CAP    Take 1 capsule (50,000 Units total) by mouth every 7 days.    FAMOTIDINE (PEPCID) 40 MG TABLET    Take 1 tablet (40 mg total) by mouth once daily.    FLUTICASONE PROPIONATE (FLONASE) 50 MCG/ACTUATION NASAL SPRAY    2 sprays (100 mcg total) by Each Nostril route once daily.    GUAIFENESIN (MUCINEX) 600 MG 12 HR TABLET    Take 1 tablet (600 mg total) by mouth 2 (two) times daily.    IBUPROFEN (ADVIL,MOTRIN) 600 MG TABLET    TK 1 T PO  Q 6 H PRN    IBUPROFEN (ADVIL,MOTRIN) 600 MG TABLET    Take 1 tablet (600 mg total) by mouth every 6 (six) hours as needed.    KETOCONAZOLE (NIZORAL) 2 % CREAM    Apply topically once daily.    KETOCONAZOLE (NIZORAL) 2 % SHAMPOO    Apply topically twice a week.    KLOR-CON/EF DISINTEGRATING TABLET    TAKE 1 TABLET BY MOUTH THREE TIMES DAILY AS DIRECTED    LEVOCETIRIZINE (XYZAL) 5 MG TABLET    Take 1 tablet (5 mg total) by mouth once daily.    ONDANSETRON (ZOFRAN-ODT) 4 MG TBDL    Take 2 tablets (8 mg total) by mouth every 8 (eight) hours as needed (nausea/vomiting).    SPIRONOLACTONE (ALDACTONE) 25 MG TABLET    Take 1 tablet (25 mg total) by mouth 2 (two) times daily. Appt needed with PCP for future refills    SPIRONOLACTONE (ALDACTONE) 25 MG  "TABLET    TAKE 1 TABLET BY MOUTH TWICE DAILY    SUCRALFATE (CARAFATE) 1 GRAM TABLET    Take 1 tablet (1 g total) by mouth 4 (four) times daily. for 14 days         Objective Findings:    Vital Signs:  /78 (BP Location: Right arm)   Pulse 72   Ht 5' 4" (1.626 m)   Wt 109 kg (240 lb 4.8 oz)   BMI 41.25 kg/m²   Body mass index is 41.25 kg/m².    Physical Exam:  General Appearance: Well appearing, NAD noted  Eyes:    No scleral icterus  ENT:  No lesions or masses   Lungs: BBS CTA ,  no wheezes  Heart:  HRRR, S1 & S2 normal, no murmurs heard  Abdomen:  Non distended, soft, no guarding, no rebound, no tenderness, no appreciated ascites  Musculoskeletal:  No major joint deformities  Skin: No petechiae or rash on exposed skin areas  Neurologic:  Alert and oriented x4  Psychiatric:  Normal speech mentation and affect    Labs reviewed:  Lab Results   Component Value Date    WBC 8.75 06/13/2019    HGB 11.8 (L) 06/13/2019    HCT 37.6 06/13/2019     (H) 06/13/2019    CHOL 132 06/13/2019    TRIG 56 06/13/2019    HDL 47 06/13/2019    ALT 26 02/18/2020    AST 17 02/18/2020     02/18/2020    K 3.2 (L) 02/18/2020     02/18/2020    CREATININE 0.7 02/18/2020    BUN 12 02/18/2020    CO2 32 (H) 02/18/2020    TSH 1.043 02/18/2020    INR 1.0 01/31/2014    GLUF 85 12/30/2008    HGBA1C 5.3 06/13/2019           Imaging reviewed:  None    Endoscopy reviewed:    Had another EGD btwn now and 2015 with dilation at [facility] and     Screening colonoscopy 2015, good prep to cecum. No specimens removed    EGD for dysphagia 2015  - Small hiatus hernia.                         - Mild Schatzki ring. Dilated.                         - A single gastric polyp. Resected and retrieved.                         - Normal examined duodenum.     Medical Decision Making:    Assessment:    Niharika Gutierrez is a 55 y.o. female here for:    1. Hiatal hernia    2. Gastroesophageal reflux disease without esophagitis    3. " Schatzki's ring    4. Fecal smearing       Hx of EGD with Dil, not experiencing dysphagia and GERD well controlled right now.  Smearing is new, also has incomplete voiding.     Recommendations:  1. Continue Pepcid in the am and finish prescription of sucralfate.  2.GERD diet and lifestyle mods  3. Fiber every day  4. PFT for the fecal smearing  5. Pt to contact me in the future for dysphagia and we will order EGD    F/u PRN pending response to recommendations    Order summary:  Orders Placed This Encounter    Ambulatory referral/consult to Physical/Occupational Therapy         Thank you for allowing me to participate in the care of Niharika Hester, PUJAP-C

## 2020-10-19 ENCOUNTER — OFFICE VISIT (OUTPATIENT)
Dept: FAMILY MEDICINE | Facility: CLINIC | Age: 55
End: 2020-10-19
Payer: COMMERCIAL

## 2020-10-19 VITALS
TEMPERATURE: 98 F | HEART RATE: 85 BPM | WEIGHT: 237.88 LBS | OXYGEN SATURATION: 96 % | HEIGHT: 64 IN | DIASTOLIC BLOOD PRESSURE: 72 MMHG | BODY MASS INDEX: 40.61 KG/M2 | SYSTOLIC BLOOD PRESSURE: 128 MMHG

## 2020-10-19 DIAGNOSIS — I10 ESSENTIAL HYPERTENSION: Chronic | ICD-10-CM

## 2020-10-19 DIAGNOSIS — E04.1 THYROID NODULE: Chronic | ICD-10-CM

## 2020-10-19 DIAGNOSIS — E55.9 VITAMIN D DEFICIENCY DISEASE: Chronic | ICD-10-CM

## 2020-10-19 DIAGNOSIS — Z00.00 ANNUAL PHYSICAL EXAM: Primary | ICD-10-CM

## 2020-10-19 DIAGNOSIS — E66.01 MORBID OBESITY WITH BMI OF 40.0-44.9, ADULT: Chronic | ICD-10-CM

## 2020-10-19 DIAGNOSIS — Z23 NEED FOR PROPHYLACTIC VACCINATION AND INOCULATION AGAINST INFLUENZA: ICD-10-CM

## 2020-10-19 DIAGNOSIS — K21.9 GASTROESOPHAGEAL REFLUX DISEASE WITHOUT ESOPHAGITIS: Chronic | ICD-10-CM

## 2020-10-19 PROCEDURE — 3078F DIAST BP <80 MM HG: CPT | Mod: CPTII,S$GLB,, | Performed by: FAMILY MEDICINE

## 2020-10-19 PROCEDURE — 90471 IMMUNIZATION ADMIN: CPT | Mod: S$GLB,,, | Performed by: FAMILY MEDICINE

## 2020-10-19 PROCEDURE — 99396 PREV VISIT EST AGE 40-64: CPT | Mod: 25,S$GLB,, | Performed by: FAMILY MEDICINE

## 2020-10-19 PROCEDURE — 90686 FLU VACCINE (QUAD) GREATER THAN OR EQUAL TO 3YO PRESERVATIVE FREE IM: ICD-10-PCS | Mod: S$GLB,,, | Performed by: FAMILY MEDICINE

## 2020-10-19 PROCEDURE — 90686 IIV4 VACC NO PRSV 0.5 ML IM: CPT | Mod: S$GLB,,, | Performed by: FAMILY MEDICINE

## 2020-10-19 PROCEDURE — 3074F SYST BP LT 130 MM HG: CPT | Mod: CPTII,S$GLB,, | Performed by: FAMILY MEDICINE

## 2020-10-19 PROCEDURE — 3008F BODY MASS INDEX DOCD: CPT | Mod: CPTII,S$GLB,, | Performed by: FAMILY MEDICINE

## 2020-10-19 PROCEDURE — 99999 PR PBB SHADOW E&M-EST. PATIENT-LVL IV: CPT | Mod: PBBFAC,,, | Performed by: FAMILY MEDICINE

## 2020-10-19 PROCEDURE — 3074F PR MOST RECENT SYSTOLIC BLOOD PRESSURE < 130 MM HG: ICD-10-PCS | Mod: CPTII,S$GLB,, | Performed by: FAMILY MEDICINE

## 2020-10-19 PROCEDURE — 90471 FLU VACCINE (QUAD) GREATER THAN OR EQUAL TO 3YO PRESERVATIVE FREE IM: ICD-10-PCS | Mod: S$GLB,,, | Performed by: FAMILY MEDICINE

## 2020-10-19 PROCEDURE — 3008F PR BODY MASS INDEX (BMI) DOCUMENTED: ICD-10-PCS | Mod: CPTII,S$GLB,, | Performed by: FAMILY MEDICINE

## 2020-10-19 PROCEDURE — 99396 PR PREVENTIVE VISIT,EST,40-64: ICD-10-PCS | Mod: 25,S$GLB,, | Performed by: FAMILY MEDICINE

## 2020-10-19 PROCEDURE — 99999 PR PBB SHADOW E&M-EST. PATIENT-LVL IV: ICD-10-PCS | Mod: PBBFAC,,, | Performed by: FAMILY MEDICINE

## 2020-10-19 PROCEDURE — 3078F PR MOST RECENT DIASTOLIC BLOOD PRESSURE < 80 MM HG: ICD-10-PCS | Mod: CPTII,S$GLB,, | Performed by: FAMILY MEDICINE

## 2020-10-19 NOTE — PROGRESS NOTES
Routine Office Visit    Patient Name: Niharika Gutierrez    : 1965  MRN: [id number]    Subjective:  Niharika is a 55 y.o. female who presents today for     1. Annual physical  2. Sinusitis vs allergies - Patient c/o congestion, post nasal drip - chronic condition for patient. No covid contacts. No fever.   3. Thyroid nodule - had ultrasound - has follow-up with endocrine     Review of Systems   Constitutional: Negative for chills and fever.   HENT: Negative for congestion.    Eyes: Negative for blurred vision.   Respiratory: Negative for cough.    Cardiovascular: Negative for chest pain.   Gastrointestinal: Negative for abdominal pain, constipation, diarrhea, heartburn, nausea and vomiting.   Genitourinary: Negative for dysuria.   Musculoskeletal: Negative for myalgias.   Skin: Negative for itching and rash.   Neurological: Negative for dizziness and headaches.   Psychiatric/Behavioral: Negative for depression.       Active Problem List  Patient Active Problem List   Diagnosis    Essential hypertension    Hypokalemia    Vitamin D deficiency disease    Microhematuria    Thyroid nodule    Morbid obesity with BMI of 40.0-44.9, adult    Gastroesophageal reflux disease without esophagitis    Arthralgia of knee       Past Surgical History  Past Surgical History:   Procedure Laterality Date    BREAST BIOPSY       SECTION  2004    X 1    CHOLECYSTECTOMY  2008    HYSTERECTOMY      LAPAROSCOPIC HYSTERECTOMY  2010    [facility]       Family History  Family History   Problem Relation Age of Onset    Diabetes Mother     Heart disease Father         CHF    Breast cancer Maternal Aunt     Breast cancer Paternal Aunt     Breast cancer Maternal Aunt     Breast cancer Paternal Aunt     Colon cancer Neg Hx     Colon polyps Neg Hx        Social History  Social History     Socioeconomic History    Marital status:      Spouse name: Not on file    Number of children: Not on file    Years of  education: Not on file    Highest education level: Not on file   Occupational History     Employer: [employer]   Social Needs    Financial resource strain: Not on file    Food insecurity     Worry: Not on file     Inability: Not on file    Transportation needs     Medical: Not on file     Non-medical: Not on file   Tobacco Use    Smoking status: Never Smoker    Smokeless tobacco: Never Used   Substance and Sexual Activity    Alcohol use: No    Drug use: No    Sexual activity: Yes     Partners: Male     Birth control/protection: Surgical   Lifestyle    Physical activity     Days per week: Not on file     Minutes per session: Not on file    Stress: Not on file   Relationships    Social connections     Talks on phone: Not on file     Gets together: Not on file     Attends Christian service: Not on file     Active member of club or organization: Not on file     Attends meetings of clubs or organizations: Not on file     Relationship status: Not on file   Other Topics Concern    Not on file   Social History Narrative    Not on file       Medications and Allergies  Reviewed and updated.   Current Outpatient Medications   Medication Sig    amLODIPine (NORVASC) 10 MG tablet TAKE 1 TABLET(10 MG) BY MOUTH EVERY DAY    azelastine (ASTELIN) 137 mcg (0.1 %) nasal spray 1 spray (137 mcg total) by Nasal route 2 (two) times daily.    chlorhexidine (PERIDEX) 0.12 % solution SWISH WITH 1 CAPFUL FOR 30 SECONDS AND EXPCTORATE UTD 2 TO 3 TIMES A DAY    ergocalciferol (ERGOCALCIFEROL) 50,000 unit Cap Take 1 capsule (50,000 Units total) by mouth every 7 days.    famotidine (PEPCID) 40 MG tablet Take 1 tablet (40 mg total) by mouth once daily.    fluticasone propionate (FLONASE) 50 mcg/actuation nasal spray 2 sprays (100 mcg total) by Each Nostril route once daily.    guaiFENesin (MUCINEX) 600 mg 12 hr tablet Take 1 tablet (600 mg total) by mouth 2 (two) times daily.    KLOR-CON/EF disintegrating tablet  "TAKE 1 TABLET BY MOUTH THREE TIMES DAILY AS DIRECTED    spironolactone (ALDACTONE) 25 MG tablet Take 1 tablet (25 mg total) by mouth 2 (two) times daily. Appt needed with PCP for future refills    spironolactone (ALDACTONE) 25 MG tablet TAKE 1 TABLET BY MOUTH TWICE DAILY    sucralfate (CARAFATE) 1 gram tablet TAKE 1 TABLET(1 GRAM) BY MOUTH FOUR TIMES DAILY FOR 14 DAYS     No current facility-administered medications for this visit.        Physical Exam  /72 (BP Location: Left arm, Patient Position: Sitting, BP Method: Large (Manual))   Pulse 85   Temp 98 °F (36.7 °C) (Oral)   Ht 5' 4" (1.626 m)   Wt 107.9 kg (237 lb 14 oz)   SpO2 96%   BMI 40.83 kg/m²   Physical Exam  Constitutional:       Appearance: She is well-developed.   HENT:      Head: Normocephalic and atraumatic.   Eyes:      Conjunctiva/sclera: Conjunctivae normal.      Pupils: Pupils are equal, round, and reactive to light.   Neck:      Musculoskeletal: Normal range of motion and neck supple.   Cardiovascular:      Rate and Rhythm: Normal rate and regular rhythm.      Heart sounds: Normal heart sounds. No murmur. No friction rub. No gallop.    Pulmonary:      Effort: No respiratory distress.      Breath sounds: Normal breath sounds.   Abdominal:      General: Bowel sounds are normal. There is no distension.      Palpations: Abdomen is soft.      Tenderness: There is no abdominal tenderness.   Musculoskeletal: Normal range of motion.   Lymphadenopathy:      Cervical: No cervical adenopathy.   Skin:     General: Skin is warm.   Neurological:      Mental Status: She is alert and oriented to person, place, and time.           Assessment/Plan:  Niharika Gutierrez is a 55 y.o. female who presents today for :    Problem List Items Addressed This Visit        Cardiac/Vascular    Essential hypertension (Chronic)  The current medical regimen is effective;  continue present plan and medications.         Endocrine    Morbid obesity with BMI of " 40.0-44.9, adult (Chronic)  The patient is asked to make an attempt to improve diet and exercise patterns to aid in medical management of this problem.  Patient states she has not been exercising as much as she would like but does plan on making changes      Thyroid nodule (Chronic)  Had ultrasound earlier this year      Vitamin D deficiency disease (Chronic)  The current medical regimen is effective;  continue present plan and medications.         GI    Gastroesophageal reflux disease without esophagitis (Chronic)  Doing well on famotidine and carafate         Other Visit Diagnoses     Annual physical exam    -  Primary    Relevant Orders    CBC auto differential    Comprehensive Metabolic Panel    Lipid Panel    Hemoglobin A1C    TSH    Vitamin D  Health Maintenance       Date Due Completion Date    Shingles Vaccine (1 of 2) 02/22/2015 ---    Mammogram 06/13/2021 6/13/2019    Lipid Panel 06/13/2024 6/13/2019    Override on 2/15/2010: Done    Colorectal Cancer Screening 06/18/2025 6/18/2015    TETANUS VACCINE 09/22/2027 9/22/2017    Override on 9/22/2017: Done        I addressed all major concerns as it related to health maintenance.  All were ordered and scheduled based on the patients wishes.  Any additional health maintenance will be readdressed at the next physical if declined or deferred by the patient.      Need for prophylactic vaccination and inoculation against influenza        Relevant Orders    Influenza - Quadrivalent *Preferred* (6 months+) (PF) (Completed)          Follow up in about 1 year (around 10/19/2021), or if symptoms worsen or fail to improve, for yearly exam.

## 2020-10-24 ENCOUNTER — LAB VISIT (OUTPATIENT)
Dept: LAB | Facility: HOSPITAL | Age: 55
End: 2020-10-24
Attending: FAMILY MEDICINE
Payer: COMMERCIAL

## 2020-10-24 DIAGNOSIS — Z00.00 ANNUAL PHYSICAL EXAM: ICD-10-CM

## 2020-10-24 LAB
25(OH)D3+25(OH)D2 SERPL-MCNC: 40 NG/ML (ref 30–96)
ALBUMIN SERPL BCP-MCNC: 3.4 G/DL (ref 3.5–5.2)
ALP SERPL-CCNC: 87 U/L (ref 55–135)
ALT SERPL W/O P-5'-P-CCNC: 13 U/L (ref 10–44)
ANION GAP SERPL CALC-SCNC: 10 MMOL/L (ref 8–16)
AST SERPL-CCNC: 14 U/L (ref 10–40)
BASOPHILS # BLD AUTO: 0.03 K/UL (ref 0–0.2)
BASOPHILS NFR BLD: 0.4 % (ref 0–1.9)
BILIRUB SERPL-MCNC: 0.4 MG/DL (ref 0.1–1)
BUN SERPL-MCNC: 11 MG/DL (ref 6–20)
CALCIUM SERPL-MCNC: 9 MG/DL (ref 8.7–10.5)
CHLORIDE SERPL-SCNC: 100 MMOL/L (ref 95–110)
CHOLEST SERPL-MCNC: 123 MG/DL (ref 120–199)
CHOLEST/HDLC SERPL: 2.4 {RATIO} (ref 2–5)
CO2 SERPL-SCNC: 32 MMOL/L (ref 23–29)
CREAT SERPL-MCNC: 0.7 MG/DL (ref 0.5–1.4)
DIFFERENTIAL METHOD: ABNORMAL
EOSINOPHIL # BLD AUTO: 0.1 K/UL (ref 0–0.5)
EOSINOPHIL NFR BLD: 0.8 % (ref 0–8)
ERYTHROCYTE [DISTWIDTH] IN BLOOD BY AUTOMATED COUNT: 16.4 % (ref 11.5–14.5)
EST. GFR  (AFRICAN AMERICAN): >60 ML/MIN/1.73 M^2
EST. GFR  (NON AFRICAN AMERICAN): >60 ML/MIN/1.73 M^2
ESTIMATED AVG GLUCOSE: 108 MG/DL (ref 68–131)
GLUCOSE SERPL-MCNC: 88 MG/DL (ref 70–110)
HBA1C MFR BLD HPLC: 5.4 % (ref 4–5.6)
HCT VFR BLD AUTO: 39.3 % (ref 37–48.5)
HDLC SERPL-MCNC: 51 MG/DL (ref 40–75)
HDLC SERPL: 41.5 % (ref 20–50)
HGB BLD-MCNC: 12.1 G/DL (ref 12–16)
IMM GRANULOCYTES # BLD AUTO: 0.02 K/UL (ref 0–0.04)
IMM GRANULOCYTES NFR BLD AUTO: 0.2 % (ref 0–0.5)
LDLC SERPL CALC-MCNC: 58 MG/DL (ref 63–159)
LYMPHOCYTES # BLD AUTO: 2.5 K/UL (ref 1–4.8)
LYMPHOCYTES NFR BLD: 29.9 % (ref 18–48)
MCH RBC QN AUTO: 27.8 PG (ref 27–31)
MCHC RBC AUTO-ENTMCNC: 30.8 G/DL (ref 32–36)
MCV RBC AUTO: 90 FL (ref 82–98)
MONOCYTES # BLD AUTO: 0.5 K/UL (ref 0.3–1)
MONOCYTES NFR BLD: 5.3 % (ref 4–15)
NEUTROPHILS # BLD AUTO: 5.3 K/UL (ref 1.8–7.7)
NEUTROPHILS NFR BLD: 63.4 % (ref 38–73)
NONHDLC SERPL-MCNC: 72 MG/DL
NRBC BLD-RTO: 0 /100 WBC
PLATELET # BLD AUTO: 396 K/UL (ref 150–350)
PMV BLD AUTO: 10.5 FL (ref 9.2–12.9)
POTASSIUM SERPL-SCNC: 3 MMOL/L (ref 3.5–5.1)
PROT SERPL-MCNC: 8.4 G/DL (ref 6–8.4)
RBC # BLD AUTO: 4.35 M/UL (ref 4–5.4)
SODIUM SERPL-SCNC: 142 MMOL/L (ref 136–145)
TRIGL SERPL-MCNC: 70 MG/DL (ref 30–150)
TSH SERPL DL<=0.005 MIU/L-ACNC: 0.93 UIU/ML (ref 0.4–4)
WBC # BLD AUTO: 8.43 K/UL (ref 3.9–12.7)

## 2020-10-24 PROCEDURE — 80061 LIPID PANEL: CPT

## 2020-10-24 PROCEDURE — 80053 COMPREHEN METABOLIC PANEL: CPT

## 2020-10-24 PROCEDURE — 36415 COLL VENOUS BLD VENIPUNCTURE: CPT | Mod: PO

## 2020-10-24 PROCEDURE — 82306 VITAMIN D 25 HYDROXY: CPT

## 2020-10-24 PROCEDURE — 83036 HEMOGLOBIN GLYCOSYLATED A1C: CPT

## 2020-10-24 PROCEDURE — 85025 COMPLETE CBC W/AUTO DIFF WBC: CPT

## 2020-10-24 PROCEDURE — 84443 ASSAY THYROID STIM HORMONE: CPT

## 2021-01-20 DIAGNOSIS — Z12.31 OTHER SCREENING MAMMOGRAM: ICD-10-CM

## 2021-03-05 ENCOUNTER — IMMUNIZATION (OUTPATIENT)
Dept: PRIMARY CARE CLINIC | Facility: CLINIC | Age: 56
End: 2021-03-05
Payer: COMMERCIAL

## 2021-03-05 ENCOUNTER — TELEPHONE (OUTPATIENT)
Dept: FAMILY MEDICINE | Facility: CLINIC | Age: 56
End: 2021-03-05

## 2021-03-05 DIAGNOSIS — Z23 NEED FOR VACCINATION: Primary | ICD-10-CM

## 2021-03-05 DIAGNOSIS — H53.8 BLURRED VISION: Primary | ICD-10-CM

## 2021-03-05 PROCEDURE — 91303 PR SARSCOV2 VAC AD26 .5ML IM: CPT | Mod: S$GLB,,, | Performed by: INTERNAL MEDICINE

## 2021-03-05 PROCEDURE — 0031A PR IMMUNIZ ADMIN, SARS-COV-2 COVID-19 VACC, 5X10VP/0.5ML: ICD-10-PCS | Mod: CV19,S$GLB,, | Performed by: INTERNAL MEDICINE

## 2021-03-05 PROCEDURE — 91303 PR SARSCOV2 VAC AD26 .5ML IM: ICD-10-PCS | Mod: S$GLB,,, | Performed by: INTERNAL MEDICINE

## 2021-03-05 PROCEDURE — 0031A PR IMMUNIZ ADMIN, SARS-COV-2 COVID-19 VACC, 5X10VP/0.5ML: CPT | Mod: CV19,S$GLB,, | Performed by: INTERNAL MEDICINE

## 2021-03-15 ENCOUNTER — HOSPITAL ENCOUNTER (OUTPATIENT)
Dept: RADIOLOGY | Facility: HOSPITAL | Age: 56
Discharge: HOME | End: 2021-03-15
Attending: FAMILY MEDICINE
Payer: COMMERCIAL

## 2021-03-15 VITALS — WEIGHT: 237.88 LBS | BODY MASS INDEX: 40.61 KG/M2 | HEIGHT: 64 IN

## 2021-03-15 DIAGNOSIS — Z12.31 OTHER SCREENING MAMMOGRAM: ICD-10-CM

## 2021-03-15 PROCEDURE — 77067 MAMMO DIGITAL SCREENING BILAT WITH TOMO: ICD-10-PCS | Mod: 26,,, | Performed by: RADIOLOGY

## 2021-03-15 PROCEDURE — 77067 SCR MAMMO BI INCL CAD: CPT | Mod: TC

## 2021-03-15 PROCEDURE — 77063 BREAST TOMOSYNTHESIS BI: CPT | Mod: 26,,, | Performed by: RADIOLOGY

## 2021-03-15 PROCEDURE — 77063 MAMMO DIGITAL SCREENING BILAT WITH TOMO: ICD-10-PCS | Mod: 26,,, | Performed by: RADIOLOGY

## 2021-03-15 PROCEDURE — 77067 SCR MAMMO BI INCL CAD: CPT | Mod: 26,,, | Performed by: RADIOLOGY

## 2021-03-18 ENCOUNTER — OFFICE VISIT (OUTPATIENT)
Dept: OPTOMETRY | Facility: CLINIC | Age: 56
End: 2021-03-18
Payer: COMMERCIAL

## 2021-03-18 DIAGNOSIS — H52.7 REFRACTIVE ERROR: ICD-10-CM

## 2021-03-18 DIAGNOSIS — H16.223 KERATOCONJUNCTIVITIS SICCA OF BOTH EYES NOT SPECIFIED AS SJOGREN'S: Primary | ICD-10-CM

## 2021-03-18 PROCEDURE — 99999 PR PBB SHADOW E&M-EST. PATIENT-LVL III: ICD-10-PCS | Mod: PBBFAC,,, | Performed by: OPTOMETRIST

## 2021-03-18 PROCEDURE — 99999 PR PBB SHADOW E&M-EST. PATIENT-LVL III: CPT | Mod: PBBFAC,,, | Performed by: OPTOMETRIST

## 2021-03-18 PROCEDURE — 1126F AMNT PAIN NOTED NONE PRSNT: CPT | Mod: S$GLB,,, | Performed by: OPTOMETRIST

## 2021-03-18 PROCEDURE — 92002 INTRM OPH EXAM NEW PATIENT: CPT | Mod: S$GLB,,, | Performed by: OPTOMETRIST

## 2021-03-18 PROCEDURE — 1126F PR PAIN SEVERITY QUANTIFIED, NO PAIN PRESENT: ICD-10-PCS | Mod: S$GLB,,, | Performed by: OPTOMETRIST

## 2021-03-18 PROCEDURE — 92002 PR EYE EXAM, NEW PATIENT,INTERMED: ICD-10-PCS | Mod: S$GLB,,, | Performed by: OPTOMETRIST

## 2021-03-18 RX ORDER — CYCLOSPORINE 0.5 MG/ML
1 EMULSION OPHTHALMIC 2 TIMES DAILY
COMMUNITY
Start: 2021-03-04 | End: 2021-10-20

## 2021-06-01 ENCOUNTER — PATIENT MESSAGE (OUTPATIENT)
Dept: OPTOMETRY | Facility: CLINIC | Age: 56
End: 2021-06-01

## 2021-06-23 ENCOUNTER — OFFICE VISIT (OUTPATIENT)
Dept: OPTOMETRY | Facility: CLINIC | Age: 56
End: 2021-06-23
Payer: COMMERCIAL

## 2021-06-23 DIAGNOSIS — H16.223 KERATOCONJUNCTIVITIS SICCA OF BOTH EYES NOT SPECIFIED AS SJOGREN'S: Primary | ICD-10-CM

## 2021-06-23 PROCEDURE — 92012 INTRM OPH EXAM EST PATIENT: CPT | Mod: S$GLB,,, | Performed by: OPTOMETRIST

## 2021-06-23 PROCEDURE — 92012 PR EYE EXAM, EST PATIENT,INTERMED: ICD-10-PCS | Mod: S$GLB,,, | Performed by: OPTOMETRIST

## 2021-06-23 PROCEDURE — 1126F AMNT PAIN NOTED NONE PRSNT: CPT | Mod: S$GLB,,, | Performed by: OPTOMETRIST

## 2021-06-23 PROCEDURE — 99999 PR PBB SHADOW E&M-EST. PATIENT-LVL III: CPT | Mod: PBBFAC,,, | Performed by: OPTOMETRIST

## 2021-06-23 PROCEDURE — 1126F PR PAIN SEVERITY QUANTIFIED, NO PAIN PRESENT: ICD-10-PCS | Mod: S$GLB,,, | Performed by: OPTOMETRIST

## 2021-06-23 PROCEDURE — 99999 PR PBB SHADOW E&M-EST. PATIENT-LVL III: ICD-10-PCS | Mod: PBBFAC,,, | Performed by: OPTOMETRIST

## 2021-06-23 RX ORDER — PREDNISOLONE ACETATE 10 MG/ML
SUSPENSION/ DROPS OPHTHALMIC
Qty: 5 ML | Refills: 0 | Status: SHIPPED | OUTPATIENT
Start: 2021-06-23 | End: 2021-07-07

## 2021-06-28 ENCOUNTER — PATIENT MESSAGE (OUTPATIENT)
Dept: OPTOMETRY | Facility: CLINIC | Age: 56
End: 2021-06-28

## 2021-07-09 ENCOUNTER — PATIENT MESSAGE (OUTPATIENT)
Dept: OPTOMETRY | Facility: CLINIC | Age: 56
End: 2021-07-09

## 2021-07-09 ENCOUNTER — PATIENT OUTREACH (OUTPATIENT)
Dept: ADMINISTRATIVE | Facility: OTHER | Age: 56
End: 2021-07-09

## 2021-07-09 ENCOUNTER — OFFICE VISIT (OUTPATIENT)
Dept: OPHTHALMOLOGY | Facility: CLINIC | Age: 56
End: 2021-07-09
Payer: COMMERCIAL

## 2021-07-09 DIAGNOSIS — H16.223 KERATOCONJUNCTIVITIS SICCA OF BOTH EYES NOT SPECIFIED AS SJOGREN'S: ICD-10-CM

## 2021-07-09 DIAGNOSIS — H20.9 IRITIS OF BOTH EYES: Primary | ICD-10-CM

## 2021-07-09 PROCEDURE — 92002 INTRM OPH EXAM NEW PATIENT: CPT | Mod: S$GLB,,, | Performed by: OPHTHALMOLOGY

## 2021-07-09 PROCEDURE — 92002 PR EYE EXAM, NEW PATIENT,INTERMED: ICD-10-PCS | Mod: S$GLB,,, | Performed by: OPHTHALMOLOGY

## 2021-07-09 PROCEDURE — 99999 PR PBB SHADOW E&M-EST. PATIENT-LVL III: ICD-10-PCS | Mod: PBBFAC,,, | Performed by: OPHTHALMOLOGY

## 2021-07-09 PROCEDURE — 99999 PR PBB SHADOW E&M-EST. PATIENT-LVL III: CPT | Mod: PBBFAC,,, | Performed by: OPHTHALMOLOGY

## 2021-07-16 ENCOUNTER — OFFICE VISIT (OUTPATIENT)
Dept: OPTOMETRY | Facility: CLINIC | Age: 56
End: 2021-07-16
Payer: COMMERCIAL

## 2021-07-16 DIAGNOSIS — H20.00 ACUTE IRITIS OF BOTH EYES: Primary | ICD-10-CM

## 2021-07-16 PROCEDURE — 92012 INTRM OPH EXAM EST PATIENT: CPT | Mod: S$GLB,,, | Performed by: OPTOMETRIST

## 2021-07-16 PROCEDURE — 99999 PR PBB SHADOW E&M-EST. PATIENT-LVL III: ICD-10-PCS | Mod: PBBFAC,,, | Performed by: OPTOMETRIST

## 2021-07-16 PROCEDURE — 1126F PR PAIN SEVERITY QUANTIFIED, NO PAIN PRESENT: ICD-10-PCS | Mod: S$GLB,,, | Performed by: OPTOMETRIST

## 2021-07-16 PROCEDURE — 92012 PR EYE EXAM, EST PATIENT,INTERMED: ICD-10-PCS | Mod: S$GLB,,, | Performed by: OPTOMETRIST

## 2021-07-16 PROCEDURE — 99999 PR PBB SHADOW E&M-EST. PATIENT-LVL III: CPT | Mod: PBBFAC,,, | Performed by: OPTOMETRIST

## 2021-07-16 PROCEDURE — 1126F AMNT PAIN NOTED NONE PRSNT: CPT | Mod: S$GLB,,, | Performed by: OPTOMETRIST

## 2021-09-13 ENCOUNTER — PATIENT MESSAGE (OUTPATIENT)
Dept: FAMILY MEDICINE | Facility: CLINIC | Age: 56
End: 2021-09-13

## 2021-09-13 DIAGNOSIS — Z00.00 ANNUAL PHYSICAL EXAM: Primary | ICD-10-CM

## 2021-09-13 DIAGNOSIS — I10 ESSENTIAL HYPERTENSION: ICD-10-CM

## 2021-09-17 ENCOUNTER — PATIENT MESSAGE (OUTPATIENT)
Dept: FAMILY MEDICINE | Facility: CLINIC | Age: 56
End: 2021-09-17

## 2021-09-18 ENCOUNTER — LAB VISIT (OUTPATIENT)
Dept: LAB | Facility: HOSPITAL | Age: 56
End: 2021-09-18
Attending: FAMILY MEDICINE
Payer: COMMERCIAL

## 2021-09-18 DIAGNOSIS — Z00.00 ANNUAL PHYSICAL EXAM: ICD-10-CM

## 2021-09-18 DIAGNOSIS — I10 ESSENTIAL HYPERTENSION: ICD-10-CM

## 2021-09-18 LAB
ALBUMIN SERPL BCP-MCNC: 3.2 G/DL (ref 3.5–5.2)
ALP SERPL-CCNC: 78 U/L (ref 55–135)
ALT SERPL W/O P-5'-P-CCNC: 15 U/L (ref 10–44)
ANION GAP SERPL CALC-SCNC: 16 MMOL/L (ref 8–16)
AST SERPL-CCNC: 19 U/L (ref 10–40)
BASOPHILS # BLD AUTO: 0.02 K/UL (ref 0–0.2)
BASOPHILS NFR BLD: 0.2 % (ref 0–1.9)
BILIRUB SERPL-MCNC: 0.4 MG/DL (ref 0.1–1)
BUN SERPL-MCNC: 11 MG/DL (ref 6–20)
CALCIUM SERPL-MCNC: 9.4 MG/DL (ref 8.7–10.5)
CHLORIDE SERPL-SCNC: 102 MMOL/L (ref 95–110)
CHOLEST SERPL-MCNC: 126 MG/DL (ref 120–199)
CHOLEST/HDLC SERPL: 2.6 {RATIO} (ref 2–5)
CO2 SERPL-SCNC: 23 MMOL/L (ref 23–29)
CREAT SERPL-MCNC: 0.7 MG/DL (ref 0.5–1.4)
DIFFERENTIAL METHOD: ABNORMAL
EOSINOPHIL # BLD AUTO: 0.1 K/UL (ref 0–0.5)
EOSINOPHIL NFR BLD: 0.8 % (ref 0–8)
ERYTHROCYTE [DISTWIDTH] IN BLOOD BY AUTOMATED COUNT: 16 % (ref 11.5–14.5)
EST. GFR  (AFRICAN AMERICAN): >60 ML/MIN/1.73 M^2
EST. GFR  (NON AFRICAN AMERICAN): >60 ML/MIN/1.73 M^2
ESTIMATED AVG GLUCOSE: 100 MG/DL (ref 68–131)
GLUCOSE SERPL-MCNC: 73 MG/DL (ref 70–110)
HBA1C MFR BLD: 5.1 % (ref 4–5.6)
HCT VFR BLD AUTO: 36 % (ref 37–48.5)
HDLC SERPL-MCNC: 48 MG/DL (ref 40–75)
HDLC SERPL: 38.1 % (ref 20–50)
HGB BLD-MCNC: 11.7 G/DL (ref 12–16)
IMM GRANULOCYTES # BLD AUTO: 0.04 K/UL (ref 0–0.04)
IMM GRANULOCYTES NFR BLD AUTO: 0.3 % (ref 0–0.5)
LDLC SERPL CALC-MCNC: 61.2 MG/DL (ref 63–159)
LYMPHOCYTES # BLD AUTO: 3.6 K/UL (ref 1–4.8)
LYMPHOCYTES NFR BLD: 31.4 % (ref 18–48)
MCH RBC QN AUTO: 28.1 PG (ref 27–31)
MCHC RBC AUTO-ENTMCNC: 32.5 G/DL (ref 32–36)
MCV RBC AUTO: 87 FL (ref 82–98)
MONOCYTES # BLD AUTO: 0.6 K/UL (ref 0.3–1)
MONOCYTES NFR BLD: 5.5 % (ref 4–15)
NEUTROPHILS # BLD AUTO: 7.1 K/UL (ref 1.8–7.7)
NEUTROPHILS NFR BLD: 61.8 % (ref 38–73)
NONHDLC SERPL-MCNC: 78 MG/DL
NRBC BLD-RTO: 0 /100 WBC
PLATELET # BLD AUTO: 369 K/UL (ref 150–450)
PMV BLD AUTO: 10.3 FL (ref 9.2–12.9)
POTASSIUM SERPL-SCNC: 3 MMOL/L (ref 3.5–5.1)
PROT SERPL-MCNC: 8.2 G/DL (ref 6–8.4)
RBC # BLD AUTO: 4.16 M/UL (ref 4–5.4)
SODIUM SERPL-SCNC: 141 MMOL/L (ref 136–145)
TRIGL SERPL-MCNC: 84 MG/DL (ref 30–150)
TSH SERPL DL<=0.005 MIU/L-ACNC: 1.15 UIU/ML (ref 0.4–4)
WBC # BLD AUTO: 11.55 K/UL (ref 3.9–12.7)

## 2021-09-18 PROCEDURE — 85025 COMPLETE CBC W/AUTO DIFF WBC: CPT | Performed by: FAMILY MEDICINE

## 2021-09-18 PROCEDURE — 36415 COLL VENOUS BLD VENIPUNCTURE: CPT | Mod: PO | Performed by: FAMILY MEDICINE

## 2021-09-18 PROCEDURE — 83036 HEMOGLOBIN GLYCOSYLATED A1C: CPT | Performed by: FAMILY MEDICINE

## 2021-09-18 PROCEDURE — 84443 ASSAY THYROID STIM HORMONE: CPT | Performed by: FAMILY MEDICINE

## 2021-09-18 PROCEDURE — 80053 COMPREHEN METABOLIC PANEL: CPT | Performed by: FAMILY MEDICINE

## 2021-09-18 PROCEDURE — 80061 LIPID PANEL: CPT | Performed by: FAMILY MEDICINE

## 2021-10-14 ENCOUNTER — PATIENT OUTREACH (OUTPATIENT)
Dept: ADMINISTRATIVE | Facility: HOSPITAL | Age: 56
End: 2021-10-14

## 2021-10-14 RX ORDER — LIFITEGRAST 50 MG/ML
1 SOLUTION/ DROPS OPHTHALMIC 2 TIMES DAILY
COMMUNITY
Start: 2021-09-11 | End: 2021-10-21 | Stop reason: SDUPTHER

## 2021-10-21 ENCOUNTER — OFFICE VISIT (OUTPATIENT)
Dept: FAMILY MEDICINE | Facility: CLINIC | Age: 56
End: 2021-10-21
Payer: COMMERCIAL

## 2021-10-21 VITALS
SYSTOLIC BLOOD PRESSURE: 126 MMHG | TEMPERATURE: 98 F | OXYGEN SATURATION: 97 % | DIASTOLIC BLOOD PRESSURE: 70 MMHG | HEIGHT: 64 IN | BODY MASS INDEX: 40.31 KG/M2 | WEIGHT: 236.13 LBS | HEART RATE: 87 BPM

## 2021-10-21 DIAGNOSIS — E55.9 VITAMIN D INSUFFICIENCY: ICD-10-CM

## 2021-10-21 DIAGNOSIS — Z00.00 ANNUAL PHYSICAL EXAM: Primary | ICD-10-CM

## 2021-10-21 DIAGNOSIS — K21.9 GASTROESOPHAGEAL REFLUX DISEASE WITHOUT ESOPHAGITIS: ICD-10-CM

## 2021-10-21 DIAGNOSIS — H04.123 DRY EYES: ICD-10-CM

## 2021-10-21 DIAGNOSIS — E87.6 HYPOKALEMIA: ICD-10-CM

## 2021-10-21 DIAGNOSIS — E04.1 THYROID NODULE: ICD-10-CM

## 2021-10-21 DIAGNOSIS — N95.2 VAGINAL ATROPHY: ICD-10-CM

## 2021-10-21 DIAGNOSIS — Z23 FLU VACCINE NEED: ICD-10-CM

## 2021-10-21 DIAGNOSIS — I10 ESSENTIAL HYPERTENSION: ICD-10-CM

## 2021-10-21 DIAGNOSIS — E66.01 MORBID OBESITY WITH BMI OF 40.0-44.9, ADULT: ICD-10-CM

## 2021-10-21 PROCEDURE — 90686 FLU VACCINE (QUAD) GREATER THAN OR EQUAL TO 3YO PRESERVATIVE FREE IM: ICD-10-PCS | Mod: S$GLB,,, | Performed by: FAMILY MEDICINE

## 2021-10-21 PROCEDURE — 3044F HG A1C LEVEL LT 7.0%: CPT | Mod: CPTII,S$GLB,, | Performed by: FAMILY MEDICINE

## 2021-10-21 PROCEDURE — 90471 FLU VACCINE (QUAD) GREATER THAN OR EQUAL TO 3YO PRESERVATIVE FREE IM: ICD-10-PCS | Mod: S$GLB,,, | Performed by: FAMILY MEDICINE

## 2021-10-21 PROCEDURE — 1159F PR MEDICATION LIST DOCUMENTED IN MEDICAL RECORD: ICD-10-PCS | Mod: CPTII,S$GLB,, | Performed by: FAMILY MEDICINE

## 2021-10-21 PROCEDURE — 1160F RVW MEDS BY RX/DR IN RCRD: CPT | Mod: CPTII,S$GLB,, | Performed by: FAMILY MEDICINE

## 2021-10-21 PROCEDURE — 3074F SYST BP LT 130 MM HG: CPT | Mod: CPTII,S$GLB,, | Performed by: FAMILY MEDICINE

## 2021-10-21 PROCEDURE — 1159F MED LIST DOCD IN RCRD: CPT | Mod: CPTII,S$GLB,, | Performed by: FAMILY MEDICINE

## 2021-10-21 PROCEDURE — 3008F BODY MASS INDEX DOCD: CPT | Mod: CPTII,S$GLB,, | Performed by: FAMILY MEDICINE

## 2021-10-21 PROCEDURE — 99999 PR PBB SHADOW E&M-EST. PATIENT-LVL IV: CPT | Mod: PBBFAC,,, | Performed by: FAMILY MEDICINE

## 2021-10-21 PROCEDURE — 1160F PR REVIEW ALL MEDS BY PRESCRIBER/CLIN PHARMACIST DOCUMENTED: ICD-10-PCS | Mod: CPTII,S$GLB,, | Performed by: FAMILY MEDICINE

## 2021-10-21 PROCEDURE — 3078F DIAST BP <80 MM HG: CPT | Mod: CPTII,S$GLB,, | Performed by: FAMILY MEDICINE

## 2021-10-21 PROCEDURE — 99396 PREV VISIT EST AGE 40-64: CPT | Mod: 25,S$GLB,, | Performed by: FAMILY MEDICINE

## 2021-10-21 PROCEDURE — 3044F PR MOST RECENT HEMOGLOBIN A1C LEVEL <7.0%: ICD-10-PCS | Mod: CPTII,S$GLB,, | Performed by: FAMILY MEDICINE

## 2021-10-21 PROCEDURE — 99396 PR PREVENTIVE VISIT,EST,40-64: ICD-10-PCS | Mod: 25,S$GLB,, | Performed by: FAMILY MEDICINE

## 2021-10-21 PROCEDURE — 3074F PR MOST RECENT SYSTOLIC BLOOD PRESSURE < 130 MM HG: ICD-10-PCS | Mod: CPTII,S$GLB,, | Performed by: FAMILY MEDICINE

## 2021-10-21 PROCEDURE — 3078F PR MOST RECENT DIASTOLIC BLOOD PRESSURE < 80 MM HG: ICD-10-PCS | Mod: CPTII,S$GLB,, | Performed by: FAMILY MEDICINE

## 2021-10-21 PROCEDURE — 99999 PR PBB SHADOW E&M-EST. PATIENT-LVL IV: ICD-10-PCS | Mod: PBBFAC,,, | Performed by: FAMILY MEDICINE

## 2021-10-21 PROCEDURE — 3008F PR BODY MASS INDEX (BMI) DOCUMENTED: ICD-10-PCS | Mod: CPTII,S$GLB,, | Performed by: FAMILY MEDICINE

## 2021-10-21 PROCEDURE — 90471 IMMUNIZATION ADMIN: CPT | Mod: S$GLB,,, | Performed by: FAMILY MEDICINE

## 2021-10-21 PROCEDURE — 90686 IIV4 VACC NO PRSV 0.5 ML IM: CPT | Mod: S$GLB,,, | Performed by: FAMILY MEDICINE

## 2021-10-21 RX ORDER — ERGOCALCIFEROL 1.25 MG/1
50000 CAPSULE ORAL
Qty: 12 CAPSULE | Refills: 3 | Status: SHIPPED | OUTPATIENT
Start: 2021-10-21 | End: 2023-03-08

## 2021-10-21 RX ORDER — AMLODIPINE BESYLATE 10 MG/1
10 TABLET ORAL DAILY
Qty: 90 TABLET | Refills: 3 | Status: SHIPPED | OUTPATIENT
Start: 2021-10-21 | End: 2022-12-09 | Stop reason: SDUPTHER

## 2021-10-21 RX ORDER — ESTRADIOL 0.1 MG/G
2 CREAM VAGINAL
Qty: 42.5 G | Refills: 11 | Status: SHIPPED | OUTPATIENT
Start: 2021-10-21 | End: 2023-10-05

## 2021-10-21 RX ORDER — SUCRALFATE 1 G/1
TABLET ORAL
Qty: 56 TABLET | Refills: 0 | Status: SHIPPED | OUTPATIENT
Start: 2021-10-21 | End: 2023-04-19

## 2021-10-21 RX ORDER — LIFITEGRAST 50 MG/ML
1 SOLUTION/ DROPS OPHTHALMIC 2 TIMES DAILY
Qty: 60 EACH | Refills: 11 | Status: SHIPPED | OUTPATIENT
Start: 2021-10-21 | End: 2022-12-01

## 2021-10-21 RX ORDER — FAMOTIDINE 40 MG/1
40 TABLET, FILM COATED ORAL DAILY
Qty: 90 TABLET | Refills: 2 | OUTPATIENT
Start: 2021-10-21 | End: 2022-02-10

## 2021-12-03 ENCOUNTER — OFFICE VISIT (OUTPATIENT)
Dept: PODIATRY | Facility: CLINIC | Age: 56
End: 2021-12-03
Payer: COMMERCIAL

## 2021-12-03 VITALS
WEIGHT: 236 LBS | HEIGHT: 64 IN | DIASTOLIC BLOOD PRESSURE: 77 MMHG | HEART RATE: 88 BPM | SYSTOLIC BLOOD PRESSURE: 140 MMHG | BODY MASS INDEX: 40.29 KG/M2

## 2021-12-03 DIAGNOSIS — L60.2 LONG TOENAIL: Primary | ICD-10-CM

## 2021-12-03 DIAGNOSIS — M77.9 TENDINITIS: ICD-10-CM

## 2021-12-03 PROCEDURE — 29540 PR STRAPPING; ANKLE &/OR FOOT: ICD-10-PCS | Mod: RT,S$GLB,, | Performed by: PODIATRIST

## 2021-12-03 PROCEDURE — 99999 PR PBB SHADOW E&M-EST. PATIENT-LVL IV: CPT | Mod: PBBFAC,,, | Performed by: PODIATRIST

## 2021-12-03 PROCEDURE — 99204 PR OFFICE/OUTPT VISIT, NEW, LEVL IV, 45-59 MIN: ICD-10-PCS | Mod: 25,S$GLB,, | Performed by: PODIATRIST

## 2021-12-03 PROCEDURE — 99999 PR PBB SHADOW E&M-EST. PATIENT-LVL IV: ICD-10-PCS | Mod: PBBFAC,,, | Performed by: PODIATRIST

## 2021-12-03 PROCEDURE — 99204 OFFICE O/P NEW MOD 45 MIN: CPT | Mod: 25,S$GLB,, | Performed by: PODIATRIST

## 2021-12-03 PROCEDURE — 29540 STRAPPING ANKLE &/FOOT: CPT | Mod: RT,S$GLB,, | Performed by: PODIATRIST

## 2021-12-03 RX ORDER — ACETAMINOPHEN AND CODEINE PHOSPHATE 300; 30 MG/1; MG/1
1 TABLET ORAL
COMMUNITY
Start: 2021-11-23 | End: 2022-02-10

## 2021-12-03 RX ORDER — PENICILLIN V POTASSIUM 500 MG/1
500 TABLET, FILM COATED ORAL EVERY 6 HOURS
COMMUNITY
Start: 2021-11-23 | End: 2022-02-10

## 2021-12-03 RX ORDER — DICLOFENAC SODIUM 10 MG/G
2 GEL TOPICAL 4 TIMES DAILY
Qty: 100 G | Refills: 2 | Status: SHIPPED | OUTPATIENT
Start: 2021-12-03 | End: 2023-04-19

## 2021-12-03 RX ORDER — IBUPROFEN 800 MG/1
800 TABLET ORAL EVERY 8 HOURS PRN
COMMUNITY
Start: 2021-11-23 | End: 2022-02-10

## 2021-12-04 ENCOUNTER — IMMUNIZATION (OUTPATIENT)
Dept: INTERNAL MEDICINE | Facility: CLINIC | Age: 56
End: 2021-12-04
Payer: COMMERCIAL

## 2021-12-04 DIAGNOSIS — Z23 NEED FOR VACCINATION: Primary | ICD-10-CM

## 2021-12-04 PROCEDURE — 91303 COVID-19,VECTOR-NR,RS-AD26,PF,0.5 ML DOSE VACCINE (JANSSEN): CPT | Mod: PBBFAC | Performed by: INTERNAL MEDICINE

## 2021-12-04 PROCEDURE — 0034A COVID-19,VECTOR-NR,RS-AD26,PF,0.5 ML DOSE VACCINE (JANSSEN): CPT | Mod: CV19,PBBFAC | Performed by: INTERNAL MEDICINE

## 2021-12-13 ENCOUNTER — TELEPHONE (OUTPATIENT)
Dept: ENDOCRINOLOGY | Facility: CLINIC | Age: 56
End: 2021-12-13
Payer: COMMERCIAL

## 2021-12-13 DIAGNOSIS — E04.1 THYROID NODULE: Primary | ICD-10-CM

## 2021-12-15 ENCOUNTER — PATIENT MESSAGE (OUTPATIENT)
Dept: ENDOCRINOLOGY | Facility: CLINIC | Age: 56
End: 2021-12-15
Payer: COMMERCIAL

## 2021-12-16 ENCOUNTER — OFFICE VISIT (OUTPATIENT)
Dept: ENDOCRINOLOGY | Facility: CLINIC | Age: 56
End: 2021-12-16
Payer: COMMERCIAL

## 2021-12-16 VITALS
SYSTOLIC BLOOD PRESSURE: 118 MMHG | HEART RATE: 88 BPM | DIASTOLIC BLOOD PRESSURE: 78 MMHG | WEIGHT: 240 LBS | BODY MASS INDEX: 40.97 KG/M2 | HEIGHT: 64 IN

## 2021-12-16 DIAGNOSIS — I10 ESSENTIAL HYPERTENSION: Chronic | ICD-10-CM

## 2021-12-16 DIAGNOSIS — E87.6 HYPOKALEMIA: ICD-10-CM

## 2021-12-16 DIAGNOSIS — E04.1 THYROID NODULE: Primary | Chronic | ICD-10-CM

## 2021-12-16 PROCEDURE — 99214 OFFICE O/P EST MOD 30 MIN: CPT | Mod: S$GLB,,, | Performed by: INTERNAL MEDICINE

## 2021-12-16 PROCEDURE — 99999 PR PBB SHADOW E&M-EST. PATIENT-LVL IV: ICD-10-PCS | Mod: PBBFAC,,, | Performed by: INTERNAL MEDICINE

## 2021-12-16 PROCEDURE — 99999 PR PBB SHADOW E&M-EST. PATIENT-LVL IV: CPT | Mod: PBBFAC,,, | Performed by: INTERNAL MEDICINE

## 2021-12-16 PROCEDURE — 99214 PR OFFICE/OUTPT VISIT, EST, LEVL IV, 30-39 MIN: ICD-10-PCS | Mod: S$GLB,,, | Performed by: INTERNAL MEDICINE

## 2021-12-16 RX ORDER — SPIRONOLACTONE 25 MG/1
25 TABLET ORAL 2 TIMES DAILY
Qty: 60 TABLET | Refills: 11 | Status: SHIPPED | OUTPATIENT
Start: 2021-12-16 | End: 2023-10-05

## 2021-12-22 ENCOUNTER — HOSPITAL ENCOUNTER (OUTPATIENT)
Dept: RADIOLOGY | Facility: HOSPITAL | Age: 56
Discharge: HOME | End: 2021-12-22
Attending: INTERNAL MEDICINE
Payer: COMMERCIAL

## 2021-12-22 DIAGNOSIS — E04.1 THYROID NODULE: ICD-10-CM

## 2021-12-22 PROCEDURE — 76536 US EXAM OF HEAD AND NECK: CPT | Mod: TC

## 2021-12-22 PROCEDURE — 76536 US EXAM OF HEAD AND NECK: CPT | Mod: 26,,, | Performed by: RADIOLOGY

## 2021-12-22 PROCEDURE — 76536 US SOFT TISSUE HEAD NECK THYROID: ICD-10-PCS | Mod: 26,,, | Performed by: RADIOLOGY

## 2021-12-30 ENCOUNTER — PATIENT MESSAGE (OUTPATIENT)
Dept: ENDOCRINOLOGY | Facility: CLINIC | Age: 56
End: 2021-12-30
Payer: COMMERCIAL

## 2021-12-30 DIAGNOSIS — E04.1 THYROID NODULE: Primary | Chronic | ICD-10-CM

## 2022-01-26 ENCOUNTER — PATIENT MESSAGE (OUTPATIENT)
Dept: ORTHOPEDICS | Facility: CLINIC | Age: 57
End: 2022-01-26
Payer: COMMERCIAL

## 2022-01-26 DIAGNOSIS — M25.562 PAIN IN BOTH KNEES, UNSPECIFIED CHRONICITY: Primary | ICD-10-CM

## 2022-01-26 DIAGNOSIS — M25.561 PAIN IN BOTH KNEES, UNSPECIFIED CHRONICITY: Primary | ICD-10-CM

## 2022-01-31 ENCOUNTER — TELEPHONE (OUTPATIENT)
Dept: ORTHOPEDICS | Facility: CLINIC | Age: 57
End: 2022-01-31
Payer: COMMERCIAL

## 2022-01-31 NOTE — TELEPHONE ENCOUNTER
I called the patient to confirm her appointments. The patient did not answer. I left a voicemail with a call back number.

## 2022-02-01 ENCOUNTER — HOSPITAL ENCOUNTER (OUTPATIENT)
Dept: RADIOLOGY | Facility: HOSPITAL | Age: 57
Discharge: HOME | End: 2022-02-01
Attending: ORTHOPAEDIC SURGERY
Payer: COMMERCIAL

## 2022-02-01 ENCOUNTER — OFFICE VISIT (OUTPATIENT)
Dept: ORTHOPEDICS | Facility: CLINIC | Age: 57
End: 2022-02-01
Payer: COMMERCIAL

## 2022-02-01 VITALS — HEIGHT: 65 IN | WEIGHT: 238.88 LBS | BODY MASS INDEX: 39.8 KG/M2

## 2022-02-01 DIAGNOSIS — M25.561 PAIN IN BOTH KNEES, UNSPECIFIED CHRONICITY: ICD-10-CM

## 2022-02-01 DIAGNOSIS — M25.562 PAIN IN BOTH KNEES, UNSPECIFIED CHRONICITY: ICD-10-CM

## 2022-02-01 DIAGNOSIS — M17.0 ARTHRITIS OF BOTH KNEES: Primary | ICD-10-CM

## 2022-02-01 PROCEDURE — 3008F BODY MASS INDEX DOCD: CPT | Mod: CPTII,S$GLB,, | Performed by: ORTHOPAEDIC SURGERY

## 2022-02-01 PROCEDURE — 73564 X-RAY EXAM KNEE 4 OR MORE: CPT | Mod: 26,,, | Performed by: RADIOLOGY

## 2022-02-01 PROCEDURE — 99203 PR OFFICE/OUTPT VISIT, NEW, LEVL III, 30-44 MIN: ICD-10-PCS | Mod: S$GLB,,, | Performed by: ORTHOPAEDIC SURGERY

## 2022-02-01 PROCEDURE — 73564 X-RAY EXAM KNEE 4 OR MORE: CPT | Mod: TC,50

## 2022-02-01 PROCEDURE — 73564 XR KNEE ORTHO BILAT WITH FLEXION: ICD-10-PCS | Mod: 26,,, | Performed by: RADIOLOGY

## 2022-02-01 PROCEDURE — 99203 OFFICE O/P NEW LOW 30 MIN: CPT | Mod: S$GLB,,, | Performed by: ORTHOPAEDIC SURGERY

## 2022-02-01 PROCEDURE — 99999 PR PBB SHADOW E&M-EST. PATIENT-LVL III: CPT | Mod: PBBFAC,,, | Performed by: ORTHOPAEDIC SURGERY

## 2022-02-01 PROCEDURE — 3008F PR BODY MASS INDEX (BMI) DOCUMENTED: ICD-10-PCS | Mod: CPTII,S$GLB,, | Performed by: ORTHOPAEDIC SURGERY

## 2022-02-01 PROCEDURE — 99999 PR PBB SHADOW E&M-EST. PATIENT-LVL III: ICD-10-PCS | Mod: PBBFAC,,, | Performed by: ORTHOPAEDIC SURGERY

## 2022-02-01 NOTE — PROGRESS NOTES
"  Subjective:     HPI:   Niharika Gutierrez is a 57 y.o. female who presents for eval L>R knee pain    "Definitely do not want a knee replacement"    She has had longstanding bilateral knee pain left more severe than right for years global in nature worse over the past week.  She gets hyaluronic injections every few years.     She is in the middle of a series of Synvisc injections with [facility] and is here for a 2nd opinion. She has had increasing pain after hyaluronic acid injection and more limited range of motion.     Medications: Tylenol Arthritis TID, mobic Rx from [facility]    Injections: In the middle of getting the Synvisc Series with Dr. Sanchez ([facility]), providing some relief. 3 on left knee last one was last Friday, 2 on right knee refused last injection   R knee is almost back to "being good"   Years ago had HA injections with Dr Shaikh which helped for 3 years but cannot remember brand name  Had CSI a long time ago    Physical Therapy: None     Bracing: Yes, sleeve, provides some relief and stability     Assistive Devices: None     Walkin mile    Limitations: General walking, difficulty going up/down steps and difficulty standing for long periods of time          Occupation: The patient works currently works at a school as an      Social support: The patient stated that they live at home with their . The patient stated that their  would be able to help take care of them if they were to have surgery.     Works with [employer]     ROS:  The updated medical history is in the chart and has been reviewed. A review of systems is updated and there is no reported vision changes, ear/nose/mouth/throat complaints,  chest pain, shortness of breath, abdominal pain, urological complaints, fevers or chills, psychiatric complaints. Musculoskeletal and neurologcial symptoms are as documented. All other systems are negative.      Objective:   Exam:  There were no " vitals filed for this visit.  Body mass index is 40.37 kg/m².    Physical examination included assessment of the patient's general appearance with particular attention to development, nutrition, body habitus, attention to grooming, and any evidence of distress.  Constitutional: The patient is a well-developed, well-nourished patient in no acute distress.   Cardiovascular: Vascular examination included warmth and capillary refill as well inspection for edema and assessment of pedal pulses. Pulses are palpable and regular.  Musculoskeletal: Gait was assessed as to whether it was steady, non-antalgic, and/or required the use of an assist device. The patient was also asked to walk independently and get onto the examination table.  Skin: The skin was examined for any obvious rashes or lesions in the extremity.  Neurologic: Sensation is intact to light touch in the extremity. The patient has good coordination without hyperreflexia and is alert and oriented to person, place and time and has normal mood and affect.     All of the above were examined and found to be within normal limits except for the following pertinent clinical findings:    No limp nonantalgic gait bilateral Trendelenburg.  Left knee 0110 degree knee range of motion difficult to tell alignment due to body habitus tender palpation mediolateral patellofemoral joint lines mild-to-moderate effusion knee stable anterior-posterior varus valgus stresses no flexion contracture or extensor lag.  She has got some anterior lateral hematoma consistent with recent injection appears to be resolving.  She has no pain with internal-external range of motion of her hip joint no groin pain with active straight leg raise      Imaging:    KNEE R ARTHRITIS    Indication:  Right knee pain  Exam Ordered: Radiographs of the right knee include a standing anteroposterior view, a standing posterioanterior view, a lateral view in full flexion, and a sunrise view  Details of  Examination: Exam shows evidence of joint space narrowing, osteophyte formation, and subchondral sclerosis, all consistent with degenerative arthritis of the knee.  No other significant findings are noted.  Impression:  Degenerative Arthritis, Right Knee and KNEE L ARTHRITIS     Indication:  Left knee pain  Exam Ordered: Radiographs of the left knee include a standing anteroposterior view, a standing posterioanterior view, a lateral view in full flexion, and a sunrise view  Details of Examination: Exam shows evidence of joint space narrowing, osteophyte formation, and subchondral sclerosis, all consistent with degenerative arthritis of the knee.  No other significant findings are noted.  Impression:  Degenerative Arthritis, Left Knee    R G3 varus, L G4 PF OA      Assessment:       ICD-10-CM ICD-9-CM   1. Arthritis of both knees  M17.0 716.96      BMI 40.37  Vit D on Rx 40  Anemia 11.7  Alb 3.2     Plan:       The above findings were discussed with patient length. We discussed the risks of conservative versus surgical management knee arthritis. Conservative management consisting of anti-inflammatory medications, glucosamine/chondroitin sulfate, weight loss, physical therapy, activity modification, as well as injections (lubricant versus corticosteroid) was discussed at length. At this point considering the patient's level of activity, pain, and radiographic findings I recommend continued conservative management of the knee arthritis.     The patient was given a handout with treatment strategies for hip and knee joint care prior to surgery from [employer], the American Association of Hip and Knee [employer]geons.   This included information regarding medications, injections, weight loss, exercise, braces, physical therapy, and alternative therapies.     I explained the potentially adverse gastric, cardiac, and renal effects of NSAIDs and explained that if the patient wishes to take it for longer that the patient should discuss  this with their primary care physician to determine if it is safe to do so.    A do not see any concerning physical exam findings for septic or pseudo septic knee reaction.  Some people to get more pain right after an injection. . No indication for aspiration or antibiotics at this time.  Would recommend that she follow-up with [facility] for any injection reactions or if pain gets worse.     We did discuss possibility of considering steroids instead of hyaluronic acid for her next round of injections as indicated.  We will give her information about non operative arthritis options from the American Academy of hip and knee surgeons.  Recommend physical therapy.  Will give her a total knee replacement information.  Discussed that long-term she is likely headed towards knee replacements    F/u with [facility] for injection related pain  F/u PRN     Orders Placed This Encounter   Procedures    Ambulatory referral/consult to Physical/Occupational Therapy     Standing Status:   Future     Number of Occurrences:   1     Standing Expiration Date:   3/1/2023     Referral Priority:   Routine     Referral Type:   Physical Medicine     Referral Reason:   Specialty Services Required     Number of Visits Requested:   1             Past Medical History:   Diagnosis Date    GERD (gastroesophageal reflux disease)     Hypertension     Hypokalemic syndrome     Thyroid disease     Thyroid nodule     Vitamin D deficiency disease        Past Surgical History:   Procedure Laterality Date    BREAST BIOPSY       SECTION  2004    X 1    CHOLECYSTECTOMY  2008    HYSTERECTOMY      LAPAROSCOPIC HYSTERECTOMY  2010    [facility]       Family History   Problem Relation Age of Onset    Diabetes Mother     Heart disease Father         CHF    No Known Problems Sister     No Known Problems Brother     No Known Problems Brother     Breast cancer Maternal Aunt     Breast cancer Paternal Aunt     Breast cancer Maternal Aunt      Breast cancer Paternal Aunt     No Known Problems Maternal Uncle     No Known Problems Paternal Uncle     No Known Problems Maternal Grandmother     No Known Problems Maternal Grandfather     No Known Problems Paternal Grandmother     No Known Problems Paternal Grandfather     Colon cancer Neg Hx     Colon polyps Neg Hx     Amblyopia Neg Hx     Blindness Neg Hx     Cancer Neg Hx     Cataracts Neg Hx     Glaucoma Neg Hx     Hypertension Neg Hx     Macular degeneration Neg Hx     Retinal detachment Neg Hx     Strabismus Neg Hx     Stroke Neg Hx     Thyroid disease Neg Hx        Social History     Socioeconomic History    Marital status:    Occupational History     Employer: [employer]   Tobacco Use    Smoking status: Never Smoker    Smokeless tobacco: Never Used   Substance and Sexual Activity    Alcohol use: No    Drug use: No    Sexual activity: Yes     Partners: Male     Birth control/protection: Surgical

## 2022-02-07 ENCOUNTER — CLINICAL SUPPORT (OUTPATIENT)
Dept: REHABILITATION | Facility: HOSPITAL | Age: 57
End: 2022-02-07
Attending: ORTHOPAEDIC SURGERY
Payer: COMMERCIAL

## 2022-02-07 DIAGNOSIS — M25.562 CHRONIC PAIN OF LEFT KNEE: ICD-10-CM

## 2022-02-07 DIAGNOSIS — R53.1 WEAKNESS: ICD-10-CM

## 2022-02-07 DIAGNOSIS — M17.0 ARTHRITIS OF BOTH KNEES: ICD-10-CM

## 2022-02-07 DIAGNOSIS — G89.29 CHRONIC PAIN OF LEFT KNEE: ICD-10-CM

## 2022-02-07 PROCEDURE — 97161 PT EVAL LOW COMPLEX 20 MIN: CPT | Performed by: PHYSICAL THERAPIST

## 2022-02-07 PROCEDURE — 97110 THERAPEUTIC EXERCISES: CPT | Performed by: PHYSICAL THERAPIST

## 2022-02-07 NOTE — PROGRESS NOTES
"  [facility]  Physical Therapy Initial Evaluation    Name: Niharika Gutierrez  Clinic Number: [id number]    Therapy Diagnosis:   Encounter Diagnosis   Name Primary?    Arthritis of both knees      Physician: Pradeep Camilo III, *    Physician Orders: PT Eval and Treat   Medical Diagnosis from Referral: M17.0 (ICD-10-CM) - Arthritis of both knees  Evaluation Date: 2022  Authorization Period Expiration: 23  Plan of Care Expiration: 22  Visit # / Visits authorized:     Time In: 1520  Time Out: 1610  Total Billable Time: 45 minutes    Precautions: Standard    Subjective   Date of onset: chronic  History of current condition - NIHARIKA reports: pt reports hx of synvisc injections. States that she was getting injections in January and had a reaction. Pt has been on mobic to decrease the inflammation in her knees secondary to the injection. States at this time she is back to "normal". States that L knee is worse than R. States that she has a history of chronic knee pain. Described as aching, dull. Has pain with sit to stand, sitting or standing for long periods of time. States activities help.      Medical History:   Past Medical History:   Diagnosis Date    Hypertension     Hypokalemic syndrome     Thyroid disease     Thyroid nodule     Vitamin D deficiency disease        Surgical History:   Niharika Gutierrez  has a past surgical history that includes Laparoscopic hysterectomy (); Cholecystectomy ();  section (); Breast biopsy; and Hysterectomy.    Medications:   Niharika has a current medication list which includes the following prescription(s): acetaminophen-codeine 300-30mg, amlodipine, diclofenac sodium, ergocalciferol, estradiol, famotidine, ibuprofen, penicillin v potassium, potassium bicarbonate, spironolactone, sucralfate, and xiidra.    Allergies:   Review of patient's allergies indicates:  No Known Allergies     Imaging, "   EXAMINATION:  XR KNEE ORTHO BILAT WITH FLEXION     CLINICAL HISTORY:  Pain in right knee     TECHNIQUE:  AP standing of both knees, PA flexion standing views of both knees, and Merchant views of both knees were performed.  Lateral views of both knees were also performed.     COMPARISON:  None     FINDINGS:  Skeletal structures at the knees are intact with satisfactory alignment.  Degenerative joint disease is evident at the knees with small spurs at multiple positions, moderate narrowing of left patellofemoral joint space,   mild narrowing of left medial tibiofemoral joint space and moderate narrowing of right medial tibiofemoral joint space.     No erosions are detected.  Small joint effusion is probably present on the right.     Impression:     DJD both knees    Prior Therapy: none  Social History:  lives with their spouse  Occupation: High school admin  Prior Level of Function: fluctuating pain in knees  Current Level of Function: fluctuating pain in knee    Pain:  Current 1/10, worst 10/10, best 0/10   Location: left knee   Description: Aching, Dull and Sharp  Aggravating Factors: see above  Easing Factors: rest    Pts goals: decrease knee pain.    Objective   Observation: pt presents in gym. No distress noted. Pt has some swelling in bilateral ankles.     Gait: independent, mild lateral lean to same side stance leg, mildly antalgic    Functional tests(*=pain):   Sit to stand: antalgic, hip dominate  SL squat: nt  SLS EO: increased sway/LOB tramaine  SLS EC: n/t  Eccentric step down: n/t  DL jump: n/t  SL hop: n/t    Range of Motion(*=pain):   Knee AROM PROM   Right 5-0-120 5-0-120   Left X-0-115 X-0-115     Lower Extremity Strength  Right LE  Left LE    Quadriceps: 4+/5 Quadriceps: 4-/5   Hamstrings: 4+/5 Hamstrings: 4+/5   Hip flexion (supine): 4-/5 Hip flexion (supine): 4-/5   Hip extension:  4/5 Hip extension: 4/5   Hip abduction: 3+/5 Hip abduction:  3+/5   Hip ER:  3+/5 Hip ER:  3+/5   Hip IR: 4-/5 Hip IR:  4-/5     Special Tests:   Right Left   Valgus Stress Test N N   Varus Stress test N N   Lachman's test N N   Posterior Lachman N N   Marlin's Test N P   Thessaly's Test NT NT   Patellar Grind Test N P     Joint Mobility: decreased medial, lateral and superior glide L patella     Palpation: tenderness along medial and lateral joint line LLE    Proximal/distal screen:   Generalized ankle stiffness limiting DF tramaine    Sensation: intact BLE    Flexibility:    Ely's test: R = tight ; L = tigh    Hamstrings: R = tight ; L = tight    Ayana's test: R =  ; L =    Regulo test: R = tight ; L = tight      CMS Impairment/Limitation/Restriction for FOTO  Survey    Therapist reviewed FOTO scores for Saloni Gutierrez on 2/7/2022.   FOTO documents entered into [employer] - see Media section.         TREATMENT   Treatment Time In: 1520  Treatment Time Out: 1610  Total Treatment time separate from Evaluation: 20 minutes    SALONI received therapeutic exercises to develop strength and ROM for 20 minutes including:  Issued hep listed in pt instructions    Home Exercises and Patient Education Provided    Education provided:   - issued hep, instructed to use heat/ice prn    Written Home Exercises Provided: yes.  Exercises were reviewed and SALONI was able to demonstrate them prior to the end of the session.  SALONI demonstrated good  understanding of the education provided.     See EMR under Patient Instructions for exercises provided 2/7/2022.    Assessment   Saloni is a 56 y.o. female referred to outpatient Physical Therapy with a medical diagnosis ofM17.0 (ICD-10-CM) - Arthritis of both knees . Pt presents with decreased ROM, weakness, pain, antalgic gait secondary to the above dx. Pt has a lack of knee ext in LLE. LLE pain >RLE. tramaine hips are weak that may contribute to pain. Pt would benefit from skilled PT in order to maximize function.    Pt prognosis is Good.   Pt will benefit from skilled outpatient Physical Therapy to  address the deficits stated above and in the chart below, provide pt/family education, and to maximize pt's level of independence.     Plan of care discussed with patient: Yes  Pt's spiritual, cultural and educational needs considered and patient is agreeable to the plan of care and goals as stated below:     Anticipated Barriers for therapy: none    Medical Necessity is demonstrated by the following  History  Co-morbidities and personal factors that may impact the plan of care Co-morbidities:   high BMI    Personal Factors:   no deficits     low   Examination  Body Structures and Functions, activity limitations and participation restrictions that may impact the plan of care Body Regions:   lower extremities    Body Systems:    ROM  strength  balance  gait  transitions  motor control    Participation Restrictions:   none    Activity limitations:   Learning and applying knowledge  no deficits    General Tasks and Commands  no deficits    Communication  no deficits    Mobility  lifting and carrying objects  walking    Self care  looking after one's health    Domestic Life  shopping  cooking  doing house work (cleaning house, washing dishes, laundry)    Interactions/Relationships  no deficits    Life Areas  no deficits    Community and Social Life  community life  recreation and leisure         low   Clinical Presentation stable and uncomplicated low   Decision Making/ Complexity Score: low     Goals:  Short Term Goals: 6 weeks   1. Pt independent in initial hep  2. Pain 0-4/10 at worst  3. Strength 4-/5 grossly or better  4. Pt reports 25% improvement in symptoms    Long Term Goals: 12 weeks   1. Pt independent in d/c hep  2. Pain 0-3/10 at worst  3. Strength 4/5 grossly or better  4. Pt reports 75% improvement in symptoms or better    Plan   Plan of care Certification: 2/7/2022 to 6/1/22.    Outpatient Physical Therapy 1-2 times weekly for 12 weeks to include the following interventions: Electrical Stimulation IFC,  Manual Therapy, Moist Heat/ Ice, Neuromuscular Re-ed, Patient Education, Therapeutic Activities and Therapeutic Exercise.     José Antonio Anderson, PT

## 2022-02-08 NOTE — PLAN OF CARE
"  [facility]  Physical Therapy Initial Evaluation    Name: Niharika Gutierrez  Clinic Number: [id number]    Therapy Diagnosis:   Encounter Diagnosis   Name Primary?    Arthritis of both knees      Physician: Pradeep Camilo III, *    Physician Orders: PT Eval and Treat   Medical Diagnosis from Referral: M17.0 (ICD-10-CM) - Arthritis of both knees  Evaluation Date: 2022  Authorization Period Expiration: 23  Plan of Care Expiration: 22  Visit # / Visits authorized:     Time In: 1520  Time Out: 1610  Total Billable Time: 45 minutes    Precautions: Standard    Subjective   Date of onset: chronic  History of current condition - NIHARIKA reports: pt reports hx of synvisc injections. States that she was getting injections in January and had a reaction. Pt has been on mobic to decrease the inflammation in her knees secondary to the injection. States at this time she is back to "normal". States that L knee is worse than R. States that she has a history of chronic knee pain. Described as aching, dull. Has pain with sit to stand, sitting or standing for long periods of time. States activities help.      Medical History:   Past Medical History:   Diagnosis Date    Hypertension     Hypokalemic syndrome     Thyroid disease     Thyroid nodule     Vitamin D deficiency disease        Surgical History:   Niharika Gutierrez  has a past surgical history that includes Laparoscopic hysterectomy (); Cholecystectomy ();  section (); Breast biopsy; and Hysterectomy.    Medications:   Niharika has a current medication list which includes the following prescription(s): acetaminophen-codeine 300-30mg, amlodipine, diclofenac sodium, ergocalciferol, estradiol, famotidine, ibuprofen, penicillin v potassium, potassium bicarbonate, spironolactone, sucralfate, and xiidra.    Allergies:   Review of patient's allergies indicates:  No Known Allergies     Imaging, "   EXAMINATION:  XR KNEE ORTHO BILAT WITH FLEXION     CLINICAL HISTORY:  Pain in right knee     TECHNIQUE:  AP standing of both knees, PA flexion standing views of both knees, and Merchant views of both knees were performed.  Lateral views of both knees were also performed.     COMPARISON:  None     FINDINGS:  Skeletal structures at the knees are intact with satisfactory alignment.  Degenerative joint disease is evident at the knees with small spurs at multiple positions, moderate narrowing of left patellofemoral joint space,   mild narrowing of left medial tibiofemoral joint space and moderate narrowing of right medial tibiofemoral joint space.     No erosions are detected.  Small joint effusion is probably present on the right.     Impression:     DJD both knees    Prior Therapy: none  Social History:  lives with their spouse  Occupation: High school admin  Prior Level of Function: fluctuating pain in knees  Current Level of Function: fluctuating pain in knee    Pain:  Current 1/10, worst 10/10, best 0/10   Location: left knee   Description: Aching, Dull and Sharp  Aggravating Factors: see above  Easing Factors: rest    Pts goals: decrease knee pain.    Objective   Observation: pt presents in gym. No distress noted. Pt has some swelling in bilateral ankles.     Gait: independent, mild lateral lean to same side stance leg, mildly antalgic    Functional tests(*=pain):   Sit to stand: antalgic, hip dominate  SL squat: nt  SLS EO: increased sway/LOB tramaine  SLS EC: n/t  Eccentric step down: n/t  DL jump: n/t  SL hop: n/t    Range of Motion(*=pain):   Knee AROM PROM   Right 5-0-120 5-0-120   Left X-0-115 X-0-115     Lower Extremity Strength  Right LE  Left LE    Quadriceps: 4+/5 Quadriceps: 4-/5   Hamstrings: 4+/5 Hamstrings: 4+/5   Hip flexion (supine): 4-/5 Hip flexion (supine): 4-/5   Hip extension:  4/5 Hip extension: 4/5   Hip abduction: 3+/5 Hip abduction:  3+/5   Hip ER:  3+/5 Hip ER:  3+/5   Hip IR: 4-/5 Hip IR:  4-/5     Special Tests:   Right Left   Valgus Stress Test N N   Varus Stress test N N   Lachman's test N N   Posterior Lachman N N   Marlin's Test N P   Thessaly's Test NT NT   Patellar Grind Test N P     Joint Mobility: decreased medial, lateral and superior glide L patella     Palpation: tenderness along medial and lateral joint line LLE    Proximal/distal screen:   Generalized ankle stiffness limiting DF tramaine    Sensation: intact BLE    Flexibility:    Ely's test: R = tight ; L = tigh    Hamstrings: R = tight ; L = tight    Ayana's test: R =  ; L =    Regulo test: R = tight ; L = tight      CMS Impairment/Limitation/Restriction for FOTO  Survey    Therapist reviewed FOTO scores for Saloni Gutierrez on 2/7/2022.   FOTO documents entered into [employer] - see Media section.         TREATMENT   Treatment Time In: 1520  Treatment Time Out: 1610  Total Treatment time separate from Evaluation: 20 minutes    SALONI received therapeutic exercises to develop strength and ROM for 20 minutes including:  Issued hep listed in pt instructions    Home Exercises and Patient Education Provided    Education provided:   - issued hep, instructed to use heat/ice prn    Written Home Exercises Provided: yes.  Exercises were reviewed and SALONI was able to demonstrate them prior to the end of the session.  SALONI demonstrated good  understanding of the education provided.     See EMR under Patient Instructions for exercises provided 2/7/2022.    Assessment   Saloni is a 56 y.o. female referred to outpatient Physical Therapy with a medical diagnosis ofM17.0 (ICD-10-CM) - Arthritis of both knees . Pt presents with decreased ROM, weakness, pain, antalgic gait secondary to the above dx. Pt has a lack of knee ext in LLE. LLE pain >RLE. tramaine hips are weak that may contribute to pain. Pt would benefit from skilled PT in order to maximize function.    Pt prognosis is Good.   Pt will benefit from skilled outpatient Physical Therapy to  address the deficits stated above and in the chart below, provide pt/family education, and to maximize pt's level of independence.     Plan of care discussed with patient: Yes  Pt's spiritual, cultural and educational needs considered and patient is agreeable to the plan of care and goals as stated below:     Anticipated Barriers for therapy: none    Medical Necessity is demonstrated by the following  History  Co-morbidities and personal factors that may impact the plan of care Co-morbidities:   high BMI    Personal Factors:   no deficits     low   Examination  Body Structures and Functions, activity limitations and participation restrictions that may impact the plan of care Body Regions:   lower extremities    Body Systems:    ROM  strength  balance  gait  transitions  motor control    Participation Restrictions:   none    Activity limitations:   Learning and applying knowledge  no deficits    General Tasks and Commands  no deficits    Communication  no deficits    Mobility  lifting and carrying objects  walking    Self care  looking after one's health    Domestic Life  shopping  cooking  doing house work (cleaning house, washing dishes, laundry)    Interactions/Relationships  no deficits    Life Areas  no deficits    Community and Social Life  community life  recreation and leisure         low   Clinical Presentation stable and uncomplicated low   Decision Making/ Complexity Score: low     Goals:  Short Term Goals: 6 weeks   1. Pt independent in initial hep  2. Pain 0-4/10 at worst  3. Strength 4-/5 grossly or better  4. Pt reports 25% improvement in symptoms    Long Term Goals: 12 weeks   1. Pt independent in d/c hep  2. Pain 0-3/10 at worst  3. Strength 4/5 grossly or better  4. Pt reports 75% improvement in symptoms or better    Plan   Plan of care Certification: 2/7/2022 to 6/1/22.    Outpatient Physical Therapy 1-2 times weekly for 12 weeks to include the following interventions: Electrical Stimulation IFC,  Manual Therapy, Moist Heat/ Ice, Neuromuscular Re-ed, Patient Education, Therapeutic Activities and Therapeutic Exercise.     José Antonio Anderson, PT

## 2022-02-10 ENCOUNTER — HOSPITAL ENCOUNTER (EMERGENCY)
Facility: HOSPITAL | Age: 57
Discharge: HOME | End: 2022-02-10
Attending: EMERGENCY MEDICINE
Payer: COMMERCIAL

## 2022-02-10 VITALS
WEIGHT: 240 LBS | BODY MASS INDEX: 39.99 KG/M2 | RESPIRATION RATE: 20 BRPM | SYSTOLIC BLOOD PRESSURE: 145 MMHG | OXYGEN SATURATION: 100 % | DIASTOLIC BLOOD PRESSURE: 67 MMHG | HEIGHT: 65 IN | HEART RATE: 74 BPM | TEMPERATURE: 98 F

## 2022-02-10 DIAGNOSIS — K21.9 GASTROESOPHAGEAL REFLUX DISEASE, UNSPECIFIED WHETHER ESOPHAGITIS PRESENT: Primary | ICD-10-CM

## 2022-02-10 DIAGNOSIS — R07.9 CHEST PAIN: ICD-10-CM

## 2022-02-10 DIAGNOSIS — E87.6 HYPOKALEMIA: ICD-10-CM

## 2022-02-10 DIAGNOSIS — R03.0 ELEVATED BLOOD PRESSURE READING: ICD-10-CM

## 2022-02-10 LAB
ALBUMIN SERPL-MCNC: 3.7 G/DL (ref 3.3–5.5)
ALP SERPL-CCNC: 84 U/L (ref 42–141)
BILIRUB SERPL-MCNC: 0.4 MG/DL (ref 0.2–1.6)
BILIRUBIN, POC UA: NEGATIVE
BLOOD, POC UA: ABNORMAL
BUN SERPL-MCNC: 12 MG/DL (ref 7–22)
CALCIUM SERPL-MCNC: 9.2 MG/DL (ref 8–10.3)
CHLORIDE SERPL-SCNC: 105 MMOL/L (ref 98–108)
CLARITY, POC UA: ABNORMAL
COLOR, POC UA: ABNORMAL
CREAT SERPL-MCNC: 0.6 MG/DL (ref 0.6–1.2)
GLUCOSE SERPL-MCNC: 97 MG/DL (ref 73–118)
GLUCOSE, POC UA: NEGATIVE
KETONES, POC UA: NEGATIVE
LEUKOCYTE EST, POC UA: NEGATIVE
NITRITE, POC UA: NEGATIVE
PH UR STRIP: 8.5 [PH]
POC ALT (SGPT): 22 U/L (ref 10–47)
POC AST (SGOT): 24 U/L (ref 11–38)
POC CARDIAC TROPONIN I: 0 NG/ML
POC PTINR: 1.1 (ref 0.9–1.2)
POC PTWBT: 13.2 SEC (ref 9.7–14.3)
POC TCO2: 32 MMOL/L (ref 18–33)
POTASSIUM BLD-SCNC: 3.3 MMOL/L (ref 3.6–5.1)
PROTEIN, POC UA: NEGATIVE
PROTEIN, POC: 9.2 G/DL (ref 6.4–8.1)
SAMPLE: NORMAL
SAMPLE: NORMAL
SODIUM BLD-SCNC: 145 MMOL/L (ref 128–145)
SPECIFIC GRAVITY, POC UA: 1.01
UROBILINOGEN, POC UA: 0.2 E.U./DL

## 2022-02-10 PROCEDURE — 93010 ELECTROCARDIOGRAM REPORT: CPT | Mod: ,,, | Performed by: INTERNAL MEDICINE

## 2022-02-10 PROCEDURE — 99285 EMERGENCY DEPT VISIT HI MDM: CPT | Mod: 25,ER

## 2022-02-10 PROCEDURE — 85025 COMPLETE CBC W/AUTO DIFF WBC: CPT | Mod: ER

## 2022-02-10 PROCEDURE — 93005 ELECTROCARDIOGRAM TRACING: CPT | Mod: ER

## 2022-02-10 PROCEDURE — 93010 EKG 12-LEAD: ICD-10-PCS | Mod: ,,, | Performed by: INTERNAL MEDICINE

## 2022-02-10 PROCEDURE — 84484 ASSAY OF TROPONIN QUANT: CPT | Mod: ER

## 2022-02-10 PROCEDURE — 96361 HYDRATE IV INFUSION ADD-ON: CPT | Mod: ER

## 2022-02-10 PROCEDURE — 96374 THER/PROPH/DIAG INJ IV PUSH: CPT | Mod: ER

## 2022-02-10 PROCEDURE — 85610 PROTHROMBIN TIME: CPT | Mod: ER

## 2022-02-10 PROCEDURE — 81003 URINALYSIS AUTO W/O SCOPE: CPT | Mod: ER

## 2022-02-10 PROCEDURE — 25000003 PHARM REV CODE 250: Mod: ER | Performed by: NURSE PRACTITIONER

## 2022-02-10 PROCEDURE — 80053 COMPREHEN METABOLIC PANEL: CPT | Mod: ER

## 2022-02-10 RX ORDER — DICYCLOMINE HYDROCHLORIDE 10 MG/1
10 CAPSULE ORAL 3 TIMES DAILY PRN
Qty: 30 CAPSULE | Refills: 0 | Status: SHIPPED | OUTPATIENT
Start: 2022-02-10 | End: 2022-02-20

## 2022-02-10 RX ORDER — PANTOPRAZOLE SODIUM 40 MG/1
40 TABLET, DELAYED RELEASE ORAL DAILY
COMMUNITY
End: 2022-02-10 | Stop reason: SDUPTHER

## 2022-02-10 RX ORDER — POTASSIUM CHLORIDE 20 MEQ/1
40 TABLET, EXTENDED RELEASE ORAL
Status: COMPLETED | OUTPATIENT
Start: 2022-02-10 | End: 2022-02-10

## 2022-02-10 RX ORDER — DEXTROMETHORPHAN HYDROBROMIDE, GUAIFENESIN 5; 100 MG/5ML; MG/5ML
650 LIQUID ORAL EVERY 8 HOURS
Qty: 21 TABLET | Refills: 0 | Status: SHIPPED | OUTPATIENT
Start: 2022-02-10 | End: 2022-02-17

## 2022-02-10 RX ORDER — ONDANSETRON 4 MG/1
4 TABLET, ORALLY DISINTEGRATING ORAL EVERY 8 HOURS PRN
Qty: 21 TABLET | Refills: 0 | Status: SHIPPED | OUTPATIENT
Start: 2022-02-10 | End: 2022-02-17

## 2022-02-10 RX ORDER — FAMOTIDINE 10 MG/ML
20 INJECTION INTRAVENOUS
Status: COMPLETED | OUTPATIENT
Start: 2022-02-10 | End: 2022-02-10

## 2022-02-10 RX ORDER — MAG HYDROX/ALUMINUM HYD/SIMETH 200-200-20
30 SUSPENSION, ORAL (FINAL DOSE FORM) ORAL ONCE
Status: COMPLETED | OUTPATIENT
Start: 2022-02-10 | End: 2022-02-10

## 2022-02-10 RX ORDER — LIDOCAINE HYDROCHLORIDE 20 MG/ML
10 SOLUTION OROPHARYNGEAL ONCE
Status: COMPLETED | OUTPATIENT
Start: 2022-02-10 | End: 2022-02-10

## 2022-02-10 RX ORDER — ASPIRIN 325 MG
325 TABLET ORAL
Status: COMPLETED | OUTPATIENT
Start: 2022-02-10 | End: 2022-02-10

## 2022-02-10 RX ORDER — PANTOPRAZOLE SODIUM 40 MG/1
40 TABLET, DELAYED RELEASE ORAL 2 TIMES DAILY
Qty: 40 TABLET | Refills: 0 | Status: SHIPPED | OUTPATIENT
Start: 2022-02-10 | End: 2023-09-29 | Stop reason: ALTCHOICE

## 2022-02-10 RX ADMIN — SODIUM CHLORIDE 500 ML: 0.9 INJECTION, SOLUTION INTRAVENOUS at 01:02

## 2022-02-10 RX ADMIN — MAGNESIUM HYDROXIDE/ALUMINUM HYDROXICE/SIMETHICONE 30 ML: 120; 1200; 1200 SUSPENSION ORAL at 01:02

## 2022-02-10 RX ADMIN — POTASSIUM CHLORIDE 40 MEQ: 1500 TABLET, FILM COATED, EXTENDED RELEASE ORAL at 02:02

## 2022-02-10 RX ADMIN — LIDOCAINE HYDROCHLORIDE 10 ML: 20 SOLUTION ORAL; TOPICAL at 01:02

## 2022-02-10 RX ADMIN — FAMOTIDINE 20 MG: 10 INJECTION INTRAVENOUS at 01:02

## 2022-02-10 RX ADMIN — ASPIRIN 325 MG ORAL TABLET 325 MG: 325 PILL ORAL at 01:02

## 2022-02-10 NOTE — Clinical Note
"Niharika WHELANIA" Brenda was seen and treated in our emergency department on 2/10/2022.  She may return to work on 02/14/2022.       If you have any questions or concerns, please don't hesitate to call.      GAETANO Sotomayor"

## 2022-02-10 NOTE — ED PROVIDER NOTES
Encounter Date: 2/10/2022    SCRIBE #1 NOTE: I, James Carrera, am scribing for, and in the presence of,  GAETANO Dobson. I have scribed the following portions of the note - the EKG reading.       History     Chief Complaint   Patient presents with    Gastroesophageal Reflux     Pt has had heartburn on and off for many weeks. She states she came here last time and the GI cocktail improved her heartburn. Pt has a burning sensation to her chest.      55 y/o female presents to the ED with complaints of worsening heartburn and chest pain for several weeks.  Patient states that the symptoms worsened when she began taking Meloxicam.  She also reports a lot of stomach gurgling.  Her GERD is normally controlled with once daily protonix.  Patient denies rash, fever, SOB, numbness, weakness, tingling, abdominal pain, back pain, dysuria, hematuria, nausea, vomiting, diarrhea, or any other complaints.  Patient rates the pain as 6/10 and has taken her RX medications for the symptoms.  No Alleviating/aggravating factors.                Review of patient's allergies indicates:  No Known Allergies  Past Medical History:   Diagnosis Date    GERD (gastroesophageal reflux disease)     Hypertension     Hypokalemic syndrome     Thyroid disease     Thyroid nodule     Vitamin D deficiency disease      Past Surgical History:   Procedure Laterality Date    BREAST BIOPSY       SECTION  2004    X 1    CHOLECYSTECTOMY  2008    HYSTERECTOMY      LAPAROSCOPIC HYSTERECTOMY  2010    LSO     Family History   Problem Relation Age of Onset    Diabetes Mother     Heart disease Father         CHF    No Known Problems Sister     No Known Problems Brother     No Known Problems Brother     Breast cancer Maternal Aunt     Breast cancer Paternal Aunt     Breast cancer Maternal Aunt     Breast cancer Paternal Aunt     No Known Problems Maternal Uncle     No Known Problems Paternal Uncle     No Known Problems Maternal  Grandmother     No Known Problems Maternal Grandfather     No Known Problems Paternal Grandmother     No Known Problems Paternal Grandfather     Colon cancer Neg Hx     Colon polyps Neg Hx     Amblyopia Neg Hx     Blindness Neg Hx     Cancer Neg Hx     Cataracts Neg Hx     Glaucoma Neg Hx     Hypertension Neg Hx     Macular degeneration Neg Hx     Retinal detachment Neg Hx     Strabismus Neg Hx     Stroke Neg Hx     Thyroid disease Neg Hx      Social History     Tobacco Use    Smoking status: Never Smoker    Smokeless tobacco: Never Used   Substance Use Topics    Alcohol use: No    Drug use: No     Review of Systems   Constitutional: Negative for chills and fever.   HENT: Negative for congestion, ear pain, rhinorrhea, sore throat and trouble swallowing.    Eyes: Negative for pain, discharge and redness.   Respiratory: Negative for cough and shortness of breath.    Cardiovascular: Positive for chest pain.   Gastrointestinal: Negative for abdominal pain, diarrhea, nausea and vomiting.        GERD   Genitourinary: Negative for decreased urine volume and dysuria.   Musculoskeletal: Negative for back pain, neck pain and neck stiffness.   Skin: Negative for rash.   Neurological: Negative for dizziness, weakness, light-headedness, numbness and headaches.   Psychiatric/Behavioral: Negative for confusion.       Physical Exam     Initial Vitals [02/10/22 1206]   BP Pulse Resp Temp SpO2   (!) 140/65 78 14 98 °F (36.7 °C) 98 %      MAP       --         Physical Exam    Nursing note and vitals reviewed.  Constitutional: Vital signs are normal. She appears well-developed.  Non-toxic appearance. She does not appear ill.   HENT:   Head: Normocephalic and atraumatic.   Right Ear: External ear normal.   Left Ear: External ear normal.   Nose: Nose normal.   Mouth/Throat: Oropharynx is clear and moist.   Eyes: Conjunctivae are normal.   Neck:   Normal range of motion.  Cardiovascular: Normal rate and regular rhythm.    Pulmonary/Chest: Effort normal and breath sounds normal. She exhibits no tenderness.   Abdominal: Abdomen is soft. Bowel sounds are increased. There is no abdominal tenderness.   No right CVA tenderness.  No left CVA tenderness. There is no rebound and no guarding.   Musculoskeletal:      Cervical back: Normal range of motion.     Neurological: She is alert and oriented to person, place, and time. Gait normal. GCS eye subscore is 4. GCS verbal subscore is 5. GCS motor subscore is 6.   Skin: Skin is warm, dry and intact. No rash noted.   Psychiatric: She has a normal mood and affect. Her speech is normal and behavior is normal. Judgment and thought content normal.         ED Course   Procedures  Labs Reviewed   POCT URINALYSIS W/O SCOPE - Abnormal; Notable for the following components:       Result Value    Blood, UA 1+ (*)     All other components within normal limits   POCT CMP - Abnormal; Notable for the following components:    POC Potassium 3.3 (*)     Protein, POC 9.2 (*)     All other components within normal limits   TROPONIN ISTAT   POCT CBC   POCT URINALYSIS W/O SCOPE   POCT CMP   POCT PROTIME-INR   POCT TROPONIN   ISTAT PROCEDURE             EKG Readings: (Independently Interpreted)   No STEMI. Rate of 76. Normal Sinus Rhythm. Normal Axis. Abnormal EKG. LVH appreciated. QTc normal at 465. When compared to prior EKG dated 2/9/18 rate increased by 2 bpm.        Imaging Results          X-Ray Chest PA And Lateral (Final result)  Result time 02/10/22 13:52:25    Final result by Dwain Tee MD (02/10/22 13:52:25)                 Impression:      No detrimental change or radiographic acute intrathoracic process seen.      Electronically signed by: Dwain Tee MD  Date:    02/10/2022  Time:    13:52             Narrative:    EXAMINATION:  XR CHEST PA AND LATERAL    CLINICAL HISTORY:  Chest Pain;    TECHNIQUE:  PA and lateral views of the chest were performed.    COMPARISON:  Chest radiograph  02/09/2018    FINDINGS:  Patient is slightly rotated.Bibasilar few scattered linear opacities consistent with minimal platelike scarring versus atelectasis.  The lungs are otherwise well expanded without consolidation, pleural effusion or pneumothorax.    The cardiac silhouette is normal in size. The hilar and mediastinal contours are unremarkable.    Osseous structures appear grossly stable without acute process seen.  Right upper quadrant surgical clips noted.                                 Medications   aspirin tablet 325 mg (325 mg Oral Given 2/10/22 1314)   famotidine (PF) injection 20 mg (20 mg Intravenous Given 2/10/22 1316)   aluminum-magnesium hydroxide-simethicone 200-200-20 mg/5 mL suspension 30 mL (30 mLs Oral Given 2/10/22 1312)     And   LIDOcaine HCl 2% oral solution 10 mL (10 mLs Oral Given 2/10/22 1313)   sodium chloride 0.9% bolus 500 mL (0 mLs Intravenous Stopped 2/10/22 1459)   potassium chloride SA CR tablet 40 mEq (40 mEq Oral Given 2/10/22 1445)     Medical Decision Making:   Clinical Tests:   Lab Tests: Ordered and Reviewed  Radiological Study: Ordered and Reviewed  Medical Tests: Ordered and Reviewed    Additional MDM:   PERC Rule:   Age is greater than or equal to 50 = 1.0  Heart Rate is greater than or equal to 100 = 0.0  SaO2 on room air < 95% = 0.0  Unilateral leg swelling = 0.0  Hemoptysis = 0.0  Recent surgery or trauma = 0.0  Prior PE or DVT =  0.0  Hormone use = 0.00  PERC Score = 1    Well's Criteria Score:  -Clinical symptoms of DVT (leg swelling, pain with palpation) = 0.0  -Other diagnosis less likely than pulmonary embolism =            0.0  -Heart Rate >100 =   0.0  -Immobilization (= or > than 3 days) or surgery in the previous 4 weeks = 0.0  -Previous DVT/PE = 0.0  -Hemoptysis =          0.0  -Malignancy =           0.0  Well's Probability Score =    0      Heart Score:    History:          Slightly suspicious.  ECG:             Normal  Age:               45-65 years  Risk  factors: 1-2 risk factors  Troponin:       Less than or equal to normal limit  Final Score: 2        APC / Resident Notes:   This is an evaluation of a 56 y.o. female that presents to the Emergency Department for GERD and CP. Physical Exam shows a non-toxic, afebrile, and well appearing female. Breath sounds clear and equal bilaterally, no increased work of breathing or respiratory distress. Heart sounds regular rate and rhythm. No murmurs. Abdomen soft and non tender. No palpable masses or bruits. Equal upper and lower extremity pulses, no ripping chest pain to the back. No dysphagia or vomiting and CXR negative for mediastinal air.  Increased bowel sounds.      Vital Signs Are Reassuring. If available, previous records reviewed.     RESULTS: labs stable with hypokalemia, UA negative for infection.  CXR negative for infiltrates, pneumothorax, cardiomegaly, pleural effusion or widening of the mediastinum. Patient is PERC Negative. No evidence of hypoxia.  EKG interpreted by myself and Dr. Casper, with No STEMI. The patient is low risk for CAD based on their risk factors; Heart Score 2; all symptoms resolved with treatment to the ED    My overall impression is GERD, CP, Hypokalemia.  I considered but doubt pulmonary embolus, acute myocardial infarction, Boerhaeve syndrome, cardiac tamponade, thoracic artery dissection, acute coronary syndrome, pneumothorax, pneumonia, CHF, cardiac arrhythmia, or any other emergent cardiac, esophageal, pulmonary or aortic pathology.      ED Course: labs, CXR, ASA, Pepcid, GI Cocktail, potassium, NS. D/C Meds: tylenol, Protonix BID, zofran, bentyl. D/C Information: return precautions, GI f/u. The diagnosis, treatment plan, instructions for follow-up and reevaluation with GI as well as ED return precautions were discussed and understanding was verbalized. All questions or concerns have been addressed. This case was discussed with and the patient has been examined by Dr. Casper who is in  agreement with my assessment and plan.        Scribe Attestation:   Scribe #1: I performed the above scribed service and the documentation accurately describes the services I performed. I attest to the accuracy of the note.                   I, MARGOTH Larkin, personally performed the services described in this documentation. All medical record entries made by the scribe were at my direction and in my presence. I have reviewed the chart and agree that the record reflects my personal performance and is accurate and complete.  Clinical Impression:   Final diagnoses:  [R07.9] Chest pain  [K21.9] Gastroesophageal reflux disease, unspecified whether esophagitis present (Primary)  [R03.0] Elevated blood pressure reading  [E87.6] Hypokalemia          ED Disposition Condition    Discharge Stable        ED Prescriptions     Medication Sig Dispense Start Date End Date Auth. Provider    pantoprazole (PROTONIX) 40 MG tablet Take 1 tablet (40 mg total) by mouth 2 (two) times daily. for 20 days 40 tablet 2/10/2022 3/2/2022 GAETANO Sotomayor    ondansetron (ZOFRAN-ODT) 4 MG TbDL Take 1 tablet (4 mg total) by mouth every 8 (eight) hours as needed (nausea). 21 tablet 2/10/2022 2/17/2022 GAETANO Sotomayor    acetaminophen (TYLENOL) 650 MG TbSR Take 1 tablet (650 mg total) by mouth every 8 (eight) hours. for 7 days 21 tablet 2/10/2022 2/17/2022 GAETANO Sotomayor    dicyclomine (BENTYL) 10 MG capsule Take 1 capsule (10 mg total) by mouth 3 (three) times daily as needed (abdominal cramping). 30 capsule 2/10/2022 2/20/2022 GAETANO Sotomayor        Follow-up Information     Follow up With Specialties Details Why Contact Info    Janae Schwartz MD Family Medicine, Wound Care Schedule an appointment as soon as possible for a visit in 2 days  [address] [place] [id number] [phone]      Joel Langford MD Gastroenterology Schedule an appointment as soon as possible for a visit in 2 days  [address] [place] [address]  [address]  [facility]  [facility] [address]  [phone]      [facility] ED Emergency Medicine Go to  If symptoms worsen [address] [facility] [address] [phone]           Kristen Qiu, PUJAP  02/10/22 1534

## 2022-03-04 ENCOUNTER — TELEPHONE (OUTPATIENT)
Dept: OPTOMETRY | Facility: CLINIC | Age: 57
End: 2022-03-04
Payer: COMMERCIAL

## 2022-03-04 NOTE — TELEPHONE ENCOUNTER
I called pt to let her know to start using otc tears QID and keep appt. On 3/9/22 with Dr. Chand. No answer left message for pt.

## 2022-03-04 NOTE — TELEPHONE ENCOUNTER
----- Message from Flavia Chand OD sent at 3/3/2022  4:55 PM CST -----  Contact: Pt @ [phone]  History of dry eyes. Recommend AT QID daily. Can keep appt for 3/9      ----- Message -----  From: Cecilia Betancur MA  Sent: 3/3/2022   4:48 PM CST  To: Flavia Chand OD      ----- Message -----  From: Shelli Sales  Sent: 3/3/2022   4:45 PM CST  To: Mannie Alejandro Staff    Pt says she is having a lot of trouble w/ her vision such as blurriness, irritation and glossy. She says she can't make out things in the distance, but is still able to see clearly up close. She says her eyes feel like they're dilated for a couple of days and is concerned. Pt has an appt on 3/9 but she is wanting to know if the dr can prescribe something until then?       [id number] [place] - [address] [facility] [address]  [email or web address]  [place] [address]  Phone: [phone] Fax: [phone]

## 2022-03-09 ENCOUNTER — OFFICE VISIT (OUTPATIENT)
Dept: OPTOMETRY | Facility: CLINIC | Age: 57
End: 2022-03-09
Payer: COMMERCIAL

## 2022-03-09 DIAGNOSIS — H16.223 KERATOCONJUNCTIVITIS SICCA OF BOTH EYES NOT SPECIFIED AS SJOGREN'S: Primary | ICD-10-CM

## 2022-03-09 PROCEDURE — 99999 PR PBB SHADOW E&M-EST. PATIENT-LVL III: ICD-10-PCS | Mod: PBBFAC,,, | Performed by: OPTOMETRIST

## 2022-03-09 PROCEDURE — 1160F PR REVIEW ALL MEDS BY PRESCRIBER/CLIN PHARMACIST DOCUMENTED: ICD-10-PCS | Mod: CPTII,S$GLB,, | Performed by: OPTOMETRIST

## 2022-03-09 PROCEDURE — 92012 PR EYE EXAM, EST PATIENT,INTERMED: ICD-10-PCS | Mod: S$GLB,,, | Performed by: OPTOMETRIST

## 2022-03-09 PROCEDURE — 1159F MED LIST DOCD IN RCRD: CPT | Mod: CPTII,S$GLB,, | Performed by: OPTOMETRIST

## 2022-03-09 PROCEDURE — 1160F RVW MEDS BY RX/DR IN RCRD: CPT | Mod: CPTII,S$GLB,, | Performed by: OPTOMETRIST

## 2022-03-09 PROCEDURE — 1159F PR MEDICATION LIST DOCUMENTED IN MEDICAL RECORD: ICD-10-PCS | Mod: CPTII,S$GLB,, | Performed by: OPTOMETRIST

## 2022-03-09 PROCEDURE — 92012 INTRM OPH EXAM EST PATIENT: CPT | Mod: S$GLB,,, | Performed by: OPTOMETRIST

## 2022-03-09 PROCEDURE — 99999 PR PBB SHADOW E&M-EST. PATIENT-LVL III: CPT | Mod: PBBFAC,,, | Performed by: OPTOMETRIST

## 2022-03-09 RX ORDER — PREDNISOLONE ACETATE 10 MG/ML
SUSPENSION/ DROPS OPHTHALMIC
Qty: 5 ML | Refills: 0 | Status: SHIPPED | OUTPATIENT
Start: 2022-03-09 | End: 2022-03-23

## 2022-03-09 NOTE — PROGRESS NOTES
Subjective:       Patient ID: Niharika Gutierrez is a 57 y.o. female      Chief Complaint   Patient presents with    Concerns About Ocular Health     History of Present Illness  Dls: 7/16/21 Dr. Chand     56 y/o female presents today for ocular health check.  Pt c/o blurry vision ou off/on at distance and near.   Pt wears pal's.     Pt states last week had episode of blurry vision that alternates   No tearing  No itching  + burning  No pain  No ha's  No floaters  No flashes    Eye meds  Xiidra OU BID   Refresh OU QID        Assessment/Plan:     1. Keratoconjunctivitis sicca of both eyes not specified as Sjogren's  Pt with on/off alternating episodes of dry eyes. Does well with PF but flares up once off. Pt using xiidra with some relief. Also supplementing with AT. Today OS with SPK, OD with SPK and white epi opacity in visual axis causing blur. 2 week course of PF. Continue xiidra and AT. Referral to cornea service for evaluation.   - prednisoLONE acetate (PRED FORTE) 1 % DrpS; Place 1 drop into both eyes 4 (four) times daily for 7 days, THEN 1 drop 2 (two) times daily for 7 days. Remember to shake the medication before use..  Dispense: 5 mL; Refill: 0  - Ambulatory referral/consult to Ophthalmology    Follow up for cornea consult.

## 2022-03-14 ENCOUNTER — OFFICE VISIT (OUTPATIENT)
Dept: URGENT CARE | Facility: CLINIC | Age: 57
End: 2022-03-14
Payer: COMMERCIAL

## 2022-03-14 VITALS
BODY MASS INDEX: 39.99 KG/M2 | SYSTOLIC BLOOD PRESSURE: 155 MMHG | OXYGEN SATURATION: 96 % | HEART RATE: 72 BPM | WEIGHT: 240 LBS | TEMPERATURE: 97 F | DIASTOLIC BLOOD PRESSURE: 80 MMHG | RESPIRATION RATE: 18 BRPM | HEIGHT: 65 IN
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
## 2022-03-14 DIAGNOSIS — S89.92XA INJURY OF LEFT KNEE, INITIAL ENCOUNTER: ICD-10-CM

## 2022-03-14 DIAGNOSIS — M25.562 ACUTE PAIN OF LEFT KNEE: Primary | ICD-10-CM

## 2022-03-14 PROCEDURE — 73562 X-RAY EXAM OF KNEE 3: CPT | Mod: LT,S$GLB,, | Performed by: RADIOLOGY

## 2022-03-14 PROCEDURE — 99212 OFFICE O/P EST SF 10 MIN: CPT | Mod: S$GLB,,, | Performed by: NURSE PRACTITIONER

## 2022-03-14 PROCEDURE — 99212 PR OFFICE/OUTPT VISIT, EST, LEVL II, 10-19 MIN: ICD-10-PCS | Mod: S$GLB,,, | Performed by: NURSE PRACTITIONER

## 2022-03-14 PROCEDURE — 73562 XR KNEE 3 VIEW LEFT: ICD-10-PCS | Mod: LT,S$GLB,, | Performed by: RADIOLOGY

## 2022-03-14 NOTE — LETTER
[facility]  [address], [place] [address]  Phone: [phone]  Fax: [phone]  [facility] [employer]: [email or web address]    Pt Name: Niharika Mcdaniel Brenda  Injury Date: 03/09/2022   Employee ID:  Date of First Treatment: 03/14/2022   Company: [employer]      Appointment Time: 04:55 PM Arrived: 1710   Provider: Mikayla Church NP Time Out:[id number]     Office Treatment:   1. Acute pain of left knee    2. Injury of left knee, initial encounter    3. Work related injury                     Return Appointment: Visit date   Follow up with Occupational medicine  May return to work on 3/15/2022
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
## 2022-03-16 ENCOUNTER — OFFICE VISIT (OUTPATIENT)
Dept: URGENT CARE | Facility: CLINIC | Age: 57
End: 2022-03-16
Payer: COMMERCIAL

## 2022-03-16 DIAGNOSIS — S80.02XA CONTUSION OF LEFT KNEE, INITIAL ENCOUNTER: ICD-10-CM

## 2022-03-16 DIAGNOSIS — M25.562 ACUTE PAIN OF LEFT KNEE: Primary | ICD-10-CM

## 2022-03-16 PROCEDURE — 99213 OFFICE O/P EST LOW 20 MIN: CPT | Mod: S$GLB,,, | Performed by: EMERGENCY MEDICINE

## 2022-03-16 PROCEDURE — 99213 PR OFFICE/OUTPT VISIT, EST, LEVL III, 20-29 MIN: ICD-10-PCS | Mod: S$GLB,,, | Performed by: EMERGENCY MEDICINE

## 2022-03-16 RX ORDER — MELOXICAM 7.5 MG/1
7.5 TABLET ORAL 2 TIMES DAILY
Qty: 20 TABLET | Refills: 1 | Status: SHIPPED | OUTPATIENT
Start: 2022-03-16 | End: 2022-03-26

## 2022-03-16 NOTE — PROGRESS NOTES
Subjective:       Patient ID: Niharika Gutierrez is a 57 y.o. female.    Chief Complaint: Knee Pain    Follow up workers comp injury. DOI 3/9/2022 Patient is here to follow up on a left knee injury. Patient is having pain. 6/10 pain. Patient taking Tylenol for pain. Patient is icing the knee and it is helping.  [facility]    PATIENT IS A 57-YEAR-OLD  WHO WAS BREAKING UP A FIGHT EARLIER IN THE WEEK AND WAS SEEN AT URGENT CARE FOR LEFT KNEE PAIN.  SHE FELL TO THE GROUND WITH A DIRECT IMPACT TO THE LEFT ANTEROMEDIAL KNEE.  NO OTHER INJURY REPORTED.  SHE DOES HAVE A LONGSTANDING HISTORY OF OSTEOARTHRITIS TO THE POINT WHERE ORTHOPEDIST WAS RECOMMENDING CONSIDERING KNEE REPLACEMENT.  SHE REPORTS NO TWISTING OR PIVOTING MOTION DURING THE FALL.  SHE HAS MILD TENDERNESS TO PALPATION TO THE ANTEROMEDIAL KNEE.  NO PAIN ON VARUS OR VALGUS STRESS AND NO LAXITY OF THE JOINT.  DISTALLY NEUROVASCULARLY INTACT.  I HAVE REVIEWED THE X-RAY PERFORMED AT URGENT CARE 2 DAYS AGO SHOWING NO ACUTE FRACTURE OR BONY INJURY.  SHE HAS HAD SUCCESS WITH MOBIC IN THE PAST BUT AT THE HIGHER DOSES OF 50 MG DID GET SOME STOMACH UPSET.  SHE STATES HE WAS AFFECTIVE WOULD LIKE TO TRY LOWER DOSE.  PRESCRIBED MOBIC 7.5 MG TWICE PER DAY AND IF UPSET STOMACH TO TAKE WITH FOOD AND TAKE ONLY THE 7.5 MG PER DAY.  SHE HAS ALSO BEEN INSTRUCTED TO WEAR HER ACE WRAP THAT SHE ALREADY HAS AND ICE TWICE PER DAY.  SHE WILL BE ALLOWED TO WORK REGULAR DUTY AS  AND RETURN IN 1 WEEK FOR REASSESSMENT.  ANTICIPATE FULL CLEARANCE WITHOUT RESTRICTIONS AND DISCHARGE FROM OCCUPATIONAL HEALTH AT NEXT VISIT.    Knee Pain   Pertinent negatives include no numbness.   Knee Injury  This is a new problem. The current episode started in the past 7 days. The problem occurs daily. The problem has been gradually worsening. Associated symptoms include arthralgias, joint swelling, myalgias and weakness. Pertinent negatives include no fatigue, nausea or  numbness. The symptoms are aggravated by standing, walking and twisting. She has tried ice for the symptoms. The treatment provided mild relief.     ROS    Constitution: Negative for fatigue.   Gastrointestinal: Negative for nausea.   Musculoskeletal: Positive for pain, joint pain, joint swelling, muscle cramps and muscle ache.   Neurological: Negative for numbness and tingling.        Objective:      Physical Exam  Vitals and nursing note reviewed.   Constitutional:       General: She is not in acute distress.     Appearance: Normal appearance. She is well-developed. She is not ill-appearing, toxic-appearing or diaphoretic.   HENT:      Head: Normocephalic and atraumatic. No abrasion, contusion or laceration.      Jaw: No trismus.      Right Ear: Hearing, tympanic membrane, ear canal and external ear normal. No hemotympanum.      Left Ear: Hearing, tympanic membrane, ear canal and external ear normal. No hemotympanum.      Nose: Nose normal. No nasal deformity, mucosal edema or rhinorrhea.      Right Sinus: No maxillary sinus tenderness or frontal sinus tenderness.      Left Sinus: No maxillary sinus tenderness or frontal sinus tenderness.      Mouth/Throat:      Dentition: Normal dentition.      Pharynx: Uvula midline. No posterior oropharyngeal erythema or uvula swelling.   Eyes:      General: Lids are normal. No scleral icterus.        Right eye: No discharge.         Left eye: No discharge.      Conjunctiva/sclera: Conjunctivae normal.      Pupils: Pupils are equal, round, and reactive to light.      Comments: Sclera clear bilat   Neck:      Trachea: Trachea and phonation normal. No tracheal deviation.   Cardiovascular:      Rate and Rhythm: Normal rate and regular rhythm.      Pulses: Normal pulses.      Heart sounds: Normal heart sounds.   Pulmonary:      Effort: Pulmonary effort is normal. No respiratory distress.      Breath sounds: Normal breath sounds.   Abdominal:      General: Bowel sounds are normal.  There is no distension.      Palpations: Abdomen is soft. There is no mass or pulsatile mass.      Tenderness: There is no abdominal tenderness.   Musculoskeletal:         General: Tenderness (NO EDEMA OR ECCHYMOSIS, CHRONIC DEGENERATIVE CHANGES BY HISTORY.  MILD TENDERNESS TO PALPATION OF THE ANTEROMEDIAL ASPECT OF THE KNEE JUST MEDIAL TO THE PATELLA AND ANTERIOR TO THE TIBIA.  DISTALLY NEUROVASCULARLY INTACT.) and signs of injury present. No deformity. Normal range of motion.      Cervical back: Full passive range of motion without pain, normal range of motion and neck supple. No rigidity. No spinous process tenderness or muscular tenderness.   Skin:     General: Skin is warm and dry.      Capillary Refill: Capillary refill takes less than 2 seconds.      Coloration: Skin is not pale.      Findings: No abrasion, bruising, burn, ecchymosis or laceration.   Neurological:      Mental Status: She is alert and oriented to person, place, and time.      GCS: GCS eye subscore is 4. GCS verbal subscore is 5. GCS motor subscore is 6.      Cranial Nerves: No cranial nerve deficit.      Sensory: No sensory deficit.      Motor: No abnormal muscle tone or seizure activity.      Coordination: Coordination normal.   Psychiatric:         Speech: Speech normal.         Behavior: Behavior normal. Behavior is cooperative.         Thought Content: Thought content normal.         Judgment: Judgment normal.          XR KNEE 3 VIEW LEFT    Result Date: 3/14/2022  EXAMINATION: XR KNEE 3 VIEW LEFT CLINICAL HISTORY: Pain in left knee TECHNIQUE: AP, lateral, and Merchant views of the left knee were performed. COMPARISON: Bilateral knee radiographs performed 02/01/2022. FINDINGS: No definite evidence of acute fracture or dislocation. Tricompartmental DJD. Enthesopathic change at the patella. Small suprapatellar knee joint effusion may be present. No definite radiopaque foreign body. Partially imaged right knee without definite acute  abnormality.  Degenerative findings involving the right appear grossly similar to 02/01/2022 study.     No convincing evidence of acute fracture or dislocation. Suggested small left knee joint effusion. Electronically signed by: Ruddy Trivedi Date:    03/14/2022 Time:    18:54      Assessment:       1. Acute pain of left knee    2. Contusion of left knee, initial encounter        Plan:       PATIENT IS A 57-YEAR-OLD  WHO WAS BREAKING UP A FIGHT EARLIER IN THE WEEK AND WAS SEEN AT URGENT CARE FOR LEFT KNEE PAIN.  SHE FELL TO THE GROUND WITH A DIRECT IMPACT TO THE LEFT ANTEROMEDIAL KNEE.  NO OTHER INJURY REPORTED.  SHE DOES HAVE A LONGSTANDING HISTORY OF OSTEOARTHRITIS TO THE POINT WHERE ORTHOPEDIST WAS RECOMMENDING CONSIDERING KNEE REPLACEMENT.  SHE REPORTS NO TWISTING OR PIVOTING MOTION DURING THE FALL.  SHE HAS MILD TENDERNESS TO PALPATION TO THE ANTEROMEDIAL KNEE.  NO PAIN ON VARUS OR VALGUS STRESS AND NO LAXITY OF THE JOINT.  DISTALLY NEUROVASCULARLY INTACT.  I HAVE REVIEWED THE X-RAY PERFORMED AT URGENT CARE 2 DAYS AGO SHOWING NO ACUTE FRACTURE OR BONY INJURY.  SHE HAS HAD SUCCESS WITH MOBIC IN THE PAST BUT AT THE HIGHER DOSES OF 50 MG DID GET SOME STOMACH UPSET.  SHE STATES HE WAS AFFECTIVE WOULD LIKE TO TRY LOWER DOSE.  PRESCRIBED MOBIC 7.5 MG TWICE PER DAY AND IF UPSET STOMACH TO TAKE WITH FOOD AND TAKE ONLY THE 7.5 MG PER DAY.  SHE HAS ALSO BEEN INSTRUCTED TO WEAR HER ACE WRAP THAT SHE ALREADY HAS AND ICE TWICE PER DAY.  SHE WILL BE ALLOWED TO WORK REGULAR DUTY AS  AND RETURN IN 1 WEEK FOR REASSESSMENT.  ANTICIPATE FULL CLEARANCE WITHOUT RESTRICTIONS AND DISCHARGE FROM OCCUPATIONAL HEALTH AT NEXT VISIT.  Medications Ordered This Encounter   Medications    meloxicam (MOBIC) 7.5 MG tablet     Sig: Take 1 tablet (7.5 mg total) by mouth 2 (two) times daily. for 10 days     Dispense:  20 tablet     Refill:  1     Patient Instructions: Attention not to aggravate affected  area, Apply ice 24-48 hours then apply heat/warm soaks, Elevated affected area (OK TO WEAR KNEE BRACE THAT YOU ALREADY HAVE)   Restrictions: Regular Duty  Follow up in about 6 days (around 3/22/2022).

## 2022-03-23 DIAGNOSIS — Z12.31 OTHER SCREENING MAMMOGRAM: ICD-10-CM

## 2022-04-18 ENCOUNTER — HOSPITAL ENCOUNTER (OUTPATIENT)
Dept: RADIOLOGY | Facility: HOSPITAL | Age: 57
Discharge: HOME | End: 2022-04-18
Attending: FAMILY MEDICINE
Payer: COMMERCIAL

## 2022-04-18 VITALS — WEIGHT: 240 LBS | BODY MASS INDEX: 40.56 KG/M2

## 2022-04-18 DIAGNOSIS — Z12.31 OTHER SCREENING MAMMOGRAM: ICD-10-CM

## 2022-04-18 PROCEDURE — 77063 MAMMO DIGITAL SCREENING BILAT WITH TOMO: ICD-10-PCS | Mod: 26,,, | Performed by: RADIOLOGY

## 2022-04-18 PROCEDURE — 77067 SCR MAMMO BI INCL CAD: CPT | Mod: TC

## 2022-04-18 PROCEDURE — 77067 MAMMO DIGITAL SCREENING BILAT WITH TOMO: ICD-10-PCS | Mod: 26,,, | Performed by: RADIOLOGY

## 2022-04-18 PROCEDURE — 77067 SCR MAMMO BI INCL CAD: CPT | Mod: 26,,, | Performed by: RADIOLOGY

## 2022-04-18 PROCEDURE — 77063 BREAST TOMOSYNTHESIS BI: CPT | Mod: 26,,, | Performed by: RADIOLOGY

## 2022-04-18 PROCEDURE — 77063 BREAST TOMOSYNTHESIS BI: CPT | Mod: TC

## 2022-06-06 ENCOUNTER — PATIENT MESSAGE (OUTPATIENT)
Dept: ORTHOPEDICS | Facility: CLINIC | Age: 57
End: 2022-06-06
Payer: COMMERCIAL

## 2022-06-07 ENCOUNTER — TELEPHONE (OUTPATIENT)
Dept: ORTHOPEDICS | Facility: CLINIC | Age: 57
End: 2022-06-07
Payer: COMMERCIAL

## 2022-06-07 NOTE — TELEPHONE ENCOUNTER
I called the patient today regarding her voice message. The patient's questions were answered . The patient verbalized understanding and has no further questions.             ----- Message from Deni Lopez sent at 6/7/2022 11:58 AM CDT -----  Contact: pt.  .Type:  Needs Medical Advice    Who Called: pt  Would the patient rather a call back or a response via [facility]?call back  Best Call Back Number: [phone]   Additional Information: Pt. Is returning a call to Gainfranco in the office.

## 2022-06-07 NOTE — TELEPHONE ENCOUNTER
I called the patient today regarding her myChart message. I left a message for the patient to call me back. I left my name and phone number.

## 2022-06-10 ENCOUNTER — OFFICE VISIT (OUTPATIENT)
Dept: ORTHOPEDICS | Facility: CLINIC | Age: 57
End: 2022-06-10
Payer: COMMERCIAL

## 2022-06-10 VITALS — HEIGHT: 64 IN | BODY MASS INDEX: 38.77 KG/M2 | WEIGHT: 227.13 LBS

## 2022-06-10 DIAGNOSIS — M17.12 PRIMARY OSTEOARTHRITIS OF LEFT KNEE: Primary | ICD-10-CM

## 2022-06-10 PROCEDURE — 99213 OFFICE O/P EST LOW 20 MIN: CPT | Mod: 25,S$GLB,, | Performed by: PHYSICIAN ASSISTANT

## 2022-06-10 PROCEDURE — 99999 PR PBB SHADOW E&M-EST. PATIENT-LVL III: CPT | Mod: PBBFAC,,, | Performed by: PHYSICIAN ASSISTANT

## 2022-06-10 PROCEDURE — 1159F PR MEDICATION LIST DOCUMENTED IN MEDICAL RECORD: ICD-10-PCS | Mod: CPTII,S$GLB,, | Performed by: PHYSICIAN ASSISTANT

## 2022-06-10 PROCEDURE — 3008F PR BODY MASS INDEX (BMI) DOCUMENTED: ICD-10-PCS | Mod: CPTII,S$GLB,, | Performed by: PHYSICIAN ASSISTANT

## 2022-06-10 PROCEDURE — 1159F MED LIST DOCD IN RCRD: CPT | Mod: CPTII,S$GLB,, | Performed by: PHYSICIAN ASSISTANT

## 2022-06-10 PROCEDURE — 20610 PR DRAIN/INJECT LARGE JOINT/BURSA: ICD-10-PCS | Mod: LT,S$GLB,, | Performed by: PHYSICIAN ASSISTANT

## 2022-06-10 PROCEDURE — 99213 PR OFFICE/OUTPT VISIT, EST, LEVL III, 20-29 MIN: ICD-10-PCS | Mod: 25,S$GLB,, | Performed by: PHYSICIAN ASSISTANT

## 2022-06-10 PROCEDURE — 99999 PR PBB SHADOW E&M-EST. PATIENT-LVL III: ICD-10-PCS | Mod: PBBFAC,,, | Performed by: PHYSICIAN ASSISTANT

## 2022-06-10 PROCEDURE — 3008F BODY MASS INDEX DOCD: CPT | Mod: CPTII,S$GLB,, | Performed by: PHYSICIAN ASSISTANT

## 2022-06-10 PROCEDURE — 20610 DRAIN/INJ JOINT/BURSA W/O US: CPT | Mod: LT,S$GLB,, | Performed by: PHYSICIAN ASSISTANT

## 2022-06-10 RX ADMIN — TRIAMCINOLONE ACETONIDE 60 MG: 40 INJECTION, SUSPENSION INTRA-ARTICULAR; INTRAMUSCULAR at 09:06

## 2022-06-14 RX ORDER — TRIAMCINOLONE ACETONIDE 40 MG/ML
60 INJECTION, SUSPENSION INTRA-ARTICULAR; INTRAMUSCULAR
Status: COMPLETED | OUTPATIENT
Start: 2022-06-14 | End: 2022-06-10

## 2022-06-14 NOTE — PROGRESS NOTES
Subjective:     HPI:   Niharika Gutierrez is a 57 y.o. female  who presents for L knee CSI. Pt has seen Dr. Camilo in the past and did not want TKA so she sees JOCELYN for non-op management. She has had longstanding bilateral knee pain left more severe than right for years global in nature worse over the past week.  She gets hyaluronic injections every few years at an outside facility but last series caused increase in pain, swelling and limited ROM. She has also tried  Tylenol Arthritis TID, mobic Rx from [facility]. She uses a knee sleeve.      ROS:  The updated medical history is in the chart and has been reviewed. A review of systems is updated and there is no reported vision changes, ear/nose/mouth/throat complaints,  chest pain, shortness of breath, abdominal pain, urological complaints, fevers or chills, psychiatric complaints. Musculoskeletal and neurologcial symptoms are as documented. All other systems are negative.      Objective:   Exam:  Body mass index is 38.98 kg/m².    Physical examination included assessment of the patient's general appearance with particular attention to development, nutrition, body habitus, attention to grooming, and any evidence of distress.  Constitutional: The patient is a well-developed, well-nourished patient in no acute distress.   Cardiovascular: Vascular examination included warmth and capillary refill as well inspection for edema and assessment of pedal pulses. Pulses are palpable and regular.  Musculoskeletal: Gait was assessed as to whether it was steady, non-antalgic, and/or required the use of an assist device. The patient was also asked to walk independently and get onto the examination table.  Skin: The skin was examined for any obvious rashes or lesions in the extremity.  Neurologic: Sensation is intact to light touch in the extremity. The patient has good coordination without hyperreflexia and is alert and oriented to person, place and time and has  normal mood and affect.     All of the above were examined and found to be within normal limits except for the following pertinent clinical findings:    No limp nonantalgic gait bilateral Trendelenburg.  Left knee 0110 degree knee range of motion difficult to tell alignment due to body habitus tender palpation mediolateral patellofemoral joint lines mild-to-moderate effusion knee stable anterior-posterior varus valgus stresses no flexion contracture or extensor lag.  She has got some anterior lateral hematoma consistent with recent injection appears to be resolving.  She has no pain with internal-external range of motion of her hip joint no groin pain with active straight leg raise      Imaging:    KNEE R ARTHRITIS    Indication:  Right knee pain  Exam Ordered: Radiographs of the right knee include a standing anteroposterior view, a standing posterioanterior view, a lateral view in full flexion, and a sunrise view  Details of Examination: Exam shows evidence of joint space narrowing, osteophyte formation, and subchondral sclerosis, all consistent with degenerative arthritis of the knee.  No other significant findings are noted.  Impression:  Degenerative Arthritis, Right Knee and KNEE L ARTHRITIS     Indication:  Left knee pain  Exam Ordered: Radiographs of the left knee include a standing anteroposterior view, a standing posterioanterior view, a lateral view in full flexion, and a sunrise view  Details of Examination: Exam shows evidence of joint space narrowing, osteophyte formation, and subchondral sclerosis, all consistent with degenerative arthritis of the knee.  No other significant findings are noted.  Impression:  Degenerative Arthritis, Left Knee    R G3 varus, L G4 PF OA      Assessment:       ICD-10-CM ICD-9-CM   1. Primary osteoarthritis of left knee  M17.12 715.16      BMI 38.98  Vit D on Rx   Anemia 11.6  Alb 3.2     Plan:   Primary osteoarthritis left knee  - Continue Mobic, weight loss efforts, low  impact activity   - CSI today  - Pt will f/u with Dr. Camilo when ready for TKA. Will f/u with me for non-op mgmt.     Knee Injection Procedure Note  Diagnosis: left knee degenerative arthritis  Indications: left knee pain  Procedure Details: Verbal consent was obtained for the procedure. The injection site was identified and the skin was prepared with alcohol. The left knee was injected from an anterolateral approach with 1.5 ml of Kenalog and 2 ml Lidocaine under sterile technique using a 22 gauge needle. The needle was removed and the area cleansed and dressed.  Complications:  Patient tolerated the procedure well.    she was advised to rest the knee today, using ice and elevation as needed for comfort and swelling.Immediate relief of the knee pain may be short lived and secondary to the lidocaine. she may have an increase in discomfort tonight followed by steady improvement over the next several days. It may take 1-2 weeks following the injection to get the full benefit of the medication.

## 2022-09-27 ENCOUNTER — IMMUNIZATION (OUTPATIENT)
Dept: OBSTETRICS AND GYNECOLOGY | Facility: CLINIC | Age: 57
End: 2022-09-27
Payer: COMMERCIAL

## 2022-09-27 DIAGNOSIS — Z23 NEED FOR VACCINATION: Primary | ICD-10-CM

## 2022-09-27 PROCEDURE — 91312 COVID-19, MRNA, LNP-S, BIVALENT BOOSTER, PF, 30 MCG/0.3 ML DOSE: ICD-10-PCS | Mod: S$GLB,,, | Performed by: FAMILY MEDICINE

## 2022-09-27 PROCEDURE — 91312 COVID-19, MRNA, LNP-S, BIVALENT BOOSTER, PF, 30 MCG/0.3 ML DOSE: CPT | Mod: S$GLB,,, | Performed by: FAMILY MEDICINE

## 2022-09-27 PROCEDURE — 0124A COVID-19, MRNA, LNP-S, BIVALENT BOOSTER, PF, 30 MCG/0.3 ML DOSE: CPT | Mod: PBBFAC | Performed by: FAMILY MEDICINE

## 2022-09-28 DIAGNOSIS — I10 ESSENTIAL HYPERTENSION: ICD-10-CM

## 2022-11-10 ENCOUNTER — LAB VISIT (OUTPATIENT)
Dept: LAB | Facility: HOSPITAL | Age: 57
End: 2022-11-10
Attending: FAMILY MEDICINE
Payer: COMMERCIAL

## 2022-11-10 DIAGNOSIS — I10 ESSENTIAL HYPERTENSION: ICD-10-CM

## 2022-11-10 LAB
ALBUMIN SERPL BCP-MCNC: 3.2 G/DL (ref 3.5–5.2)
ALP SERPL-CCNC: 74 U/L (ref 55–135)
ALT SERPL W/O P-5'-P-CCNC: 9 U/L (ref 10–44)
ANION GAP SERPL CALC-SCNC: 11 MMOL/L (ref 8–16)
AST SERPL-CCNC: 11 U/L (ref 10–40)
BILIRUB SERPL-MCNC: 0.4 MG/DL (ref 0.1–1)
BUN SERPL-MCNC: 9 MG/DL (ref 6–20)
CALCIUM SERPL-MCNC: 9.3 MG/DL (ref 8.7–10.5)
CHLORIDE SERPL-SCNC: 102 MMOL/L (ref 95–110)
CO2 SERPL-SCNC: 28 MMOL/L (ref 23–29)
CREAT SERPL-MCNC: 0.7 MG/DL (ref 0.5–1.4)
EST. GFR  (NO RACE VARIABLE): >60 ML/MIN/1.73 M^2
GLUCOSE SERPL-MCNC: 79 MG/DL (ref 70–110)
POTASSIUM SERPL-SCNC: 3.4 MMOL/L (ref 3.5–5.1)
PROT SERPL-MCNC: 8.1 G/DL (ref 6–8.4)
SODIUM SERPL-SCNC: 141 MMOL/L (ref 136–145)

## 2022-11-10 PROCEDURE — 80053 COMPREHEN METABOLIC PANEL: CPT | Performed by: FAMILY MEDICINE

## 2022-11-10 PROCEDURE — 36415 COLL VENOUS BLD VENIPUNCTURE: CPT | Mod: PO | Performed by: FAMILY MEDICINE

## 2022-11-11 ENCOUNTER — PATIENT MESSAGE (OUTPATIENT)
Dept: FAMILY MEDICINE | Facility: CLINIC | Age: 57
End: 2022-11-11
Payer: COMMERCIAL

## 2022-11-29 ENCOUNTER — PATIENT MESSAGE (OUTPATIENT)
Dept: FAMILY MEDICINE | Facility: CLINIC | Age: 57
End: 2022-11-29
Payer: COMMERCIAL

## 2022-12-09 DIAGNOSIS — E87.6 HYPOKALEMIA: ICD-10-CM

## 2022-12-09 DIAGNOSIS — I10 ESSENTIAL HYPERTENSION: ICD-10-CM

## 2022-12-09 RX ORDER — AMLODIPINE BESYLATE 10 MG/1
10 TABLET ORAL DAILY
Qty: 90 TABLET | Refills: 3 | Status: SHIPPED | OUTPATIENT
Start: 2022-12-09 | End: 2023-12-15

## 2022-12-09 NOTE — TELEPHONE ENCOUNTER
Care Due:                  Date            Visit Type   Department     Provider  --------------------------------------------------------------------------------                                EP -         [facility]                              PRIMARY      MED/ INTERNAL  Last Visit: 10-      CARE ([facility])   MED/ PEDS      Janae Schwartz                              [facility]                              ANNUAL       [facility]                              CHECKUP/PHY  MED/ INTERNAL  Next Visit: 03-      S            MED/ PEDS      Janae Schwartz                                                            Last  Test          Frequency    Reason                     Performed    Due Date  --------------------------------------------------------------------------------    Office Visit  12 months..  amLODIPine, potassium....  10-   10-    [facility] Embedded Care Gaps. Reference number: [id number]. 12/09/2022   4:06:40 PM CST

## 2022-12-09 NOTE — TELEPHONE ENCOUNTER
----- Message from Gabrielledede Coello sent at 12/9/2022  4:01 PM CST -----  Type: Patient Call Back        Who called: self         What is the request in detail: Pt states she would like a refill for this medication  amLODIPine (NORVASC) 10 MG tablet and potassium bicarbonate (KLOR-CON/EF) disintegrating tablet        Can the clinic reply by [facility]? No         Would the patient rather a call back or a response via [facility]? Yes         Best call back number: [phone] (home)                Thank You

## 2022-12-20 ENCOUNTER — PATIENT MESSAGE (OUTPATIENT)
Dept: ORTHOPEDICS | Facility: CLINIC | Age: 57
End: 2022-12-20
Payer: COMMERCIAL

## 2022-12-20 NOTE — TELEPHONE ENCOUNTER
I called and spoke to the patient and scheduled her with Sarah on 12/23 for bilateral csi injections. The patient verbalized understanding and has no further questions.

## 2022-12-23 ENCOUNTER — OFFICE VISIT (OUTPATIENT)
Dept: ORTHOPEDICS | Facility: CLINIC | Age: 57
End: 2022-12-23
Payer: COMMERCIAL

## 2022-12-23 DIAGNOSIS — M17.0 PRIMARY OSTEOARTHRITIS OF BOTH KNEES: Primary | ICD-10-CM

## 2022-12-23 DIAGNOSIS — M17.12 PRIMARY OSTEOARTHRITIS OF LEFT KNEE: ICD-10-CM

## 2022-12-23 PROCEDURE — 1159F MED LIST DOCD IN RCRD: CPT | Mod: CPTII,S$GLB,, | Performed by: NURSE PRACTITIONER

## 2022-12-23 PROCEDURE — 99999 PR PBB SHADOW E&M-EST. PATIENT-LVL III: CPT | Mod: PBBFAC,,, | Performed by: NURSE PRACTITIONER

## 2022-12-23 PROCEDURE — 99499 UNLISTED E&M SERVICE: CPT | Mod: S$GLB,,, | Performed by: NURSE PRACTITIONER

## 2022-12-23 PROCEDURE — 1159F PR MEDICATION LIST DOCUMENTED IN MEDICAL RECORD: ICD-10-PCS | Mod: CPTII,S$GLB,, | Performed by: NURSE PRACTITIONER

## 2022-12-23 PROCEDURE — 1160F PR REVIEW ALL MEDS BY PRESCRIBER/CLIN PHARMACIST DOCUMENTED: ICD-10-PCS | Mod: CPTII,S$GLB,, | Performed by: NURSE PRACTITIONER

## 2022-12-23 PROCEDURE — 20610 PR DRAIN/INJECT LARGE JOINT/BURSA: ICD-10-PCS | Mod: LT,S$GLB,, | Performed by: NURSE PRACTITIONER

## 2022-12-23 PROCEDURE — 99499 NO LOS: ICD-10-PCS | Mod: S$GLB,,, | Performed by: NURSE PRACTITIONER

## 2022-12-23 PROCEDURE — 99999 PR PBB SHADOW E&M-EST. PATIENT-LVL III: ICD-10-PCS | Mod: PBBFAC,,, | Performed by: NURSE PRACTITIONER

## 2022-12-23 PROCEDURE — 20610 DRAIN/INJ JOINT/BURSA W/O US: CPT | Mod: LT,S$GLB,, | Performed by: NURSE PRACTITIONER

## 2022-12-23 PROCEDURE — 1160F RVW MEDS BY RX/DR IN RCRD: CPT | Mod: CPTII,S$GLB,, | Performed by: NURSE PRACTITIONER

## 2022-12-23 RX ORDER — MELOXICAM 15 MG/1
15 TABLET ORAL DAILY
Qty: 30 TABLET | Refills: 1 | Status: SHIPPED | OUTPATIENT
Start: 2022-12-23 | End: 2023-04-19

## 2022-12-23 RX ORDER — TRIAMCINOLONE ACETONIDE 40 MG/ML
40 INJECTION, SUSPENSION INTRA-ARTICULAR; INTRAMUSCULAR
Status: COMPLETED | OUTPATIENT
Start: 2022-12-23 | End: 2022-12-23

## 2022-12-23 RX ADMIN — TRIAMCINOLONE ACETONIDE 40 MG: 40 INJECTION, SUSPENSION INTRA-ARTICULAR; INTRAMUSCULAR at 04:12

## 2022-12-23 NOTE — PROGRESS NOTES
Pt with known knee arthritis. She has been seen in the past and has been given cortisone injections for pain relief. She returns today to repeat that. Her last injection was in June.    Knee Injection Procedure Note  Diagnosis: left knee degenerative arthritis  Indications: left knee pain  Procedure Details: Verbal consent was obtained for the procedure. The injection site was identified and the skin was prepared with alcohol. The left knee was injected from an anterolateral approach with 1 ml of Kenalog and 4 ml Lidocaine under sterile technique using a 22 gauge needle. The needle was removed and the area cleansed and dressed.  Complications:  Patient tolerated the procedure well.    she was advised to rest the knee today, using ice and elevation as needed for comfort and swelling.Immediate relief of the knee pain may be short lived and secondary to the lidocaine. she may have an increase in discomfort tonight followed by steady improvement over the next several days. It may take 1-2 weeks following the injection to get the full benefit of the medication.

## 2023-01-19 ENCOUNTER — NURSE TRIAGE (OUTPATIENT)
Dept: ADMINISTRATIVE | Facility: CLINIC | Age: 58
End: 2023-01-19
Payer: COMMERCIAL

## 2023-01-19 NOTE — TELEPHONE ENCOUNTER
Niharika calling c/o chest tightness/discomfort since waking up this morning. Intermittent dizziness. States she doubled up on Pantoprazole but states symptoms still persist. Advised pt per triage protocol to call  now. V/u.  Reason for Disposition   Chest pain lasting longer than 5 minutes and ANY of the following:* Over 44 years old* Over 30 years old and at least one cardiac risk factor (e.g., diabetes mellitus, high blood pressure, high cholesterol, smoker, or strong family history of heart disease)* History of heart disease (i.e., angina, heart attack, heart failure, bypass surgery, takes nitroglycerin)* Pain is crushing, pressure-like, or heavy    Additional Information   Negative: SEVERE difficulty breathing (e.g., struggling for each breath, speaks in single words)   Negative: Passed out (i.e., fainted, collapsed and was not responding)   Negative: Difficult to awaken or acting confused (e.g., disoriented, slurred speech)   Negative: Shock suspected (e.g., cold/pale/clammy skin, too weak to stand, low BP, rapid pulse)    Protocols used: Chest Pain-A-[place]

## 2023-01-19 NOTE — TELEPHONE ENCOUNTER
Called patient and told her that Dr Schwartz said that she needs to go to the ER department to be checked out.

## 2023-01-30 ENCOUNTER — PATIENT MESSAGE (OUTPATIENT)
Dept: FAMILY MEDICINE | Facility: CLINIC | Age: 58
End: 2023-01-30
Payer: COMMERCIAL

## 2023-01-31 ENCOUNTER — PATIENT MESSAGE (OUTPATIENT)
Dept: FAMILY MEDICINE | Facility: CLINIC | Age: 58
End: 2023-01-31
Payer: COMMERCIAL

## 2023-02-07 ENCOUNTER — OFFICE VISIT (OUTPATIENT)
Dept: CARDIOLOGY | Facility: CLINIC | Age: 58
End: 2023-02-07
Payer: COMMERCIAL

## 2023-02-07 VITALS
SYSTOLIC BLOOD PRESSURE: 132 MMHG | DIASTOLIC BLOOD PRESSURE: 78 MMHG | OXYGEN SATURATION: 99 % | HEIGHT: 64 IN | BODY MASS INDEX: 38.39 KG/M2 | WEIGHT: 224.88 LBS | HEART RATE: 80 BPM | RESPIRATION RATE: 15 BRPM

## 2023-02-07 DIAGNOSIS — E66.9 NON MORBID OBESITY, UNSPECIFIED OBESITY TYPE: ICD-10-CM

## 2023-02-07 DIAGNOSIS — R94.31 ABNORMAL EKG: ICD-10-CM

## 2023-02-07 DIAGNOSIS — R07.9 CHEST PAIN, UNSPECIFIED TYPE: Primary | ICD-10-CM

## 2023-02-07 DIAGNOSIS — I10 ESSENTIAL HYPERTENSION: Chronic | ICD-10-CM

## 2023-02-07 PROCEDURE — 1159F PR MEDICATION LIST DOCUMENTED IN MEDICAL RECORD: ICD-10-PCS | Mod: CPTII,S$GLB,, | Performed by: INTERNAL MEDICINE

## 2023-02-07 PROCEDURE — 1160F RVW MEDS BY RX/DR IN RCRD: CPT | Mod: CPTII,S$GLB,, | Performed by: INTERNAL MEDICINE

## 2023-02-07 PROCEDURE — 99999 PR PBB SHADOW E&M-EST. PATIENT-LVL IV: CPT | Mod: PBBFAC,,, | Performed by: INTERNAL MEDICINE

## 2023-02-07 PROCEDURE — 3075F SYST BP GE 130 - 139MM HG: CPT | Mod: CPTII,S$GLB,, | Performed by: INTERNAL MEDICINE

## 2023-02-07 PROCEDURE — 3078F DIAST BP <80 MM HG: CPT | Mod: CPTII,S$GLB,, | Performed by: INTERNAL MEDICINE

## 2023-02-07 PROCEDURE — 99204 OFFICE O/P NEW MOD 45 MIN: CPT | Mod: S$GLB,,, | Performed by: INTERNAL MEDICINE

## 2023-02-07 PROCEDURE — 99999 PR PBB SHADOW E&M-EST. PATIENT-LVL IV: ICD-10-PCS | Mod: PBBFAC,,, | Performed by: INTERNAL MEDICINE

## 2023-02-07 PROCEDURE — 93000 ELECTROCARDIOGRAM COMPLETE: CPT | Mod: S$GLB,,, | Performed by: INTERNAL MEDICINE

## 2023-02-07 PROCEDURE — 1159F MED LIST DOCD IN RCRD: CPT | Mod: CPTII,S$GLB,, | Performed by: INTERNAL MEDICINE

## 2023-02-07 PROCEDURE — 1160F PR REVIEW ALL MEDS BY PRESCRIBER/CLIN PHARMACIST DOCUMENTED: ICD-10-PCS | Mod: CPTII,S$GLB,, | Performed by: INTERNAL MEDICINE

## 2023-02-07 PROCEDURE — 3075F PR MOST RECENT SYSTOLIC BLOOD PRESS GE 130-139MM HG: ICD-10-PCS | Mod: CPTII,S$GLB,, | Performed by: INTERNAL MEDICINE

## 2023-02-07 PROCEDURE — 3008F BODY MASS INDEX DOCD: CPT | Mod: CPTII,S$GLB,, | Performed by: INTERNAL MEDICINE

## 2023-02-07 PROCEDURE — 3078F PR MOST RECENT DIASTOLIC BLOOD PRESSURE < 80 MM HG: ICD-10-PCS | Mod: CPTII,S$GLB,, | Performed by: INTERNAL MEDICINE

## 2023-02-07 PROCEDURE — 93000 EKG 12-LEAD: ICD-10-PCS | Mod: S$GLB,,, | Performed by: INTERNAL MEDICINE

## 2023-02-07 PROCEDURE — 3008F PR BODY MASS INDEX (BMI) DOCUMENTED: ICD-10-PCS | Mod: CPTII,S$GLB,, | Performed by: INTERNAL MEDICINE

## 2023-02-07 PROCEDURE — 99204 PR OFFICE/OUTPT VISIT, NEW, LEVL IV, 45-59 MIN: ICD-10-PCS | Mod: S$GLB,,, | Performed by: INTERNAL MEDICINE

## 2023-02-07 NOTE — PROGRESS NOTES
CARDIOVASCULAR CONSULTATION    REASON FOR CONSULT:   Niharika Gutierrez is a 57 y.o. female who presents for CP.    PCP/req: Efren MARCOS: Jesse  HISTORY OF PRESENT ILLNESS:   The patient comes in today at the request of her primary care physician for evaluation of chest pain.  She was previously referred to the emergency room for this, and underwent evaluation which was negative.  Review of data in Care everywhere undertaken.  She describes heartburn type symptoms.  These appear to be nonexertional.  She is had no shortness of breath, palpitations, or syncope.  There has been no PND, orthopnea, or lower extremity edema.  She denies melena, hematuria, or claudicant symptoms.      Family history appears to be negative for premature CAD or sudden cardiac death amongst first-degree relatives.      The patient is a nonsmoker.  She denies alcohol excess or illicit drug use.  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    CARDIOVASCULAR HISTORY:   none    PAST MEDICAL HISTORY:     Past Medical History:   Diagnosis Date    GERD (gastroesophageal reflux disease)     Hypertension     Hypokalemic syndrome     Thyroid disease     Thyroid nodule     Vitamin D deficiency disease        PAST SURGICAL HISTORY:     Past Surgical History:   Procedure Laterality Date    BREAST BIOPSY       SECTION  2004    X 1    CHOLECYSTECTOMY  2008    HYSTERECTOMY      LAPAROSCOPIC HYSTERECTOMY  2010    LSO       ALLERGIES AND MEDICATION:   Review of patient's allergies indicates:  No Known Allergies     Medication List            Accurate as of 2023  1:56 PM. If you have any questions, ask your nurse or doctor.                CONTINUE taking these medications      amLODIPine 10 MG tablet  Commonly known as: NORVASC  Take 1 tablet (10 mg total) by mouth once daily.     diclofenac sodium 1 % Gel  Commonly known as: VOLTAREN  Apply 2 g topically 4 (four) times daily.     ergocalciferol 50,000  unit Cap  Commonly known as: ERGOCALCIFEROL  Take 1 capsule (50,000 Units total) by mouth every 7 days.     estradioL 0.01 % (0.1 mg/gram) vaginal cream  Commonly known as: ESTRACE  Place 2 g vaginally twice a week.     famotidine 40 MG tablet  Commonly known as: PEPCID  TAKE 1 TABLET(40 MG) BY MOUTH EVERY DAY     meloxicam 15 MG tablet  Commonly known as: MOBIC  Take 1 tablet (15 mg total) by mouth once daily.     pantoprazole 40 MG tablet  Commonly known as: PROTONIX  Take 1 tablet (40 mg total) by mouth 2 (two) times daily. for 20 days     potassium bicarbonate disintegrating tablet  Commonly known as: KLOR-CON/EF  Take 1 tablet (25 mEq total) by mouth 3 (three) times daily.     spironolactone 25 MG tablet  Commonly known as: ALDACTONE  Take 1 tablet (25 mg total) by mouth 2 (two) times daily.     sucralfate 1 gram tablet  Commonly known as: CARAFATE  TAKE 1 TABLET(1 GRAM) BY MOUTH FOUR TIMES DAILY FOR 14 DAYS     XIIDRA 5 % Dpet  Generic drug: lifitegrast  INSTILL 1 DROP IN BOTH EYES TWICE DAILY              SOCIAL HISTORY:     Social History     Socioeconomic History    Marital status:    Occupational History     Employer: [employer]   Tobacco Use    Smoking status: Never    Smokeless tobacco: Never   Substance and Sexual Activity    Alcohol use: No    Drug use: No    Sexual activity: Yes     Partners: Male     Birth control/protection: Surgical       FAMILY HISTORY:     Family History   Problem Relation Age of Onset    Diabetes Mother     Heart disease Father         CHF    No Known Problems Sister     No Known Problems Brother     No Known Problems Brother     Breast cancer Maternal Aunt     Breast cancer Paternal Aunt     Breast cancer Maternal Aunt     Breast cancer Paternal Aunt     No Known Problems Maternal Uncle     No Known Problems Paternal Uncle     No Known Problems Maternal Grandmother     No Known Problems Maternal Grandfather     No Known Problems Paternal Grandmother     No  "Known Problems Paternal Grandfather     Colon cancer Neg Hx     Colon polyps Neg Hx     Amblyopia Neg Hx     Blindness Neg Hx     Cancer Neg Hx     Cataracts Neg Hx     Glaucoma Neg Hx     Hypertension Neg Hx     Macular degeneration Neg Hx     Retinal detachment Neg Hx     Strabismus Neg Hx     Stroke Neg Hx     Thyroid disease Neg Hx        REVIEW OF SYSTEMS:   Review of Systems   Constitutional:  Negative for chills, diaphoresis and fever.   HENT:  Negative for nosebleeds.    Eyes:  Negative for blurred vision, double vision and photophobia.   Respiratory:  Negative for hemoptysis, shortness of breath and wheezing.    Cardiovascular:  Positive for chest pain. Negative for palpitations, orthopnea, claudication, leg swelling and PND.   Gastrointestinal:  Negative for abdominal pain, blood in stool, heartburn, melena, nausea and vomiting.   Genitourinary:  Negative for flank pain and hematuria.   Musculoskeletal:  Negative for falls, myalgias and neck pain.   Skin:  Negative for rash.   Neurological:  Negative for dizziness, seizures, loss of consciousness, weakness and headaches.   Endo/Heme/Allergies:  Negative for polydipsia. Does not bruise/bleed easily.   Psychiatric/Behavioral:  Negative for depression and memory loss. The patient is not nervous/anxious.      PHYSICAL EXAM:     Vitals:    02/07/23 1319   BP: 132/78   Pulse: 80   Resp: 15    Body mass index is 38.6 kg/m².  Weight: 102 kg (224 lb 13.9 oz)   Height: 5' 4" (162.6 cm)     Physical Exam  Vitals reviewed.   Constitutional:       General: She is not in acute distress.     Appearance: She is well-developed. She is obese. She is not ill-appearing, toxic-appearing or diaphoretic.   HENT:      Head: Normocephalic and atraumatic.   Eyes:      General: No scleral icterus.     Extraocular Movements: Extraocular movements intact.      Conjunctiva/sclera: Conjunctivae normal.      Pupils: Pupils are equal, round, and reactive to light.   Neck:      Thyroid: No " thyromegaly.      Vascular: Normal carotid pulses. No carotid bruit or JVD.      Trachea: Trachea normal.   Cardiovascular:      Rate and Rhythm: Normal rate and regular rhythm.      Pulses:           Carotid pulses are 2+ on the right side and 2+ on the left side.     Heart sounds: S1 normal and S2 normal. No murmur heard.    No friction rub. No gallop.   Pulmonary:      Effort: Pulmonary effort is normal. No respiratory distress.      Breath sounds: Normal breath sounds. No stridor. No wheezing, rhonchi or rales.   Chest:      Chest wall: No tenderness.   Abdominal:      General: There is no distension.      Palpations: Abdomen is soft.   Musculoskeletal:         General: No swelling or tenderness. Normal range of motion.      Cervical back: Normal range of motion and neck supple. No edema or rigidity.      Right lower leg: No edema.      Left lower leg: No edema.   Feet:      Right foot:      Skin integrity: No ulcer.      Left foot:      Skin integrity: No ulcer.   Skin:     General: Skin is warm and dry.      Coloration: Skin is not jaundiced.   Neurological:      General: No focal deficit present.      Mental Status: She is alert and oriented to person, place, and time.      Cranial Nerves: No cranial nerve deficit.   Psychiatric:         Mood and Affect: Mood normal.         Speech: Speech normal.         Behavior: Behavior normal. Behavior is cooperative.       DATA:   EKG: (personally reviewed tracing)  2/7/23 SR 73, LVH, ASMI-age indet, ?inflat ST depression (1/2-1mm)    Laboratory:  CBC:  Recent Labs   Lab 10/24/20  0900 09/18/21  0950   WBC 8.43 11.55   Hemoglobin 12.1 11.7 L   Hematocrit 39.3 36.0 L   Platelets 396 H 369       CHEMISTRIES:  Recent Labs   Lab 02/18/20  0817 10/24/20  0900 09/18/21  0950 11/10/22  0842   Glucose 79 88 73 79   Sodium 142 142 141 141   Potassium 3.2 L 3.0 L 3.0 L 3.4 L   BUN 12 11 11 9   Creatinine 0.7 0.7 0.7 0.7   eGFR if non African American >60.0 >60.0 >60.0  --    eGFR   --   --   --  >60.0   Calcium 8.7 9.0 9.4 9.3       CARDIAC BIOMARKERS:        COAGS:  Recent Labs   Lab 02/10/22  1335   INR 1.1       LIPIDS/LFTS:  Recent Labs   Lab 10/24/20  0900 09/18/21  0950 11/10/22  0842   Cholesterol 123 126  --    Triglycerides 70 84  --    HDL 51 48  --    LDL Cholesterol 58.0 L 61.2 L  --    Non-HDL Cholesterol 72 78  --    AST 14 19 11   ALT 13 15 9 L         Cardiovascular Testing:  Ordered  Ex stress echo    ASSESSMENT:   # CP, ?GI  # HTN, controlled  # abnl EKG, resting ST depression noted  # BMI 39    PLAN:   Cont med rx  Ex stress echo  Diet/exercise/weight loss  RTC 1 month (virtual)    Stevan Pena MD, FACC

## 2023-02-08 ENCOUNTER — PATIENT MESSAGE (OUTPATIENT)
Dept: ORTHOPEDICS | Facility: CLINIC | Age: 58
End: 2023-02-08
Payer: COMMERCIAL

## 2023-02-13 ENCOUNTER — OFFICE VISIT (OUTPATIENT)
Dept: FAMILY MEDICINE | Facility: CLINIC | Age: 58
End: 2023-02-13
Payer: COMMERCIAL

## 2023-02-13 DIAGNOSIS — E87.6 HYPOKALEMIA: Primary | ICD-10-CM

## 2023-02-13 DIAGNOSIS — K21.9 GASTROESOPHAGEAL REFLUX DISEASE, UNSPECIFIED WHETHER ESOPHAGITIS PRESENT: ICD-10-CM

## 2023-02-13 DIAGNOSIS — I10 ESSENTIAL HYPERTENSION: Chronic | ICD-10-CM

## 2023-02-13 DIAGNOSIS — E04.1 THYROID NODULE: Chronic | ICD-10-CM

## 2023-02-13 DIAGNOSIS — K21.9 GASTROESOPHAGEAL REFLUX DISEASE WITHOUT ESOPHAGITIS: Chronic | ICD-10-CM

## 2023-02-13 DIAGNOSIS — E55.9 VITAMIN D DEFICIENCY DISEASE: Chronic | ICD-10-CM

## 2023-02-13 PROCEDURE — 1159F MED LIST DOCD IN RCRD: CPT | Mod: CPTII,95,, | Performed by: FAMILY MEDICINE

## 2023-02-13 PROCEDURE — 1159F PR MEDICATION LIST DOCUMENTED IN MEDICAL RECORD: ICD-10-PCS | Mod: CPTII,95,, | Performed by: FAMILY MEDICINE

## 2023-02-13 PROCEDURE — 99214 OFFICE O/P EST MOD 30 MIN: CPT | Mod: 95,,, | Performed by: FAMILY MEDICINE

## 2023-02-13 PROCEDURE — 1160F PR REVIEW ALL MEDS BY PRESCRIBER/CLIN PHARMACIST DOCUMENTED: ICD-10-PCS | Mod: CPTII,95,, | Performed by: FAMILY MEDICINE

## 2023-02-13 PROCEDURE — 1160F RVW MEDS BY RX/DR IN RCRD: CPT | Mod: CPTII,95,, | Performed by: FAMILY MEDICINE

## 2023-02-13 PROCEDURE — 99214 PR OFFICE/OUTPT VISIT, EST, LEVL IV, 30-39 MIN: ICD-10-PCS | Mod: 95,,, | Performed by: FAMILY MEDICINE

## 2023-02-13 RX ORDER — CIMETIDINE 300 MG/1
300 TABLET, FILM COATED ORAL 2 TIMES DAILY
Qty: 60 TABLET | Refills: 11 | OUTPATIENT
Start: 2023-02-13 | End: 2023-04-19

## 2023-02-13 RX ORDER — POTASSIUM CHLORIDE 750 MG/1
20 CAPSULE, EXTENDED RELEASE ORAL 3 TIMES DAILY
Qty: 540 CAPSULE | Refills: 3 | OUTPATIENT
Start: 2023-02-13 | End: 2023-03-15

## 2023-02-13 NOTE — PROGRESS NOTES
Telemedicine Office Visit    Niharika Gutierrez    1965  [id number]    Subjective     The patient location is: home   The chief complaint leading to consultation is: medication refill   Visit type: Virtual visit with synchronous audio and video  Total time spent with patient: 15 minutes   Each patient to whom he or she provides medical services by telemedicine is:  (1) informed of the relationship between the physician and patient and the respective role of any other health care provider with respect to management of the patient; and (2) notified that he or she may decline to receive medical services by telemedicine and may withdraw from such care at any time.    Niharika is a 57 y.o. female who presents through telemedicine for:       Hypokalemia - Patient has history of hypokalemia requiring replacement. Patient is tired of the dissolving tablets due to the chalky taste. She is requesting the capsules. She states the pharmacist informed her there was a potassium 20meq capsule that she could take. She would like this instead.   GERD - Patient is under care of GI. She was on protonix and famotidine. She states this was not working at improving her reflux symptoms. She tried cimetidine and found this helpful.  She has a follow-up appointment but would like a prescription in the mean time.     Objective   Answers submitted by the patient for this visit:  Review of Systems Questionnaire (Submitted on 2/13/2023)  activity change: Yes  unexpected weight change: Yes  rhinorrhea: No  trouble swallowing: Yes  visual disturbance: No  chest tightness: Yes  polyuria: No  difficulty urinating: No  menstrual problem: No  joint swelling: Yes  arthralgias: Yes  confusion: No  dysphoric mood: No  Review of Systems   Constitutional:  Negative for chills and fever.   HENT:  Negative for congestion and hearing loss.    Eyes:  Negative for blurred vision and discharge.   Respiratory:  Negative for cough and wheezing.     Cardiovascular:  Positive for chest pain and palpitations.   Gastrointestinal:  Positive for diarrhea. Negative for abdominal pain, blood in stool, constipation, heartburn, nausea and vomiting.   Genitourinary:  Negative for dysuria and hematuria.   Musculoskeletal:  Negative for myalgias and neck pain.   Skin:  Negative for itching and rash.   Neurological:  Negative for dizziness, weakness and headaches.   Endo/Heme/Allergies:  Negative for polydipsia.   Psychiatric/Behavioral:  Negative for depression.      There were no vitals taken for this visit.  Physical Exam  Constitutional:       Appearance: She is well-developed.   HENT:      Head: Normocephalic and atraumatic.      Right Ear: External ear normal.      Left Ear: External ear normal.   Eyes:      Conjunctiva/sclera: Conjunctivae normal.   Pulmonary:      Effort: Pulmonary effort is normal.   Musculoskeletal:      Cervical back: Normal range of motion.   Skin:     Coloration: Skin is not pale.   Neurological:      Mental Status: She is alert and oriented to person, place, and time.   Psychiatric:         Behavior: Behavior normal.         Thought Content: Thought content normal.         Judgment: Judgment normal.         Plan     Problem List Items Addressed This Visit          Cardiac/Vascular    Essential hypertension (Chronic)  The current medical regimen is effective;  continue present plan and medications.         Renal/    Hypokalemia - Primary    Relevant Medications    potassium chloride (MICRO-K) 10 MEQ CpSR       Endocrine    Thyroid nodule (Chronic)    Vitamin D deficiency disease (Chronic)       GI    Gastroesophageal reflux disease    Relevant Medications    cimetidine (TAGAMET) 300 MG tablet         Follow up in about 3 months (around 5/13/2023), or if symptoms worsen or fail to improve.

## 2023-02-14 ENCOUNTER — TELEPHONE (OUTPATIENT)
Dept: FAMILY MEDICINE | Facility: CLINIC | Age: 58
End: 2023-02-14
Payer: COMMERCIAL

## 2023-02-14 ENCOUNTER — PATIENT MESSAGE (OUTPATIENT)
Dept: FAMILY MEDICINE | Facility: CLINIC | Age: 58
End: 2023-02-14
Payer: COMMERCIAL

## 2023-02-14 DIAGNOSIS — M17.12 PRIMARY OSTEOARTHRITIS OF LEFT KNEE: Primary | ICD-10-CM

## 2023-02-14 NOTE — TELEPHONE ENCOUNTER
Spoke with Pharmacist Hollie at [facility], confirmed medication with  Dr. Efren fuller to fill at this time.

## 2023-02-14 NOTE — TELEPHONE ENCOUNTER
----- Message from Zahraa Lovell MA sent at 2/14/2023 11:55 AM CST -----  Type: Patient Call Back    Who called: [facility]     What is the request in detail: stating the pt's medication was never sent to the pharmacy..     cimetidine (TAGAMET) 300 MG tablet  potassium chloride (MICRO-K) 10 MEQ CpSR    Can the clinic reply by [facility]?NO    Would the patient rather a call back or a response via [facility]? yes    Best call back number:[phone]

## 2023-02-27 ENCOUNTER — HOSPITAL ENCOUNTER (OUTPATIENT)
Dept: CARDIOLOGY | Facility: HOSPITAL | Age: 58
Discharge: HOME | End: 2023-02-27
Attending: INTERNAL MEDICINE
Payer: COMMERCIAL

## 2023-02-27 DIAGNOSIS — R07.9 CHEST PAIN, UNSPECIFIED TYPE: ICD-10-CM

## 2023-02-27 DIAGNOSIS — R94.31 ABNORMAL EKG: ICD-10-CM

## 2023-02-27 LAB
ASCENDING AORTA: 3.34 CM
AV PEAK GRADIENT: 9 MMHG
AV VELOCITY RATIO: 0.51
CV ECHO LV RWT: 0.52 CM
CV STRESS BASE HR: 75 BPM
DIASTOLIC BLOOD PRESSURE: 64 MMHG
DOP CALC AO PEAK VEL: 1.5 M/S
DOP CALC LVOT AREA: 3.5 CM2
DOP CALC LVOT DIAMETER: 2.1 CM
DOP CALC LVOT PEAK VEL: 0.77 M/S
DOP CALC LVOT STROKE VOLUME: 63.01 CM3
DOP CALCLVOT PEAK VEL VTI: 18.2 CM
E WAVE DECELERATION TIME: 232.08 MSEC
E/A RATIO: 0.8
E/E' RATIO: 14.91 M/S
ECHO LV POSTERIOR WALL: 1.3 CM (ref 0.6–1.1)
EJECTION FRACTION: 45 %
FRACTIONAL SHORTENING: 28 % (ref 28–44)
INTERVENTRICULAR SEPTUM: 1.39 CM (ref 0.6–1.1)
IVC DIAMETER: 13 CM
IVRT: 138.41 MSEC
LA MAJOR: 5.45 CM
LA MINOR: 5.33 CM
LA WIDTH: 4.3 CM
LEFT ATRIUM SIZE: 4.44 CM
LEFT ATRIUM VOLUME: 87.46 CM3
LEFT INTERNAL DIMENSION IN SYSTOLE: 3.59 CM (ref 2.1–4)
LEFT VENTRICLE DIASTOLIC VOLUME: 117.32 ML
LEFT VENTRICLE SYSTOLIC VOLUME: 54.06 ML
LEFT VENTRICULAR INTERNAL DIMENSION IN DIASTOLE: 4.98 CM (ref 3.5–6)
LEFT VENTRICULAR MASS: 273.26 G
LV LATERAL E/E' RATIO: 11.71 M/S
LV SEPTAL E/E' RATIO: 20.5 M/S
LVOT MG: 1.57 MMHG
LVOT MV: 0.6 CM/S
MV PEAK A VEL: 1.03 M/S
MV PEAK E VEL: 0.82 M/S
MV STENOSIS PRESSURE HALF TIME: 67.3 MS
MV VALVE AREA P 1/2 METHOD: 3.27 CM2
OHS CV CPX 1 MINUTE RECOVERY HEART RATE: 122 BPM
OHS CV CPX 85 PERCENT MAX PREDICTED HEART RATE MALE: 132
OHS CV CPX ESTIMATED METS: 7
OHS CV CPX MAX PREDICTED HEART RATE: 155
OHS CV CPX PATIENT IS FEMALE: 1
OHS CV CPX PATIENT IS MALE: 0
OHS CV CPX PEAK DIASTOLIC BLOOD PRESSURE: 42 MMHG
OHS CV CPX PEAK HEAR RATE: 151 BPM
OHS CV CPX PEAK RATE PRESSURE PRODUCT: ABNORMAL
OHS CV CPX PEAK SYSTOLIC BLOOD PRESSURE: 164 MMHG
OHS CV CPX PERCENT MAX PREDICTED HEART RATE ACHIEVED: 97
OHS CV CPX RATE PRESSURE PRODUCT PRESENTING: 9375
PISA TR MAX VEL: 2.15 M/S
PULM VEIN S/D RATIO: 1.29
PV PEAK D VEL: 0.49 M/S
PV PEAK S VEL: 0.63 M/S
PV PEAK VELOCITY: 0.95 CM/S
RA MAJOR: 4.68 CM
RA PRESSURE: 3 MMHG
RA WIDTH: 3 CM
RIGHT VENTRICULAR END-DIASTOLIC DIMENSION: 3 CM
SINUS: 3.1 CM
STJ: 2.21 CM
STRESS ANGINA INDEX: 0
STRESS DUKE TREADMILL SCORE: 0
STRESS ECHO POST EXERCISE DUR MIN: 5 MINUTES
STRESS ECHO POST EXERCISE DUR SEC: 17 SECONDS
STRESS ST DEPRESSION: 1 MM
SYSTOLIC BLOOD PRESSURE: 125 MMHG
TDI LATERAL: 0.07 M/S
TDI SEPTAL: 0.04 M/S
TDI: 0.06 M/S
TR MAX PG: 18 MMHG
TRICUSPID ANNULAR PLANE SYSTOLIC EXCURSION: 2.9 CM
TV PEAK GRADIENT: 0.87 MMHG
TV REST PULMONARY ARTERY PRESSURE: 21 MMHG

## 2023-02-27 PROCEDURE — 93325 STRESS ECHO (CUPID ONLY): ICD-10-PCS | Mod: 26,,, | Performed by: INTERNAL MEDICINE

## 2023-02-27 PROCEDURE — 93320 STRESS ECHO (CUPID ONLY): ICD-10-PCS | Mod: 26,,, | Performed by: INTERNAL MEDICINE

## 2023-02-27 PROCEDURE — 93351 STRESS ECHO (CUPID ONLY): ICD-10-PCS | Mod: 26,,, | Performed by: INTERNAL MEDICINE

## 2023-02-27 PROCEDURE — 93351 STRESS TTE COMPLETE: CPT | Mod: 26,,, | Performed by: INTERNAL MEDICINE

## 2023-02-27 PROCEDURE — 93320 DOPPLER ECHO COMPLETE: CPT | Mod: 26,,, | Performed by: INTERNAL MEDICINE

## 2023-02-27 PROCEDURE — 93351 STRESS TTE COMPLETE: CPT

## 2023-02-27 PROCEDURE — 93325 DOPPLER ECHO COLOR FLOW MAPG: CPT | Mod: 26,,, | Performed by: INTERNAL MEDICINE

## 2023-03-01 ENCOUNTER — TELEPHONE (OUTPATIENT)
Dept: FAMILY MEDICINE | Facility: CLINIC | Age: 58
End: 2023-03-01
Payer: COMMERCIAL

## 2023-03-01 DIAGNOSIS — I10 ESSENTIAL HYPERTENSION, MALIGNANT: ICD-10-CM

## 2023-03-01 DIAGNOSIS — E11.9 DIABETES MELLITUS WITHOUT COMPLICATION: ICD-10-CM

## 2023-03-01 DIAGNOSIS — E07.9 DISEASE OF THYROID GLAND: Primary | ICD-10-CM

## 2023-03-01 NOTE — TELEPHONE ENCOUNTER
Called pt to reschedule 3/21 annual appt; annual scheduled for 4/11 with Dr. Fisher;   Pt requesting lab work to be done this week, labs have been put in except for CBC; Please advise

## 2023-03-02 ENCOUNTER — LAB VISIT (OUTPATIENT)
Dept: LAB | Facility: HOSPITAL | Age: 58
End: 2023-03-02
Attending: FAMILY MEDICINE
Payer: COMMERCIAL

## 2023-03-02 DIAGNOSIS — E11.9 DIABETES MELLITUS WITHOUT COMPLICATION: ICD-10-CM

## 2023-03-02 DIAGNOSIS — E07.9 DISEASE OF THYROID GLAND: ICD-10-CM

## 2023-03-02 DIAGNOSIS — I10 ESSENTIAL HYPERTENSION, MALIGNANT: ICD-10-CM

## 2023-03-02 LAB
ALBUMIN SERPL BCP-MCNC: 3.2 G/DL (ref 3.5–5.2)
ALP SERPL-CCNC: 72 U/L (ref 55–135)
ALT SERPL W/O P-5'-P-CCNC: 9 U/L (ref 10–44)
ANION GAP SERPL CALC-SCNC: 7 MMOL/L (ref 8–16)
AST SERPL-CCNC: 12 U/L (ref 10–40)
BASOPHILS # BLD AUTO: 0.02 K/UL (ref 0–0.2)
BASOPHILS NFR BLD: 0.2 % (ref 0–1.9)
BILIRUB SERPL-MCNC: 0.3 MG/DL (ref 0.1–1)
BUN SERPL-MCNC: 10 MG/DL (ref 6–20)
CALCIUM SERPL-MCNC: 9.5 MG/DL (ref 8.7–10.5)
CHLORIDE SERPL-SCNC: 104 MMOL/L (ref 95–110)
CHOLEST SERPL-MCNC: 136 MG/DL (ref 120–199)
CHOLEST/HDLC SERPL: 2.8 {RATIO} (ref 2–5)
CO2 SERPL-SCNC: 29 MMOL/L (ref 23–29)
CREAT SERPL-MCNC: 0.7 MG/DL (ref 0.5–1.4)
DIFFERENTIAL METHOD: ABNORMAL
EOSINOPHIL # BLD AUTO: 0.1 K/UL (ref 0–0.5)
EOSINOPHIL NFR BLD: 0.8 % (ref 0–8)
ERYTHROCYTE [DISTWIDTH] IN BLOOD BY AUTOMATED COUNT: 16.8 % (ref 11.5–14.5)
EST. GFR  (NO RACE VARIABLE): >60 ML/MIN/1.73 M^2
ESTIMATED AVG GLUCOSE: 103 MG/DL (ref 68–131)
GLUCOSE SERPL-MCNC: 76 MG/DL (ref 70–110)
HBA1C MFR BLD: 5.2 % (ref 4–5.6)
HCT VFR BLD AUTO: 38.7 % (ref 37–48.5)
HDLC SERPL-MCNC: 48 MG/DL (ref 40–75)
HDLC SERPL: 35.3 % (ref 20–50)
HGB BLD-MCNC: 11.7 G/DL (ref 12–16)
IMM GRANULOCYTES # BLD AUTO: 0.03 K/UL (ref 0–0.04)
IMM GRANULOCYTES NFR BLD AUTO: 0.3 % (ref 0–0.5)
LDLC SERPL CALC-MCNC: 75.8 MG/DL (ref 63–159)
LYMPHOCYTES # BLD AUTO: 2.6 K/UL (ref 1–4.8)
LYMPHOCYTES NFR BLD: 23.2 % (ref 18–48)
MCH RBC QN AUTO: 28.3 PG (ref 27–31)
MCHC RBC AUTO-ENTMCNC: 30.2 G/DL (ref 32–36)
MCV RBC AUTO: 94 FL (ref 82–98)
MONOCYTES # BLD AUTO: 0.5 K/UL (ref 0.3–1)
MONOCYTES NFR BLD: 4.1 % (ref 4–15)
NEUTROPHILS # BLD AUTO: 8.1 K/UL (ref 1.8–7.7)
NEUTROPHILS NFR BLD: 71.4 % (ref 38–73)
NONHDLC SERPL-MCNC: 88 MG/DL
NRBC BLD-RTO: 0 /100 WBC
PLATELET # BLD AUTO: 356 K/UL (ref 150–450)
PMV BLD AUTO: 10 FL (ref 9.2–12.9)
POTASSIUM SERPL-SCNC: 3.4 MMOL/L (ref 3.5–5.1)
PROT SERPL-MCNC: 8.3 G/DL (ref 6–8.4)
RBC # BLD AUTO: 4.14 M/UL (ref 4–5.4)
SODIUM SERPL-SCNC: 140 MMOL/L (ref 136–145)
TRIGL SERPL-MCNC: 61 MG/DL (ref 30–150)
TSH SERPL DL<=0.005 MIU/L-ACNC: 1.62 UIU/ML (ref 0.4–4)
WBC # BLD AUTO: 11.31 K/UL (ref 3.9–12.7)

## 2023-03-02 PROCEDURE — 80061 LIPID PANEL: CPT | Performed by: FAMILY MEDICINE

## 2023-03-02 PROCEDURE — 80053 COMPREHEN METABOLIC PANEL: CPT | Performed by: FAMILY MEDICINE

## 2023-03-02 PROCEDURE — 83036 HEMOGLOBIN GLYCOSYLATED A1C: CPT | Performed by: FAMILY MEDICINE

## 2023-03-02 PROCEDURE — 85025 COMPLETE CBC W/AUTO DIFF WBC: CPT | Performed by: FAMILY MEDICINE

## 2023-03-02 PROCEDURE — 84443 ASSAY THYROID STIM HORMONE: CPT | Performed by: FAMILY MEDICINE

## 2023-03-02 PROCEDURE — 36415 COLL VENOUS BLD VENIPUNCTURE: CPT | Mod: PO | Performed by: FAMILY MEDICINE

## 2023-03-14 ENCOUNTER — OFFICE VISIT (OUTPATIENT)
Dept: ORTHOPEDICS | Facility: CLINIC | Age: 58
End: 2023-03-14
Payer: COMMERCIAL

## 2023-03-14 ENCOUNTER — HOSPITAL ENCOUNTER (OUTPATIENT)
Dept: RADIOLOGY | Facility: HOSPITAL | Age: 58
Discharge: HOME | End: 2023-03-14
Attending: ORTHOPAEDIC SURGERY
Payer: COMMERCIAL

## 2023-03-14 VITALS — BODY MASS INDEX: 38.02 KG/M2 | WEIGHT: 222.69 LBS | HEIGHT: 64 IN

## 2023-03-14 DIAGNOSIS — M17.12 PRIMARY OSTEOARTHRITIS OF LEFT KNEE: ICD-10-CM

## 2023-03-14 DIAGNOSIS — M17.12 PRIMARY OSTEOARTHRITIS OF LEFT KNEE: Primary | ICD-10-CM

## 2023-03-14 PROCEDURE — 1159F PR MEDICATION LIST DOCUMENTED IN MEDICAL RECORD: ICD-10-PCS | Mod: CPTII,S$GLB,, | Performed by: ORTHOPAEDIC SURGERY

## 2023-03-14 PROCEDURE — 73560 X-RAY EXAM OF KNEE 1 OR 2: CPT | Mod: 26,,, | Performed by: RADIOLOGY

## 2023-03-14 PROCEDURE — 3044F PR MOST RECENT HEMOGLOBIN A1C LEVEL <7.0%: ICD-10-PCS | Mod: CPTII,S$GLB,, | Performed by: ORTHOPAEDIC SURGERY

## 2023-03-14 PROCEDURE — 73560 X-RAY EXAM OF KNEE 1 OR 2: CPT | Mod: TC,50

## 2023-03-14 PROCEDURE — 3008F BODY MASS INDEX DOCD: CPT | Mod: CPTII,S$GLB,, | Performed by: ORTHOPAEDIC SURGERY

## 2023-03-14 PROCEDURE — 3044F HG A1C LEVEL LT 7.0%: CPT | Mod: CPTII,S$GLB,, | Performed by: ORTHOPAEDIC SURGERY

## 2023-03-14 PROCEDURE — 73560 XR KNEE 1 OR 2 VIEW BILATERAL: ICD-10-PCS | Mod: 26,,, | Performed by: RADIOLOGY

## 2023-03-14 PROCEDURE — 99213 OFFICE O/P EST LOW 20 MIN: CPT | Mod: S$GLB,,, | Performed by: ORTHOPAEDIC SURGERY

## 2023-03-14 PROCEDURE — 99999 PR PBB SHADOW E&M-EST. PATIENT-LVL III: CPT | Mod: PBBFAC,,, | Performed by: ORTHOPAEDIC SURGERY

## 2023-03-14 PROCEDURE — 1159F MED LIST DOCD IN RCRD: CPT | Mod: CPTII,S$GLB,, | Performed by: ORTHOPAEDIC SURGERY

## 2023-03-14 PROCEDURE — 99999 PR PBB SHADOW E&M-EST. PATIENT-LVL III: ICD-10-PCS | Mod: PBBFAC,,, | Performed by: ORTHOPAEDIC SURGERY

## 2023-03-14 PROCEDURE — 3008F PR BODY MASS INDEX (BMI) DOCUMENTED: ICD-10-PCS | Mod: CPTII,S$GLB,, | Performed by: ORTHOPAEDIC SURGERY

## 2023-03-14 PROCEDURE — 99213 PR OFFICE/OUTPT VISIT, EST, LEVL III, 20-29 MIN: ICD-10-PCS | Mod: S$GLB,,, | Performed by: ORTHOPAEDIC SURGERY

## 2023-03-14 NOTE — PROGRESS NOTES
"  Subjective:     HPI:   Niharika Gutierrez is a 58 y.o. female who presents for repeat eval L knee    She was seen 2/1/22  and Dx'd with B knee OA and had increased knee pain after HA injection. "Definitely do not want a knee replacement"  Plan: F/u with [facility] for injection related pain  F/u PRN     She says her pain after HA injection improved after we saw her    She says in December she stepped wrong at an airport in [place] had acute pain in the left knee.  She saw Sarah Galeano 12/22/22 who did a steroid injection and recommended that she consider a knee replacement.      She says overall she is better now just intermittent symptoms.      She is off Mobic just taking Tylenol Arthritis as needed.    Did 1-2 visits PT 2022, now doing home exercise bike 5mi/day       Objective:   Body mass index is 38.23 kg/m².  Exam:  Trace limp and antalgic gait on the left knee 0-115 degree knee range of motion 4° valgus alignment she points to her medial joint line but has nontender mediolateral patellofemoral joint lines were nontender at the pes anserinus no significant effusion knee stable anterior-posterior varus valgus stresses without flexion contracture or extensor lag      Imaging:  Indication:  Left knee pain  Exam Ordered: Radiographs of the left knee include a standing anteroposterior view, a standing posterioanterior view, a lateral view in full flexion, and a sunrise view  Details of Examination: Exam shows evidence of joint space narrowing, osteophyte formation, and subchondral sclerosis, all consistent with degenerative arthritis of the knee.  No other significant findings are noted.  Impression:  Degenerative Arthritis, Left Knee    KL g4 varus L knee arthritis, med and PF joint      Assessment:       ICD-10-CM ICD-9-CM   1. Primary osteoarthritis of left knee  M17.12 715.16      BMI 38 down from 40  B knee OA     Plan:       The above findings were discussed with patient length. We discussed the risks " of conservative versus surgical management knee arthritis. Conservative management consisting of anti-inflammatory medications, glucosamine/chondroitin sulfate, weight loss, physical therapy, activity modification, as well as injections (lubricant versus corticosteroid) was discussed at length. At this point considering the patient's level of activity, pain, and radiographic findings I recommend continued conservative management of the knee arthritis.     Acute flare L knee OA, better after NSAIDs, CSI, PT  She does not want to pursue TKA at this time  TKA info    F/u JOCELYN PRN non-op OA management  F/u with me to discuss TKA when ready    No orders of the defined types were placed in this encounter.            Past Medical History:   Diagnosis Date    GERD (gastroesophageal reflux disease)     Hypertension     Hypokalemic syndrome     Thyroid disease     Thyroid nodule     Vitamin D deficiency disease        Past Surgical History:   Procedure Laterality Date    BREAST BIOPSY       SECTION  2004    X 1    CHOLECYSTECTOMY  2008    HYSTERECTOMY      LAPAROSCOPIC HYSTERECTOMY  2010    LSO       Family History   Problem Relation Age of Onset    Diabetes Mother     Heart disease Father         CHF    No Known Problems Sister     No Known Problems Brother     No Known Problems Brother     Breast cancer Maternal Aunt     Breast cancer Paternal Aunt     Breast cancer Maternal Aunt     Breast cancer Paternal Aunt     No Known Problems Maternal Uncle     No Known Problems Paternal Uncle     No Known Problems Maternal Grandmother     No Known Problems Maternal Grandfather     No Known Problems Paternal Grandmother     No Known Problems Paternal Grandfather     Colon cancer Neg Hx     Colon polyps Neg Hx     Amblyopia Neg Hx     Blindness Neg Hx     Cancer Neg Hx     Cataracts Neg Hx     Glaucoma Neg Hx     Hypertension Neg Hx     Macular degeneration Neg Hx     Retinal detachment Neg Hx     Strabismus Neg Hx     Stroke  Neg Hx     Thyroid disease Neg Hx        Social History     Socioeconomic History    Marital status:    Occupational History     Employer: [employer]   Tobacco Use    Smoking status: Never    Smokeless tobacco: Never   Substance and Sexual Activity    Alcohol use: No    Drug use: No    Sexual activity: Yes     Partners: Male     Birth control/protection: Surgical

## 2023-03-27 ENCOUNTER — PATIENT MESSAGE (OUTPATIENT)
Dept: DERMATOLOGY | Facility: CLINIC | Age: 58
End: 2023-03-27
Payer: COMMERCIAL

## 2023-03-28 ENCOUNTER — PATIENT MESSAGE (OUTPATIENT)
Dept: ORTHOPEDICS | Facility: CLINIC | Age: 58
End: 2023-03-28
Payer: COMMERCIAL

## 2023-03-29 DIAGNOSIS — M17.12 PRIMARY OSTEOARTHRITIS OF LEFT KNEE: Primary | ICD-10-CM

## 2023-04-04 ENCOUNTER — HOSPITAL ENCOUNTER (OUTPATIENT)
Dept: RADIOLOGY | Facility: HOSPITAL | Age: 58
Discharge: HOME | End: 2023-04-04
Attending: ORTHOPAEDIC SURGERY
Payer: COMMERCIAL

## 2023-04-04 ENCOUNTER — OFFICE VISIT (OUTPATIENT)
Dept: ORTHOPEDICS | Facility: CLINIC | Age: 58
End: 2023-04-04
Payer: COMMERCIAL

## 2023-04-04 VITALS — HEIGHT: 64 IN | WEIGHT: 225.88 LBS | BODY MASS INDEX: 38.56 KG/M2

## 2023-04-04 DIAGNOSIS — M17.12 PRIMARY OSTEOARTHRITIS OF LEFT KNEE: ICD-10-CM

## 2023-04-04 DIAGNOSIS — M17.12 PRIMARY OSTEOARTHRITIS OF LEFT KNEE: Primary | ICD-10-CM

## 2023-04-04 PROCEDURE — 1159F MED LIST DOCD IN RCRD: CPT | Mod: CPTII,S$GLB,, | Performed by: ORTHOPAEDIC SURGERY

## 2023-04-04 PROCEDURE — 3008F BODY MASS INDEX DOCD: CPT | Mod: CPTII,S$GLB,, | Performed by: ORTHOPAEDIC SURGERY

## 2023-04-04 PROCEDURE — 3044F HG A1C LEVEL LT 7.0%: CPT | Mod: CPTII,S$GLB,, | Performed by: ORTHOPAEDIC SURGERY

## 2023-04-04 PROCEDURE — 20610 LARGE JOINT ASPIRATION/INJECTION: L KNEE: ICD-10-PCS | Mod: LT,S$GLB,, | Performed by: ORTHOPAEDIC SURGERY

## 2023-04-04 PROCEDURE — 99999 PR PBB SHADOW E&M-EST. PATIENT-LVL III: CPT | Mod: PBBFAC,,, | Performed by: ORTHOPAEDIC SURGERY

## 2023-04-04 PROCEDURE — 99499 NO LOS: ICD-10-PCS | Mod: S$GLB,,, | Performed by: ORTHOPAEDIC SURGERY

## 2023-04-04 PROCEDURE — 3044F PR MOST RECENT HEMOGLOBIN A1C LEVEL <7.0%: ICD-10-PCS | Mod: CPTII,S$GLB,, | Performed by: ORTHOPAEDIC SURGERY

## 2023-04-04 PROCEDURE — 3008F PR BODY MASS INDEX (BMI) DOCUMENTED: ICD-10-PCS | Mod: CPTII,S$GLB,, | Performed by: ORTHOPAEDIC SURGERY

## 2023-04-04 PROCEDURE — 99999 PR PBB SHADOW E&M-EST. PATIENT-LVL III: ICD-10-PCS | Mod: PBBFAC,,, | Performed by: ORTHOPAEDIC SURGERY

## 2023-04-04 PROCEDURE — 73562 X-RAY EXAM OF KNEE 3: CPT | Mod: 26,RT,, | Performed by: RADIOLOGY

## 2023-04-04 PROCEDURE — 1159F PR MEDICATION LIST DOCUMENTED IN MEDICAL RECORD: ICD-10-PCS | Mod: CPTII,S$GLB,, | Performed by: ORTHOPAEDIC SURGERY

## 2023-04-04 PROCEDURE — 73564 XR KNEE ORTHO LEFT WITH FLEXION: ICD-10-PCS | Mod: 26,LT,, | Performed by: RADIOLOGY

## 2023-04-04 PROCEDURE — 73562 XR KNEE ORTHO LEFT WITH FLEXION: ICD-10-PCS | Mod: 26,RT,, | Performed by: RADIOLOGY

## 2023-04-04 PROCEDURE — 73564 X-RAY EXAM KNEE 4 OR MORE: CPT | Mod: TC,LT

## 2023-04-04 PROCEDURE — 20610 DRAIN/INJ JOINT/BURSA W/O US: CPT | Mod: LT,S$GLB,, | Performed by: ORTHOPAEDIC SURGERY

## 2023-04-04 PROCEDURE — 99499 UNLISTED E&M SERVICE: CPT | Mod: S$GLB,,, | Performed by: ORTHOPAEDIC SURGERY

## 2023-04-04 PROCEDURE — 73564 X-RAY EXAM KNEE 4 OR MORE: CPT | Mod: 26,LT,, | Performed by: RADIOLOGY

## 2023-04-04 RX ORDER — TRIAMCINOLONE ACETONIDE 40 MG/ML
40 INJECTION, SUSPENSION INTRA-ARTICULAR; INTRAMUSCULAR
Status: DISCONTINUED | OUTPATIENT
Start: 2023-04-04 | End: 2023-04-04 | Stop reason: HOSPADM

## 2023-04-04 RX ADMIN — TRIAMCINOLONE ACETONIDE 40 MG: 40 INJECTION, SUSPENSION INTRA-ARTICULAR; INTRAMUSCULAR at 02:04

## 2023-04-04 NOTE — PROGRESS NOTES
Injection Information    Triamcinolone Acetonide Injectable Suspension (40mg/mL)  Lot Number: [id number]   Expiration Date: 9/30/2024

## 2023-04-04 NOTE — PROCEDURES
Large Joint Aspiration/Injection: L knee    Date/Time: 4/4/2023 2:20 PM  Performed by: Pradeep Camilo III, MD  Authorized by: Pradeep Camilo III, MD     Consent Done?:  Yes (Verbal)  Indications:  Pain  Timeout: prior to procedure the correct patient, procedure, and site was verified    Prep: patient was prepped and draped in usual sterile fashion      Local anesthesia used?: Yes    Local anesthetic:  Lidocaine 1% without epinephrine  Anesthetic total (ml):  5      Details:  Needle Size:  21 G  Location:  Knee  Site:  L knee  Medications:  40 mg triamcinolone acetonide 40 mg/mL  Patient tolerance:  Patient tolerated the procedure well with no immediate complications

## 2023-04-04 NOTE — PROGRESS NOTES
F/u L knee OA  Was doing well 3/14/23, did not want Sx or CSI  Knee more symptomatic, would like another CSI today  Riding bike 6mi/day - try breaking up/intervals    F/u PRN

## 2023-04-11 ENCOUNTER — OFFICE VISIT (OUTPATIENT)
Dept: FAMILY MEDICINE | Facility: CLINIC | Age: 58
End: 2023-04-11
Payer: COMMERCIAL

## 2023-04-11 VITALS
BODY MASS INDEX: 39.14 KG/M2 | OXYGEN SATURATION: 99 % | HEART RATE: 78 BPM | HEIGHT: 64 IN | DIASTOLIC BLOOD PRESSURE: 54 MMHG | TEMPERATURE: 98 F | SYSTOLIC BLOOD PRESSURE: 132 MMHG | WEIGHT: 229.25 LBS

## 2023-04-11 DIAGNOSIS — E55.9 VITAMIN D DEFICIENCY DISEASE: ICD-10-CM

## 2023-04-11 DIAGNOSIS — E87.6 HYPOKALEMIA: ICD-10-CM

## 2023-04-11 DIAGNOSIS — Z00.00 ANNUAL PHYSICAL EXAM: Primary | ICD-10-CM

## 2023-04-11 DIAGNOSIS — Z12.31 ENCOUNTER FOR SCREENING MAMMOGRAM FOR MALIGNANT NEOPLASM OF BREAST: ICD-10-CM

## 2023-04-11 DIAGNOSIS — K64.9 HEMORRHOIDS, UNSPECIFIED HEMORRHOID TYPE: ICD-10-CM

## 2023-04-11 DIAGNOSIS — Z01.419 WELL WOMAN EXAM: ICD-10-CM

## 2023-04-11 DIAGNOSIS — D50.9 IRON DEFICIENCY ANEMIA, UNSPECIFIED IRON DEFICIENCY ANEMIA TYPE: ICD-10-CM

## 2023-04-11 PROBLEM — E66.01 SEVERE OBESITY (BMI 35.0-39.9) WITH COMORBIDITY: Status: ACTIVE | Noted: 2023-04-11

## 2023-04-11 PROCEDURE — 3078F DIAST BP <80 MM HG: CPT | Mod: CPTII,S$GLB,, | Performed by: FAMILY MEDICINE

## 2023-04-11 PROCEDURE — 99396 PREV VISIT EST AGE 40-64: CPT | Mod: S$GLB,,, | Performed by: FAMILY MEDICINE

## 2023-04-11 PROCEDURE — 3075F PR MOST RECENT SYSTOLIC BLOOD PRESS GE 130-139MM HG: ICD-10-PCS | Mod: CPTII,S$GLB,, | Performed by: FAMILY MEDICINE

## 2023-04-11 PROCEDURE — 3044F PR MOST RECENT HEMOGLOBIN A1C LEVEL <7.0%: ICD-10-PCS | Mod: CPTII,S$GLB,, | Performed by: FAMILY MEDICINE

## 2023-04-11 PROCEDURE — 1159F PR MEDICATION LIST DOCUMENTED IN MEDICAL RECORD: ICD-10-PCS | Mod: CPTII,S$GLB,, | Performed by: FAMILY MEDICINE

## 2023-04-11 PROCEDURE — 3075F SYST BP GE 130 - 139MM HG: CPT | Mod: CPTII,S$GLB,, | Performed by: FAMILY MEDICINE

## 2023-04-11 PROCEDURE — 1159F MED LIST DOCD IN RCRD: CPT | Mod: CPTII,S$GLB,, | Performed by: FAMILY MEDICINE

## 2023-04-11 PROCEDURE — 3008F BODY MASS INDEX DOCD: CPT | Mod: CPTII,S$GLB,, | Performed by: FAMILY MEDICINE

## 2023-04-11 PROCEDURE — 1160F RVW MEDS BY RX/DR IN RCRD: CPT | Mod: CPTII,S$GLB,, | Performed by: FAMILY MEDICINE

## 2023-04-11 PROCEDURE — 99396 PR PREVENTIVE VISIT,EST,40-64: ICD-10-PCS | Mod: S$GLB,,, | Performed by: FAMILY MEDICINE

## 2023-04-11 PROCEDURE — 3008F PR BODY MASS INDEX (BMI) DOCUMENTED: ICD-10-PCS | Mod: CPTII,S$GLB,, | Performed by: FAMILY MEDICINE

## 2023-04-11 PROCEDURE — 99999 PR PBB SHADOW E&M-EST. PATIENT-LVL V: ICD-10-PCS | Mod: PBBFAC,,, | Performed by: FAMILY MEDICINE

## 2023-04-11 PROCEDURE — 3044F HG A1C LEVEL LT 7.0%: CPT | Mod: CPTII,S$GLB,, | Performed by: FAMILY MEDICINE

## 2023-04-11 PROCEDURE — 99999 PR PBB SHADOW E&M-EST. PATIENT-LVL V: CPT | Mod: PBBFAC,,, | Performed by: FAMILY MEDICINE

## 2023-04-11 PROCEDURE — 3078F PR MOST RECENT DIASTOLIC BLOOD PRESSURE < 80 MM HG: ICD-10-PCS | Mod: CPTII,S$GLB,, | Performed by: FAMILY MEDICINE

## 2023-04-11 PROCEDURE — 1160F PR REVIEW ALL MEDS BY PRESCRIBER/CLIN PHARMACIST DOCUMENTED: ICD-10-PCS | Mod: CPTII,S$GLB,, | Performed by: FAMILY MEDICINE

## 2023-04-11 RX ORDER — POTASSIUM CHLORIDE 750 MG/1
10 TABLET, EXTENDED RELEASE ORAL
COMMUNITY
Start: 2023-01-19 | End: 2023-04-11

## 2023-04-11 RX ORDER — BENZONATATE 100 MG/1
100-200 CAPSULE ORAL
COMMUNITY
Start: 2023-01-14 | End: 2023-04-19

## 2023-04-11 RX ORDER — FAMOTIDINE 20 MG/1
20 TABLET, FILM COATED ORAL 2 TIMES DAILY
COMMUNITY
Start: 2023-01-19 | End: 2023-09-29

## 2023-04-11 RX ORDER — FLUOROMETHOLONE ACETATE 1 MG/ML
SUSPENSION/ DROPS OPHTHALMIC
COMMUNITY
Start: 2023-04-03 | End: 2023-12-13

## 2023-04-11 RX ORDER — NITROGLYCERIN 4 MG/G
OINTMENT RECTAL
Qty: 30 G | Refills: 2 | Status: SHIPPED | OUTPATIENT
Start: 2023-04-11 | End: 2023-09-29

## 2023-04-11 RX ORDER — PROMETHAZINE HYDROCHLORIDE AND DEXTROMETHORPHAN HYDROBROMIDE 6.25; 15 MG/5ML; MG/5ML
5 SYRUP ORAL NIGHTLY
COMMUNITY
Start: 2023-01-14 | End: 2023-04-19

## 2023-04-11 NOTE — PROGRESS NOTES
Assessment & Plan:    Annual physical exam    Hypokalemia  -     potassium bicarbonate (K-LYTE) disintegrating tablet; Take 1 tablet (25 mEq total) by mouth 3 (three) times daily.  Dispense: 270 tablet; Refill: 3    K refilled. Advised to take with OTC magnesium to enhance levels.    Iron deficiency anemia, unspecified iron deficiency anemia type  Recommended daily iron supplement.    Hemorrhoids, unspecified hemorrhoid type  -     nitroglycerin (RECTIV) 0.4 % (w/w) Oint; Apply small amount topically to the rectal area daily as needed.  Dispense: 30 g; Refill: 2  -     Ambulatory referral/consult to Colorectal Surgery; Future; Expected date: 04/18/2023    May try rx for Rectiv but patient informed that it may not be less expensive.   Recommended Sitz baths.  Referral to Colorectal specialist.    Vitamin D deficiency disease  Continue supplement.    Well woman exam  -     Ambulatory referral/consult to Gynecology; Future; Expected date: 04/18/2023    Referral to Gynecology upon patient's request.    Encounter for screening mammogram for malignant neoplasm of breast  -     Mammo Digital Screening Bilat; Future; Expected date: 04/25/2023      Encouraged shingles vaccine.     Follow-up: Follow up in about 1 year (around 4/11/2024).  ______________________________________________________________________    Chief Complaint  Chief Complaint   Patient presents with    Annual Exam       HPI  Niharika Gutierrez is a 58 y.o. female with medical diagnoses as listed in the medical history and problem list that presents to the office for her annual exam. She reports a known history of hemorrhoids for which she has tried Preparation H and OTC topical lidocaine. The preparation H provided mild relief and the lidocaine was more effective but it cost $20 per bottle, which only lasts for a week. She is requesting a cheaper alternative if possible. Denies constipation. Interested in seeing a female specialist. Also requesting  referral to gynecology for well woman visit.     Most recent pertinent workup:     Last CBC Results:   Lab Results   Component Value Date    WBC 11.31 2023    HGB 11.7 (L) 2023    HCT 38.7 2023     2023       Last CMP Results  Lab Results   Component Value Date     2023    K 3.4 (L) 2023     2023    CO2 29 2023    BUN 10 2023    CREATININE 0.7 2023    CALCIUM 9.5 2023    ALBUMIN 3.2 (L) 2023    AST 12 2023    ALT 9 (L) 2023       Last Lipids  Lab Results   Component Value Date    CHOL 136 2023    TRIG 61 2023    HDL 48 2023    LDLCALC 75.8 2023       Last A1C  Lab Results   Component Value Date    HGBA1C 5.2 2023         Health Maintenance         Date Due Completion Date    Shingles Vaccine (1 of 2) Never done ---    Mammogram 2023    Colorectal Cancer Screening 2025    Hemoglobin A1c (Diabetic Prevention Screening) 2026 3/2/2023    TETANUS VACCINE 2027    Lipid Panel 2028 3/2/2023    Override on 2/15/2010: Done              PAST MEDICAL HISTORY:  Past Medical History:   Diagnosis Date    GERD (gastroesophageal reflux disease)     Hypertension     Hypokalemic syndrome     Thyroid disease     Thyroid nodule     Vitamin D deficiency disease        PAST SURGICAL HISTORY:  Past Surgical History:   Procedure Laterality Date    BREAST BIOPSY       SECTION  2004    X 1    CHOLECYSTECTOMY  2008    HYSTERECTOMY      LAPAROSCOPIC HYSTERECTOMY  2010    LSO       SOCIAL HISTORY:  Social History     Socioeconomic History    Marital status:    Occupational History     Employer: [employer]   Tobacco Use    Smoking status: Never    Smokeless tobacco: Never   Substance and Sexual Activity    Alcohol use: No    Drug use: No    Sexual activity: Yes     Partners: Male     Birth control/protection: Surgical        FAMILY HISTORY:  Family History   Problem Relation Age of Onset    Diabetes Mother     Heart disease Father         CHF    No Known Problems Sister     No Known Problems Brother     No Known Problems Brother     Breast cancer Maternal Aunt     Breast cancer Paternal Aunt     Breast cancer Maternal Aunt     Breast cancer Paternal Aunt     No Known Problems Maternal Uncle     No Known Problems Paternal Uncle     No Known Problems Maternal Grandmother     No Known Problems Maternal Grandfather     No Known Problems Paternal Grandmother     No Known Problems Paternal Grandfather     Colon cancer Neg Hx     Colon polyps Neg Hx     Amblyopia Neg Hx     Blindness Neg Hx     Cancer Neg Hx     Cataracts Neg Hx     Glaucoma Neg Hx     Hypertension Neg Hx     Macular degeneration Neg Hx     Retinal detachment Neg Hx     Strabismus Neg Hx     Stroke Neg Hx     Thyroid disease Neg Hx        ALLERGIES AND MEDICATIONS: updated and reviewed.  Review of patient's allergies indicates:  No Known Allergies  Current Outpatient Medications   Medication Sig Dispense Refill    amLODIPine (NORVASC) 10 MG tablet Take 1 tablet (10 mg total) by mouth once daily. 90 tablet 3    benzonatate (TESSALON) 100 MG capsule Take 100-200 mg by mouth.      cimetidine (TAGAMET) 300 MG tablet Take 1 tablet (300 mg total) by mouth 2 (two) times daily. 60 tablet 11    ergocalciferol (ERGOCALCIFEROL) 50,000 unit Cap TAKE 1 CAPSULE BY MOUTH EVERY 7 DAYS 12 capsule 3    estradioL (ESTRACE) 0.01 % (0.1 mg/gram) vaginal cream Place 2 g vaginally twice a week. 42.5 g 11    famotidine (PEPCID) 20 MG tablet Take 20 mg by mouth 2 (two) times daily.      FLAREX 0.1 % DrpS Place into both eyes.      pantoprazole (PROTONIX) 40 MG tablet Take 1 tablet (40 mg total) by mouth 2 (two) times daily. for 20 days 40 tablet 0    promethazine-dextromethorphan (PROMETHAZINE-DM) 6.25-15 mg/5 mL Syrp Take 5 mLs by mouth every evening.      spironolactone (ALDACTONE) 25 MG  tablet Take 1 tablet (25 mg total) by mouth 2 (two) times daily. 60 tablet 11    XIIDRA 5 % Dpet INSTILL 1 DROP IN BOTH EYES TWICE DAILY 60 each 0    diclofenac sodium (VOLTAREN) 1 % Gel Apply 2 g topically 4 (four) times daily. (Patient not taking: Reported on 3/14/2023) 100 g 2    meloxicam (MOBIC) 15 MG tablet Take 1 tablet (15 mg total) by mouth once daily. (Patient not taking: Reported on 4/11/2023) 30 tablet 1    nitroglycerin (RECTIV) 0.4 % (w/w) Oint Apply small amount topically to the rectal area daily as needed. 30 g 2    potassium bicarbonate (K-LYTE) disintegrating tablet Take 1 tablet (25 mEq total) by mouth 3 (three) times daily. 270 tablet 3    sucralfate (CARAFATE) 1 gram tablet TAKE 1 TABLET(1 GRAM) BY MOUTH FOUR TIMES DAILY FOR 14 DAYS (Patient not taking: Reported on 3/14/2023) 56 tablet 0     No current facility-administered medications for this visit.         ROS  Review of Systems   Constitutional:  Negative for activity change, fever and unexpected weight change.   HENT:  Negative for congestion and sore throat.    Eyes:  Negative for photophobia and visual disturbance.   Respiratory:  Negative for cough and shortness of breath.    Cardiovascular:  Negative for chest pain and leg swelling.   Gastrointestinal:  Negative for abdominal pain, constipation, diarrhea, nausea and vomiting.   Endocrine: Negative for polydipsia, polyphagia and polyuria.   Genitourinary:  Negative for dysuria and urgency.   Musculoskeletal:  Negative for arthralgias and gait problem.   Skin:  Negative for color change.   Neurological:  Negative for dizziness, weakness and headaches.   Psychiatric/Behavioral:  Negative for dysphoric mood and sleep disturbance. The patient is not nervous/anxious.          Physical Exam  Vitals:    04/11/23 1447   BP: (!) 132/54   BP Location: Right arm   Patient Position: Sitting   BP Method: Medium (Manual)   Pulse: 78   Temp: 98.2 °F (36.8 °C)   TempSrc: Oral   SpO2: 99%   Weight: 104  "kg (229 lb 4.5 oz)   Height: 5' 4" (1.626 m)    Body mass index is 39.36 kg/m².  Weight: 104 kg (229 lb 4.5 oz)   Height: 5' 4" (162.6 cm)   Physical Exam  Constitutional:       General: She is not in acute distress.     Appearance: She is obese.   HENT:      Head: Normocephalic and atraumatic.   Neck:      Thyroid: No thyromegaly.      Vascular: No carotid bruit.   Cardiovascular:      Rate and Rhythm: Normal rate and regular rhythm.      Pulses: Normal pulses.      Heart sounds: Normal heart sounds.   Pulmonary:      Effort: Pulmonary effort is normal. No respiratory distress.      Breath sounds: Normal breath sounds.   Musculoskeletal:      Cervical back: Neck supple.      Right lower leg: No edema.      Left lower leg: No edema.   Lymphadenopathy:      Cervical: No cervical adenopathy.   Skin:     General: Skin is warm and dry.      Findings: No rash.   Neurological:      General: No focal deficit present.      Mental Status: She is alert and oriented to person, place, and time.   Psychiatric:         Mood and Affect: Mood normal.         Behavior: Behavior normal.         Thought Content: Thought content normal.           "

## 2023-04-19 ENCOUNTER — OFFICE VISIT (OUTPATIENT)
Dept: CARDIOLOGY | Facility: CLINIC | Age: 58
End: 2023-04-19
Payer: COMMERCIAL

## 2023-04-19 DIAGNOSIS — E78.5 DYSLIPIDEMIA: Primary | ICD-10-CM

## 2023-04-19 DIAGNOSIS — R94.31 ABNORMAL EKG: ICD-10-CM

## 2023-04-19 DIAGNOSIS — I10 ESSENTIAL HYPERTENSION: ICD-10-CM

## 2023-04-19 DIAGNOSIS — E66.9 NON MORBID OBESITY, UNSPECIFIED OBESITY TYPE: ICD-10-CM

## 2023-04-19 DIAGNOSIS — K21.9 GASTROESOPHAGEAL REFLUX DISEASE, UNSPECIFIED WHETHER ESOPHAGITIS PRESENT: ICD-10-CM

## 2023-04-19 PROBLEM — E66.01 SEVERE OBESITY (BMI 35.0-39.9) WITH COMORBIDITY: Status: RESOLVED | Noted: 2023-04-11 | Resolved: 2023-04-19

## 2023-04-19 PROCEDURE — 1160F PR REVIEW ALL MEDS BY PRESCRIBER/CLIN PHARMACIST DOCUMENTED: ICD-10-PCS | Mod: CPTII,95,, | Performed by: INTERNAL MEDICINE

## 2023-04-19 PROCEDURE — 3044F HG A1C LEVEL LT 7.0%: CPT | Mod: CPTII,95,, | Performed by: INTERNAL MEDICINE

## 2023-04-19 PROCEDURE — 1159F MED LIST DOCD IN RCRD: CPT | Mod: CPTII,95,, | Performed by: INTERNAL MEDICINE

## 2023-04-19 PROCEDURE — 1159F PR MEDICATION LIST DOCUMENTED IN MEDICAL RECORD: ICD-10-PCS | Mod: CPTII,95,, | Performed by: INTERNAL MEDICINE

## 2023-04-19 PROCEDURE — 3044F PR MOST RECENT HEMOGLOBIN A1C LEVEL <7.0%: ICD-10-PCS | Mod: CPTII,95,, | Performed by: INTERNAL MEDICINE

## 2023-04-19 PROCEDURE — 99213 OFFICE O/P EST LOW 20 MIN: CPT | Mod: 95,,, | Performed by: INTERNAL MEDICINE

## 2023-04-19 PROCEDURE — 1160F RVW MEDS BY RX/DR IN RCRD: CPT | Mod: CPTII,95,, | Performed by: INTERNAL MEDICINE

## 2023-04-19 PROCEDURE — 99213 PR OFFICE/OUTPT VISIT, EST, LEVL III, 20-29 MIN: ICD-10-PCS | Mod: 95,,, | Performed by: INTERNAL MEDICINE

## 2023-04-19 RX ORDER — ATORVASTATIN CALCIUM 20 MG/1
20 TABLET, FILM COATED ORAL NIGHTLY
Qty: 90 TABLET | Refills: 3 | Status: SHIPPED | OUTPATIENT
Start: 2023-04-19 | End: 2023-09-29

## 2023-04-19 NOTE — PROGRESS NOTES
CARDIOVASCULAR PROGRESS NOTE    The patient location is: [place]  The chief complaint leading to consultation is: CP    Visit type: audiovisual    Face to Face time with patient: 10min  15 minutes of total time spent on the encounter, which includes face to face time and non-face to face time preparing to see the patient (eg, review of tests), Obtaining and/or reviewing separately obtained history, Documenting clinical information in the electronic or other health record, Independently interpreting results (not separately reported) and communicating results to the patient/family/caregiver, or Care coordination (not separately reported).         Each patient to whom he or she provides medical services by telemedicine is:  (1) informed of the relationship between the physician and patient and the respective role of any other health care provider with respect to management of the patient; and (2) notified that he or she may decline to receive medical services by telemedicine and may withdraw from such care at any time.    Notes:       REASON FOR CONSULT:   Niharika Gutierrez is a 58 y.o. female who presents for f/u CP, testing.    PCP: Efren Molina  HISTORY OF PRESENT ILLNESS:    The patient returns for follow-up of her testing via virtual visit.  She tells me she saw her gastroenterologist and doubled up on her proton pump inhibitor with resolution of her chest pain.  She otherwise denies angina, dyspnea, palpitations, or syncope.  There has been no PND, orthopnea, or lower extremity edema.  She denies melena, hematuria, or claudicant symptoms.  She is currently working out on her bicycle ergometer without any symptoms.  She denied any symptoms during her stress test.  The stress echo was negative for ischemia ( EF 45% at rest with normal echocardiographic response to exercise).    CARDIOVASCULAR HISTORY:   none    PAST MEDICAL HISTORY:     Past Medical History:   Diagnosis Date    GERD (gastroesophageal reflux  disease)     Hypertension     Hypokalemic syndrome     Thyroid disease     Thyroid nodule     Vitamin D deficiency disease        PAST SURGICAL HISTORY:     Past Surgical History:   Procedure Laterality Date    BREAST BIOPSY       SECTION  2004    X 1    CHOLECYSTECTOMY  2008    HYSTERECTOMY      LAPAROSCOPIC HYSTERECTOMY  2010    LSO       ALLERGIES AND MEDICATION:   Review of patient's allergies indicates:  No Known Allergies     Medication List            Accurate as of 2023  8:26 AM. If you have any questions, ask your nurse or doctor.                CONTINUE taking these medications      amLODIPine 10 MG tablet  Commonly known as: NORVASC  Take 1 tablet (10 mg total) by mouth once daily.     ergocalciferol 50,000 unit Cap  Commonly known as: ERGOCALCIFEROL  TAKE 1 CAPSULE BY MOUTH EVERY 7 DAYS     estradioL 0.01 % (0.1 mg/gram) vaginal cream  Commonly known as: ESTRACE  Place 2 g vaginally twice a week.     famotidine 20 MG tablet  Commonly known as: PEPCID     FLAREX 0.1 % Drps  Generic drug: fluorometholone     nitroglycerin 0.4 % (w/w) Oint  Commonly known as: RECTIV  Apply small amount topically to the rectal area daily as needed.     pantoprazole 40 MG tablet  Commonly known as: PROTONIX  Take 1 tablet (40 mg total) by mouth 2 (two) times daily. for 20 days     potassium bicarbonate disintegrating tablet  Commonly known as: K-LYTE  Take 1 tablet (25 mEq total) by mouth 3 (three) times daily.     spironolactone 25 MG tablet  Commonly known as: ALDACTONE  Take 1 tablet (25 mg total) by mouth 2 (two) times daily.     XIIDRA 5 % Dpet  Generic drug: lifitegrast  INSTILL 1 DROP IN BOTH EYES TWICE DAILY            STOP taking these medications      benzonatate 100 MG capsule  Commonly known as: TESSALON  Stopped by: Stevan Pena MD     cimetidine 300 MG tablet  Commonly known as: TAGAMET  Stopped by: Stevan Pena MD     diclofenac sodium 1 % Gel  Commonly known as: VOLTAREN  Stopped  by: Stevna Pena MD     meloxicam 15 MG tablet  Commonly known as: MOBIC  Stopped by: Stevan Pena MD     promethazine-dextromethorphan 6.25-15 mg/5 mL Syrp  Commonly known as: PROMETHAZINE-DM  Stopped by: Stevan Pena MD     sucralfate 1 gram tablet  Commonly known as: CARAFATE  Stopped by: Stevan Pena MD              SOCIAL HISTORY:     Social History     Socioeconomic History    Marital status:    Occupational History     Employer: [employer]   Tobacco Use    Smoking status: Never    Smokeless tobacco: Never   Substance and Sexual Activity    Alcohol use: No    Drug use: No    Sexual activity: Yes     Partners: Male     Birth control/protection: Surgical       FAMILY HISTORY:     Family History   Problem Relation Age of Onset    Diabetes Mother     Heart disease Father         CHF    No Known Problems Sister     No Known Problems Brother     No Known Problems Brother     Breast cancer Maternal Aunt     Breast cancer Paternal Aunt     Breast cancer Maternal Aunt     Breast cancer Paternal Aunt     No Known Problems Maternal Uncle     No Known Problems Paternal Uncle     No Known Problems Maternal Grandmother     No Known Problems Maternal Grandfather     No Known Problems Paternal Grandmother     No Known Problems Paternal Grandfather     Colon cancer Neg Hx     Colon polyps Neg Hx     Amblyopia Neg Hx     Blindness Neg Hx     Cancer Neg Hx     Cataracts Neg Hx     Glaucoma Neg Hx     Hypertension Neg Hx     Macular degeneration Neg Hx     Retinal detachment Neg Hx     Strabismus Neg Hx     Stroke Neg Hx     Thyroid disease Neg Hx        REVIEW OF SYSTEMS:   Review of Systems   Constitutional:  Negative for chills, diaphoresis and fever.   HENT:  Negative for nosebleeds.    Eyes:  Negative for blurred vision, double vision and photophobia.   Respiratory:  Negative for hemoptysis, shortness of breath and wheezing.    Cardiovascular:  Negative for chest pain,  palpitations, orthopnea, claudication, leg swelling and PND.   Gastrointestinal:  Negative for abdominal pain, blood in stool, heartburn, melena, nausea and vomiting.   Genitourinary:  Negative for flank pain and hematuria.   Musculoskeletal:  Negative for falls, myalgias and neck pain.   Skin:  Negative for rash.   Neurological:  Negative for dizziness, seizures, loss of consciousness, weakness and headaches.   Endo/Heme/Allergies:  Negative for polydipsia. Does not bruise/bleed easily.   Psychiatric/Behavioral:  Negative for depression and memory loss. The patient is not nervous/anxious.      PHYSICAL EXAM:     There were no vitals filed for this visit.   There is no height or weight on file to calculate BMI.              General:  Well-appearing, well-nourished and in no apparent distress.  HEENT:  NC/AT, EOMI, anicteric sclera.  Neck:  Supple, trachea midline.  CNS:  Alert and oriented x3, cranial nerves 2-12 grossly intact.  Psych:  Normal mood and affect.      Per OV 2/7/23  Physical Exam  Vitals reviewed.   Constitutional:       General: She is not in acute distress.     Appearance: She is well-developed. She is obese. She is not ill-appearing, toxic-appearing or diaphoretic.   HENT:      Head: Normocephalic and atraumatic.   Eyes:      General: No scleral icterus.     Extraocular Movements: Extraocular movements intact.      Conjunctiva/sclera: Conjunctivae normal.      Pupils: Pupils are equal, round, and reactive to light.   Neck:      Thyroid: No thyromegaly.      Vascular: Normal carotid pulses. No carotid bruit or JVD.      Trachea: Trachea normal.   Cardiovascular:      Rate and Rhythm: Normal rate and regular rhythm.      Pulses:           Carotid pulses are 2+ on the right side and 2+ on the left side.     Heart sounds: S1 normal and S2 normal. No murmur heard.    No friction rub. No gallop.   Pulmonary:      Effort: Pulmonary effort is normal. No respiratory distress.      Breath sounds: Normal  breath sounds. No stridor. No wheezing, rhonchi or rales.   Chest:      Chest wall: No tenderness.   Abdominal:      General: There is no distension.      Palpations: Abdomen is soft.   Musculoskeletal:         General: No swelling or tenderness. Normal range of motion.      Cervical back: Normal range of motion and neck supple. No edema or rigidity.      Right lower leg: No edema.      Left lower leg: No edema.   Feet:      Right foot:      Skin integrity: No ulcer.      Left foot:      Skin integrity: No ulcer.   Skin:     General: Skin is warm and dry.      Coloration: Skin is not jaundiced.   Neurological:      General: No focal deficit present.      Mental Status: She is alert and oriented to person, place, and time.      Cranial Nerves: No cranial nerve deficit.   Psychiatric:         Mood and Affect: Mood normal.         Speech: Speech normal.         Behavior: Behavior normal. Behavior is cooperative.       DATA:   EKG: (personally reviewed tracing)  2/7/23 SR 73, LVH, ASMI-age indet, ?inflat ST depression (1/2-1mm)    Laboratory:  CBC:  Recent Labs   Lab 10/24/20  0900 09/18/21  0950 03/02/23  0856   WBC 8.43 11.55 11.31   Hemoglobin 12.1 11.7 L 11.7 L   Hematocrit 39.3 36.0 L 38.7   Platelets 396 H 369 356       CHEMISTRIES:  Recent Labs   Lab 10/24/20  0900 09/18/21  0950 11/10/22  0842 03/02/23  0856   Glucose 88 73 79 76   Sodium 142 141 141 140   Potassium 3.0 L 3.0 L 3.4 L 3.4 L   BUN 11 11 9 10   Creatinine 0.7 0.7 0.7 0.7   eGFR if non African American >60.0 >60.0  --   --    eGFR  --   --  >60.0 >60.0   Calcium 9.0 9.4 9.3 9.5       CARDIAC BIOMARKERS:        COAGS:  Recent Labs   Lab 02/10/22  1335   INR 1.1       LIPIDS/LFTS:  Recent Labs   Lab 10/24/20  0900 09/18/21  0950 11/10/22  0842 03/02/23  0856   Cholesterol 123 126  --  136   Triglycerides 70 84  --  61   HDL 51 48  --  48   LDL Cholesterol 58.0 L 61.2 L  --  75.8   Non-HDL Cholesterol 72 78  --  88   AST 14 19 11 12   ALT 13 15 9 L 9 L        The 10-year ASCVD risk score (Justino ABEBE, et al., 2019) is: 5.1%    Values used to calculate the score:      Age: 58 years      Sex: Female      Is Non- : Yes      Diabetic: No      Tobacco smoker: No      Systolic Blood Pressure: 132 mmHg      Is BP treated: Yes      HDL Cholesterol: 48 mg/dL      Total Cholesterol: 136 mg/dL      Cardiovascular Testing:  Ex stress echo 2/27/23  The left ventricle is normal in size with mild concentric hypertrophy and mildly decreased systolic function.  The estimated ejection fraction is 45%.  There is mild left ventricular global hypokinesis.  Grade I left ventricular diastolic dysfunction.  Normal right ventricular size with normal right ventricular systolic function.  The estimated PA systolic pressure is 21 mmHg.  The stress echo portion of this study is negative for myocardial ischemia.  The ECG portion of this study was positive for myocardial ischemia. 1 mm of horizontal sloping ST depression was noted during stress.  The patient exercised for 5 minutes and 17 seconds on a Davion protocol, corresponding to a functional capacity of 7 METS, achieving a peak heart rate of 151 bpm, which is 97% of the age predicted maximum heart rate. The patient reported no symptoms during stress. The patient experienced no angina during the stress test. The blood pressure response to stress was normal. The patient's exercise capacity was below average.    ASSESSMENT:   # CP, ?GI, resolved with doubling up of PPI  # HTN, controlled  # abnl EKG, resting ST depression noted.  DELFINA 2/2023 neg.  Low resting EF noted.  # BMI 39  # dyslipidemia, ASCVD calc suggests statin benefit.    PLAN:   Cont med rx  Start atorva 20mg qhs  Diet/exercise/weight loss  RTC 6 months with lipids/LFT (Oct 2023)    Stevan Pena MD, FACC

## 2023-04-21 ENCOUNTER — PATIENT MESSAGE (OUTPATIENT)
Dept: FAMILY MEDICINE | Facility: CLINIC | Age: 58
End: 2023-04-21
Payer: COMMERCIAL

## 2023-04-25 ENCOUNTER — OFFICE VISIT (OUTPATIENT)
Dept: URGENT CARE | Facility: CLINIC | Age: 58
End: 2023-04-25
Payer: COMMERCIAL

## 2023-04-25 VITALS
TEMPERATURE: 98 F | OXYGEN SATURATION: 97 % | WEIGHT: 229 LBS | BODY MASS INDEX: 39.09 KG/M2 | HEART RATE: 79 BPM | SYSTOLIC BLOOD PRESSURE: 129 MMHG | HEIGHT: 64 IN | RESPIRATION RATE: 16 BRPM | DIASTOLIC BLOOD PRESSURE: 80 MMHG

## 2023-04-25 DIAGNOSIS — R30.0 DYSURIA: Primary | ICD-10-CM

## 2023-04-25 DIAGNOSIS — R07.9 CHEST PAIN, UNSPECIFIED TYPE: ICD-10-CM

## 2023-04-25 DIAGNOSIS — K21.9 GASTROESOPHAGEAL REFLUX DISEASE, UNSPECIFIED WHETHER ESOPHAGITIS PRESENT: ICD-10-CM

## 2023-04-25 LAB
BILIRUB UR QL STRIP: NEGATIVE
GLUCOSE UR QL STRIP: NEGATIVE
KETONES UR QL STRIP: NEGATIVE
LEUKOCYTE ESTERASE UR QL STRIP: NEGATIVE
PH, POC UA: 8.5
POC BLOOD, URINE: NEGATIVE
POC NITRATES, URINE: NEGATIVE
PROT UR QL STRIP: POSITIVE
SP GR UR STRIP: 1 (ref 1–1.03)
UROBILINOGEN UR STRIP-ACNC: NORMAL (ref 0.1–1.1)

## 2023-04-25 PROCEDURE — 93010 EKG 12-LEAD: ICD-10-PCS | Mod: S$GLB,,, | Performed by: INTERNAL MEDICINE

## 2023-04-25 PROCEDURE — 81003 URINALYSIS AUTO W/O SCOPE: CPT | Mod: QW,S$GLB,,

## 2023-04-25 PROCEDURE — 93005 ELECTROCARDIOGRAM TRACING: CPT | Mod: S$GLB,,,

## 2023-04-25 PROCEDURE — 99213 OFFICE O/P EST LOW 20 MIN: CPT | Mod: S$GLB,,,

## 2023-04-25 PROCEDURE — 99213 PR OFFICE/OUTPT VISIT, EST, LEVL III, 20-29 MIN: ICD-10-PCS | Mod: S$GLB,,,

## 2023-04-25 PROCEDURE — 81003 POCT URINALYSIS, DIPSTICK, AUTOMATED, W/O SCOPE: ICD-10-PCS | Mod: QW,S$GLB,,

## 2023-04-25 PROCEDURE — 93005 EKG 12-LEAD: ICD-10-PCS | Mod: S$GLB,,,

## 2023-04-25 PROCEDURE — 93010 ELECTROCARDIOGRAM REPORT: CPT | Mod: S$GLB,,, | Performed by: INTERNAL MEDICINE

## 2023-04-25 RX ORDER — LIDOCAINE HYDROCHLORIDE 20 MG/ML
10 SOLUTION OROPHARYNGEAL
Status: COMPLETED | OUTPATIENT
Start: 2023-04-25 | End: 2023-04-25

## 2023-04-25 RX ORDER — DICYCLOMINE HYDROCHLORIDE 10 MG/5ML
20 SOLUTION ORAL
Status: COMPLETED | OUTPATIENT
Start: 2023-04-25 | End: 2023-04-25

## 2023-04-25 RX ORDER — MAG HYDROX/ALUMINUM HYD/SIMETH 200-200-20
30 SUSPENSION, ORAL (FINAL DOSE FORM) ORAL
Status: COMPLETED | OUTPATIENT
Start: 2023-04-25 | End: 2023-04-25

## 2023-04-25 RX ADMIN — LIDOCAINE HYDROCHLORIDE 10 ML: 20 SOLUTION OROPHARYNGEAL at 04:04

## 2023-04-25 RX ADMIN — DICYCLOMINE HYDROCHLORIDE 20 MG: 10 SOLUTION ORAL at 04:04

## 2023-04-25 RX ADMIN — Medication 30 ML: at 04:04

## 2023-04-25 NOTE — PATIENT INSTRUCTIONS
Please return here or go to the Emergency Department for any concerns or worsening of condition.   Continue Protonix as prescribed by your doctor  Please drink plenty of fluids.   Please get plenty of rest.   Sit up at least 30 minutes after eating a meal; avoid eating late evening meals 2 hours before bedtime.  Avoid food that can make your symptoms worse such as chocolate, peppermint and fatty foods. Avoid Alcohol.  Elevate your head of bed by about 6 to 8 inches while sleeping (examples:

## 2023-04-25 NOTE — PROGRESS NOTES
"Subjective:      Patient ID: Niharika Gutierrez is a 58 y.o. female.    Vitals:  height is 5' 4" (1.626 m) and weight is 103.9 kg (229 lb). Her tympanic temperature is 97.5 °F (36.4 °C). Her blood pressure is 129/80 and her pulse is 79. Her respiration is 16 and oxygen saturation is 97%.     Chief Complaint: Heartburn and Dysuria    Patient is a 58-year-old female with history of GERD who presents with heartburn, chest tightness, vomiting that occurred yesterday.  She takes Protonix 40 mg twice a day for heartburn.  However, she states over the weekend she was eating fried foods and ice cream which made her heartburn worse.  Denies any association of chest tightness with exertion.  One episode of nonbloody nonbilious emesis yesterday, no further episodes today.  Also states that she has been having dysuria since yesterday.  Says that is a mild discomfort, but not necessarily painful or burning.  She denies any other symptoms, including fever, chills, SOB, nausea, abdominal pain, diarrhea, palpitations, leg pain or swelling, hemoptysis, diaphoresis, numbness or tingling, flank pain, vaginal discharge or odor, back or flank pain.    Heartburn  She complains of chest pain and heartburn. She reports no abdominal pain, no coughing, no nausea or no sore throat. This is a new problem. The current episode started yesterday. The problem has been unchanged. Heartburn duration: entire day. The heartburn does not wake her from sleep. The heartburn does not limit her activity. The heartburn doesn't change with position. The symptoms are aggravated by certain foods. She has tried an antacid for the symptoms.   Dysuria   This is a new problem. The current episode started in the past 7 days. The problem occurs every urination. The problem has been unchanged. The quality of the pain is described as burning. The pain is mild. There has been no fever. Associated symptoms include vomiting. Pertinent negatives include no chills, " discharge, flank pain, frequency, hematuria, nausea, urgency, constipation or rash. She has tried nothing for the symptoms.     Constitution: Negative for chills and fever.   HENT:  Negative for ear pain, congestion and sore throat.    Neck: Negative for neck pain and neck stiffness.   Cardiovascular:  Positive for chest pain. Negative for leg swelling, palpitations and sob on exertion.   Eyes:  Negative for double vision and blurred vision.   Respiratory:  Negative for cough and shortness of breath.    Gastrointestinal:  Positive for vomiting and heartburn. Negative for abdominal pain, nausea, constipation, diarrhea, bright red blood in stool and dark colored stools.   Genitourinary:  Positive for dysuria. Negative for frequency, urgency, flank pain, bladder incontinence, hematuria, vaginal pain, vaginal discharge, vaginal odor and pelvic pain.   Musculoskeletal:  Negative for muscle ache.   Skin:  Negative for rash.   Neurological:  Negative for dizziness and headaches.    Objective:     Physical Exam   Constitutional: She is oriented to person, place, and time. She appears well-developed.   HENT:   Head: Normocephalic and atraumatic.   Ears:   Right Ear: External ear normal.   Left Ear: External ear normal.   Nose: Nose normal.   Mouth/Throat: Oropharynx is clear and moist. Mucous membranes are moist. Oropharynx is clear.   Eyes: Conjunctivae, EOM and lids are normal. Pupils are equal, round, and reactive to light.   Neck: Trachea normal and phonation normal. Neck supple.   Cardiovascular: Normal rate, regular rhythm, normal heart sounds and normal pulses.   Pulmonary/Chest: Effort normal and breath sounds normal. She exhibits no tenderness.   Abdominal: She exhibits no distension. Soft. There is no abdominal tenderness. There is no rebound, no guarding, no left CVA tenderness and no right CVA tenderness.   Musculoskeletal: Normal range of motion.         General: Normal range of motion.   Neurological: no focal  deficit. She is alert and oriented to person, place, and time.   Skin: Skin is warm, dry and intact. Capillary refill takes less than 2 seconds.   Psychiatric: Her speech is normal and behavior is normal. Judgment and thought content normal.   Nursing note and vitals reviewed.    Results for orders placed or performed in visit on 04/25/23   POCT Urinalysis, Dipstick, Automated, W/O Scope   Result Value Ref Range    POC Blood, Urine Negative Negative    POC Bilirubin, Urine Negative Negative    POC Urobilinogen, Urine normal 0.1 - 1.1    POC Ketones, Urine Negative Negative    POC Protein, Urine Positive (A) Negative    POC Nitrates, Urine Negative Negative    POC Glucose, Urine Negative Negative    pH, UA 8.5     POC Specific Gravity, Urine 1.005 1.003 - 1.029    POC Leukocytes, Urine Negative Negative     EKG: HR 75, NSR, voltage criteria for LVA, no acute ischemic changes    Assessment:     1. Dysuria    2. Chest pain, unspecified type    3. Gastroesophageal reflux disease, unspecified whether esophagitis present        Plan:       Dysuria  -     POCT Urinalysis, Dipstick, Automated, W/O Scope    Chest pain, unspecified type  -     IN OFFICE EKG 12-LEAD (to Muse)    Gastroesophageal reflux disease, unspecified whether esophagitis present  -     LIDOcaine HCl 2% oral solution 10 mL  -     dicyclomine 10 mg/5 mL syrup 20 mg  -     aluminum-magnesium hydroxide-simethicone 200-200-20 mg/5 mL suspension 30 mL              Patient Instructions     Please return here or go to the Emergency Department for any concerns or worsening of condition.   Continue Protonix as prescribed by your doctor  Please drink plenty of fluids.   Please get plenty of rest.   Sit up at least 30 minutes after eating a meal; avoid eating late evening meals 2 hours before bedtime.  Avoid food that can make your symptoms worse such as chocolate, peppermint and fatty foods. Avoid Alcohol.  Elevate your head of bed by about 6 to 8 inches while  sleeping (examples:

## 2023-04-26 ENCOUNTER — HOSPITAL ENCOUNTER (OUTPATIENT)
Dept: RADIOLOGY | Facility: HOSPITAL | Age: 58
Discharge: HOME | End: 2023-04-26
Attending: FAMILY MEDICINE
Payer: COMMERCIAL

## 2023-04-26 DIAGNOSIS — Z12.31 ENCOUNTER FOR SCREENING MAMMOGRAM FOR MALIGNANT NEOPLASM OF BREAST: ICD-10-CM

## 2023-04-26 PROCEDURE — 77063 MAMMO DIGITAL SCREENING BILAT WITH TOMO: ICD-10-PCS | Mod: 26,,, | Performed by: RADIOLOGY

## 2023-04-26 PROCEDURE — 77067 SCR MAMMO BI INCL CAD: CPT | Mod: 26,,, | Performed by: RADIOLOGY

## 2023-04-26 PROCEDURE — 77067 SCR MAMMO BI INCL CAD: CPT | Mod: TC,PO

## 2023-04-26 PROCEDURE — 77067 MAMMO DIGITAL SCREENING BILAT WITH TOMO: ICD-10-PCS | Mod: 26,,, | Performed by: RADIOLOGY

## 2023-04-26 PROCEDURE — 77063 BREAST TOMOSYNTHESIS BI: CPT | Mod: 26,,, | Performed by: RADIOLOGY

## 2023-05-05 ENCOUNTER — OFFICE VISIT (OUTPATIENT)
Dept: OBSTETRICS AND GYNECOLOGY | Facility: CLINIC | Age: 58
End: 2023-05-05
Payer: COMMERCIAL

## 2023-05-05 VITALS
SYSTOLIC BLOOD PRESSURE: 124 MMHG | WEIGHT: 222.69 LBS | BODY MASS INDEX: 38.22 KG/M2 | DIASTOLIC BLOOD PRESSURE: 82 MMHG

## 2023-05-05 DIAGNOSIS — Z01.419 WELL WOMAN EXAM: ICD-10-CM

## 2023-05-05 DIAGNOSIS — Z90.710 HISTORY OF TOTAL HYSTERECTOMY: Primary | ICD-10-CM

## 2023-05-05 PROCEDURE — 3079F DIAST BP 80-89 MM HG: CPT | Mod: CPTII,S$GLB,, | Performed by: OBSTETRICS & GYNECOLOGY

## 2023-05-05 PROCEDURE — 3008F BODY MASS INDEX DOCD: CPT | Mod: CPTII,S$GLB,, | Performed by: OBSTETRICS & GYNECOLOGY

## 2023-05-05 PROCEDURE — 3008F PR BODY MASS INDEX (BMI) DOCUMENTED: ICD-10-PCS | Mod: CPTII,S$GLB,, | Performed by: OBSTETRICS & GYNECOLOGY

## 2023-05-05 PROCEDURE — 1159F MED LIST DOCD IN RCRD: CPT | Mod: CPTII,S$GLB,, | Performed by: OBSTETRICS & GYNECOLOGY

## 2023-05-05 PROCEDURE — 99999 PR PBB SHADOW E&M-EST. PATIENT-LVL III: CPT | Mod: PBBFAC,,, | Performed by: OBSTETRICS & GYNECOLOGY

## 2023-05-05 PROCEDURE — 3044F HG A1C LEVEL LT 7.0%: CPT | Mod: CPTII,S$GLB,, | Performed by: OBSTETRICS & GYNECOLOGY

## 2023-05-05 PROCEDURE — 3074F SYST BP LT 130 MM HG: CPT | Mod: CPTII,S$GLB,, | Performed by: OBSTETRICS & GYNECOLOGY

## 2023-05-05 PROCEDURE — 3074F PR MOST RECENT SYSTOLIC BLOOD PRESSURE < 130 MM HG: ICD-10-PCS | Mod: CPTII,S$GLB,, | Performed by: OBSTETRICS & GYNECOLOGY

## 2023-05-05 PROCEDURE — 99386 PREV VISIT NEW AGE 40-64: CPT | Mod: S$GLB,,, | Performed by: OBSTETRICS & GYNECOLOGY

## 2023-05-05 PROCEDURE — 99386 PR PREVENTIVE VISIT,NEW,40-64: ICD-10-PCS | Mod: S$GLB,,, | Performed by: OBSTETRICS & GYNECOLOGY

## 2023-05-05 PROCEDURE — 3079F PR MOST RECENT DIASTOLIC BLOOD PRESSURE 80-89 MM HG: ICD-10-PCS | Mod: CPTII,S$GLB,, | Performed by: OBSTETRICS & GYNECOLOGY

## 2023-05-05 PROCEDURE — 1159F PR MEDICATION LIST DOCUMENTED IN MEDICAL RECORD: ICD-10-PCS | Mod: CPTII,S$GLB,, | Performed by: OBSTETRICS & GYNECOLOGY

## 2023-05-05 PROCEDURE — 3044F PR MOST RECENT HEMOGLOBIN A1C LEVEL <7.0%: ICD-10-PCS | Mod: CPTII,S$GLB,, | Performed by: OBSTETRICS & GYNECOLOGY

## 2023-05-05 PROCEDURE — 99999 PR PBB SHADOW E&M-EST. PATIENT-LVL III: ICD-10-PCS | Mod: PBBFAC,,, | Performed by: OBSTETRICS & GYNECOLOGY

## 2023-05-05 NOTE — PROGRESS NOTES
Subjective:      Patient ID: Niharika Gutierrez is a 58 y.o. female.    Chief Complaint:  Annual Exam      History of Present Illness  HPI  Annual Exam-Postmenopausal  Patient presents for annual exam. The patient has no complaints today. The patient is rarely sexually active because  had ED issues. GYN screening history:  pt had total hyst done in late 30's or early 40's for fibroids and bleeding . The patient is not taking hormone replacement therapy. Patient denies post-menopausal vaginal bleeding. The patient wears seatbelts: yes. The patient participates in regular exercise: no. Has the patient ever been transfused or tattooed?: no. The patient reports that there is not domestic violence in her life.    MM2023 WNL  Colonoscopy:  pt states up to date    GYN & OB History  No LMP recorded. Patient has had a hysterectomy.   Date of Last Pap: No result found    OB History    Para Term  AB Living   1 1 1         SAB IAB Ectopic Multiple Live Births                  # Outcome Date GA Lbr Lopez/2nd Weight Sex Delivery Anes PTL Lv   1 Term                Review of Systems  Review of Systems   Constitutional: Negative.    HENT: Negative.     Gastrointestinal: Negative.    Endocrine: Negative.    Genitourinary:  Positive for decreased libido. Negative for bladder incontinence, dysmenorrhea, dyspareunia, dysuria, flank pain, frequency, genital sores, hematuria, hot flashes, vaginal discharge, vaginal pain, urinary incontinence, vaginal dryness and vaginal odor.   Musculoskeletal:  Positive for arthralgias, joint swelling, leg pain and myalgias.   Neurological: Negative.    Psychiatric/Behavioral: Negative.     Breast: negative.         Objective:     Physical Exam:   Constitutional: She is oriented to person, place, and time. She appears well-developed and well-nourished. No distress.    HENT:   Head: Normocephalic and atraumatic.     Neck: No thyromegaly present.     Pulmonary/Chest:  Effort normal. No respiratory distress. Right breast exhibits no inverted nipple, no mass, no nipple discharge, no skin change, no tenderness, presence, no bleeding, no swelling, no mastectomy, no augmentation and no lumpectomy. Left breast exhibits no inverted nipple, no mass, no nipple discharge, no skin change, no tenderness, presence, no bleeding, no swelling, no mastectomy, no augmentation and no lumpectomy.        Abdominal: Soft. She exhibits no distension and no mass. There is no abdominal tenderness. There is no rebound and no guarding.     Genitourinary:    Vagina, right adnexa and left adnexa normal.      Pelvic exam was performed with patient supine.   The external female genitalia was normal.   No external genitalia lesions identified,Genitalia hair distrobution normal .   Labial bartholins normal.There is no rash, tenderness, lesion or injury on the right labia. There is no rash, tenderness, lesion or injury on the left labia. No no adexnal prolapse, no nodularity or no masses or organomegaly. Right adnexum displays no mass, no tenderness and no fullness. Left adnexum displays no mass, no tenderness and no fullness. Vagina exhibits no lesion. No erythema,  no vaginal discharge, tenderness, bleeding, rectocele, cystocele or unspecified prolapse of vaginal walls in the vagina.    No foreign body in the vagina.      No signs of injury in the vagina.   Vagina was moist.Cervix is absent.Uterus is absent. Normal urethral meatus.Urethral Meatus exhibits: urethral lesion and prolapsedUrethra findings: no urethral mass, no tenderness and no urethral scarringBladder findings: no bladder distention and no bladder tenderness          Musculoskeletal: Normal range of motion and moves all extremeties. No tenderness.       Neurological: She is alert and oriented to person, place, and time. No cranial nerve deficit. Coordination normal.    Skin: She is not diaphoretic.    Psychiatric: She has a normal mood and affect.  Her behavior is normal. Judgment and thought content normal.       Assessment:     1. History of total hysterectomy    2. Well woman exam              Plan:     Pap smears no longer indicated since total hyst done for benign indications  Pt up to date with MMG and colonoscopy  F/u annually for routine gyn care

## 2023-05-07 PROBLEM — Z90.710 HISTORY OF TOTAL HYSTERECTOMY: Status: ACTIVE | Noted: 2023-05-07

## 2023-05-09 ENCOUNTER — TELEPHONE (OUTPATIENT)
Dept: ENDOSCOPY | Facility: HOSPITAL | Age: 58
End: 2023-05-09
Payer: COMMERCIAL

## 2023-05-09 NOTE — TELEPHONE ENCOUNTER
----- Message from Alfred GERMAN Route sent at 5/9/2023  9:40 AM CDT -----  Regarding: Missed Call  Contact: [phone]  Pt call stating she missed a call from someone in provider's office. Pt states she has an appt. 5/12. Patient Requesting Call Back @ [phone]

## 2023-05-11 ENCOUNTER — OFFICE VISIT (OUTPATIENT)
Dept: SURGERY | Facility: CLINIC | Age: 58
End: 2023-05-11
Payer: COMMERCIAL

## 2023-05-11 VITALS
DIASTOLIC BLOOD PRESSURE: 78 MMHG | WEIGHT: 225.38 LBS | BODY MASS INDEX: 38.69 KG/M2 | SYSTOLIC BLOOD PRESSURE: 134 MMHG | HEART RATE: 85 BPM

## 2023-05-11 DIAGNOSIS — R15.1 FECAL SOILING: ICD-10-CM

## 2023-05-11 DIAGNOSIS — K64.9 HEMORRHOIDS, UNSPECIFIED HEMORRHOID TYPE: Primary | ICD-10-CM

## 2023-05-11 PROCEDURE — 3078F DIAST BP <80 MM HG: CPT | Mod: CPTII,S$GLB,, | Performed by: NURSE PRACTITIONER

## 2023-05-11 PROCEDURE — 99203 OFFICE O/P NEW LOW 30 MIN: CPT | Mod: S$GLB,,, | Performed by: NURSE PRACTITIONER

## 2023-05-11 PROCEDURE — 3075F SYST BP GE 130 - 139MM HG: CPT | Mod: CPTII,S$GLB,, | Performed by: NURSE PRACTITIONER

## 2023-05-11 PROCEDURE — 3008F PR BODY MASS INDEX (BMI) DOCUMENTED: ICD-10-PCS | Mod: CPTII,S$GLB,, | Performed by: NURSE PRACTITIONER

## 2023-05-11 PROCEDURE — 1159F PR MEDICATION LIST DOCUMENTED IN MEDICAL RECORD: ICD-10-PCS | Mod: CPTII,S$GLB,, | Performed by: NURSE PRACTITIONER

## 2023-05-11 PROCEDURE — 3044F PR MOST RECENT HEMOGLOBIN A1C LEVEL <7.0%: ICD-10-PCS | Mod: CPTII,S$GLB,, | Performed by: NURSE PRACTITIONER

## 2023-05-11 PROCEDURE — 3078F PR MOST RECENT DIASTOLIC BLOOD PRESSURE < 80 MM HG: ICD-10-PCS | Mod: CPTII,S$GLB,, | Performed by: NURSE PRACTITIONER

## 2023-05-11 PROCEDURE — 99203 PR OFFICE/OUTPT VISIT, NEW, LEVL III, 30-44 MIN: ICD-10-PCS | Mod: S$GLB,,, | Performed by: NURSE PRACTITIONER

## 2023-05-11 PROCEDURE — 1160F RVW MEDS BY RX/DR IN RCRD: CPT | Mod: CPTII,S$GLB,, | Performed by: NURSE PRACTITIONER

## 2023-05-11 PROCEDURE — 3075F PR MOST RECENT SYSTOLIC BLOOD PRESS GE 130-139MM HG: ICD-10-PCS | Mod: CPTII,S$GLB,, | Performed by: NURSE PRACTITIONER

## 2023-05-11 PROCEDURE — 99999 PR PBB SHADOW E&M-EST. PATIENT-LVL IV: CPT | Mod: PBBFAC,,, | Performed by: NURSE PRACTITIONER

## 2023-05-11 PROCEDURE — 3044F HG A1C LEVEL LT 7.0%: CPT | Mod: CPTII,S$GLB,, | Performed by: NURSE PRACTITIONER

## 2023-05-11 PROCEDURE — 1160F PR REVIEW ALL MEDS BY PRESCRIBER/CLIN PHARMACIST DOCUMENTED: ICD-10-PCS | Mod: CPTII,S$GLB,, | Performed by: NURSE PRACTITIONER

## 2023-05-11 PROCEDURE — 99999 PR PBB SHADOW E&M-EST. PATIENT-LVL IV: ICD-10-PCS | Mod: PBBFAC,,, | Performed by: NURSE PRACTITIONER

## 2023-05-11 PROCEDURE — 3008F BODY MASS INDEX DOCD: CPT | Mod: CPTII,S$GLB,, | Performed by: NURSE PRACTITIONER

## 2023-05-11 PROCEDURE — 1159F MED LIST DOCD IN RCRD: CPT | Mod: CPTII,S$GLB,, | Performed by: NURSE PRACTITIONER

## 2023-05-11 RX ORDER — HYDROCORTISONE 25 MG/G
CREAM TOPICAL 2 TIMES DAILY
Qty: 28 G | Refills: 2 | Status: SHIPPED | OUTPATIENT
Start: 2023-05-11 | End: 2023-09-05

## 2023-05-11 NOTE — PATIENT INSTRUCTIONS
Daily fiber supplement. Citrucel, fibercon, metamucil, etc.   Water intake > 64 oz/day.  Anusol cream 2x/day internally/externally for 2 weeks.  If able to manually reduce, can follow up for a rubber band ligation.

## 2023-05-11 NOTE — PROGRESS NOTES
CRS Office Visit History and Physical    Referring Md:   Janae Schwartz Md  [address] [facility] [place] [address]    SUBJECTIVE:     Chief Complaint: hemorrhoids    History of Present Illness:  The patient is new patient to this practice.   Course is as follows:  Patient is a 58 y.o. female presents with hemorrhoidal irritation and leakage of fecal matter.  Symptoms have been present for years.  Has tried prep h and recticare.  Associated bleeding: no  Previous anorectal procedures: No  denies straining/prolonged time on toilet with bowel movements.  is not currently taking fiber supplement or stool softener. 1-2 bm/day of usually loose stools.   Blood thinners: No    Last Colonoscopy completed on 2015  - The entire examined colon is normal.   - No specimens collected.   - repeat in 10 years    Review of patient's allergies indicates:  No Known Allergies    Past Medical History:   Diagnosis Date    GERD (gastroesophageal reflux disease)     Hypertension     Hypokalemic syndrome     Thyroid disease     Thyroid nodule     Vitamin D deficiency disease      Past Surgical History:   Procedure Laterality Date    BREAST BIOPSY       SECTION  2004    X 1    CHOLECYSTECTOMY  2008    HYSTERECTOMY      LAPAROSCOPIC HYSTERECTOMY  2010    LSO     Family History   Problem Relation Age of Onset    Diabetes Mother     Heart disease Father         CHF    No Known Problems Sister     No Known Problems Brother     No Known Problems Brother     Breast cancer Maternal Aunt     Breast cancer Paternal Aunt     Breast cancer Maternal Aunt     Breast cancer Paternal Aunt     No Known Problems Maternal Uncle     No Known Problems Paternal Uncle     No Known Problems Maternal Grandmother     No Known Problems Maternal Grandfather     No Known Problems Paternal Grandmother     No Known Problems Paternal Grandfather     Colon cancer Neg Hx     Colon polyps Neg Hx     Amblyopia Neg Hx     Blindness Neg Hx      Cancer Neg Hx     Cataracts Neg Hx     Glaucoma Neg Hx     Hypertension Neg Hx     Macular degeneration Neg Hx     Retinal detachment Neg Hx     Strabismus Neg Hx     Stroke Neg Hx     Thyroid disease Neg Hx      Social History     Tobacco Use    Smoking status: Never    Smokeless tobacco: Never   Substance Use Topics    Alcohol use: No    Drug use: No        Review of Systems:  Review of Systems   Gastrointestinal:  Positive for diarrhea.        Anal irritation     OBJECTIVE:     Vital Signs (Most Recent)  Blood Pressure 134/78   Pulse 85   Weight 102.2 kg (225 lb 6.4 oz)   Body Mass Index 38.69 kg/m²     Physical Exam:  General: Black or  female in no distress   Neuro: Alert and oriented to person, place, and time.  Moves all extremities.     HEENT: No icterus.  Trachea midline  Respiratory: Respirations are even and unlabored, no cough or audible wheezing  Skin: Warm dry and intact, No visible rashes, no jaundice    Labs reviewed today:  Lab Results   Component Value Date    WBC 11.31 03/02/2023    HGB 11.7 (L) 03/02/2023    HCT 38.7 03/02/2023     03/02/2023    CHOL 136 03/02/2023    TRIG 61 03/02/2023    HDL 48 03/02/2023    ALT 9 (L) 03/02/2023    AST 12 03/02/2023     03/02/2023    K 3.4 (L) 03/02/2023     03/02/2023    CREATININE 0.7 03/02/2023    BUN 10 03/02/2023    CO2 29 03/02/2023    TSH 1.622 03/02/2023    INR 1.1 02/10/2022    GLUF 85 12/30/2008    HGBA1C 5.2 03/02/2023       Imaging reviewed today:  none    Endoscopy reviewed today:  Last Colonoscopy completed on 6/18/2015  - The entire examined colon is normal.   - No specimens collected.   - repeat in 10 years    Anorectal Exam:    Anal Skin: External hemorrhoids    Digital Rectal Exam:  Resting Tone normal  Mass none  Tenderness  absent      ASSESSMENT/PLAN:     Diagnoses and all orders for this visit:    Hemorrhoids, unspecified hemorrhoid type  -     Ambulatory referral/consult to Colorectal  Surgery  -     hydrocortisone (ANUSOL-HC) 2.5 % rectal cream; Place rectally 2 (two) times daily.    Fecal soiling        The patient was instructed to:  Anusol internally/externally bid for 2 weeks  Increase water intake to at least 8-10 glasses of water per day.  Take a daily fiber supplement (Konsyl, Benefiber, Metamucil) and increase dietary intake to 20-30 grams/day.  Avoid straining or spending >5min/event with bowel movements.  Follow-up in clinic in 6-8 weeks for RBL if able to manually reduce.  Pelvic floor pt if fecal leakage continues      GAETANO Jones-DASHA  Colon and Rectal Surgery

## 2023-05-19 ENCOUNTER — PATIENT MESSAGE (OUTPATIENT)
Dept: ORTHOPEDICS | Facility: CLINIC | Age: 58
End: 2023-05-19
Payer: COMMERCIAL

## 2023-05-19 DIAGNOSIS — M17.0 PRIMARY OSTEOARTHRITIS OF BOTH KNEES: ICD-10-CM

## 2023-05-19 RX ORDER — MELOXICAM 15 MG/1
TABLET ORAL
Qty: 30 TABLET | Refills: 1 | Status: SHIPPED | OUTPATIENT
Start: 2023-05-19

## 2023-08-16 ENCOUNTER — OFFICE VISIT (OUTPATIENT)
Dept: DERMATOLOGY | Facility: CLINIC | Age: 58
End: 2023-08-16
Payer: COMMERCIAL

## 2023-08-16 DIAGNOSIS — L85.8 KP (KERATOSIS PILARIS): ICD-10-CM

## 2023-08-16 DIAGNOSIS — Z76.89 ENCOUNTER FOR SKIN CARE: Primary | ICD-10-CM

## 2023-08-16 DIAGNOSIS — L68.0 HIRSUTISM: ICD-10-CM

## 2023-08-16 PROCEDURE — 1160F PR REVIEW ALL MEDS BY PRESCRIBER/CLIN PHARMACIST DOCUMENTED: ICD-10-PCS | Mod: CPTII,95,, | Performed by: DERMATOLOGY

## 2023-08-16 PROCEDURE — 1159F PR MEDICATION LIST DOCUMENTED IN MEDICAL RECORD: ICD-10-PCS | Mod: CPTII,95,, | Performed by: DERMATOLOGY

## 2023-08-16 PROCEDURE — 1160F RVW MEDS BY RX/DR IN RCRD: CPT | Mod: CPTII,95,, | Performed by: DERMATOLOGY

## 2023-08-16 PROCEDURE — 99203 OFFICE O/P NEW LOW 30 MIN: CPT | Mod: 95,,, | Performed by: DERMATOLOGY

## 2023-08-16 PROCEDURE — 3044F HG A1C LEVEL LT 7.0%: CPT | Mod: CPTII,95,, | Performed by: DERMATOLOGY

## 2023-08-16 PROCEDURE — 3044F PR MOST RECENT HEMOGLOBIN A1C LEVEL <7.0%: ICD-10-PCS | Mod: CPTII,95,, | Performed by: DERMATOLOGY

## 2023-08-16 PROCEDURE — 99203 PR OFFICE/OUTPT VISIT, NEW, LEVL III, 30-44 MIN: ICD-10-PCS | Mod: 95,,, | Performed by: DERMATOLOGY

## 2023-08-16 PROCEDURE — 1159F MED LIST DOCD IN RCRD: CPT | Mod: CPTII,95,, | Performed by: DERMATOLOGY

## 2023-08-16 NOTE — PROGRESS NOTES
The patient location is: home  The chief complaint leading to consultation is: dry skin on arms.  No tx.  Also increased body hair.  Also some bumps on the face.    Visit type: audiovisual    Face to Face time with patient: 4.5 m  7 minutes of total time spent on the encounter, which includes face to face time and non-face to face time preparing to see the patient (eg, review of tests), Obtaining and/or reviewing separately obtained history, Documenting clinical information in the electronic or other health record, Independently interpreting results (not separately reported) and communicating results to the patient/family/caregiver, or Care coordination (not separately reported).         Each patient to whom he or she provides medical services by telemedicine is:  (1) informed of the relationship between the physician and patient and the respective role of any other health care provider with respect to management of the patient; and (2) notified that he or she may decline to receive medical services by telemedicine and may withdraw from such care at any time.    Notes:     Subjective:      Patient ID:  Niharika Gutierrez is a 58 y.o. female who presents for No chief complaint on file.    HPI    Review of Systems   Constitutional:  Negative for fever.   HENT:  Negative for sore throat.    Respiratory:  Negative for cough.    Skin:  Positive for dry skin.       Objective:   Physical Exam   Constitutional: She appears well-developed and well-nourished.   Neurological: She is alert and oriented to person, place, and time.   Psychiatric: She has a normal mood and affect.   Skin:   Areas Examined (abnormalities noted in diagram):   Head / Face Inspection Performed       Diagram Legend     Erythematous scaling macule/papule c/w actinic keratosis       Vascular papule c/w angioma      Pigmented verrucoid papule/plaque c/w seborrheic keratosis      Yellow umbilicated papule c/w sebaceous hyperplasia      Irregularly  shaped tan macule c/w lentigo     1-2 mm smooth white papules consistent with Milia      Movable subcutaneous cyst with punctum c/w epidermal inclusion cyst      Subcutaneous movable cyst c/w pilar cyst      Firm pink to brown papule c/w dermatofibroma      Pedunculated fleshy papule(s) c/w skin tag(s)      Evenly pigmented macule c/w junctional nevus     Mildly variegated pigmented, slightly irregular-bordered macule c/w mildly atypical nevus      Flesh colored to evenly pigmented papule c/w intradermal nevus       Pink pearly papule/plaque c/w basal cell carcinoma      Erythematous hyperkeratotic cursted plaque c/w SCC      Surgical scar with no sign of skin cancer recurrence      Open and closed comedones      Inflammatory papules and pustules      Verrucoid papule consistent consistent with wart     Erythematous eczematous patches and plaques     Dystrophic onycholytic nail with subungual debris c/w onychomycosis     Umbilicated papule    Erythematous-base heme-crusted tan verrucoid plaque consistent with inflamed seborrheic keratosis     Erythematous Silvery Scaling Plaque c/w Psoriasis     See annotation        Assessment / Plan:        Encounter for skin care  No hot water bathing reviewed.    KP (keratosis pilaris)  Etiology of keratosis pilaris discussed and explained the dry skin plugging of the pores.  Recommended salicylic acid washes for now with avoidance of excessively hot water bathing on affected areas.  Use a scrub brush and a loofa.  Can consider moisturizers and natural oils later.  Consider gluten free diet as it may help.  Discussed with patient the etiology and pathogenesis of the disease or skin lesion(s) and possible treatments and aggravators.  .  Reviewed with patient different treatment options and associated risks.  Chronic nature of this condition discussed with patient.  Face and arms.    Hirsutism  Pt co increased body hair.  Okay to try hair supplements for scalp hair.  Unlikely to  grow more body hair but poss.  Previous [facility] labs and or records and notes reviewed and considered for their impact on our clinical decision making today.             Follow up if symptoms worsen or fail to improve.

## 2023-08-16 NOTE — PATIENT INSTRUCTIONS
Etiology of keratosis pilaris discussed and explained the dry skin plugging of the pores.  Recommended salicylic acid washes for now with avoidance of excessively hot water bathing on affected areas.  Use a scrub brush and a loofa.  Can consider moisturizers and natural oils later.  Consider gluten free diet as it may help.

## 2023-08-23 ENCOUNTER — PATIENT MESSAGE (OUTPATIENT)
Dept: FAMILY MEDICINE | Facility: CLINIC | Age: 58
End: 2023-08-23
Payer: COMMERCIAL

## 2023-09-04 DIAGNOSIS — K64.9 HEMORRHOIDS, UNSPECIFIED HEMORRHOID TYPE: ICD-10-CM

## 2023-09-05 RX ORDER — HYDROCORTISONE 25 MG/G
CREAM TOPICAL
Qty: 30 G | Refills: 2 | Status: SHIPPED | OUTPATIENT
Start: 2023-09-05 | End: 2023-09-29

## 2023-09-07 ENCOUNTER — OFFICE VISIT (OUTPATIENT)
Dept: PODIATRY | Facility: CLINIC | Age: 58
End: 2023-09-07
Payer: COMMERCIAL

## 2023-09-07 VITALS — HEIGHT: 64 IN | WEIGHT: 225.31 LBS | BODY MASS INDEX: 38.47 KG/M2

## 2023-09-07 DIAGNOSIS — L60.0 INGROWN NAIL: Primary | ICD-10-CM

## 2023-09-07 DIAGNOSIS — M79.675 PAIN OF LEFT GREAT TOE: ICD-10-CM

## 2023-09-07 PROCEDURE — 3008F PR BODY MASS INDEX (BMI) DOCUMENTED: ICD-10-PCS | Mod: CPTII,S$GLB,, | Performed by: PODIATRIST

## 2023-09-07 PROCEDURE — 99214 OFFICE O/P EST MOD 30 MIN: CPT | Mod: S$GLB,,, | Performed by: PODIATRIST

## 2023-09-07 PROCEDURE — 99214 PR OFFICE/OUTPT VISIT, EST, LEVL IV, 30-39 MIN: ICD-10-PCS | Mod: S$GLB,,, | Performed by: PODIATRIST

## 2023-09-07 PROCEDURE — 99999 PR PBB SHADOW E&M-EST. PATIENT-LVL III: ICD-10-PCS | Mod: PBBFAC,,, | Performed by: PODIATRIST

## 2023-09-07 PROCEDURE — 3044F PR MOST RECENT HEMOGLOBIN A1C LEVEL <7.0%: ICD-10-PCS | Mod: CPTII,S$GLB,, | Performed by: PODIATRIST

## 2023-09-07 PROCEDURE — 3008F BODY MASS INDEX DOCD: CPT | Mod: CPTII,S$GLB,, | Performed by: PODIATRIST

## 2023-09-07 PROCEDURE — 1159F MED LIST DOCD IN RCRD: CPT | Mod: CPTII,S$GLB,, | Performed by: PODIATRIST

## 2023-09-07 PROCEDURE — 99999 PR PBB SHADOW E&M-EST. PATIENT-LVL III: CPT | Mod: PBBFAC,,, | Performed by: PODIATRIST

## 2023-09-07 PROCEDURE — 1159F PR MEDICATION LIST DOCUMENTED IN MEDICAL RECORD: ICD-10-PCS | Mod: CPTII,S$GLB,, | Performed by: PODIATRIST

## 2023-09-07 PROCEDURE — 3044F HG A1C LEVEL LT 7.0%: CPT | Mod: CPTII,S$GLB,, | Performed by: PODIATRIST

## 2023-09-07 RX ORDER — CICLOPIROX 80 MG/ML
SOLUTION TOPICAL NIGHTLY
Qty: 6.6 ML | Refills: 3 | Status: SHIPPED | OUTPATIENT
Start: 2023-09-07 | End: 2023-09-29

## 2023-09-08 NOTE — PROGRESS NOTES
Subjective:     Patient ID: Niharika Gutierrez is a 58 y.o. female.    Chief Complaint: Foot Problem    Niharika is a 58 y.o. female who presents to the clinic complaining of painful ingrown toenail on the left foot.    Review of Systems   Constitutional: Negative for chills.   Cardiovascular:  Negative for chest pain and claudication.   Respiratory:  Negative for cough.    Skin:  Positive for color change, dry skin and nail changes.   Musculoskeletal:  Positive for joint pain.   Gastrointestinal:  Negative for nausea.   Neurological:  Positive for paresthesias. Negative for numbness.   Psychiatric/Behavioral:  The patient is not nervous/anxious.         Objective:     Physical Exam  Constitutional:       Appearance: She is well-developed.      Comments: Oriented to time, place, and person.   Cardiovascular:      Comments: DP and PT pulses are palpable bilaterally. 3 sec capillary refill time and toes and feet are warm to touch proximally .  There is  hair growth on the feet and toes b/l. There is no edema b/l. No spider veins or varicosities present b/l.     Musculoskeletal:      Comments: Equinus noted b/l ankles with < 10 deg DF noted. MMT 5/5 in DF/PF/Inv/Ev resistance with no reproduction of pain in any direction. Passive range of motion of ankle and pedal joints is painless b/l.     Feet:      Right foot:      Skin integrity: No callus or dry skin.      Left foot:      Skin integrity: No callus or dry skin.   Lymphadenopathy:      Comments: Negative lymphadenopathy bilateral popliteal fossa and tarsal tunnel.   Skin:     Comments: No open lesions, lacerations or wounds noted.Interdigital spaces clean, dry and intact b/l. No erythema noted to b/l foot.    Lateral  hallux nail margin of left  foot with ingrown nail plate. Surrounding erythema and minimal edema is noted.         Neurological:      Mental Status: She is alert.      Comments: Light touch, proprioception, and sharp/dull sensation are all  intact bilaterally. Protective threshold with the Troup-Wienstein monofilament is intact bilaterally.    Psychiatric:         Behavior: Behavior is cooperative.           Assessment:      Encounter Diagnoses   Name Primary?    Ingrown nail Yes    Pain of left great toe      Plan:     Niharika was seen today for foot problem.    Diagnoses and all orders for this visit:    Ingrown nail    Pain of left great toe    Other orders  -     ciclopirox (PENLAC) 8 % Soln; Apply topically nightly.      I counseled the patient on her conditions, their implications and medical management.      At patient's request, I discussed different treatments for toenail fungus. We discussed oral antifungals but I did not recommend them as a first line treatment since the medication is taken internally and can have side effects such as rash, taste disturbances, and liver enzyme elevation. We discussed topical Penlac to be applied daily and removed weekly. Pt. Expresses understanding and would like to try the Penlac. Rx sent to the pharmacy.     Discussed treatment options with patient. Options included soaking, avulsion and matrixectomy. Risks and benefits discussed and all questions were answered. The patient wishes to proceed with  Nail avulsion at a later date      In depth conversation on the treatment of ingrown nail; partial nail avulsion vs chemical matrixectomy vs conservative treatment of soaking and nail trimming    Utilizing sterile toenail clippers I aggressively trimmed  the offending left hallux lateral  nail border approximately 3 mm from its edge and carried the nail plate incision down at an angle in order to wedge out the offending cryptotic portion of the nail plate. The offending border was then removed in total. Instructed on daily application of triple abx ointment.

## 2023-09-19 ENCOUNTER — OFFICE VISIT (OUTPATIENT)
Dept: ENDOCRINOLOGY | Facility: CLINIC | Age: 58
End: 2023-09-19
Payer: COMMERCIAL

## 2023-09-19 ENCOUNTER — HOSPITAL ENCOUNTER (OUTPATIENT)
Dept: RADIOLOGY | Facility: HOSPITAL | Age: 58
Discharge: HOME | End: 2023-09-19
Attending: INTERNAL MEDICINE
Payer: COMMERCIAL

## 2023-09-19 VITALS
SYSTOLIC BLOOD PRESSURE: 140 MMHG | BODY MASS INDEX: 37.75 KG/M2 | HEIGHT: 64 IN | WEIGHT: 221.13 LBS | DIASTOLIC BLOOD PRESSURE: 76 MMHG

## 2023-09-19 DIAGNOSIS — I10 ESSENTIAL HYPERTENSION: Chronic | ICD-10-CM

## 2023-09-19 DIAGNOSIS — E87.6 HYPOKALEMIA: ICD-10-CM

## 2023-09-19 DIAGNOSIS — E04.1 THYROID NODULE: ICD-10-CM

## 2023-09-19 DIAGNOSIS — E04.1 THYROID NODULE: Primary | Chronic | ICD-10-CM

## 2023-09-19 PROCEDURE — 99999 PR PBB SHADOW E&M-EST. PATIENT-LVL IV: CPT | Mod: PBBFAC,,, | Performed by: INTERNAL MEDICINE

## 2023-09-19 PROCEDURE — 3078F PR MOST RECENT DIASTOLIC BLOOD PRESSURE < 80 MM HG: ICD-10-PCS | Mod: CPTII,S$GLB,, | Performed by: INTERNAL MEDICINE

## 2023-09-19 PROCEDURE — 76536 US SOFT TISSUE HEAD NECK THYROID: ICD-10-PCS | Mod: 26,,, | Performed by: RADIOLOGY

## 2023-09-19 PROCEDURE — 3044F HG A1C LEVEL LT 7.0%: CPT | Mod: CPTII,S$GLB,, | Performed by: INTERNAL MEDICINE

## 2023-09-19 PROCEDURE — 3008F BODY MASS INDEX DOCD: CPT | Mod: CPTII,S$GLB,, | Performed by: INTERNAL MEDICINE

## 2023-09-19 PROCEDURE — 3008F PR BODY MASS INDEX (BMI) DOCUMENTED: ICD-10-PCS | Mod: CPTII,S$GLB,, | Performed by: INTERNAL MEDICINE

## 2023-09-19 PROCEDURE — 3078F DIAST BP <80 MM HG: CPT | Mod: CPTII,S$GLB,, | Performed by: INTERNAL MEDICINE

## 2023-09-19 PROCEDURE — 3044F PR MOST RECENT HEMOGLOBIN A1C LEVEL <7.0%: ICD-10-PCS | Mod: CPTII,S$GLB,, | Performed by: INTERNAL MEDICINE

## 2023-09-19 PROCEDURE — 99214 PR OFFICE/OUTPT VISIT, EST, LEVL IV, 30-39 MIN: ICD-10-PCS | Mod: S$GLB,,, | Performed by: INTERNAL MEDICINE

## 2023-09-19 PROCEDURE — 76536 US EXAM OF HEAD AND NECK: CPT | Mod: TC

## 2023-09-19 PROCEDURE — 3077F SYST BP >= 140 MM HG: CPT | Mod: CPTII,S$GLB,, | Performed by: INTERNAL MEDICINE

## 2023-09-19 PROCEDURE — 76536 US EXAM OF HEAD AND NECK: CPT | Mod: 26,,, | Performed by: RADIOLOGY

## 2023-09-19 PROCEDURE — 99999 PR PBB SHADOW E&M-EST. PATIENT-LVL IV: ICD-10-PCS | Mod: PBBFAC,,, | Performed by: INTERNAL MEDICINE

## 2023-09-19 PROCEDURE — 1160F RVW MEDS BY RX/DR IN RCRD: CPT | Mod: CPTII,S$GLB,, | Performed by: INTERNAL MEDICINE

## 2023-09-19 PROCEDURE — 1159F PR MEDICATION LIST DOCUMENTED IN MEDICAL RECORD: ICD-10-PCS | Mod: CPTII,S$GLB,, | Performed by: INTERNAL MEDICINE

## 2023-09-19 PROCEDURE — 1160F PR REVIEW ALL MEDS BY PRESCRIBER/CLIN PHARMACIST DOCUMENTED: ICD-10-PCS | Mod: CPTII,S$GLB,, | Performed by: INTERNAL MEDICINE

## 2023-09-19 PROCEDURE — 99214 OFFICE O/P EST MOD 30 MIN: CPT | Mod: S$GLB,,, | Performed by: INTERNAL MEDICINE

## 2023-09-19 PROCEDURE — 3077F PR MOST RECENT SYSTOLIC BLOOD PRESSURE >= 140 MM HG: ICD-10-PCS | Mod: CPTII,S$GLB,, | Performed by: INTERNAL MEDICINE

## 2023-09-19 PROCEDURE — 1159F MED LIST DOCD IN RCRD: CPT | Mod: CPTII,S$GLB,, | Performed by: INTERNAL MEDICINE

## 2023-09-19 RX ORDER — DEXTROMETHORPHAN HYDROBROMIDE, GUAIFENESIN 5; 100 MG/5ML; MG/5ML
650 LIQUID ORAL EVERY 8 HOURS
COMMUNITY

## 2023-09-19 RX ORDER — ESOMEPRAZOLE MAGNESIUM 40 MG/1
40 CAPSULE, DELAYED RELEASE ORAL
COMMUNITY

## 2023-09-19 RX ORDER — TRAMADOL HYDROCHLORIDE 50 MG/1
50 TABLET ORAL EVERY 6 HOURS
COMMUNITY

## 2023-09-19 NOTE — PROGRESS NOTES
Subjective:      Patient ID: Niharika Gutierrez is a 58 y.o. female.    Chief Complaint:  Thyroid Nodule      History of Present Illness  Ms. Gutierrez presents for follow up of thyroid nodules. Last visit with me in 12/2021.      Has active history of thyroid nodules, hypertension and hypokalemia.     First noted to have thyroid nodules in 2014. Was initially palpated on exam then confirmed with ultrasound.      Denies family history of thyroid cancer.  TSH WNL  No personal history of head or neck irradiation.     Has GERD which causes some occ difficulty with swallowing. No hoarseness or voice changes.     Previous thyroid FNA results:  Left lobe nodule 8/2014  FINAL PATHOLOGIC DIAGNOSIS  THE [facility] FOR REPORTING THYROID CYTOPATHOLOGY  II BENIGN  A MODERATE BUT NOT EXCESSIVE NUMBER OF INNOCUOUS FOLLICULAR CLUSTERS. COLLOID  PRESENT. SOME DISTENDED HISTIOCYTES.  Left lobe nodule 5/2018:  Specimen submitted as FNA thyroid nodule:  [facility] Thyroid Cytology Category: II - Benign  Left inferior nodule 3/2020:  [facility] Thyroid Cytology Category: Benign        Most recent thyroid USS dated 12/2021:  Multinodular thyroid gland with stable nodules when compared to prior study dated 02/18/2020.  Note that only the largest nodule within the lower pole of the left lobe meets criteria for FNA biopsy, however FNA biopsy of this nodule was performed on 03/12/2020 with benign results.       Low potassium present since at least 2010 years with extensive evaluation in the past without a formal diagnosis.   On spironolactone 25 mg PO BID  Also on amlodipine and bp controlled.   Takes potassium supplementation TID.     Latest Reference Range & Units 04/17/09 15:08   ALDOSTERONE 0 - 21 ng/dL 16      Latest Reference Range & Units 04/17/09 15:08   Renin Activity,Plasma NG/DL/H 87       Review of Systems   Constitutional:  Negative for chills and fever.   Gastrointestinal:  Negative for nausea.        Objective:   Physical Exam  Vitals and nursing note reviewed.       BP Readings from Last 3 Encounters:   09/19/23 (!) 140/76   05/11/23 134/78   05/05/23 124/82     Wt Readings from Last 1 Encounters:   09/19/23 0847 100.3 kg (221 lb 1.9 oz)       Body mass index is 37.96 kg/m².    Lab Review:   Lab Results   Component Value Date    HGBA1C 5.2 03/02/2023     Lab Results   Component Value Date    CHOL 136 03/02/2023    HDL 48 03/02/2023    LDLCALC 75.8 03/02/2023    TRIG 61 03/02/2023    CHOLHDL 35.3 03/02/2023     Lab Results   Component Value Date     03/02/2023    K 3.4 (L) 03/02/2023     03/02/2023    CO2 29 03/02/2023    GLU 76 03/02/2023    BUN 10 03/02/2023    CREATININE 0.7 03/02/2023    CALCIUM 9.5 03/02/2023    PROT 8.3 03/02/2023    ALBUMIN 3.2 (L) 03/02/2023    BILITOT 0.3 03/02/2023    ALKPHOS 72 03/02/2023    AST 12 03/02/2023    ALT 9 (L) 03/02/2023    ANIONGAP 7 (L) 03/02/2023    ESTGFRAFRICA 80 (L) 01/19/2023    EGFRNONAA >60.0 09/18/2021    TSH 1.622 03/02/2023         Assessment and Plan     Thyroid nodule  --Patient found to have thyroid nodules  --No risk factors for thyroid cancer  --No compressive symptoms  --TSH WNL  --Has had 3 benign FNA's of the left lobe dominant nodule (reviewed with her that future malignancy risk is near zero with multiple benign FNA's)  --Has 2 additional nodules that have not met FNA criteria  --Repeat thyroid ultrasound now and if stable will plan to repeat in 2 years    Hypokalemia  --Continue spironolactone 25 mg PO BID  --Continue potassium supplements    Essential hypertension  -- Controlled on current medications.       Needs ultrasound with radiology when able, follow up in 2 years      J Carlos Case M.D. Staff Endocrinology

## 2023-09-19 NOTE — ASSESSMENT & PLAN NOTE
--Patient found to have thyroid nodules  --No risk factors for thyroid cancer  --No compressive symptoms  --TSH WNL  --Has had 3 benign FNA's of the left lobe dominant nodule (reviewed with her that future malignancy risk is near zero with multiple benign FNA's)  --Has 2 additional nodules that have not met FNA criteria  --Repeat thyroid ultrasound now and if stable will plan to repeat in 2 years

## 2023-09-22 ENCOUNTER — PATIENT MESSAGE (OUTPATIENT)
Dept: FAMILY MEDICINE | Facility: CLINIC | Age: 58
End: 2023-09-22
Payer: COMMERCIAL

## 2023-09-22 ENCOUNTER — PATIENT MESSAGE (OUTPATIENT)
Dept: CARDIOLOGY | Facility: CLINIC | Age: 58
End: 2023-09-22
Payer: COMMERCIAL

## 2023-09-28 ENCOUNTER — PATIENT MESSAGE (OUTPATIENT)
Dept: ADMINISTRATIVE | Facility: HOSPITAL | Age: 58
End: 2023-09-28
Payer: COMMERCIAL

## 2023-09-29 ENCOUNTER — HOSPITAL ENCOUNTER (OUTPATIENT)
Dept: RADIOLOGY | Facility: HOSPITAL | Age: 58
Discharge: HOME | End: 2023-09-29
Attending: FAMILY MEDICINE
Payer: COMMERCIAL

## 2023-09-29 ENCOUNTER — OFFICE VISIT (OUTPATIENT)
Dept: FAMILY MEDICINE | Facility: CLINIC | Age: 58
End: 2023-09-29
Payer: COMMERCIAL

## 2023-09-29 VITALS
DIASTOLIC BLOOD PRESSURE: 70 MMHG | WEIGHT: 216.06 LBS | BODY MASS INDEX: 36.89 KG/M2 | SYSTOLIC BLOOD PRESSURE: 128 MMHG | HEART RATE: 93 BPM | TEMPERATURE: 98 F | OXYGEN SATURATION: 98 % | HEIGHT: 64 IN

## 2023-09-29 DIAGNOSIS — I10 ESSENTIAL HYPERTENSION: Chronic | ICD-10-CM

## 2023-09-29 DIAGNOSIS — Z01.818 PRE-OP EXAMINATION: Primary | ICD-10-CM

## 2023-09-29 DIAGNOSIS — Z01.818 PRE-OP EXAMINATION: ICD-10-CM

## 2023-09-29 DIAGNOSIS — K64.9 HEMORRHOIDS, UNSPECIFIED HEMORRHOID TYPE: ICD-10-CM

## 2023-09-29 DIAGNOSIS — E04.1 THYROID NODULE: Chronic | ICD-10-CM

## 2023-09-29 DIAGNOSIS — K21.9 GASTROESOPHAGEAL REFLUX DISEASE, UNSPECIFIED WHETHER ESOPHAGITIS PRESENT: ICD-10-CM

## 2023-09-29 PROCEDURE — 3074F SYST BP LT 130 MM HG: CPT | Mod: CPTII,S$GLB,, | Performed by: FAMILY MEDICINE

## 2023-09-29 PROCEDURE — 1160F RVW MEDS BY RX/DR IN RCRD: CPT | Mod: CPTII,S$GLB,, | Performed by: FAMILY MEDICINE

## 2023-09-29 PROCEDURE — 93010 EKG 12-LEAD: ICD-10-PCS | Mod: S$GLB,,, | Performed by: INTERNAL MEDICINE

## 2023-09-29 PROCEDURE — 71046 X-RAY EXAM CHEST 2 VIEWS: CPT | Mod: 26,,, | Performed by: RADIOLOGY

## 2023-09-29 PROCEDURE — 3044F PR MOST RECENT HEMOGLOBIN A1C LEVEL <7.0%: ICD-10-PCS | Mod: CPTII,S$GLB,, | Performed by: FAMILY MEDICINE

## 2023-09-29 PROCEDURE — 3078F DIAST BP <80 MM HG: CPT | Mod: CPTII,S$GLB,, | Performed by: FAMILY MEDICINE

## 2023-09-29 PROCEDURE — 71046 XR CHEST PA AND LATERAL: ICD-10-PCS | Mod: 26,,, | Performed by: RADIOLOGY

## 2023-09-29 PROCEDURE — 1159F MED LIST DOCD IN RCRD: CPT | Mod: CPTII,S$GLB,, | Performed by: FAMILY MEDICINE

## 2023-09-29 PROCEDURE — 71046 X-RAY EXAM CHEST 2 VIEWS: CPT | Mod: TC,FY,PO

## 2023-09-29 PROCEDURE — 3078F PR MOST RECENT DIASTOLIC BLOOD PRESSURE < 80 MM HG: ICD-10-PCS | Mod: CPTII,S$GLB,, | Performed by: FAMILY MEDICINE

## 2023-09-29 PROCEDURE — 3008F PR BODY MASS INDEX (BMI) DOCUMENTED: ICD-10-PCS | Mod: CPTII,S$GLB,, | Performed by: FAMILY MEDICINE

## 2023-09-29 PROCEDURE — 3044F HG A1C LEVEL LT 7.0%: CPT | Mod: CPTII,S$GLB,, | Performed by: FAMILY MEDICINE

## 2023-09-29 PROCEDURE — 3074F PR MOST RECENT SYSTOLIC BLOOD PRESSURE < 130 MM HG: ICD-10-PCS | Mod: CPTII,S$GLB,, | Performed by: FAMILY MEDICINE

## 2023-09-29 PROCEDURE — 93005 ELECTROCARDIOGRAM TRACING: CPT | Mod: S$GLB,,, | Performed by: FAMILY MEDICINE

## 2023-09-29 PROCEDURE — 99215 OFFICE O/P EST HI 40 MIN: CPT | Mod: S$GLB,,, | Performed by: FAMILY MEDICINE

## 2023-09-29 PROCEDURE — 93005 EKG 12-LEAD: ICD-10-PCS | Mod: S$GLB,,, | Performed by: FAMILY MEDICINE

## 2023-09-29 PROCEDURE — 3008F BODY MASS INDEX DOCD: CPT | Mod: CPTII,S$GLB,, | Performed by: FAMILY MEDICINE

## 2023-09-29 PROCEDURE — 1159F PR MEDICATION LIST DOCUMENTED IN MEDICAL RECORD: ICD-10-PCS | Mod: CPTII,S$GLB,, | Performed by: FAMILY MEDICINE

## 2023-09-29 PROCEDURE — 93010 ELECTROCARDIOGRAM REPORT: CPT | Mod: S$GLB,,, | Performed by: INTERNAL MEDICINE

## 2023-09-29 PROCEDURE — 1160F PR REVIEW ALL MEDS BY PRESCRIBER/CLIN PHARMACIST DOCUMENTED: ICD-10-PCS | Mod: CPTII,S$GLB,, | Performed by: FAMILY MEDICINE

## 2023-09-29 PROCEDURE — 99215 PR OFFICE/OUTPT VISIT, EST, LEVL V, 40-54 MIN: ICD-10-PCS | Mod: S$GLB,,, | Performed by: FAMILY MEDICINE

## 2023-09-29 PROCEDURE — 99999 PR PBB SHADOW E&M-EST. PATIENT-LVL IV: CPT | Mod: PBBFAC,,, | Performed by: FAMILY MEDICINE

## 2023-09-29 PROCEDURE — 99999 PR PBB SHADOW E&M-EST. PATIENT-LVL IV: ICD-10-PCS | Mod: PBBFAC,,, | Performed by: FAMILY MEDICINE

## 2023-09-29 RX ORDER — HYDROCORTISONE 25 MG/G
CREAM TOPICAL
Qty: 30 G | Refills: 2 | OUTPATIENT
Start: 2023-09-29 | End: 2023-10-18 | Stop reason: SDUPTHER

## 2023-09-29 NOTE — PROGRESS NOTES
"Routine Office Visit    Niharika Gutierrez  1965  [id number]      Subjective     Niharika is a 58 y.o. female who presents today for:    Pre-op examination - Patient here for her pre-op for her knee (total knee arthroplasty). She has c/o severe knee pain. She is able to complete her ADLs. She did 2 miles on her exercise bike today and denies any shortness of breath or nausea. No chest pain or shortness of breath at rest. She is able to walk a few blocks but does have to stop due to pain.   Fatigue - Patient c/o excess fatigue. Started a few days ago when she stopped her tramadol. She was taking tramadol for severe pain in her knee.   Thyroid d/o - Patient does f/u with endocrine.     Objective     Review of Systems   Constitutional:  Negative for chills and fever.   HENT:  Negative for congestion.    Eyes:  Negative for blurred vision.   Respiratory:  Negative for cough.    Cardiovascular:  Negative for chest pain.   Gastrointestinal:  Negative for abdominal pain, constipation, diarrhea, heartburn, nausea and vomiting.   Genitourinary:  Negative for dysuria.   Musculoskeletal:  Negative for myalgias.   Skin:  Negative for itching and rash.   Neurological:  Negative for dizziness and headaches.   Psychiatric/Behavioral:  Negative for depression.      Do you have leakage of urine? No    /70   Pulse 93   Temp 98.3 °F (36.8 °C) (Oral)   Ht 5' 4" (1.626 m)   Wt 98 kg (216 lb 0.8 oz)   SpO2 98%   BMI 37.09 kg/m²   Physical Exam  Constitutional:       Appearance: Normal appearance. She is well-developed. She is obese.   HENT:      Head: Normocephalic and atraumatic.      Nose: Nose normal.   Eyes:      Extraocular Movements: Extraocular movements intact.      Conjunctiva/sclera: Conjunctivae normal.      Pupils: Pupils are equal, round, and reactive to light.   Cardiovascular:      Rate and Rhythm: Normal rate and regular rhythm.      Pulses: Normal pulses.      Heart sounds: Normal heart sounds. No " murmur heard.     No friction rub. No gallop.   Pulmonary:      Effort: Pulmonary effort is normal. No respiratory distress.      Breath sounds: Normal breath sounds.   Abdominal:      General: Bowel sounds are normal. There is no distension.      Palpations: Abdomen is soft.      Tenderness: There is no abdominal tenderness.   Musculoskeletal:         General: Normal range of motion.      Cervical back: Normal range of motion and neck supple.   Lymphadenopathy:      Cervical: No cervical adenopathy.   Skin:     General: Skin is warm.   Neurological:      Mental Status: She is alert and oriented to person, place, and time.   Psychiatric:         Mood and Affect: Mood normal.             Assessment     Health Maintenance         Date Due Completion Date    Shingles Vaccine (1 of 2) Never done ---    Influenza Vaccine (1) 09/01/2023 11/4/2022    Mammogram 04/26/2024 4/26/2023    Colorectal Cancer Screening 06/18/2025 6/18/2015    Hemoglobin A1c (Diabetic Prevention Screening) 09/29/2026 9/29/2023    TETANUS VACCINE 09/22/2027 9/22/2017    Lipid Panel 03/02/2028 3/2/2023    Override on 2/15/2010: Done              Problem List Items Addressed This Visit          Cardiac/Vascular    Essential hypertension (Chronic)    Relevant Orders    TSH (Completed)  The current medical regimen is effective;  continue present plan and medications.            Endocrine    Thyroid nodule (Chronic)  The current medical regimen is effective;  continue present plan and medications.  Continue f/u with endocrine       GI    Gastroesophageal reflux disease  The current medical regimen is effective;  continue present plan and medications.        Other Visit Diagnoses       Pre-op examination    -  Primary    Relevant Orders    IN OFFICE EKG 12-LEAD (to Muse) (Completed)    Urinalysis (Completed)    X-Ray Chest PA And Lateral (Completed)    CBC Auto Differential (Completed)    Comprehensive Metabolic Panel (Completed)    Hemoglobin A1C  (Completed)    Protime-INR (Completed)    APTT (Completed)    VITAMIN B12 (Completed)    Medically optimized for surgery   EKG did not show significant differences from previous EKG   NSR   METS > 4  Bacteria in urine. Will treat as UTI   CXR WNL  Labs WNL       Hemorrhoids, unspecified hemorrhoid type        Relevant Medications    hydrocortisone (ANUSOL-HC) 2.5 % rectal cream              Greater than 45 minutes was spent with this patient with greater than 50% spent with face-to-face counseling     Follow up in about 6 months (around 3/29/2024), or if symptoms worsen or fail to improve, for yearly exam.

## 2023-10-02 DIAGNOSIS — N30.00 ACUTE CYSTITIS WITHOUT HEMATURIA: Primary | ICD-10-CM

## 2023-10-02 RX ORDER — NITROFURANTOIN 25; 75 MG/1; MG/1
100 CAPSULE ORAL 2 TIMES DAILY
Qty: 10 CAPSULE | Refills: 0 | Status: SHIPPED | OUTPATIENT
Start: 2023-10-02

## 2023-10-03 ENCOUNTER — LAB VISIT (OUTPATIENT)
Dept: LAB | Facility: HOSPITAL | Age: 58
End: 2023-10-03
Attending: INTERNAL MEDICINE
Payer: COMMERCIAL

## 2023-10-03 DIAGNOSIS — E78.5 DYSLIPIDEMIA: ICD-10-CM

## 2023-10-03 LAB
ALBUMIN SERPL BCP-MCNC: 3.4 G/DL (ref 3.5–5.2)
ALP SERPL-CCNC: 80 U/L (ref 55–135)
ALT SERPL W/O P-5'-P-CCNC: 13 U/L (ref 10–44)
AST SERPL-CCNC: 15 U/L (ref 10–40)
BILIRUB DIRECT SERPL-MCNC: 0.1 MG/DL (ref 0.1–0.3)
BILIRUB SERPL-MCNC: 0.3 MG/DL (ref 0.1–1)
CHOLEST SERPL-MCNC: 138 MG/DL (ref 120–199)
CHOLEST/HDLC SERPL: 3 {RATIO} (ref 2–5)
HDLC SERPL-MCNC: 46 MG/DL (ref 40–75)
HDLC SERPL: 33.3 % (ref 20–50)
LDLC SERPL CALC-MCNC: 73.4 MG/DL (ref 63–159)
NONHDLC SERPL-MCNC: 92 MG/DL
PROT SERPL-MCNC: 8.6 G/DL (ref 6–8.4)
TRIGL SERPL-MCNC: 93 MG/DL (ref 30–150)

## 2023-10-03 PROCEDURE — 80076 HEPATIC FUNCTION PANEL: CPT | Performed by: INTERNAL MEDICINE

## 2023-10-03 PROCEDURE — 36415 COLL VENOUS BLD VENIPUNCTURE: CPT | Mod: PO | Performed by: INTERNAL MEDICINE

## 2023-10-03 PROCEDURE — 80061 LIPID PANEL: CPT | Performed by: INTERNAL MEDICINE

## 2023-10-05 ENCOUNTER — OFFICE VISIT (OUTPATIENT)
Dept: CARDIOLOGY | Facility: CLINIC | Age: 58
End: 2023-10-05
Payer: COMMERCIAL

## 2023-10-05 VITALS
HEIGHT: 64 IN | HEART RATE: 86 BPM | DIASTOLIC BLOOD PRESSURE: 68 MMHG | OXYGEN SATURATION: 100 % | RESPIRATION RATE: 18 BRPM | WEIGHT: 217.25 LBS | BODY MASS INDEX: 37.09 KG/M2 | SYSTOLIC BLOOD PRESSURE: 128 MMHG

## 2023-10-05 DIAGNOSIS — K21.9 GASTROESOPHAGEAL REFLUX DISEASE, UNSPECIFIED WHETHER ESOPHAGITIS PRESENT: ICD-10-CM

## 2023-10-05 DIAGNOSIS — E78.5 DYSLIPIDEMIA: ICD-10-CM

## 2023-10-05 DIAGNOSIS — R94.31 ABNORMAL EKG: ICD-10-CM

## 2023-10-05 DIAGNOSIS — E66.9 NON MORBID OBESITY, UNSPECIFIED OBESITY TYPE: ICD-10-CM

## 2023-10-05 DIAGNOSIS — I10 ESSENTIAL HYPERTENSION: Primary | ICD-10-CM

## 2023-10-05 PROCEDURE — 3008F BODY MASS INDEX DOCD: CPT | Mod: CPTII,S$GLB,, | Performed by: INTERNAL MEDICINE

## 2023-10-05 PROCEDURE — 1159F MED LIST DOCD IN RCRD: CPT | Mod: CPTII,S$GLB,, | Performed by: INTERNAL MEDICINE

## 2023-10-05 PROCEDURE — 1159F PR MEDICATION LIST DOCUMENTED IN MEDICAL RECORD: ICD-10-PCS | Mod: CPTII,S$GLB,, | Performed by: INTERNAL MEDICINE

## 2023-10-05 PROCEDURE — 3074F SYST BP LT 130 MM HG: CPT | Mod: CPTII,S$GLB,, | Performed by: INTERNAL MEDICINE

## 2023-10-05 PROCEDURE — 3074F PR MOST RECENT SYSTOLIC BLOOD PRESSURE < 130 MM HG: ICD-10-PCS | Mod: CPTII,S$GLB,, | Performed by: INTERNAL MEDICINE

## 2023-10-05 PROCEDURE — 1160F RVW MEDS BY RX/DR IN RCRD: CPT | Mod: CPTII,S$GLB,, | Performed by: INTERNAL MEDICINE

## 2023-10-05 PROCEDURE — 1160F PR REVIEW ALL MEDS BY PRESCRIBER/CLIN PHARMACIST DOCUMENTED: ICD-10-PCS | Mod: CPTII,S$GLB,, | Performed by: INTERNAL MEDICINE

## 2023-10-05 PROCEDURE — 3044F HG A1C LEVEL LT 7.0%: CPT | Mod: CPTII,S$GLB,, | Performed by: INTERNAL MEDICINE

## 2023-10-05 PROCEDURE — 3044F PR MOST RECENT HEMOGLOBIN A1C LEVEL <7.0%: ICD-10-PCS | Mod: CPTII,S$GLB,, | Performed by: INTERNAL MEDICINE

## 2023-10-05 PROCEDURE — 99999 PR PBB SHADOW E&M-EST. PATIENT-LVL V: ICD-10-PCS | Mod: PBBFAC,,, | Performed by: INTERNAL MEDICINE

## 2023-10-05 PROCEDURE — 99214 OFFICE O/P EST MOD 30 MIN: CPT | Mod: S$GLB,,, | Performed by: INTERNAL MEDICINE

## 2023-10-05 PROCEDURE — 3078F DIAST BP <80 MM HG: CPT | Mod: CPTII,S$GLB,, | Performed by: INTERNAL MEDICINE

## 2023-10-05 PROCEDURE — 99999 PR PBB SHADOW E&M-EST. PATIENT-LVL V: CPT | Mod: PBBFAC,,, | Performed by: INTERNAL MEDICINE

## 2023-10-05 PROCEDURE — 3008F PR BODY MASS INDEX (BMI) DOCUMENTED: ICD-10-PCS | Mod: CPTII,S$GLB,, | Performed by: INTERNAL MEDICINE

## 2023-10-05 PROCEDURE — 99214 PR OFFICE/OUTPT VISIT, EST, LEVL IV, 30-39 MIN: ICD-10-PCS | Mod: S$GLB,,, | Performed by: INTERNAL MEDICINE

## 2023-10-05 PROCEDURE — 3078F PR MOST RECENT DIASTOLIC BLOOD PRESSURE < 80 MM HG: ICD-10-PCS | Mod: CPTII,S$GLB,, | Performed by: INTERNAL MEDICINE

## 2023-10-05 NOTE — PROGRESS NOTES
CARDIOVASCULAR PROGRESS NOTE    REASON FOR CONSULT:   Niharika Gutierrez is a 58 y.o. female who presents for f/u CP, testing.    PCP: Efren Molina  HISTORY OF PRESENT ILLNESS:   The patient returns for follow up.  Her main complaint at present is left knee pain for which therapeutic intervention is being planned.  She was seen by her primary care physician and cleared for the surgery.  She otherwise denies angina.  She does continue to have heartburn.  She denies any shortness of breath, palpitations, or syncope.  There has been no PND, orthopnea, melena, hematuria, or claudication symptoms.  She apparently is not taking her statin.  Her ASCVD risk score is less than 5% at present.    CARDIOVASCULAR HISTORY:   none    PAST MEDICAL HISTORY:     Past Medical History:   Diagnosis Date    GERD (gastroesophageal reflux disease)     Hypertension     Hypokalemic syndrome     Thyroid disease     Thyroid nodule     Vitamin D deficiency disease        PAST SURGICAL HISTORY:     Past Surgical History:   Procedure Laterality Date    BREAST BIOPSY       SECTION  2004    X 1    CHOLECYSTECTOMY  2008    HYSTERECTOMY      LAPAROSCOPIC HYSTERECTOMY      LSO       ALLERGIES AND MEDICATION:   Review of patient's allergies indicates:  No Known Allergies     Medication List            Accurate as of 2023  9:03 AM. If you have any questions, ask your nurse or doctor.                CONTINUE taking these medications      acetaminophen 650 MG Tbsr  Commonly known as: TYLENOL     amLODIPine 10 MG tablet  Commonly known as: NORVASC  Take 1 tablet (10 mg total) by mouth once daily.     ergocalciferol 50,000 unit Cap  Commonly known as: ERGOCALCIFEROL  TAKE 1 CAPSULE BY MOUTH EVERY 7 DAYS     esomeprazole 40 MG capsule  Commonly known as: NEXIUM     estradioL 0.01 % (0.1 mg/gram) vaginal cream  Commonly known as: ESTRACE  Place 2 g vaginally twice a week.     FLAREX 0.1 % Drervin  Generic drug:  fluorometholone     hydrocortisone 2.5 % rectal cream  Commonly known as: ANUSOL-HC  PLACE RECTALLY TWICE DAILY     meloxicam 15 MG tablet  Commonly known as: MOBIC  TAKE 1 TABLET(15 MG) BY MOUTH EVERY DAY     nitrofurantoin (macrocrystal-monohydrate) 100 MG capsule  Commonly known as: MACROBID  Take 1 capsule (100 mg total) by mouth 2 (two) times daily.     potassium bicarbonate disintegrating tablet  Commonly known as: K-LYTE  Take 1 tablet (25 mEq total) by mouth 3 (three) times daily.     spironolactone 25 MG tablet  Commonly known as: ALDACTONE  Take 1 tablet (25 mg total) by mouth 2 (two) times daily.     traMADoL 50 mg tablet  Commonly known as: ULTRAM     XIIDRA 5 % Dpet  Generic drug: lifitegrast  INSTILL 1 DROP IN BOTH EYES TWICE DAILY              SOCIAL HISTORY:     Social History     Socioeconomic History    Marital status:    Occupational History     Employer: [employer]   Tobacco Use    Smoking status: Never    Smokeless tobacco: Never   Substance and Sexual Activity    Alcohol use: No    Drug use: No    Sexual activity: Yes     Partners: Male     Birth control/protection: Surgical       FAMILY HISTORY:     Family History   Problem Relation Age of Onset    Diabetes Mother     Heart disease Father         CHF    No Known Problems Sister     No Known Problems Brother     No Known Problems Brother     Breast cancer Maternal Aunt     Breast cancer Paternal Aunt     Breast cancer Maternal Aunt     Breast cancer Paternal Aunt     No Known Problems Maternal Uncle     No Known Problems Paternal Uncle     No Known Problems Maternal Grandmother     No Known Problems Maternal Grandfather     No Known Problems Paternal Grandmother     No Known Problems Paternal Grandfather     Colon cancer Neg Hx     Colon polyps Neg Hx     Amblyopia Neg Hx     Blindness Neg Hx     Cancer Neg Hx     Cataracts Neg Hx     Glaucoma Neg Hx     Hypertension Neg Hx     Macular degeneration Neg Hx     Retinal  "detachment Neg Hx     Strabismus Neg Hx     Stroke Neg Hx     Thyroid disease Neg Hx        REVIEW OF SYSTEMS:   Review of Systems   Constitutional:  Negative for chills, diaphoresis and fever.   HENT:  Negative for nosebleeds.    Eyes:  Negative for blurred vision, double vision and photophobia.   Respiratory:  Negative for hemoptysis, shortness of breath and wheezing.    Cardiovascular:  Negative for chest pain, palpitations, orthopnea, claudication, leg swelling and PND.   Gastrointestinal:  Negative for abdominal pain, blood in stool, heartburn, melena, nausea and vomiting.   Genitourinary:  Negative for flank pain and hematuria.   Musculoskeletal:  Positive for joint pain (L knee). Negative for falls, myalgias and neck pain.   Skin:  Negative for rash.   Neurological:  Negative for dizziness, seizures, loss of consciousness, weakness and headaches.   Endo/Heme/Allergies:  Negative for polydipsia. Does not bruise/bleed easily.   Psychiatric/Behavioral:  Negative for depression and memory loss. The patient is not nervous/anxious.        PHYSICAL EXAM:     Vitals:    10/05/23 0856   BP: 128/68   Pulse: 86   Resp: 18      Body mass index is 37.29 kg/m².  Weight: 98.5 kg (217 lb 4.2 oz)   Height: 5' 4" (162.6 cm)     Physical Exam  Vitals reviewed.   Constitutional:       General: She is not in acute distress.     Appearance: She is well-developed. She is obese. She is not ill-appearing, toxic-appearing or diaphoretic.   HENT:      Head: Normocephalic and atraumatic.   Eyes:      General: No scleral icterus.     Extraocular Movements: Extraocular movements intact.      Conjunctiva/sclera: Conjunctivae normal.      Pupils: Pupils are equal, round, and reactive to light.   Neck:      Thyroid: No thyromegaly.      Vascular: Normal carotid pulses. No carotid bruit or JVD.      Trachea: Trachea normal.   Cardiovascular:      Rate and Rhythm: Normal rate and regular rhythm.      Pulses:           Carotid pulses are 2+ on " the right side and 2+ on the left side.     Heart sounds: S1 normal and S2 normal. No murmur heard.     No friction rub. No gallop.   Pulmonary:      Effort: Pulmonary effort is normal. No respiratory distress.      Breath sounds: Normal breath sounds. No stridor. No wheezing, rhonchi or rales.   Chest:      Chest wall: No tenderness.   Abdominal:      General: There is no distension.      Palpations: Abdomen is soft.   Musculoskeletal:         General: No swelling or tenderness. Normal range of motion.      Cervical back: Normal range of motion and neck supple. No edema or rigidity.      Right lower leg: No edema.      Left lower leg: No edema.   Feet:      Right foot:      Skin integrity: No ulcer.      Left foot:      Skin integrity: No ulcer.   Skin:     General: Skin is warm and dry.      Coloration: Skin is not jaundiced.   Neurological:      General: No focal deficit present.      Mental Status: She is alert and oriented to person, place, and time.      Cranial Nerves: No cranial nerve deficit.   Psychiatric:         Mood and Affect: Mood normal.         Speech: Speech normal.         Behavior: Behavior normal. Behavior is cooperative.         DATA:   EKG: (personally reviewed tracing)  2/7/23 SR 73, LVH, ASMI-age indet, ?inflat ST depression (1/2-1mm)    Laboratory:  CBC:  Recent Labs   Lab 09/18/21  0950 03/02/23  0856 09/29/23  1610   WBC 11.55 11.31 10.82   Hemoglobin 11.7 L 11.7 L 11.4 L   Hematocrit 36.0 L 38.7 36.7 L   Platelets 369 356 506 H         CHEMISTRIES:  Recent Labs   Lab 10/24/20  0900 09/18/21  0950 11/10/22  0842 01/19/23  1159 03/02/23  0856 09/29/23  1610   Glucose 88 73 79  --  76 77   Sodium 142 141 141 132 L 140 139   Potassium 3.0 L 3.0 L 3.4 L 3.1 L 3.4 L 3.3 L   BUN 11 11 9 13.1 10 11   Creatinine 0.7 0.7 0.7 0.85 0.7 0.8   eGFR if non African American >60.0 >60.0  --   --   --   --    eGFR  --   --  >60.0  --  >60.0 >60.0   Calcium 9.0 9.4 9.3 9.1 9.5 9.7         CARDIAC  BIOMARKERS:        COAGS:  Recent Labs   Lab 02/10/22  1335 09/29/23  1610   INR 1.1 1.1         LIPIDS/LFTS:  Recent Labs   Lab 09/18/21  0950 11/10/22  0842 03/02/23  0856 09/29/23  1610 10/03/23  0830   Cholesterol 126  --  136  --  138   Triglycerides 84  --  61  --  93   HDL 48  --  48  --  46   LDL Cholesterol 61.2 L  --  75.8  --  73.4   Non-HDL Cholesterol 78  --  88  --  92   AST 19   < > 12 19 15   ALT 15   < > 9 L 23 13    < > = values in this interval not displayed.         The 10-year ASCVD risk score (Justino ABEBE, et al., 2019) is: 4.9%    Values used to calculate the score:      Age: 58 years      Sex: Female      Is Non- : Yes      Diabetic: No      Tobacco smoker: No      Systolic Blood Pressure: 128 mmHg      Is BP treated: Yes      HDL Cholesterol: 46 mg/dL      Total Cholesterol: 138 mg/dL      Cardiovascular Testing:  Ex stress echo 2/27/23  The left ventricle is normal in size with mild concentric hypertrophy and mildly decreased systolic function.  The estimated ejection fraction is 45%.  There is mild left ventricular global hypokinesis.  Grade I left ventricular diastolic dysfunction.  Normal right ventricular size with normal right ventricular systolic function.  The estimated PA systolic pressure is 21 mmHg.  The stress echo portion of this study is negative for myocardial ischemia.  The ECG portion of this study was positive for myocardial ischemia. 1 mm of horizontal sloping ST depression was noted during stress.  The patient exercised for 5 minutes and 17 seconds on a Davion protocol, corresponding to a functional capacity of 7 METS, achieving a peak heart rate of 151 bpm, which is 97% of the age predicted maximum heart rate. The patient reported no symptoms during stress. The patient experienced no angina during the stress test. The blood pressure response to stress was normal. The patient's exercise capacity was below average.    ASSESSMENT:   # CP, ?GI, resolved  with doubling up of PPI  # HTN, controlled  # abnl EKG, resting ST depression noted.  DELFINA 2/2023 neg.  Low resting EF noted.  # BMI 37, down 2 unit(s) vs last OV.    PLAN:   Cont med rx  Diet/exercise/weight loss  Patient cleared for knee surgery by Dr. Schwartz.  RTC prn    Stevan Pena MD, FACC

## 2023-10-18 DIAGNOSIS — K64.9 HEMORRHOIDS, UNSPECIFIED HEMORRHOID TYPE: ICD-10-CM

## 2023-10-18 RX ORDER — HYDROCORTISONE 25 MG/G
CREAM TOPICAL
Qty: 30 G | Refills: 2 | Status: SHIPPED | OUTPATIENT
Start: 2023-10-18 | End: 2023-10-18 | Stop reason: SDUPTHER

## 2023-10-18 RX ORDER — HYDROCORTISONE 25 MG/G
CREAM TOPICAL
Qty: 30 G | Refills: 2 | Status: SHIPPED | OUTPATIENT
Start: 2023-10-18

## 2023-10-18 NOTE — TELEPHONE ENCOUNTER
No care due was identified.  [facility] Embedded Care Due Messages. Reference number: [id number].   10/18/2023 10:55:04 AM CDT

## 2023-10-27 ENCOUNTER — OFFICE VISIT (OUTPATIENT)
Dept: OBSTETRICS AND GYNECOLOGY | Facility: CLINIC | Age: 58
End: 2023-10-27
Payer: COMMERCIAL

## 2023-10-27 VITALS
SYSTOLIC BLOOD PRESSURE: 124 MMHG | WEIGHT: 219.44 LBS | BODY MASS INDEX: 37.67 KG/M2 | DIASTOLIC BLOOD PRESSURE: 70 MMHG

## 2023-10-27 DIAGNOSIS — L29.2 VULVAR ITCHING: Primary | ICD-10-CM

## 2023-10-27 DIAGNOSIS — N89.8 VAGINA ITCHING: ICD-10-CM

## 2023-10-27 PROCEDURE — 3008F BODY MASS INDEX DOCD: CPT | Mod: CPTII,S$GLB,, | Performed by: OBSTETRICS & GYNECOLOGY

## 2023-10-27 PROCEDURE — 3074F PR MOST RECENT SYSTOLIC BLOOD PRESSURE < 130 MM HG: ICD-10-PCS | Mod: CPTII,S$GLB,, | Performed by: OBSTETRICS & GYNECOLOGY

## 2023-10-27 PROCEDURE — 1159F PR MEDICATION LIST DOCUMENTED IN MEDICAL RECORD: ICD-10-PCS | Mod: CPTII,S$GLB,, | Performed by: OBSTETRICS & GYNECOLOGY

## 2023-10-27 PROCEDURE — 3074F SYST BP LT 130 MM HG: CPT | Mod: CPTII,S$GLB,, | Performed by: OBSTETRICS & GYNECOLOGY

## 2023-10-27 PROCEDURE — 3008F PR BODY MASS INDEX (BMI) DOCUMENTED: ICD-10-PCS | Mod: CPTII,S$GLB,, | Performed by: OBSTETRICS & GYNECOLOGY

## 2023-10-27 PROCEDURE — 81514 NFCT DS BV&VAGINITIS DNA ALG: CPT | Performed by: OBSTETRICS & GYNECOLOGY

## 2023-10-27 PROCEDURE — 99213 PR OFFICE/OUTPT VISIT, EST, LEVL III, 20-29 MIN: ICD-10-PCS | Mod: S$GLB,,, | Performed by: OBSTETRICS & GYNECOLOGY

## 2023-10-27 PROCEDURE — 1159F MED LIST DOCD IN RCRD: CPT | Mod: CPTII,S$GLB,, | Performed by: OBSTETRICS & GYNECOLOGY

## 2023-10-27 PROCEDURE — 99999 PR PBB SHADOW E&M-EST. PATIENT-LVL III: ICD-10-PCS | Mod: PBBFAC,,, | Performed by: OBSTETRICS & GYNECOLOGY

## 2023-10-27 PROCEDURE — 99999 PR PBB SHADOW E&M-EST. PATIENT-LVL III: CPT | Mod: PBBFAC,,, | Performed by: OBSTETRICS & GYNECOLOGY

## 2023-10-27 PROCEDURE — 3078F PR MOST RECENT DIASTOLIC BLOOD PRESSURE < 80 MM HG: ICD-10-PCS | Mod: CPTII,S$GLB,, | Performed by: OBSTETRICS & GYNECOLOGY

## 2023-10-27 PROCEDURE — 3044F PR MOST RECENT HEMOGLOBIN A1C LEVEL <7.0%: ICD-10-PCS | Mod: CPTII,S$GLB,, | Performed by: OBSTETRICS & GYNECOLOGY

## 2023-10-27 PROCEDURE — 99213 OFFICE O/P EST LOW 20 MIN: CPT | Mod: S$GLB,,, | Performed by: OBSTETRICS & GYNECOLOGY

## 2023-10-27 PROCEDURE — 3078F DIAST BP <80 MM HG: CPT | Mod: CPTII,S$GLB,, | Performed by: OBSTETRICS & GYNECOLOGY

## 2023-10-27 PROCEDURE — 3044F HG A1C LEVEL LT 7.0%: CPT | Mod: CPTII,S$GLB,, | Performed by: OBSTETRICS & GYNECOLOGY

## 2023-10-27 RX ORDER — CLOTRIMAZOLE AND BETAMETHASONE DIPROPIONATE 10; .64 MG/G; MG/G
CREAM TOPICAL 2 TIMES DAILY
Qty: 45 G | Refills: 0 | Status: SHIPPED | OUTPATIENT
Start: 2023-10-27

## 2023-10-29 NOTE — PROGRESS NOTES
Cc:  vulvar and vaginal itching    HPI:  58 yr who is up to date with routine gyn care presents with several weeks of vulvar and vaginal itching.  Pt tried OTC monistat which improved sx's a little, but states not all the way.    Vitals:    10/27/23 1140   BP: 124/70   Weight: 99.6 kg (219 lb 7.5 oz)     Physical Exam:   Constitutional: She is oriented to person, place, and time. She appears well-developed and well-nourished. No distress.    HENT:   Head: Normocephalic and atraumatic.       Pulmonary/Chest: Effort normal. No respiratory distress.        Abdominal: Soft. She exhibits no distension and no mass. There is no abdominal tenderness. There is no rebound and no guarding.     Genitourinary:    Urethra, bladder, right adnexa and left adnexa normal.            Pelvic exam was performed with patient in the lithotomy position.   No external genitalia lesions identified,Genitalia hair distrobution normal .   Labial bartholins normal.There is no rash, tenderness, lesion or injury on the right labia. There is no rash, tenderness, lesion or injury on the left labia. Vagina exhibits no lesion. There is vaginal discharge in the vagina. No erythema, tenderness, bleeding, rectocele, cystocele or unspecified prolapse of vaginal walls in the vagina.    No foreign body in the vagina.      No signs of injury in the vagina.   Cervix is absent.Uterus is absent. Normal urethral meatus.Urethral Meatus exhibits: urethral lesion and prolapsedUrethra findings: no urethral mass, no tenderness and no urethral scarringBladder findings: no bladder distention and no bladder tenderness              Neurological: She is alert and oriented to person, place, and time. No cranial nerve deficit. Coordination normal.    Skin: She is not diaphoretic.    Psychiatric: She has a normal mood and affect. Her behavior is normal. Judgment and thought content normal.     Assessment/Plan  1. Vulvar itching  clotrimazole-betamethasone 1-0.05% (LOTRISONE)  cream      2. Vagina itching  Vaginosis Screen by DNA Probe

## 2023-11-02 LAB
BACTERIAL VAGINOSIS DNA: NEGATIVE
CANDIDA GLABRATA DNA: NEGATIVE
CANDIDA KRUSEI DNA: NEGATIVE
CANDIDA RRNA VAG QL PROBE: NEGATIVE
T VAGINALIS RRNA GENITAL QL PROBE: NEGATIVE

## 2023-12-07 ENCOUNTER — TELEPHONE (OUTPATIENT)
Dept: SURGERY | Facility: CLINIC | Age: 58
End: 2023-12-07
Payer: COMMERCIAL

## 2023-12-12 NOTE — PROGRESS NOTES
[facility] Consultation Note    Reason for Consult:  The primary encounter diagnosis was Gastroesophageal reflux disease without esophagitis. A diagnosis of Drug-induced constipation was also pertinent to this visit.    PCP:   Janae Schwartz   No address on file    Referring MD:  Self, Aaareferral  No address on file    Initial History of Present Illness (HPI):  This is a 58 y.o. female here for evaluation of dysphagia  Has been taking OTC Nexium 40 mg QAM since 2015. Tried to wean off. Takes pepcid PRN in the evenings. Weaned herself off the Nexium bc she   Getting chest tightness.     Has been to the ER around march for GI cocktail.  Currently :  Abdominal pain - no  Reflux - well controlled now on Taking pepcid for a couple weeks and sucralfate QID and has not had any symptoms.  Dysphagia - not right now.   Bowel habits - of she eats fried foods may have diarrhea but other than that okay. S/p briana. She may have rectal leakage and unaware it is happening. Occasional pain and hemorrhoids  GI bleeding - none  NSAID usage - rarely    Interval history 12/13/2023  Last seen > 3 years ago by NP  Taking pain meds after knee procedure- gabapentin, celebrex 200, tizanidine, baby aspirin, tramadol, hydrocodone 7.5  Takes nexium regularly, took with tramadol and had a flareup. Took gaviscon/tums  Had seen [facility] in the past  Took mag citrate for constipation      ROS:  Constitutional: No fevers, chills, No weight loss  ENT:  + sore throat, + PND  CV: No chest pain, no palpitation  Pulm: No cough, No shortness of breath, no wheezing  Ophtho: No vision changes  GI: see HPI  Derm: No rash, no itching  Heme: No easy bruising  MSK: + arthritis  : No dysuria, No hematuria  Neuro: No syncope, No seizure  Psych: No uncontrolled anxiety, No uncontrolled depression    Medical History:  has a past medical history of GERD (gastroesophageal reflux disease), Hypertension, Hypokalemic syndrome, Thyroid  disease, Thyroid nodule, and Vitamin D deficiency disease.    Surgical History:  has a past surgical history that includes Laparoscopic hysterectomy (2010); Cholecystectomy (2008);  section (); Breast biopsy; and Hysterectomy.    Family History: family history includes Breast cancer in her maternal aunt, maternal aunt, paternal aunt, and paternal aunt; Diabetes in her mother; Heart disease in her father; No Known Problems in her brother, brother, maternal grandfather, maternal grandmother, maternal uncle, paternal grandfather, paternal grandmother, paternal uncle, and sister..     Social History:  reports that she has never smoked. She has never used smokeless tobacco. She reports that she does not drink alcohol and does not use drugs.    Review of patient's allergies indicates:  No Known Allergies    Medication List with Changes/Refills   New Medications    FAMOTIDINE (PEPCID) 40 MG TABLET    Take 1 tablet (40 mg total) by mouth once daily.   Current Medications    ACETAMINOPHEN (TYLENOL) 650 MG TBSR    Take 650 mg by mouth every 8 (eight) hours.    AMLODIPINE (NORVASC) 10 MG TABLET    Take 1 tablet (10 mg total) by mouth once daily.    CLOTRIMAZOLE-BETAMETHASONE 1-0.05% (LOTRISONE) CREAM    Apply topically 2 (two) times daily.    ERGOCALCIFEROL (ERGOCALCIFEROL) 50,000 UNIT CAP    TAKE 1 CAPSULE BY MOUTH EVERY 7 DAYS    ESOMEPRAZOLE (NEXIUM) 40 MG CAPSULE    Take 40 mg by mouth before breakfast.    ESTRADIOL (ESTRACE) 0.01 % (0.1 MG/GRAM) VAGINAL CREAM    Place 2 g vaginally twice a week.    FLAREX 0.1 % DRPS    Place into both eyes.    HYDROCORTISONE (ANUSOL-HC) 2.5 % RECTAL CREAM    PLACE RECTALLY TWICE DAILY    MELOXICAM (MOBIC) 15 MG TABLET    TAKE 1 TABLET(15 MG) BY MOUTH EVERY DAY    NITROFURANTOIN, MACROCRYSTAL-MONOHYDRATE, (MACROBID) 100 MG CAPSULE    Take 1 capsule (100 mg total) by mouth 2 (two) times daily.    POTASSIUM BICARBONATE (K-LYTE) DISINTEGRATING TABLET    Take 1 tablet (25 mEq  "total) by mouth 3 (three) times daily.    SPIRONOLACTONE (ALDACTONE) 25 MG TABLET    Take 1 tablet (25 mg total) by mouth 2 (two) times daily.    TRAMADOL (ULTRAM) 50 MG TABLET    Take 50 mg by mouth every 6 (six) hours.    XIIDRA 5 % DPET    INSTILL 1 DROP IN BOTH EYES TWICE DAILY         Objective Findings:    Vital Signs:  /77   Pulse 91   Ht 5' 4" (1.626 m)   Wt 97.8 kg (215 lb 9.8 oz)   BMI 37.01 kg/m²   Body mass index is 37.01 kg/m².    Physical Exam:  General Appearance: Well appearing, NAD noted  Eyes:    No scleral icterus  ENT:  No lesions or masses   Lungs: BBS CTA ,  no wheezes  Heart:  HRRR, S1 & S2 normal, no murmurs heard  Abdomen:  Non distended, soft, no guarding, no rebound, no tenderness, no appreciated ascites  Musculoskeletal:  No major joint deformities  Skin: No petechiae or rash on exposed skin areas  Neurologic:  Alert and oriented x4  Psychiatric:  Normal speech mentation and affect    Labs reviewed:  Lab Results   Component Value Date    WBC 10.82 09/29/2023    HGB 11.4 (L) 09/29/2023    HCT 36.7 (L) 09/29/2023     (H) 09/29/2023    CHOL 138 10/03/2023    TRIG 93 10/03/2023    HDL 46 10/03/2023    ALT 13 10/03/2023    AST 15 10/03/2023     09/29/2023    K 3.3 (L) 09/29/2023    CL 99 09/29/2023    CREATININE 0.8 09/29/2023    BUN 11 09/29/2023    CO2 29 09/29/2023    TSH 0.985 09/29/2023    INR 1.1 09/29/2023    GLUF 85 12/30/2008    HGBA1C 5.3 09/29/2023           Imaging reviewed:  None    Endoscopy reviewed:    Had another EGD btwn now and 2015 with dilation at [facility] and     Screening colonoscopy 2015, good prep to cecum. No specimens removed    EGD for dysphagia 2015  - Small hiatus hernia.                         - Mild Schatzki ring. Dilated.                         - A single gastric polyp. Resected and retrieved.                         - Normal examined duodenum.     EGD 2018 - with Dr Lanfgord 54 Fr Dilation      Medical Decision " Making:    Assessment:    Niharika Gutierrez is a 58 y.o. female here for:    1. Gastroesophageal reflux disease without esophagitis    2. Drug-induced constipation        Needs long term control of GERD     Recommendations:  Refill Pepcid   2.GERD diet and lifestyle mods  3. Fiber every day  4. PFT for the fecal smearing  5. Pt to contact me in the future for dysphagia and we will order EGD with dilation    F/u PRN pending response to recommendations    Order summary:  Orders Placed This Encounter    famotidine (PEPCID) 40 MG tablet           Thank you for allowing me to participate in the care of Niharika Gutierrez    Wil Black MD

## 2023-12-13 ENCOUNTER — OFFICE VISIT (OUTPATIENT)
Dept: DERMATOLOGY | Facility: CLINIC | Age: 58
End: 2023-12-13
Payer: COMMERCIAL

## 2023-12-13 ENCOUNTER — OFFICE VISIT (OUTPATIENT)
Dept: GASTROENTEROLOGY | Facility: CLINIC | Age: 58
End: 2023-12-13
Payer: COMMERCIAL

## 2023-12-13 VITALS
SYSTOLIC BLOOD PRESSURE: 126 MMHG | HEIGHT: 64 IN | BODY MASS INDEX: 36.81 KG/M2 | DIASTOLIC BLOOD PRESSURE: 77 MMHG | HEART RATE: 91 BPM | WEIGHT: 215.63 LBS

## 2023-12-13 DIAGNOSIS — K21.9 GASTROESOPHAGEAL REFLUX DISEASE WITHOUT ESOPHAGITIS: Primary | ICD-10-CM

## 2023-12-13 DIAGNOSIS — L30.4 INTERTRIGO: ICD-10-CM

## 2023-12-13 DIAGNOSIS — B35.3 TINEA PEDIS OF BOTH FEET: ICD-10-CM

## 2023-12-13 DIAGNOSIS — K59.03 DRUG-INDUCED CONSTIPATION: ICD-10-CM

## 2023-12-13 DIAGNOSIS — L68.0 HIRSUTISM: ICD-10-CM

## 2023-12-13 DIAGNOSIS — L85.3 DRY SKIN: Primary | ICD-10-CM

## 2023-12-13 PROCEDURE — 3044F HG A1C LEVEL LT 7.0%: CPT | Mod: CPTII,S$GLB,, | Performed by: DERMATOLOGY

## 2023-12-13 PROCEDURE — 3044F PR MOST RECENT HEMOGLOBIN A1C LEVEL <7.0%: ICD-10-PCS | Mod: CPTII,S$GLB,, | Performed by: DERMATOLOGY

## 2023-12-13 PROCEDURE — 1159F PR MEDICATION LIST DOCUMENTED IN MEDICAL RECORD: ICD-10-PCS | Mod: CPTII,S$GLB,, | Performed by: DERMATOLOGY

## 2023-12-13 PROCEDURE — 99214 OFFICE O/P EST MOD 30 MIN: CPT | Mod: S$GLB,,, | Performed by: DERMATOLOGY

## 2023-12-13 PROCEDURE — 99214 PR OFFICE/OUTPT VISIT, EST, LEVL IV, 30-39 MIN: ICD-10-PCS | Mod: S$GLB,,, | Performed by: DERMATOLOGY

## 2023-12-13 PROCEDURE — 3074F SYST BP LT 130 MM HG: CPT | Mod: CPTII,S$GLB,, | Performed by: INTERNAL MEDICINE

## 2023-12-13 PROCEDURE — 99214 OFFICE O/P EST MOD 30 MIN: CPT | Mod: S$GLB,,, | Performed by: INTERNAL MEDICINE

## 2023-12-13 PROCEDURE — 3008F BODY MASS INDEX DOCD: CPT | Mod: CPTII,S$GLB,, | Performed by: INTERNAL MEDICINE

## 2023-12-13 PROCEDURE — 99999 PR PBB SHADOW E&M-EST. PATIENT-LVL IV: ICD-10-PCS | Mod: PBBFAC,,, | Performed by: DERMATOLOGY

## 2023-12-13 PROCEDURE — 99214 PR OFFICE/OUTPT VISIT, EST, LEVL IV, 30-39 MIN: ICD-10-PCS | Mod: S$GLB,,, | Performed by: INTERNAL MEDICINE

## 2023-12-13 PROCEDURE — 1160F PR REVIEW ALL MEDS BY PRESCRIBER/CLIN PHARMACIST DOCUMENTED: ICD-10-PCS | Mod: CPTII,S$GLB,, | Performed by: DERMATOLOGY

## 2023-12-13 PROCEDURE — 3078F PR MOST RECENT DIASTOLIC BLOOD PRESSURE < 80 MM HG: ICD-10-PCS | Mod: CPTII,S$GLB,, | Performed by: INTERNAL MEDICINE

## 2023-12-13 PROCEDURE — 1159F MED LIST DOCD IN RCRD: CPT | Mod: CPTII,S$GLB,, | Performed by: DERMATOLOGY

## 2023-12-13 PROCEDURE — 99999 PR PBB SHADOW E&M-EST. PATIENT-LVL III: CPT | Mod: PBBFAC,,, | Performed by: INTERNAL MEDICINE

## 2023-12-13 PROCEDURE — 3074F PR MOST RECENT SYSTOLIC BLOOD PRESSURE < 130 MM HG: ICD-10-PCS | Mod: CPTII,S$GLB,, | Performed by: INTERNAL MEDICINE

## 2023-12-13 PROCEDURE — 3044F PR MOST RECENT HEMOGLOBIN A1C LEVEL <7.0%: ICD-10-PCS | Mod: CPTII,S$GLB,, | Performed by: INTERNAL MEDICINE

## 2023-12-13 PROCEDURE — 3078F DIAST BP <80 MM HG: CPT | Mod: CPTII,S$GLB,, | Performed by: INTERNAL MEDICINE

## 2023-12-13 PROCEDURE — 1160F RVW MEDS BY RX/DR IN RCRD: CPT | Mod: CPTII,S$GLB,, | Performed by: DERMATOLOGY

## 2023-12-13 PROCEDURE — 99999 PR PBB SHADOW E&M-EST. PATIENT-LVL IV: CPT | Mod: PBBFAC,,, | Performed by: DERMATOLOGY

## 2023-12-13 PROCEDURE — 99999 PR PBB SHADOW E&M-EST. PATIENT-LVL III: ICD-10-PCS | Mod: PBBFAC,,, | Performed by: INTERNAL MEDICINE

## 2023-12-13 PROCEDURE — 3008F PR BODY MASS INDEX (BMI) DOCUMENTED: ICD-10-PCS | Mod: CPTII,S$GLB,, | Performed by: INTERNAL MEDICINE

## 2023-12-13 PROCEDURE — 3044F HG A1C LEVEL LT 7.0%: CPT | Mod: CPTII,S$GLB,, | Performed by: INTERNAL MEDICINE

## 2023-12-13 RX ORDER — GABAPENTIN 300 MG/1
600 CAPSULE ORAL NIGHTLY
COMMUNITY
Start: 2023-10-11

## 2023-12-13 RX ORDER — TIZANIDINE 4 MG/1
4 TABLET ORAL NIGHTLY PRN
COMMUNITY
Start: 2023-11-13

## 2023-12-13 RX ORDER — LOTEPREDNOL ETABONATE 2.5 MG/ML
SUSPENSION/ DROPS OPHTHALMIC
COMMUNITY
Start: 2023-12-07

## 2023-12-13 RX ORDER — CELECOXIB 200 MG/1
200 CAPSULE ORAL
COMMUNITY
Start: 2023-12-06

## 2023-12-13 RX ORDER — HYDROCODONE BITARTRATE AND ACETAMINOPHEN 7.5; 325 MG/1; MG/1
1 TABLET ORAL EVERY 4 HOURS PRN
COMMUNITY
Start: 2023-11-13 | End: 2024-01-03

## 2023-12-13 RX ORDER — FAMOTIDINE 40 MG/1
40 TABLET, FILM COATED ORAL DAILY
Qty: 30 TABLET | Refills: 11 | Status: SHIPPED | OUTPATIENT
Start: 2023-12-13 | End: 2024-12-12

## 2023-12-13 RX ORDER — DOCUSATE SODIUM 100 MG/1
100 CAPSULE, LIQUID FILLED ORAL 2 TIMES DAILY
COMMUNITY
Start: 2023-12-04

## 2023-12-13 RX ORDER — MUPIROCIN 20 MG/G
OINTMENT TOPICAL
COMMUNITY
Start: 2023-10-11

## 2023-12-13 RX ORDER — TRIAMCINOLONE ACETONIDE 1 MG/G
CREAM TOPICAL
Qty: 30 G | Refills: 5 | Status: SHIPPED | OUTPATIENT
Start: 2023-12-13

## 2023-12-13 RX ORDER — ONDANSETRON 4 MG/1
4-8 TABLET, ORALLY DISINTEGRATING ORAL EVERY 4 HOURS PRN
COMMUNITY
Start: 2023-11-22 | End: 2024-01-03 | Stop reason: SDUPTHER

## 2023-12-13 RX ORDER — ASPIRIN 81 MG/1
81 TABLET ORAL DAILY
COMMUNITY

## 2023-12-13 RX ORDER — SPIRONOLACTONE 50 MG/1
TABLET, FILM COATED ORAL
Qty: 60 TABLET | Refills: 3 | Status: SHIPPED | OUTPATIENT
Start: 2023-12-13

## 2023-12-13 NOTE — PROGRESS NOTES
"GENERAL GI PATIENT INTAKE:    COVID symptoms in the last 7 days (runny nose, sore throat, congestion, cough, fever): No  PCP: Janae Schwartz  If not PCP-  number given to establish [phone]: No    ALLERGIES REVIEWED:  Yes    CHIEF COMPLAINT:    Chief Complaint   Patient presents with    Gastroesophageal Reflux     Burning in stomach       VITAL SIGNS:  /77   Pulse 91   Ht 5' 4" (1.626 m)   Wt 97.8 kg (215 lb 9.8 oz)   BMI 37.01 kg/m²      Change in medical, surgical, family or social history: Yes      REVIEWED MEDICATION LIST RECONCILED INCLUDING ABOVE MEDS:  Yes     "

## 2023-12-13 NOTE — PATIENT INSTRUCTIONS
Dry skin  Start dove senstive skin or eczema body wash by cerave  Start cerave cream or eczema creamy oil  q pm-- after bathing and patting dry skin, apply two times a day at least if not 3x a day  Change to free and clear detergent (all, tide, purex)  Use warm water (no hot water)  If there are red inflammed areas apply steroid ointment at night    Hirsutism  -     spironolactone (ALDACTONE) 50 MG tablet; Start with 1 po qday, increase to 2 po qday as tolerated  Dispense: 60 tablet; Refill: 3  Discussed benefits and risks of therapy including but not limited to breakthrough bleeding, breast tenderness, and elevated potassium levels which may give symptoms of fatigue, palpitations, and nausea. Patient should limit potassium intake - avoid potassium supplements or salt substitutes, limit bananas and citrus fruits. Pregnancy must be avoided while taking spironolactone.  - will talk to PCP as on spirolactone 25 mg daily now    Tinea pedis of both feet  - start lamasil over the counter cream BID x 1 mo  - patient opts for topical tx only  - f/u 1 mo if not gone away (+KOH), will consider luluconazole vs. Oral    Intertrigo  - shake lotion prescribed   Flares:  Recommend white vinegar: water 1:1 compresses 2x/day followed by cool blow dry and then application of prescription medication.     Maintenance:  Cool blow dry after showering 1x/day then apply Zeasorb AF powder.

## 2023-12-13 NOTE — PROGRESS NOTES
Subjective:      Patient ID:  Niharika Gutierrez is a 58 y.o. female who presents for   Chief Complaint   Patient presents with    Dry Skin     Face and bilateral feet      Patient with new complaint of rash  Location: abdominal folds   Duration: 19 yrs, recurrent   Symptoms: sore inflamed skin   Relieving factors/Previous treatments: Hibiclens   Pt notes recent flare started one week ago on the left side.          Dry Skin - Initial  Affected locations: face (bilateral feet)  Duration: 1 year  Signs / symptoms: dryness  Severity: mild  Timing: constant  Aggravated by: nothing  Treatments tried: Dr. Sandy sapp.  Improvement on treatment: mild      Review of Systems   Constitutional:  Negative for fever, chills and fatigue.   Skin:  Positive for rash and dry skin. Negative for itching.       Objective:   Physical Exam   Constitutional: She appears well-developed and well-nourished. No distress.   Neurological: She is alert and oriented to person, place, and time. She is not disoriented.   Psychiatric: She has a normal mood and affect.   Skin:   Areas Examined (abnormalities noted in diagram):   Head / Face Inspection Performed  RLE Inspected  LLE Inspection Performed                     Diagram Legend     Erythematous scaling macule/papule c/w actinic keratosis       Vascular papule c/w angioma      Pigmented verrucoid papule/plaque c/w seborrheic keratosis      Yellow umbilicated papule c/w sebaceous hyperplasia      Irregularly shaped tan macule c/w lentigo     1-2 mm smooth white papules consistent with Milia      Movable subcutaneous cyst with punctum c/w epidermal inclusion cyst      Subcutaneous movable cyst c/w pilar cyst      Firm pink to brown papule c/w dermatofibroma      Pedunculated fleshy papule(s) c/w skin tag(s)      Evenly pigmented macule c/w junctional nevus     Mildly variegated pigmented, slightly irregular-bordered macule c/w mildly atypical nevus      Flesh colored to evenly pigmented  papule c/w intradermal nevus       Pink pearly papule/plaque c/w basal cell carcinoma      Erythematous hyperkeratotic cursted plaque c/w SCC      Surgical scar with no sign of skin cancer recurrence      Open and closed comedones      Inflammatory papules and pustules      Verrucoid papule consistent consistent with wart     Erythematous eczematous patches and plaques     Dystrophic onycholytic nail with subungual debris c/w onychomycosis     Umbilicated papule    Erythematous-base heme-crusted tan verrucoid plaque consistent with inflamed seborrheic keratosis     Erythematous Silvery Scaling Plaque c/w Psoriasis     See annotation      Assessment / Plan:        Dry skin  Start dove senstive skin or eczema body wash by cerave  Start cerave cream or eczema creamy oil  q pm-- after bathing and patting dry skin, apply two times a day at least if not 3x a day  Change to free and clear detergent (all, tide, purex)  Use warm water (no hot water)  If there are red inflammed areas apply steroid ointment at night    Hirsutism  -     spironolactone (ALDACTONE) 50 MG tablet; Start with 1 po qday, increase to 2 po qday as tolerated  Dispense: 60 tablet; Refill: 3  Discussed benefits and risks of therapy including but not limited to breakthrough bleeding, breast tenderness, and elevated potassium levels which may give symptoms of fatigue, palpitations, and nausea. Patient should limit potassium intake - avoid potassium supplements or salt substitutes, limit bananas and citrus fruits. Pregnancy must be avoided while taking spironolactone.  - will talk to PCP as on spirolactone 25 mg daily now    Tinea pedis of both feet  - start lamasil over the counter cream BID x 1 mo  - patient opts for topical tx only  - f/u 1 mo if not gone away (+KOH), will consider luluconazole vs. Oral    Intertrigo  - shake lotion prescribed   Flares:  Recommend white vinegar: water 1:1 compresses 2x/day followed by cool blow dry and then application of  prescription medication.     Maintenance:  Cool blow dry after showering 1x/day then apply Zeasorb AF powder.    Seborrheic keratosis   These are benign inherited growths without a malignant potential. Reassurance given to patient. No treatment is necessary.           F/u 6 mo or if feet or not better              No follow-ups on file.

## 2023-12-14 ENCOUNTER — PATIENT MESSAGE (OUTPATIENT)
Dept: GASTROENTEROLOGY | Facility: CLINIC | Age: 58
End: 2023-12-14
Payer: COMMERCIAL

## 2023-12-15 DIAGNOSIS — I10 ESSENTIAL HYPERTENSION: ICD-10-CM

## 2023-12-15 RX ORDER — AMLODIPINE BESYLATE 10 MG/1
10 TABLET ORAL
Qty: 90 TABLET | Refills: 3 | Status: SHIPPED | OUTPATIENT
Start: 2023-12-15

## 2023-12-15 NOTE — TELEPHONE ENCOUNTER
No care due was identified.  [facility] Embedded Care Due Messages. Reference number: [id number].   12/15/2023 5:04:48 AM CST

## 2023-12-15 NOTE — TELEPHONE ENCOUNTER
Refill Decision Note   Niharika Gutierrez  is requesting a refill authorization.  Brief Assessment and Rationale for Refill:  Approve     Medication Therapy Plan:       Medication Reconciliation Completed: No   Comments:     No Care Gaps recommended.     Note composed:11:43 AM 12/15/2023

## 2023-12-18 ENCOUNTER — PATIENT MESSAGE (OUTPATIENT)
Dept: FAMILY MEDICINE | Facility: CLINIC | Age: 58
End: 2023-12-18
Payer: COMMERCIAL

## 2023-12-18 ENCOUNTER — PATIENT MESSAGE (OUTPATIENT)
Dept: DERMATOLOGY | Facility: CLINIC | Age: 58
End: 2023-12-18
Payer: COMMERCIAL

## 2023-12-31 DIAGNOSIS — E87.6 HYPOKALEMIA: ICD-10-CM

## 2023-12-31 NOTE — TELEPHONE ENCOUNTER
No care due was identified.  [facility] Embedded Care Due Messages. Reference number: [id number].   12/31/2023 5:48:17 AM CST

## 2024-01-02 RX ORDER — POTASSIUM BICARBONATE 978 MG/1
25 TABLET, EFFERVESCENT ORAL 3 TIMES DAILY
Qty: 270 TABLET | Refills: 2 | Status: SHIPPED | OUTPATIENT
Start: 2024-01-02

## 2024-01-02 NOTE — TELEPHONE ENCOUNTER
Refill Routing Note   Medication(s) are not appropriate for processing by [facility] for the following reason(s):        No active prescription written by provider    [facility] action(s):  Defer               Appointments  past 12m or future 3m with PCP    Date Provider   Last Visit   9/29/2023 Janae Schwartz MD   Next Visit   Visit date not found Janae Schwartz MD   ED visits in past 90 days: 0        Note composed:11:21 AM 01/02/2024

## 2024-01-03 ENCOUNTER — OFFICE VISIT (OUTPATIENT)
Dept: SURGERY | Facility: CLINIC | Age: 59
End: 2024-01-03
Payer: COMMERCIAL

## 2024-01-03 DIAGNOSIS — R11.0 NAUSEA: ICD-10-CM

## 2024-01-03 DIAGNOSIS — R15.1 FECAL SOILING: Primary | ICD-10-CM

## 2024-01-03 PROCEDURE — 1160F RVW MEDS BY RX/DR IN RCRD: CPT | Mod: CPTII,95,, | Performed by: NURSE PRACTITIONER

## 2024-01-03 PROCEDURE — 99213 OFFICE O/P EST LOW 20 MIN: CPT | Mod: 95,,, | Performed by: NURSE PRACTITIONER

## 2024-01-03 PROCEDURE — 1159F MED LIST DOCD IN RCRD: CPT | Mod: CPTII,95,, | Performed by: NURSE PRACTITIONER

## 2024-01-03 RX ORDER — ONDANSETRON 4 MG/1
4 TABLET, ORALLY DISINTEGRATING ORAL EVERY 6 HOURS PRN
Qty: 30 TABLET | Refills: 0 | Status: SHIPPED | OUTPATIENT
Start: 2024-01-03

## 2024-01-03 NOTE — PATIENT INSTRUCTIONS
Pelvic Floor PT will reach out to you to schedule. If you do not hear from them in the next few day, or if you would like to contact them to schedule the number is [phone]

## 2024-01-03 NOTE — PROGRESS NOTES
The patient location is: home in [facility]  The chief complaint leading to consultation is: constipation    Visit type: audiovisual    Face to Face time with patient: 15  30 minutes of total time spent on the encounter, which includes face to face time and non-face to face time preparing to see the patient (eg, review of tests), Obtaining and/or reviewing separately obtained history, Documenting clinical information in the electronic or other health record, Independently interpreting results (not separately reported) and communicating results to the patient/family/caregiver, or Care coordination (not separately reported).         Each patient to whom he or she provides medical services by telemedicine is:  (1) informed of the relationship between the physician and patient and the respective role of any other health care provider with respect to management of the patient; and (2) notified that he or she may decline to receive medical services by telemedicine and may withdraw from such care at any time.    Notes:     CRS Office Visit History and Physical    Referring Md:   No referring provider defined for this encounter.    SUBJECTIVE:     Chief Complaint: hemorrhoids    History of Present Illness 5/11/23:  The patient is new patient to this practice.   Course is as follows:  Patient is a 58 y.o. female presents with hemorrhoidal irritation and leakage of fecal matter.  Symptoms have been present for years.  Has tried prep h and recticare.  Associated bleeding: no  Previous anorectal procedures: No  denies straining/prolonged time on toilet with bowel movements.  is not currently taking fiber supplement or stool softener. 1-2 bm/day of usually loose stools.   Blood thinners: No    Interval Hx 1/3/23:  She presents today for f/u.  Reports some improvement in fecal soiling, however will still not stool after sitting to urinate.    Chief complaint today is for constipation.  Had knee sx in November, taking narcotic  "pain medication after.  Became constipated. No bm for 7 days.  Was taking colace w/o improvement. Drank a bottle of mag citrate. This gave her diarrhea for "a few days".  Now taking miralax 1-2x/week with improvement in constipation.    Reports just stopped taking pain medication 4 days ago.  Having abdominal upset, "queezy" feeling, +nausea, decreased appetite.   Concerned this is withdrawal s/s of pain medication.   Would like refill of zofran.    Taking PPI 40 mg Qam and Pepcid qpm prn.      Last Colonoscopy completed on 2015  - The entire examined colon is normal.   - No specimens collected.   - repeat in 10 years    Review of patient's allergies indicates:  No Known Allergies    Past Medical History:   Diagnosis Date    GERD (gastroesophageal reflux disease)     Hypertension     Hypokalemic syndrome     Thyroid disease     Thyroid nodule     Vitamin D deficiency disease      Past Surgical History:   Procedure Laterality Date    BREAST BIOPSY       SECTION  2004    X 1    CHOLECYSTECTOMY  2008    HYSTERECTOMY      KNEE SURGERY Left 2023    knee replacement    LAPAROSCOPIC HYSTERECTOMY  2010    LSO     Family History   Problem Relation Age of Onset    Diabetes Mother     Heart disease Father         CHF    No Known Problems Sister     No Known Problems Brother     No Known Problems Brother     Breast cancer Maternal Aunt     Breast cancer Maternal Aunt     No Known Problems Maternal Uncle     Breast cancer Paternal Aunt     Breast cancer Paternal Aunt     No Known Problems Paternal Uncle     No Known Problems Maternal Grandmother     No Known Problems Maternal Grandfather     No Known Problems Paternal Grandmother     No Known Problems Paternal Grandfather     Colon cancer Neg Hx     Colon polyps Neg Hx     Amblyopia Neg Hx     Blindness Neg Hx     Cancer Neg Hx     Cataracts Neg Hx     Glaucoma Neg Hx     Hypertension Neg Hx     Macular degeneration Neg Hx     Retinal detachment Neg Hx     " Strabismus Neg Hx     Stroke Neg Hx     Thyroid disease Neg Hx     Esophageal cancer Neg Hx      Social History     Tobacco Use    Smoking status: Never    Smokeless tobacco: Never   Substance Use Topics    Alcohol use: No    Drug use: No        Review of Systems:  Review of Systems   Gastrointestinal:  Positive for diarrhea.        Anal irritation       OBJECTIVE:     Vital Signs (Most Recent)  There were no vitals taken for this visit.    Physical Exam:  General: Black or  female in no distress   Neuro: Alert and oriented to person, place, and time.  Moves all extremities.     HEENT: No icterus.  Trachea midline  Respiratory: Respirations are even and unlabored, no cough or audible wheezing  Skin: appears dry and intact, No visible rashes, no jaundice    Labs reviewed today:  Lab Results   Component Value Date    WBC 10.82 09/29/2023    HGB 11.4 (L) 09/29/2023    HCT 36.7 (L) 09/29/2023     (H) 09/29/2023    CHOL 138 10/03/2023    TRIG 93 10/03/2023    HDL 46 10/03/2023    ALT 13 10/03/2023    AST 15 10/03/2023     09/29/2023    K 3.3 (L) 09/29/2023    CL 99 09/29/2023    CREATININE 0.8 09/29/2023    BUN 11 09/29/2023    CO2 29 09/29/2023    TSH 0.985 09/29/2023    INR 1.1 09/29/2023    GLUF 85 12/30/2008    HGBA1C 5.3 09/29/2023       Imaging reviewed today:  none    Endoscopy reviewed today:  Last Colonoscopy completed on 6/18/2015  - The entire examined colon is normal.   - No specimens collected.   - repeat in 10 years    ASSESSMENT/PLAN:     Diagnoses and all orders for this visit:    Fecal soiling  -     Ambulatory referral/consult to Physical/Occupational Therapy; Future    Nausea  -     ondansetron (ZOFRAN-ODT) 4 MG TbDL; Take 1 tablet (4 mg total) by mouth every 6 (six) hours as needed (nausea).        The patient was instructed to:  Pelvic floor pt  Zofran prn nausea.  F/u with GI if this persists after 7 days w/o narcotic use.        Gladys Qiu, GAETANO-DASHA  Colon and Rectal  Surgery

## 2024-02-23 DIAGNOSIS — E55.9 VITAMIN D INSUFFICIENCY: ICD-10-CM

## 2024-02-23 RX ORDER — ERGOCALCIFEROL 1.25 MG/1
CAPSULE ORAL
Qty: 12 CAPSULE | Refills: 3 | Status: SHIPPED | OUTPATIENT
Start: 2024-02-23

## 2024-02-23 NOTE — TELEPHONE ENCOUNTER
Care Due:                  Date            Visit Type   Department     Provider  --------------------------------------------------------------------------------                                             [facility]                                           MED/ INTERNAL  Last Visit: 09-      PRE-OP       MED/ PEDS      Janae Schwartz  Next Visit: None Scheduled  None         None Found                                                            Last  Test          Frequency    Reason                     Performed    Due Date  --------------------------------------------------------------------------------    Vitamin D...  12 months..  ergocalciferol...........  Not Found    Overdue    [employer] Embedded Care Due Messages. Reference number: [id number].   2/23/2024 5:51:04 AM CST

## 2024-02-27 ENCOUNTER — LAB VISIT (OUTPATIENT)
Dept: LAB | Facility: HOSPITAL | Age: 59
End: 2024-02-27
Attending: FAMILY MEDICINE
Payer: COMMERCIAL

## 2024-02-27 ENCOUNTER — E-VISIT (OUTPATIENT)
Dept: FAMILY MEDICINE | Facility: CLINIC | Age: 59
End: 2024-02-27
Payer: COMMERCIAL

## 2024-02-27 DIAGNOSIS — R10.84 GENERALIZED ABDOMINAL PAIN: ICD-10-CM

## 2024-02-27 DIAGNOSIS — R10.84 GENERALIZED ABDOMINAL PAIN: Primary | ICD-10-CM

## 2024-02-27 DIAGNOSIS — K21.9 GASTROESOPHAGEAL REFLUX DISEASE, UNSPECIFIED WHETHER ESOPHAGITIS PRESENT: ICD-10-CM

## 2024-02-27 DIAGNOSIS — I10 ESSENTIAL HYPERTENSION: ICD-10-CM

## 2024-02-27 DIAGNOSIS — E87.6 HYPOKALEMIA: ICD-10-CM

## 2024-02-27 LAB
BACTERIA #/AREA URNS AUTO: ABNORMAL /HPF
BILIRUB UR QL STRIP: NEGATIVE
CAOX CRY UR QL COMP ASSIST: ABNORMAL
CLARITY UR REFRACT.AUTO: ABNORMAL
COLOR UR AUTO: YELLOW
GLUCOSE UR QL STRIP: NEGATIVE
HGB UR QL STRIP: ABNORMAL
HYALINE CASTS UR QL AUTO: 5 /LPF
KETONES UR QL STRIP: NEGATIVE
LEUKOCYTE ESTERASE UR QL STRIP: NEGATIVE
MICROSCOPIC COMMENT: ABNORMAL
NITRITE UR QL STRIP: NEGATIVE
PH UR STRIP: 6 [PH] (ref 5–8)
PROT UR QL STRIP: ABNORMAL
RBC #/AREA URNS AUTO: 7 /HPF (ref 0–4)
SP GR UR STRIP: 1.02 (ref 1–1.03)
SQUAMOUS #/AREA URNS AUTO: 10 /HPF
URN SPEC COLLECT METH UR: ABNORMAL
WBC #/AREA URNS AUTO: 5 /HPF (ref 0–5)

## 2024-02-27 PROCEDURE — 99422 OL DIG E/M SVC 11-20 MIN: CPT | Mod: ,,, | Performed by: FAMILY MEDICINE

## 2024-02-27 PROCEDURE — 87086 URINE CULTURE/COLONY COUNT: CPT | Performed by: FAMILY MEDICINE

## 2024-02-27 PROCEDURE — 81001 URINALYSIS AUTO W/SCOPE: CPT | Performed by: FAMILY MEDICINE

## 2024-02-27 RX ORDER — DICYCLOMINE HYDROCHLORIDE 10 MG/1
10 CAPSULE ORAL
Qty: 120 CAPSULE | Refills: 0 | Status: SHIPPED | OUTPATIENT
Start: 2024-02-27 | End: 2024-03-28

## 2024-02-27 NOTE — PROGRESS NOTES
Patient ID: Niharika Gutierrez is a 59 y.o. female.    Chief Complaint: GI Problem (Entered automatically based on patient selection in Patient Portal.)    The patient initiated a request through [employer] on 2/27/2024 for evaluation and management with a chief complaint of GI Problem (Entered automatically based on patient selection in Patient Portal.)     I evaluated the questionnaire submission on 2/27/2024.    [facility] P[facility] Evisit Diarrhea    2/27/2024  7:10 AM CST - Filed by Patient   Do you agree to participate in an E-Visit? Yes   If you have any of the following symptoms, please present to your local ER or call 911:  I acknowledge   What is the main issue that you would like for your doctor to address today? Black stool, stomach cramps, gas, took pepto-bismol   Are you able to take your vital signs? No   Do you have diarrhea? Yes   How many stools have you passed in the last 24 hours? One to four   Is there blood in your stool, or is your stool dark red or black? My stool is black   Does your stool contain pus or mucus? No   Have you taken a laxative or a medicine to help you move your bowels lately? Yes   Are you vomiting? No, I am not vomiting   Do you have belly pain? I have a little pain or no pain   Are you feeling dizzy or like you might pass out? No   When did your symptoms begin? 2/25/2024   Do you have a fever? No, I do not have a fever   Are you having trouble walking or lifting yourself due to weakness from this illness?  No   Do any of the following apply to you? My urine is dark-colored   Did your condition begin after a specific meal that may have caused the illness? Not clearly related to a meal.    Have you taken antibiotics recently? I have not been on any antibiotics   Have you been hospitalized in the past 2 months? No, I have not been hospitalized recently.   Do you work in a  center or healthcare environment? No   Does anyone you know have similar symptoms? No   Have you had a  meal consisting of raw meat or fish in the week prior to your illness? No   Have you recently travelled to a place where you may have caught an illness? No   Have you tried any medication or other treatment for your symptoms? Yes   Please list the treatments you tried, and the result of those treatments. Pepto bismol, bland diet, sprite   Provide any information you feel is important to your history not asked above I have GERD reflux, low potassium, IBS   Please attach any relevant images or files          Encounter Diagnoses   Name Primary?    Generalized abdominal pain Yes    Hypokalemia     Essential hypertension     Gastroesophageal reflux disease, unspecified whether esophagitis present         Orders Placed This Encounter   Procedures    Urine culture     Standing Status:   Future     Number of Occurrences:   1     Standing Expiration Date:   2/26/2025    Urinalysis     Standing Status:   Future     Number of Occurrences:   1     Standing Expiration Date:   4/27/2025     Order Specific Question:   Collection Type     Answer:   Urine, Clean Catch    BASIC METABOLIC PANEL     Standing Status:   Future     Standing Expiration Date:   5/29/2025      Medications Ordered This Encounter   Medications    dicyclomine (BENTYL) 10 MG capsule     Sig: Take 1 capsule (10 mg total) by mouth 4 (four) times daily before meals and nightly.     Dispense:  120 capsule     Refill:  0      Patient with abdominal pain   Urine non-specific   Patient also had question regarding medication and potassium level   Patient advised to take medication and potassium level to be rechecked at future date       No follow-ups on file.      E-Visit Time Tracking:    Day 1 Time (in minutes): 6  Day 2 Time (in minutes): 2  Day 3 Time (in minutes): 3    Total Time (in minutes): 11

## 2024-02-29 LAB
BACTERIA UR CULT: NORMAL
BACTERIA UR CULT: NORMAL

## 2024-03-12 ENCOUNTER — PATIENT MESSAGE (OUTPATIENT)
Dept: ADMINISTRATIVE | Facility: OTHER | Age: 59
End: 2024-03-12
Payer: COMMERCIAL

## 2024-03-25 ENCOUNTER — LAB VISIT (OUTPATIENT)
Dept: LAB | Facility: HOSPITAL | Age: 59
End: 2024-03-25
Payer: COMMERCIAL

## 2024-03-25 DIAGNOSIS — E87.6 HYPOKALEMIA: ICD-10-CM

## 2024-03-25 LAB
ANION GAP SERPL CALC-SCNC: 10 MMOL/L (ref 8–16)
BUN SERPL-MCNC: 17 MG/DL (ref 6–20)
CALCIUM SERPL-MCNC: 9.6 MG/DL (ref 8.7–10.5)
CHLORIDE SERPL-SCNC: 104 MMOL/L (ref 95–110)
CO2 SERPL-SCNC: 27 MMOL/L (ref 23–29)
CREAT SERPL-MCNC: 0.8 MG/DL (ref 0.5–1.4)
EST. GFR  (NO RACE VARIABLE): >60 ML/MIN/1.73 M^2
GLUCOSE SERPL-MCNC: 76 MG/DL (ref 70–110)
POTASSIUM SERPL-SCNC: 3.6 MMOL/L (ref 3.5–5.1)
SODIUM SERPL-SCNC: 141 MMOL/L (ref 136–145)

## 2024-03-25 PROCEDURE — 80048 BASIC METABOLIC PNL TOTAL CA: CPT | Performed by: FAMILY MEDICINE

## 2024-03-25 PROCEDURE — 36415 COLL VENOUS BLD VENIPUNCTURE: CPT | Mod: PO | Performed by: FAMILY MEDICINE

## 2024-04-14 ENCOUNTER — OFFICE VISIT (OUTPATIENT)
Dept: URGENT CARE | Facility: CLINIC | Age: 59
End: 2024-04-14
Payer: COMMERCIAL

## 2024-04-14 ENCOUNTER — PATIENT MESSAGE (OUTPATIENT)
Dept: URGENT CARE | Facility: CLINIC | Age: 59
End: 2024-04-14

## 2024-04-14 VITALS
HEART RATE: 71 BPM | SYSTOLIC BLOOD PRESSURE: 125 MMHG | WEIGHT: 201.5 LBS | DIASTOLIC BLOOD PRESSURE: 80 MMHG | HEIGHT: 64 IN | OXYGEN SATURATION: 97 % | RESPIRATION RATE: 16 BRPM | TEMPERATURE: 98 F | BODY MASS INDEX: 34.4 KG/M2

## 2024-04-14 DIAGNOSIS — K21.9 GASTROESOPHAGEAL REFLUX DISEASE WITHOUT ESOPHAGITIS: Primary | ICD-10-CM

## 2024-04-14 DIAGNOSIS — N76.0 ACUTE VAGINITIS: ICD-10-CM

## 2024-04-14 DIAGNOSIS — N89.8 VAGINAL IRRITATION: ICD-10-CM

## 2024-04-14 DIAGNOSIS — R07.9 CHEST PAIN, UNSPECIFIED TYPE: ICD-10-CM

## 2024-04-14 DIAGNOSIS — B34.9 VIRAL SYNDROME: ICD-10-CM

## 2024-04-14 DIAGNOSIS — J02.9 SORE THROAT: ICD-10-CM

## 2024-04-14 DIAGNOSIS — Z87.19 HISTORY OF HEMORRHOIDS: ICD-10-CM

## 2024-04-14 DIAGNOSIS — R30.0 DYSURIA: ICD-10-CM

## 2024-04-14 PROBLEM — K44.9 DIAPHRAGMATIC HERNIA WITHOUT OBSTRUCTION OR GANGRENE: Status: ACTIVE | Noted: 2018-06-13

## 2024-04-14 LAB
BILIRUB UR QL STRIP: NEGATIVE
CTP QC/QA: YES
GLUCOSE UR QL STRIP: NEGATIVE
KETONES UR QL STRIP: NEGATIVE
LEUKOCYTE ESTERASE UR QL STRIP: NEGATIVE
PH, POC UA: 8
POC BLOOD, URINE: NEGATIVE
POC NITRATES, URINE: NEGATIVE
PROT UR QL STRIP: NEGATIVE
SARS-COV-2 AG RESP QL IA.RAPID: NEGATIVE
SP GR UR STRIP: 1 (ref 1–1.03)
UROBILINOGEN UR STRIP-ACNC: NORMAL (ref 0.1–1.1)

## 2024-04-14 PROCEDURE — 81514 NFCT DS BV&VAGINITIS DNA ALG: CPT

## 2024-04-14 PROCEDURE — 87811 SARS-COV-2 COVID19 W/OPTIC: CPT | Mod: QW,S$GLB,,

## 2024-04-14 PROCEDURE — 93010 ELECTROCARDIOGRAM REPORT: CPT | Mod: S$GLB,,, | Performed by: INTERNAL MEDICINE

## 2024-04-14 PROCEDURE — 93005 ELECTROCARDIOGRAM TRACING: CPT | Mod: S$GLB,,,

## 2024-04-14 PROCEDURE — 81003 URINALYSIS AUTO W/O SCOPE: CPT | Mod: QW,S$GLB,,

## 2024-04-14 PROCEDURE — 99214 OFFICE O/P EST MOD 30 MIN: CPT | Mod: S$GLB,,,

## 2024-04-14 RX ORDER — ESOMEPRAZOLE MAGNESIUM 40 MG/1
40 CAPSULE, DELAYED RELEASE ORAL
Qty: 30 CAPSULE | Refills: 0 | Status: SHIPPED | OUTPATIENT
Start: 2024-04-14

## 2024-04-14 RX ORDER — DICYCLOMINE HYDROCHLORIDE 10 MG/5ML
20 SOLUTION ORAL
Status: COMPLETED | OUTPATIENT
Start: 2024-04-14 | End: 2024-04-14

## 2024-04-14 RX ORDER — ALUMINUM HYDROXIDE, MAGNESIUM HYDROXIDE, AND SIMETHICONE 1200; 120; 1200 MG/30ML; MG/30ML; MG/30ML
30 SUSPENSION ORAL
Status: COMPLETED | OUTPATIENT
Start: 2024-04-14 | End: 2024-04-14

## 2024-04-14 RX ORDER — FLUCONAZOLE 150 MG/1
150 TABLET ORAL DAILY
Qty: 2 TABLET | Refills: 0 | Status: SHIPPED | OUTPATIENT
Start: 2024-04-14 | End: 2024-04-16

## 2024-04-14 RX ORDER — HYDROCORTISONE ACETATE PRAMOXINE HCL 2.5; 1 G/100G; G/100G
CREAM TOPICAL 2 TIMES DAILY
Qty: 30 G | Refills: 0 | Status: SHIPPED | OUTPATIENT
Start: 2024-04-14 | End: 2024-06-19

## 2024-04-14 RX ORDER — LIDOCAINE HYDROCHLORIDE 20 MG/ML
10 SOLUTION OROPHARYNGEAL ONCE
Status: COMPLETED | OUTPATIENT
Start: 2024-04-14 | End: 2024-04-14

## 2024-04-14 RX ADMIN — LIDOCAINE HYDROCHLORIDE 10 ML: 20 SOLUTION OROPHARYNGEAL at 10:04

## 2024-04-14 RX ADMIN — ALUMINUM HYDROXIDE, MAGNESIUM HYDROXIDE, AND SIMETHICONE 30 ML: 1200; 120; 1200 SUSPENSION ORAL at 10:04

## 2024-04-14 RX ADMIN — DICYCLOMINE HYDROCHLORIDE 20 MG: 10 SOLUTION ORAL at 10:04

## 2024-04-14 NOTE — PATIENT INSTRUCTIONS
Please return here or go to the Emergency Department for any concerns or worsening of condition.    Please drink plenty of fluids.  Please get plenty of rest.  Please utilize saltwater gargles for sore throat.  Please utilize warm tea, and lemon for sore throat relief.  Please utilize over-the-counter throat numbing spray and lozenges for sore throat relief.    Please consider Pedialyte and/or Gatorade/Powerade for hydration.  Please avoid dairy, spicy foods, greasy foods for symptom relief.  Please slow reintroduce your diet in the following pattern:   Liquids only, if you are able to tolerate, please move to the next step.   Soft foods only, if you are able to tolerate, please move to the next step.   [place] food only, if you are able to tolerate, please move to the next step.   Regular diet    Please take ESOMEPRAZOLE for ACID REFLUX. Please take into consideration the dietary changes that we discussed in clinic.    Please take DIFLUCAN for a yeast infection that may develop after taking antibiotics due to your history of yeast infections after antibiotics. IF you do take this medication please take ONE tablet when you develop signs of a yeast infection and take the ONE tablet 3-4 days after your first tablet IF you symptoms persist.    Someone will contact you with your results from your vaginal swab results and any potential changes to your treatment plan.    Please apply HYDROCORTISONE-PRAMOXINE RECTAL CREAM to the hemorrhoid as needed for discomfort.    Please follow up with your PCP within the next week.    If you  smoke, please stop smoking.

## 2024-04-14 NOTE — PROGRESS NOTES
"Subjective:      Patient ID: Niharika Gutierrez is a 59 y.o. female.    Vitals:  height is 5' 4" (1.626 m) and weight is 91.4 kg (201 lb 8 oz). Her oral temperature is 98.2 °F (36.8 °C). Her blood pressure is 125/80 and her pulse is 71. Her respiration is 16 and oxygen saturation is 97%.     Chief Complaint: Dysuria    Patient states that for 5 days, she has been having excessive heart burn, chest pain, increased flatulence, increased bloating, sore throat, non-bloody diarrhea, vaginal irritation, vaginal discharge. She states that she has taken tylenol for her symptoms with mild improvement of symptoms. She states that she visited a friend that was sick recently but she had a mask on. Patient states that her  is having surgery tomorrow and wants to make sure she does not have something that requires antibiotics. She denies fever, chills, SOB, nausea, vomiting, abdominal pain. Patient has a history of GERD. Patient states that she also has a history of vaginal dryness and was instructed to use lubrication and states that she started to have symptoms of vaginal irritation after using the lubricant.    Dysuria   This is a new problem. The current episode started in the past 7 days. The problem occurs every urination. The problem has been unchanged. The quality of the pain is described as aching. The pain is at a severity of 0/10. The patient is experiencing no pain. There has been no fever. She is Not sexually active. There is No history of pyelonephritis. Associated symptoms include urgency. Pertinent negatives include no behavior changes, chills, discharge, flank pain, frequency, hematuria, hesitancy, nausea, possible pregnancy, sweats, vomiting, weight loss, bubble bath use, constipation, rash or withholding. She has tried acetaminophen for the symptoms. The treatment provided no relief. Her past medical history is significant for hypertension.   Heartburn  She complains of belching, chest pain, " heartburn and a sore throat. She reports no abdominal pain, no choking, no coughing, no dysphagia, no early satiety, no globus sensation, no hoarse voice, no nausea, no stridor, no tooth decay, no water brash or no wheezing. This is a new problem. The current episode started in the past 7 days. The problem occurs constantly. The problem has been unchanged. The heartburn duration is more than one hour. The heartburn is located in the substernum. The heartburn is of moderate intensity. The heartburn does not wake her from sleep. The heartburn does not limit her activity. The heartburn doesn't change with position. Pertinent negatives include no weight loss.     Constitution: Negative for chills and fever.   HENT:  Positive for sore throat.    Cardiovascular:  Positive for chest pain. Negative for sob on exertion.   Respiratory:  Negative for cough, shortness of breath and wheezing.    Gastrointestinal:  Positive for abdominal bloating, diarrhea, hemorrhoids (history of hemorrhoids) and heartburn. Negative for abdominal pain, nausea, vomiting, constipation, bright red blood in stool and dark colored stools.   Genitourinary:  Positive for dysuria, urgency, vaginal pain (irritation) and vaginal discharge. Negative for frequency, flank pain, hematuria, vaginal bleeding, vaginal odor and genital sore.   Skin:  Negative for rash.      Objective:     Physical Exam   Constitutional:  Non-toxic appearance. She does not appear ill. No distress.      Comments:Patient is in no acute distress, patient is non-toxic appearing, patient is ox3, patient is answering question appropriately.   normal  HENT:   Head: Normocephalic.   Ears:   Right Ear: External ear and ear canal normal. impacted cerumen  Left Ear: External ear and ear canal normal. impacted cerumen  Nose: Nose normal. No rhinorrhea or congestion.   Mouth/Throat: Mucous membranes are moist. No oropharyngeal exudate. Oropharynx is clear.      Comments: No drooling, no  tripoding. Patient is able to handle secretions. Patient appears to be in no acute respiratory distress. Airway is intact. Patient is able to talk in complete sentences without discomfort.  Cardiovascular: Normal rate, regular rhythm and normal heart sounds.   No murmur heard.Exam reveals no gallop and no friction rub.   Pulmonary/Chest: Effort normal and breath sounds normal. No stridor. No respiratory distress. She has no wheezes. She has no rhonchi. She has no rales. She exhibits no tenderness.         Comments: Patient is in no respiratory distress. Breath sounds even, unlabored, and clear to auscultation bilaterally. No accessory muscle usage. Patient able to speak in complete sentences with ease.    Abdominal: Normal appearance and bowel sounds are normal. She exhibits no distension and no mass. Soft. There is no abdominal tenderness. There is no rebound, no guarding, no left CVA tenderness and no right CVA tenderness. No hernia.   Genitourinary:         Comments: Deferred by patient.     Lymphadenopathy:     She has cervical adenopathy.   Neurological: She is alert.   Skin: Skin is not diaphoretic.   Nursing note and vitals reviewed.    Results for orders placed or performed in visit on 04/14/24   POCT Urinalysis, Dipstick, Automated, W/O Scope   Result Value Ref Range    POC Blood, Urine Negative Negative    POC Bilirubin, Urine Negative Negative    POC Urobilinogen, Urine Normal 0.1 - 1.1    POC Ketones, Urine Negative Negative    POC Protein, Urine Negative Negative    POC Nitrates, Urine Negative Negative    POC Glucose, Urine Negative Negative    pH, UA 8.0     POC Specific Gravity, Urine 1.005 1.003 - 1.029    POC Leukocytes, Urine Negative Negative   SARS Coronavirus 2 Antigen, POCT Manual Read   Result Value Ref Range    SARS Coronavirus 2 Antigen Negative Negative     Acceptable Yes      The EKG shows a regular Sinus Rhythm, at a rate of 62, there without acute ST Changes. There is a  previous EKG for comparison. This EKG was interpreted by me.    Assessment:     1. Gastroesophageal reflux disease without esophagitis    2. Acute vaginitis    3. Viral syndrome    4. History of hemorrhoids    5. Dysuria    6. Chest pain, unspecified type    7. Sore throat    8. Vaginal irritation      Plan:   Previous notes reviewed.  Vital signs reviewed.  Labs ordered. Labs reviewed.  GI Cocktail given in clinic, chest discomfort resolved after GI Cocktail.  Discussed GERD, vaginitis, viral syndrome, history of hemorrhoids, home care, tx options, and given follow up precautions.  Patient was briefed on my thought process and diagnosis.   Patient involved with the treatment plan and agreed to the plan.    Will treat for GERD, vaginitis, viral syndrome, and hemorrhoid, patient is stable and nontoxic appearing, physical exam is within normal limits, UA is negative, urinary dysuria and vaginal irritation is likely due to vaginal yeast infection, will treat acute vaginitis for fungal etiology at this time with diflucan, will contact patient with vaginosis swab results for any needed changes to management, chest discomfort is likely due to acid reflux, with patient having a history of GERD, improvement of symptoms post GI cocktail, EKG showing no changes from previous EKG on 09/29/2023, will continue to treat chest pain for GERD at this time, will refill PPI, cardiac etiology fro chest pain appears to be less likely, with patient having symptoms of sore throat/nonbloody diarrhea for 5 days without fever, symptoms are likely viral in nature, patient states that diarrhea has aggravated her hemorrhoids and would like a refill on the hydrocortisone cream, will refill hydrocortisone cream due to history of hemorrhoids, will continue to treat symptomatically, PCP follow up encouraged, strict ER precautions given.    Patient informed on warning signs, patient understood warning signs and to go to urgent care or ER if warning  signs appear.  Please excuse grammatical/spelling errors appreciated throughout this visit encounter for a remote dictation device was used during this encounter.    Patient Instructions   Please return here or go to the Emergency Department for any concerns or worsening of condition.    Please drink plenty of fluids.  Please get plenty of rest.  Please utilize saltwater gargles for sore throat.  Please utilize warm tea, and lemon for sore throat relief.  Please utilize over-the-counter throat numbing spray and lozenges for sore throat relief.    Please consider Pedialyte and/or Gatorade/Powerade for hydration.  Please avoid dairy, spicy foods, greasy foods for symptom relief.  Please slow reintroduce your diet in the following pattern:   Liquids only, if you are able to tolerate, please move to the next step.   Soft foods only, if you are able to tolerate, please move to the next step.   [place] food only, if you are able to tolerate, please move to the next step.   Regular diet    Please take ESOMEPRAZOLE for ACID REFLUX. Please take into consideration the dietary changes that we discussed in clinic.    Please take DIFLUCAN for a yeast infection that may develop after taking antibiotics due to your history of yeast infections after antibiotics. IF you do take this medication please take ONE tablet when you develop signs of a yeast infection and take the ONE tablet 3-4 days after your first tablet IF you symptoms persist.    Someone will contact you with your results from your vaginal swab results and any potential changes to your treatment plan.    Please apply HYDROCORTISONE-PRAMOXINE RECTAL CREAM to the hemorrhoid as needed for discomfort.    Please follow up with your PCP within the next week.    If you  smoke, please stop smoking.    Gastroesophageal reflux disease without esophagitis  -     aluminum-magnesium hydroxide-simethicone 200-200-20 mg/5 mL suspension 30 mL  -     LIDOcaine viscous HCl 2% oral solution  10 mL  -     dicyclomine 10 mg/5 mL syrup 20 mg  -     esomeprazole (NEXIUM) 40 MG capsule; Take 1 capsule (40 mg total) by mouth before breakfast.  Dispense: 30 capsule; Refill: 0    Acute vaginitis  -     fluconazole (DIFLUCAN) 150 MG Tab; Take 1 tablet (150 mg total) by mouth once daily. for 2 doses  Dispense: 2 tablet; Refill: 0    Viral syndrome    History of hemorrhoids  -     hydrocortisone-pramoxine (ANALPRAM-HC) 2.5-1 % Crea; Place rectally 2 (two) times daily.  Dispense: 30 g; Refill: 0    Dysuria  -     POCT Urinalysis, Dipstick, Automated, W/O Scope    Chest pain, unspecified type  -     IN OFFICE EKG 12-LEAD (to Muse)    Sore throat  -     SARS Coronavirus 2 Antigen, POCT Manual Read    Vaginal irritation  -     Vaginosis Screen by DNA Probe; Future; Expected date: 04/14/2024      Dominick Mo PA-C

## 2024-04-15 LAB
OHS QRS DURATION: 96 MS
OHS QTC CALCULATION: 470 MS

## 2024-04-17 ENCOUNTER — TELEPHONE (OUTPATIENT)
Dept: URGENT CARE | Facility: CLINIC | Age: 59
End: 2024-04-17
Payer: COMMERCIAL

## 2024-05-06 ENCOUNTER — HOSPITAL ENCOUNTER (OUTPATIENT)
Dept: RADIOLOGY | Facility: HOSPITAL | Age: 59
Discharge: HOME | End: 2024-05-06
Attending: FAMILY MEDICINE
Payer: COMMERCIAL

## 2024-05-06 DIAGNOSIS — Z12.31 ENCOUNTER FOR SCREENING MAMMOGRAM FOR BREAST CANCER: ICD-10-CM

## 2024-05-06 PROCEDURE — 77063 BREAST TOMOSYNTHESIS BI: CPT | Mod: TC,PO

## 2024-05-06 PROCEDURE — 77067 SCR MAMMO BI INCL CAD: CPT | Mod: 26,,, | Performed by: RADIOLOGY

## 2024-05-06 PROCEDURE — 77063 BREAST TOMOSYNTHESIS BI: CPT | Mod: 26,,, | Performed by: RADIOLOGY

## 2024-05-23 ENCOUNTER — OFFICE VISIT (OUTPATIENT)
Dept: URGENT CARE | Facility: CLINIC | Age: 59
End: 2024-05-23
Payer: COMMERCIAL

## 2024-05-23 VITALS
TEMPERATURE: 100 F | OXYGEN SATURATION: 97 % | HEART RATE: 93 BPM | BODY MASS INDEX: 34.92 KG/M2 | DIASTOLIC BLOOD PRESSURE: 71 MMHG | RESPIRATION RATE: 20 BRPM | HEIGHT: 64 IN | SYSTOLIC BLOOD PRESSURE: 114 MMHG | WEIGHT: 204.56 LBS

## 2024-05-23 DIAGNOSIS — H65.193 ACUTE EFFUSION OF BOTH MIDDLE EARS: ICD-10-CM

## 2024-05-23 DIAGNOSIS — U07.1 COVID-19 VIRUS DETECTED: ICD-10-CM

## 2024-05-23 DIAGNOSIS — U07.1 COVID-19: Primary | ICD-10-CM

## 2024-05-23 PROBLEM — K22.2 STRICTURE AND STENOSIS OF ESOPHAGUS: Status: ACTIVE | Noted: 2022-03-16

## 2024-05-23 PROBLEM — K64.9 HEMORRHOIDS WITHOUT COMPLICATION: Status: ACTIVE | Noted: 2024-05-23

## 2024-05-23 PROBLEM — K58.9 IRRITABLE BOWEL SYNDROME: Status: ACTIVE | Noted: 2024-05-23

## 2024-05-23 PROBLEM — R15.9 INCONTINENCE OF FECES: Status: ACTIVE | Noted: 2024-05-23

## 2024-05-23 PROBLEM — I10 ESSENTIAL HYPERTENSION: Status: ACTIVE | Noted: 2023-01-19

## 2024-05-23 LAB
CTP QC/QA: YES
CTP QC/QA: YES
POC MOLECULAR INFLUENZA A AGN: NEGATIVE
POC MOLECULAR INFLUENZA B AGN: NEGATIVE
SARS-COV-2 RDRP RESP QL NAA+PROBE: POSITIVE

## 2024-05-23 PROCEDURE — 87635 SARS-COV-2 COVID-19 AMP PRB: CPT | Mod: QW,S$GLB,, | Performed by: PHYSICIAN ASSISTANT

## 2024-05-23 PROCEDURE — 87502 INFLUENZA DNA AMP PROBE: CPT | Mod: QW,S$GLB,, | Performed by: PHYSICIAN ASSISTANT

## 2024-05-23 PROCEDURE — 99213 OFFICE O/P EST LOW 20 MIN: CPT | Mod: S$GLB,,, | Performed by: PHYSICIAN ASSISTANT

## 2024-05-23 RX ORDER — BENZONATATE 200 MG/1
200 CAPSULE ORAL 3 TIMES DAILY PRN
Qty: 30 CAPSULE | Refills: 0 | Status: SHIPPED | OUTPATIENT
Start: 2024-05-23 | End: 2024-06-02

## 2024-05-23 NOTE — PROGRESS NOTES
"Subjective:      Patient ID: Niharika Gutierrez is a 59 y.o. female.    Vitals:  height is 5' 4" (1.626 m) and weight is 92.8 kg (204 lb 9.4 oz). Her oral temperature is 99.7 °F (37.6 °C). Her blood pressure is 114/71 and her pulse is 93. Her respiration is 20 and oxygen saturation is 97%.     Chief Complaint: Cough     Pt is here for Fever and cough. Pt says this started Tuesday evening . Pt treated with nyquil, tylenol, mucinex      Cough  This is a new problem. Episode onset: 2 days ago. The problem has been unchanged. The problem occurs constantly. The cough is Productive of bloody sputum. Associated symptoms include chills, a fever, headaches, myalgias, nasal congestion, postnasal drip and sweats. Pertinent negatives include no chest pain, ear pain, hemoptysis, rash, sore throat or shortness of breath. Nothing aggravates the symptoms. She has tried OTC cough suppressant for the symptoms. The treatment provided mild relief.       Constitution: Positive for chills, fatigue and fever. Negative for sweating.   HENT:  Positive for congestion, postnasal drip and sinus pressure. Negative for ear pain, mouth sores, tongue pain, tongue lesion, sinus pain, sore throat and trouble swallowing.    Neck: Negative for neck pain and neck stiffness.   Cardiovascular:  Negative for chest pain.   Eyes:  Negative for eye discharge and eye itching.   Respiratory:  Positive for chest tightness, cough and sputum production. Negative for bloody sputum and shortness of breath.    Gastrointestinal:  Negative for abdominal pain, nausea, vomiting, constipation and diarrhea.   Musculoskeletal:  Positive for muscle ache.   Skin:  Negative for rash.   Neurological:  Positive for headaches.      Past Medical History:   Diagnosis Date    GERD (gastroesophageal reflux disease)     Hypertension     Hypokalemic syndrome     Thyroid disease     Thyroid nodule     Vitamin D deficiency disease        Past Surgical History:   Procedure " Laterality Date    BREAST BIOPSY       SECTION  2004    X 1    CHOLECYSTECTOMY  2008    HYSTERECTOMY      KNEE SURGERY Left 2023    knee replacement    LAPAROSCOPIC HYSTERECTOMY  2010    LSO       Family History   Problem Relation Name Age of Onset    Diabetes Mother      Heart disease Father          CHF    No Known Problems Sister      No Known Problems Brother      No Known Problems Brother      Breast cancer Maternal Aunt      Breast cancer Maternal Aunt      No Known Problems Maternal Uncle      Breast cancer Paternal Aunt      Breast cancer Paternal Aunt      No Known Problems Paternal Uncle      No Known Problems Maternal Grandmother      No Known Problems Maternal Grandfather      No Known Problems Paternal Grandmother      No Known Problems Paternal Grandfather      Colon cancer Neg Hx      Colon polyps Neg Hx      Amblyopia Neg Hx      Blindness Neg Hx      Cancer Neg Hx      Cataracts Neg Hx      Glaucoma Neg Hx      Hypertension Neg Hx      Macular degeneration Neg Hx      Retinal detachment Neg Hx      Strabismus Neg Hx      Stroke Neg Hx      Thyroid disease Neg Hx      Esophageal cancer Neg Hx         Social History     Socioeconomic History    Marital status:    Occupational History     Employer: [employer]   Tobacco Use    Smoking status: Never    Smokeless tobacco: Never   Substance and Sexual Activity    Alcohol use: No    Drug use: No    Sexual activity: Yes     Partners: Male     Birth control/protection: Surgical   Social History Narrative    N/A per the patient.      Social Determinants of Health     Financial Resource Strain: Low Risk  (1/3/2024)    Overall Financial Resource Strain (CARDIA)     Difficulty of Paying Living Expenses: Not hard at all   Food Insecurity: No Food Insecurity (1/3/2024)    Hunger Vital Sign     Worried About Running Out of Food in the Last Year: Never true     Ran Out of Food in the Last Year: Never true   Transportation Needs: No  Transportation Needs (1/3/2024)    PRAPARE - Transportation     Lack of Transportation (Medical): No     Lack of Transportation (Non-Medical): No   Physical Activity: Insufficiently Active (1/3/2024)    Exercise Vital Sign     Days of Exercise per Week: 2 days     Minutes of Exercise per Session: 30 min   Stress: Stress Concern Present (1/3/2024)    [employer] [place] [employer] - Occupational Stress Questionnaire     Feeling of Stress : Rather much   Housing Stability: Unknown (1/3/2024)    Housing Stability Vital Sign     Unable to Pay for Housing in the Last Year: No     Unstable Housing in the Last Year: No       Current Outpatient Medications   Medication Sig Dispense Refill    acetaminophen (TYLENOL) 650 MG TbSR Take 650 mg by mouth every 8 (eight) hours.      amLODIPine (NORVASC) 10 MG tablet TAKE 1 TABLET(10 MG) BY MOUTH EVERY DAY 90 tablet 3    ergocalciferol (ERGOCALCIFEROL) 50,000 unit Cap TAKE 1 CAPSULE BY MOUTH EVERY 7 DAYS 12 capsule 3    esomeprazole (NEXIUM) 40 MG capsule Take 40 mg by mouth before breakfast.      esomeprazole (NEXIUM) 40 MG capsule Take 1 capsule (40 mg total) by mouth before breakfast. 30 capsule 0    estradioL (ESTRACE) 0.01 % (0.1 mg/gram) vaginal cream Place 2 g vaginally twice a week. 42.5 g 11    EYSUVIS 0.25 % DrpS SMARTSI-2 Drop(s) In Eye(s) 1-4 Times Daily      famotidine (PEPCID) 40 MG tablet Take 1 tablet (40 mg total) by mouth once daily. 30 tablet 11    hydrocortisone (ANUSOL-HC) 2.5 % rectal cream PLACE RECTALLY TWICE DAILY 30 g 2    hydrocortisone-pramoxine (ANALPRAM-HC) 2.5-1 % Crea Place rectally 2 (two) times daily. 30 g 0    ondansetron (ZOFRAN-ODT) 4 MG TbDL Take 1 tablet (4 mg total) by mouth every 6 (six) hours as needed (nausea). 30 tablet 0    spironolactone (ALDACTONE) 50 MG tablet Start with 1 po qday, increase to 2 po qday as tolerated 60 tablet 3     No current facility-administered medications for this visit.       Review of patient's  allergies indicates:  No Known Allergies    Objective:     Physical Exam   Constitutional: She is oriented to person, place, and time. She appears well-developed. She is cooperative.  Non-toxic appearance. She does not appear ill. No distress.   HENT:   Head: Normocephalic and atraumatic.   Ears:   Right Ear: Hearing, external ear and ear canal normal. Tympanic membrane is not injected, not erythematous, not retracted and not bulging. A middle ear effusion is present. no impacted cerumen  Left Ear: Hearing, external ear and ear canal normal. Tympanic membrane is not injected, not erythematous, not retracted and not bulging. A middle ear effusion is present. no impacted cerumen  Nose: Rhinorrhea and congestion present. No mucosal edema or nasal deformity. No epistaxis. Right sinus exhibits no maxillary sinus tenderness and no frontal sinus tenderness. Left sinus exhibits no maxillary sinus tenderness and no frontal sinus tenderness.   Mouth/Throat: Uvula is midline and mucous membranes are normal. Mucous membranes are moist. No trismus in the jaw. Normal dentition. No uvula swelling. No oropharyngeal exudate, posterior oropharyngeal edema or posterior oropharyngeal erythema.   Eyes: Conjunctivae and lids are normal. Right eye exhibits no discharge. Left eye exhibits no discharge. No scleral icterus.   Neck: Trachea normal and phonation normal. Neck supple. No edema present. No erythema present. No neck rigidity present.   Cardiovascular: Normal rate, regular rhythm, normal heart sounds and normal pulses.   No murmur heard.Exam reveals no gallop.   Pulmonary/Chest: Effort normal and breath sounds normal. No stridor. No respiratory distress. She has no decreased breath sounds. She has no wheezes. She has no rhonchi. She has no rales.   Abdominal: Normal appearance.   Musculoskeletal:      Cervical back: She exhibits no tenderness.   Lymphadenopathy:     She has no cervical adenopathy.   Neurological: She is alert and  oriented to person, place, and time. She exhibits normal muscle tone.   Skin: Skin is warm, dry, intact, not diaphoretic, not pale and no rash.   Psychiatric: Her speech is normal and behavior is normal. Mood, judgment and thought content normal.   Nursing note and vitals reviewed.    Results for orders placed or performed in visit on 05/23/24   POCT COVID-19 Rapid Screening   Result Value Ref Range    POC Rapid COVID Positive (A) Negative     Acceptable Yes    POCT Influenza A/B MOLECULAR   Result Value Ref Range    POC Molecular Influenza A Ag Negative Negative    POC Molecular Influenza B Ag Negative Negative     Acceptable Yes          Assessment:     1. COVID-19        Plan:       COVID-19  -     POCT COVID-19 Rapid Screening  -     POCT Influenza A/B MOLECULAR    Results reviewed  I have reviewed the patient chart and pertinent past imaging/labs.  2 - covid risk score   Pt advised to stop mucinex  Patient Instructions   You have tested positive for COVID-19 today.  Take Paxlovid as prescribed with food.  It does interact with her amlodipine decrease amlodipine to half a tablet for 8 days.  After the 8 days you can safely restart 1 whole tablet.  Use Tessalon as needed for cough.  Take tylenol/advil with food as needed for fever/pain. Flonase for nasal congestion. Stay hydrated, drink plenty of water. Peptobismol for upset stomach/diarrhea, not immodium.     ISOLATION  New CDC guidelines state that you no longer have to quarantine as long as you were fever free for 24 hours without the use of ibuprofen or Tylenol.  It is recommended that you wear a mask for 5 days after you are 24 hours fever free to prevent spreading.            Pa-student manju dyson

## 2024-05-23 NOTE — PATIENT INSTRUCTIONS
You have tested positive for COVID-19 today.  Take Paxlovid as prescribed with food.  It does interact with her amlodipine decrease amlodipine to half a tablet for 8 days.  After the 8 days you can safely restart 1 whole tablet.  Use Tessalon as needed for cough.  Take tylenol/advil with food as needed for fever/pain. Flonase for nasal congestion. Stay hydrated, drink plenty of water. Peptobismol for upset stomach/diarrhea, not immodium.     ISOLATION  New CDC guidelines state that you no longer have to quarantine as long as you were fever free for 24 hours without the use of ibuprofen or Tylenol.  It is recommended that you wear a mask for 5 days after you are 24 hours fever free to prevent spreading.

## 2024-05-31 ENCOUNTER — E-VISIT (OUTPATIENT)
Dept: FAMILY MEDICINE | Facility: CLINIC | Age: 59
End: 2024-05-31
Payer: COMMERCIAL

## 2024-05-31 ENCOUNTER — PATIENT MESSAGE (OUTPATIENT)
Dept: FAMILY MEDICINE | Facility: CLINIC | Age: 59
End: 2024-05-31
Payer: COMMERCIAL

## 2024-05-31 DIAGNOSIS — K64.9 HEMORRHOIDS, UNSPECIFIED HEMORRHOID TYPE: Primary | ICD-10-CM

## 2024-05-31 PROCEDURE — 99422 OL DIG E/M SVC 11-20 MIN: CPT | Mod: ,,, | Performed by: STUDENT IN AN ORGANIZED HEALTH CARE EDUCATION/TRAINING PROGRAM

## 2024-05-31 RX ORDER — HYDROCORTISONE 25 MG/G
CREAM TOPICAL
Qty: 30 G | Refills: 2 | Status: SHIPPED | OUTPATIENT
Start: 2024-05-31 | End: 2024-06-19

## 2024-05-31 RX ORDER — LIDOCAINE 40 MG/G
CREAM TOPICAL 4 TIMES DAILY PRN
Qty: 30 G | Refills: 2 | Status: SHIPPED | OUTPATIENT
Start: 2024-05-31

## 2024-05-31 NOTE — PROGRESS NOTES
Patient ID: Niharika Gutierrez is a 59 y.o. female.    Chief Complaint: Rectal Pain          274}  The patient initiated a request through [employer] on 5/31/2024 for evaluation and management with a chief complaint of Rectal Pain     I evaluated the questionnaire submission on 05/31/2024 .    Total Time (in minutes): 12     [facility] Peq Evisit General    5/31/2024  3:18 PM CDT - Filed by Patient   Do you agree to participate in an E-Visit? Yes   If you have any of the following symptoms, please present to your local emergency room or call 911:  I acknowledge   Choose the state of your primary residence [place]   What is the main issue you would like addressed today? Rx refill, covid follow up   Please describe your symptoms Hemorrhoids   Where is your problem located? [place] area   How severe are your symptoms? Moderate   Have you had these symptoms before? Yes   How long have you been having these symptoms? For a few days   Please list any medications or treatments you have used for your condition and indicate if it was effective or not. Hydrocortisone 2.5 % 30 mg tube is effective   What makes this feel better? Cream is effective   What makes this feel worse? Using the bathroom   Are these symptoms related to a condition that you currently have? Yes   What is the condition? Hemorrhoids   When were you last seen for this condition? 8/1/2023   Please describe any probable cause for these symptoms Unknown   Provide any additional information you feel is important.    Please attach any relevant images or files    Are you able to take your vital signs? No          Active Problem List with Overview Notes    Diagnosis Date Noted    Hemorrhoids without complication 05/23/2024    Incontinence of feces 05/23/2024    Irritable bowel syndrome 05/23/2024    History of total hysterectomy 05/07/2023    Essential hypertension 01/19/2023    Stricture and stenosis of esophagus 03/16/2022    Chronic pain of left knee 02/07/2022     Weakness 02/07/2022    Diaphragmatic hernia without obstruction or gangrene 06/13/2018    Arthralgia of knee 04/12/2018    Gastroesophageal reflux disease 06/18/2015    Thyroid nodule 08/04/2014    Microhematuria 02/07/2014    Hypokalemia 01/16/2013    Vitamin D deficiency disease 01/16/2013    Atypical chest pain 04/02/2008    Abnormal liver function tests 02/20/2008    Normal esophagogastroduodenoscopy (EGD) 01/31/2008      Recent Labs Obtained:  Lab Results   Component Value Date    WBC 10.82 09/29/2023    HGB 11.4 (L) 09/29/2023    HCT 36.7 (L) 09/29/2023    MCV 89 09/29/2023     (H) 09/29/2023     03/25/2024    K 3.6 03/25/2024    GLU 76 03/25/2024    CREATININE 0.8 03/25/2024    EGFRNORACEVR >60.0 03/25/2024    HGBA1C 5.3 09/29/2023      Review of patient's allergies indicates:  No Known Allergies    Encounter Diagnosis   Name Primary?    Hemorrhoids, unspecified hemorrhoid type Yes        No orders of the defined types were placed in this encounter.     Medications Ordered This Encounter   Medications    hydrocortisone (ANUSOL-HC) 2.5 % rectal cream     Sig: PLACE RECTALLY TWICE DAILY     Dispense:  30 g     Refill:  2    LIDOcaine (LMX) 4 % cream     Sig: Apply topically 4 (four) times daily as needed (rectal pain). Apply to external hemorrhoids prn     Dispense:  30 g     Refill:  2        E-Visit Time Tracking:    Day 1 Time (in minutes): 12    Total Time (in minutes): 12      274}

## 2024-06-10 ENCOUNTER — PATIENT MESSAGE (OUTPATIENT)
Dept: FAMILY MEDICINE | Facility: CLINIC | Age: 59
End: 2024-06-10
Payer: COMMERCIAL

## 2024-06-11 ENCOUNTER — E-VISIT (OUTPATIENT)
Dept: FAMILY MEDICINE | Facility: CLINIC | Age: 59
End: 2024-06-11
Payer: COMMERCIAL

## 2024-06-11 DIAGNOSIS — R05.2 SUBACUTE COUGH: ICD-10-CM

## 2024-06-11 DIAGNOSIS — J01.00 SUBACUTE MAXILLARY SINUSITIS: Primary | ICD-10-CM

## 2024-06-11 PROCEDURE — 99499 UNLISTED E&M SERVICE: CPT | Mod: ,,, | Performed by: STUDENT IN AN ORGANIZED HEALTH CARE EDUCATION/TRAINING PROGRAM

## 2024-06-11 RX ORDER — BENZONATATE 200 MG/1
200 CAPSULE ORAL 3 TIMES DAILY PRN
Qty: 60 CAPSULE | Refills: 1 | Status: SHIPPED | OUTPATIENT
Start: 2024-06-11 | End: 2024-06-21

## 2024-06-11 RX ORDER — DESLORATADINE 5 MG/1
5 TABLET ORAL DAILY
Qty: 7 TABLET | Refills: 0 | Status: SHIPPED | OUTPATIENT
Start: 2024-06-11 | End: 2024-06-19

## 2024-06-11 RX ORDER — AMOXICILLIN AND CLAVULANATE POTASSIUM 875; 125 MG/1; MG/1
1 TABLET, FILM COATED ORAL 2 TIMES DAILY
Qty: 14 TABLET | Refills: 0 | Status: SHIPPED | OUTPATIENT
Start: 2024-06-11 | End: 2024-06-19

## 2024-06-11 NOTE — PROGRESS NOTES
Patient ID: Niharika Gutierrez is a 59 y.o. female.    Chief Complaint: URI (Entered automatically based on patient selection in Patient Portal.)          274}  The patient initiated a request through [employer] on 6/11/2024 for evaluation and management with a chief complaint of URI (Entered automatically based on patient selection in Patient Portal.)     I evaluated the questionnaire submission on 06/11/2024 .    Total Time (in minutes): 13     Ohs Peq E-Visit Covid    6/11/2024 10:51 AM CDT - Filed by Patient   Do you agree to participate in an E-Visit? Yes   If you have any of the following symptoms, go to your local emergency room or call 911: I acknowledge   What is the main issue you would like addressed today? Head. Nasal and sinus congestion. Lingering cough due to recent Covid illness   Do you think you might have COVID or the Flu? No   Have you tested positive for COVID or Flu? Yes COVID   What symptoms do you have? Cough;  Headache;  Nasal congestion;  Runny nose   When did your symptoms first appear? 6/3/2024   List what you have done or taken to help your symptoms. Paxlovid, OTC cough meds, Dayquill, Nyquill, Mucinex,Delsym, Tylenol   Provide any additional information you feel is important. I asked for refill of Benzonate 200 mg Perkes capsules prescribed when diagnosed with Covid. Head and nasal congestion is new symptoms   Please attach any relevant images or files    Are you able to take your vital signs? No          Active Problem List with Overview Notes    Diagnosis Date Noted    Hemorrhoids without complication 05/23/2024    Incontinence of feces 05/23/2024    Irritable bowel syndrome 05/23/2024    History of total hysterectomy 05/07/2023    Essential hypertension 01/19/2023    Stricture and stenosis of esophagus 03/16/2022    Chronic pain of left knee 02/07/2022    Weakness 02/07/2022    Diaphragmatic hernia without obstruction or gangrene 06/13/2018    Arthralgia of knee 04/12/2018     Gastroesophageal reflux disease 06/18/2015    Thyroid nodule 08/04/2014    Microhematuria 02/07/2014    Hypokalemia 01/16/2013    Vitamin D deficiency disease 01/16/2013    Atypical chest pain 04/02/2008    Abnormal liver function tests 02/20/2008    Normal esophagogastroduodenoscopy (EGD) 01/31/2008      Recent Labs Obtained:  Lab Results   Component Value Date    WBC 10.82 09/29/2023    HGB 11.4 (L) 09/29/2023    HCT 36.7 (L) 09/29/2023    MCV 89 09/29/2023     (H) 09/29/2023     03/25/2024    K 3.6 03/25/2024    GLU 76 03/25/2024    CREATININE 0.8 03/25/2024    EGFRNORACEVR >60.0 03/25/2024    HGBA1C 5.3 09/29/2023      Review of patient's allergies indicates:  No Known Allergies    Encounter Diagnoses   Name Primary?    Subacute maxillary sinusitis Yes    Subacute cough         No orders of the defined types were placed in this encounter.     Medications Ordered This Encounter   Medications    amoxicillin-clavulanate 875-125mg (AUGMENTIN) 875-125 mg per tablet     Sig: Take 1 tablet by mouth 2 (two) times daily. for 7 days     Dispense:  14 tablet     Refill:  0    benzonatate (TESSALON) 200 MG capsule     Sig: Take 1 capsule (200 mg total) by mouth 3 (three) times daily as needed for Cough.     Dispense:  60 capsule     Refill:  1    desloratadine (CLARINEX) 5 mg tablet     Sig: Take 1 tablet (5 mg total) by mouth once daily. for 7 days     Dispense:  7 tablet     Refill:  0        E-Visit Time Tracking:    Day 1 Time (in minutes): 13    Total Time (in minutes): 13      274}

## 2024-06-15 ENCOUNTER — OFFICE VISIT (OUTPATIENT)
Dept: URGENT CARE | Facility: CLINIC | Age: 59
End: 2024-06-15
Payer: COMMERCIAL

## 2024-06-15 VITALS
RESPIRATION RATE: 16 BRPM | WEIGHT: 200 LBS | TEMPERATURE: 99 F | SYSTOLIC BLOOD PRESSURE: 121 MMHG | HEIGHT: 64 IN | DIASTOLIC BLOOD PRESSURE: 77 MMHG | BODY MASS INDEX: 34.15 KG/M2 | OXYGEN SATURATION: 98 % | HEART RATE: 75 BPM

## 2024-06-15 DIAGNOSIS — R51.9 FRONTAL HEADACHE: ICD-10-CM

## 2024-06-15 DIAGNOSIS — J01.90 ACUTE SINUSITIS, RECURRENCE NOT SPECIFIED, UNSPECIFIED LOCATION: Primary | ICD-10-CM

## 2024-06-15 PROCEDURE — 99213 OFFICE O/P EST LOW 20 MIN: CPT | Mod: S$GLB,,,

## 2024-06-15 RX ORDER — IBUPROFEN 600 MG/1
600 TABLET ORAL EVERY 6 HOURS PRN
Qty: 20 TABLET | Refills: 0 | Status: SHIPPED | OUTPATIENT
Start: 2024-06-15

## 2024-06-15 RX ORDER — PREDNISONE 20 MG/1
40 TABLET ORAL DAILY
Qty: 10 TABLET | Refills: 0 | Status: SHIPPED | OUTPATIENT
Start: 2024-06-15 | End: 2024-06-19

## 2024-06-15 NOTE — PROGRESS NOTES
"Subjective:      Patient ID: Niharika Gutierrez is a 59 y.o. female.    Vitals:  height is 5' 4" (1.626 m) and weight is 90.7 kg (200 lb). Her oral temperature is 98.5 °F (36.9 °C). Her blood pressure is 121/77 and her pulse is 75. Her respiration is 16 and oxygen saturation is 98%.     Chief Complaint: Sinus Problem    59-year-old female here for nasal congestion, coughing, headaches x3 weeks.  She had COVID 3 weeks ago.  E visit with her PCP 06/11/2024 for similar symptoms.  Currently on Augmentin, Tessalon Perles, desloratadine, DayQuil, Tylenol.  Tessalon Perles is helping with the cough.  Feels a lot of head congestion.  She did take Sudafed today.  Denies fever, chills, dizziness, acute visual disturbances, sore throat, nausea, vomiting.    Sinus Problem  This is a new problem. The current episode started 1 to 4 weeks ago. The problem is unchanged. There has been no fever. Her pain is at a severity of 0/10. She is experiencing no pain. Associated symptoms include congestion, coughing, headaches and sinus pressure. Pertinent negatives include no chills, ear pain, neck pain, shortness of breath or sore throat. The treatment provided no relief.     Constitution: Negative for chills and fever.   HENT:  Positive for congestion and sinus pressure. Negative for ear pain and sore throat.         +ear fullness   Neck: Negative for neck pain.   Cardiovascular:  Negative for chest pain.   Respiratory:  Positive for cough. Negative for shortness of breath.    Gastrointestinal:  Negative for abdominal pain, nausea, vomiting and diarrhea.   Musculoskeletal:  Negative for joint pain, joint swelling and muscle ache.   Skin:  Negative for rash.   Neurological:  Positive for headaches. Negative for dizziness and light-headedness.      Objective:     Physical Exam   Constitutional: She is oriented to person, place, and time. She appears well-developed.   HENT:   Head: Normocephalic and atraumatic.   Ears:   Right Ear: " Tympanic membrane, external ear and ear canal normal.   Left Ear: Tympanic membrane, external ear and ear canal normal.   Nose: Congestion present.   Mouth/Throat: Oropharynx is clear and moist. Mucous membranes are moist. Oropharynx is clear.   Eyes: Conjunctivae, EOM and lids are normal. Pupils are equal, round, and reactive to light.   Neck: Trachea normal and phonation normal. Neck supple.   Cardiovascular: Normal rate, regular rhythm, normal heart sounds and normal pulses.   Pulmonary/Chest: Effort normal and breath sounds normal. No respiratory distress.   Musculoskeletal: Normal range of motion.         General: Normal range of motion.   Neurological: She is alert and oriented to person, place, and time.   Skin: Skin is warm, dry and intact. Capillary refill takes less than 2 seconds.   Psychiatric: Her speech is normal and behavior is normal. Judgment and thought content normal.   Nursing note and vitals reviewed.      Assessment:     1. Acute sinusitis, recurrence not specified, unspecified location    2. Frontal headache        Plan:       Acute sinusitis, recurrence not specified, unspecified location  -     predniSONE (DELTASONE) 20 MG tablet; Take 2 tablets (40 mg total) by mouth once daily. for 5 days  Dispense: 10 tablet; Refill: 0    Frontal headache  -     ibuprofen (ADVIL,MOTRIN) 600 MG tablet; Take 1 tablet (600 mg total) by mouth every 6 (six) hours as needed for Pain.  Dispense: 20 tablet; Refill: 0                  Patient Instructions                                                             Sinusitis   If your condition worsens or fails to improve we recommend that you receive another evaluation at the ER immediately or contact your PCP to discuss your concerns or return here. You must understand that you've received an urgent care treatment only and that you may be released before all your medical problems are known or treated. You the patient will arrange for followup care as instructed.  "  If we discussed that I think your illness is viral it will not respond to antibiotics and it will last 10-14 days. However, if over the next few days the symptoms worsen start the antibiotics I have given you.   If we discussed that you require antibiotics start them now and take them to completion.   If you are female and on BCP and do take the antibiotics, use additional methods to prevent pregnancy while on the antibiotics and for one cycle after.   Flonase (fluticasone) is a nasal spray which is available over the counter and may help with your symptoms   Zyrtec D, Claritin D or allegra D can also help with symptoms of congestion and drainage.   If you have hypertension avoid using the "D" which is the decongestant   If you just have drainage you can take plain zyrtec, claritin or allegra   If you just have a congested feeling you can take pseudoephedrine (unless you have high blood pressure) which you have to sign for behind the counter. Do not buy the phenylephrine which is on the shelf as it is not effective   Rest and fluids are also important.   Tylenol or ibuprofen can also be used as directed for pain unless you have an allergy to them or medical condition such as stomach ulcers, kidney or liver disease or blood thinners etc for which you should not be taking these type of medications.   If you are flying in the next few days Afrin nose drops for the airplane flight upon take off and landing may help. Other than at those times refrain from using afrin.   If you were prescribed a narcotic do not drive or operate heavy machinery while taking these medications.         "

## 2024-06-19 ENCOUNTER — LAB VISIT (OUTPATIENT)
Dept: LAB | Facility: HOSPITAL | Age: 59
End: 2024-06-19
Payer: COMMERCIAL

## 2024-06-19 ENCOUNTER — OFFICE VISIT (OUTPATIENT)
Dept: FAMILY MEDICINE | Facility: CLINIC | Age: 59
End: 2024-06-19
Payer: COMMERCIAL

## 2024-06-19 ENCOUNTER — PATIENT MESSAGE (OUTPATIENT)
Dept: FAMILY MEDICINE | Facility: CLINIC | Age: 59
End: 2024-06-19

## 2024-06-19 VITALS
OXYGEN SATURATION: 99 % | SYSTOLIC BLOOD PRESSURE: 122 MMHG | HEART RATE: 82 BPM | DIASTOLIC BLOOD PRESSURE: 62 MMHG | HEIGHT: 64 IN | WEIGHT: 207.69 LBS | TEMPERATURE: 98 F | BODY MASS INDEX: 35.46 KG/M2

## 2024-06-19 DIAGNOSIS — M25.562 CHRONIC PAIN OF LEFT KNEE: ICD-10-CM

## 2024-06-19 DIAGNOSIS — E66.01 SEVERE OBESITY (BMI 35.0-39.9) WITH COMORBIDITY: ICD-10-CM

## 2024-06-19 DIAGNOSIS — K21.9 GASTROESOPHAGEAL REFLUX DISEASE, UNSPECIFIED WHETHER ESOPHAGITIS PRESENT: ICD-10-CM

## 2024-06-19 DIAGNOSIS — I10 ESSENTIAL HYPERTENSION: ICD-10-CM

## 2024-06-19 DIAGNOSIS — E04.1 THYROID NODULE: Primary | Chronic | ICD-10-CM

## 2024-06-19 DIAGNOSIS — N95.2 VAGINAL ATROPHY: ICD-10-CM

## 2024-06-19 DIAGNOSIS — K64.9 HEMORRHOIDS WITHOUT COMPLICATION: ICD-10-CM

## 2024-06-19 DIAGNOSIS — Z00.00 VISIT FOR ANNUAL HEALTH EXAMINATION: ICD-10-CM

## 2024-06-19 DIAGNOSIS — G89.29 CHRONIC PAIN OF LEFT KNEE: ICD-10-CM

## 2024-06-19 DIAGNOSIS — T30.0 BURN: ICD-10-CM

## 2024-06-19 DIAGNOSIS — E87.6 HYPOKALEMIA: ICD-10-CM

## 2024-06-19 LAB
ALBUMIN SERPL BCP-MCNC: 3.4 G/DL (ref 3.5–5.2)
ALP SERPL-CCNC: 58 U/L (ref 55–135)
ALT SERPL W/O P-5'-P-CCNC: 9 U/L (ref 10–44)
ANION GAP SERPL CALC-SCNC: 9 MMOL/L (ref 8–16)
AST SERPL-CCNC: 11 U/L (ref 10–40)
BASOPHILS # BLD AUTO: 0.02 K/UL (ref 0–0.2)
BASOPHILS NFR BLD: 0.2 % (ref 0–1.9)
BILIRUB SERPL-MCNC: 0.3 MG/DL (ref 0.1–1)
BUN SERPL-MCNC: 14 MG/DL (ref 6–20)
CALCIUM SERPL-MCNC: 9.5 MG/DL (ref 8.7–10.5)
CHLORIDE SERPL-SCNC: 104 MMOL/L (ref 95–110)
CO2 SERPL-SCNC: 27 MMOL/L (ref 23–29)
CREAT SERPL-MCNC: 0.8 MG/DL (ref 0.5–1.4)
DIFFERENTIAL METHOD BLD: ABNORMAL
EOSINOPHIL # BLD AUTO: 0.1 K/UL (ref 0–0.5)
EOSINOPHIL NFR BLD: 0.7 % (ref 0–8)
ERYTHROCYTE [DISTWIDTH] IN BLOOD BY AUTOMATED COUNT: 15.9 % (ref 11.5–14.5)
EST. GFR  (NO RACE VARIABLE): >60 ML/MIN/1.73 M^2
ESTIMATED AVG GLUCOSE: 97 MG/DL (ref 68–131)
GLUCOSE SERPL-MCNC: 84 MG/DL (ref 70–110)
HBA1C MFR BLD: 5 % (ref 4–5.6)
HCT VFR BLD AUTO: 33.7 % (ref 37–48.5)
HGB BLD-MCNC: 10.6 G/DL (ref 12–16)
IMM GRANULOCYTES # BLD AUTO: 0.03 K/UL (ref 0–0.04)
IMM GRANULOCYTES NFR BLD AUTO: 0.3 % (ref 0–0.5)
LYMPHOCYTES # BLD AUTO: 3.5 K/UL (ref 1–4.8)
LYMPHOCYTES NFR BLD: 30 % (ref 18–48)
MCH RBC QN AUTO: 30.5 PG (ref 27–31)
MCHC RBC AUTO-ENTMCNC: 31.5 G/DL (ref 32–36)
MCV RBC AUTO: 97 FL (ref 82–98)
MONOCYTES # BLD AUTO: 0.7 K/UL (ref 0.3–1)
MONOCYTES NFR BLD: 6.1 % (ref 4–15)
NEUTROPHILS # BLD AUTO: 7.3 K/UL (ref 1.8–7.7)
NEUTROPHILS NFR BLD: 62.7 % (ref 38–73)
NRBC BLD-RTO: 0 /100 WBC
PLATELET # BLD AUTO: 343 K/UL (ref 150–450)
PMV BLD AUTO: 10.2 FL (ref 9.2–12.9)
POTASSIUM SERPL-SCNC: 3.2 MMOL/L (ref 3.5–5.1)
PROT SERPL-MCNC: 7.8 G/DL (ref 6–8.4)
RBC # BLD AUTO: 3.48 M/UL (ref 4–5.4)
SODIUM SERPL-SCNC: 140 MMOL/L (ref 136–145)
T4 FREE SERPL-MCNC: 1.03 NG/DL (ref 0.71–1.51)
TSH SERPL DL<=0.005 MIU/L-ACNC: 1.63 UIU/ML (ref 0.4–4)
WBC # BLD AUTO: 11.55 K/UL (ref 3.9–12.7)

## 2024-06-19 PROCEDURE — 3078F DIAST BP <80 MM HG: CPT | Mod: CPTII,S$GLB,,

## 2024-06-19 PROCEDURE — 85025 COMPLETE CBC W/AUTO DIFF WBC: CPT

## 2024-06-19 PROCEDURE — 80053 COMPREHEN METABOLIC PANEL: CPT

## 2024-06-19 PROCEDURE — 84439 ASSAY OF FREE THYROXINE: CPT

## 2024-06-19 PROCEDURE — 3008F BODY MASS INDEX DOCD: CPT | Mod: CPTII,S$GLB,,

## 2024-06-19 PROCEDURE — 99214 OFFICE O/P EST MOD 30 MIN: CPT | Mod: 25,S$GLB,,

## 2024-06-19 PROCEDURE — 3074F SYST BP LT 130 MM HG: CPT | Mod: CPTII,S$GLB,,

## 2024-06-19 PROCEDURE — 99396 PREV VISIT EST AGE 40-64: CPT | Mod: S$GLB,,,

## 2024-06-19 PROCEDURE — 99999 PR PBB SHADOW E&M-EST. PATIENT-LVL V: CPT | Mod: PBBFAC,,,

## 2024-06-19 PROCEDURE — 1159F MED LIST DOCD IN RCRD: CPT | Mod: CPTII,S$GLB,,

## 2024-06-19 PROCEDURE — 36415 COLL VENOUS BLD VENIPUNCTURE: CPT | Mod: PO

## 2024-06-19 PROCEDURE — 84443 ASSAY THYROID STIM HORMONE: CPT

## 2024-06-19 PROCEDURE — 83036 HEMOGLOBIN GLYCOSYLATED A1C: CPT

## 2024-06-19 RX ORDER — ESTRADIOL 0.1 MG/G
2 CREAM VAGINAL
Qty: 42.5 G | Refills: 1 | Status: SHIPPED | OUTPATIENT
Start: 2024-06-20

## 2024-06-19 RX ORDER — MUPIROCIN CALCIUM 20 MG/G
CREAM TOPICAL
COMMUNITY
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
## 2024-06-19 NOTE — ASSESSMENT & PLAN NOTE
Education provided. We discussed weight concerns and safe, effective ways of losing pounds, includin) diet:  low carbohydrate, low fat diet, stay away from fast food, fried and processed food, use whole grain, lot of fruits and vegetables, use healthy fat such as avocado, nuts and olive oil in reasonable quantity, stay away from sodas. Regular meals with lean proteins.  2) physical activity: ideally 150 min a week, with cardiovascular and resistance activity.  Patient was encouraged to set realistic attainable goals for weight loss, and we will follow up periodically.

## 2024-06-19 NOTE — ASSESSMENT & PLAN NOTE
Labs today. F/u with endo next year for repeat US given recs above. Stable, asymptomatic chronic condition.  Will continue to maximize risk factor reduction and adjust medication as needed

## 2024-06-19 NOTE — ASSESSMENT & PLAN NOTE
Stable, chronic condition.  Will continue to maximize risk factor reduction and adjust medication as needed. Completed PT, pt admits does not have full ROM or strength yet. Ambulatory with steady gait.

## 2024-06-19 NOTE — PATIENT INSTRUCTIONS
Monitor blood pressure at home. Goal <130/80.     Fasting labs today    F/u with endo next year for ultrasound.       Weight reduction: maintain a normal body weight (BMI 19-25)  DASH diet: Consume diet rich in fruits, vegetables, and low-fat dairy products with a reduced content of saturated fat and total fat.   Low-sodium diet: consume <2400mg of sodium per day  Increase physical activity: regular aerobic physical activity (150 min per week)  Limit alcohol consumption: Less than 2 drinks/day in men and less than 1 drink/day in women.       Dietary changes  Intake of 1-1200 kcal/day for women (caloric deficit of 500-1000 per day)  Increase in physical activity  Examples of physical activities and their respective rate of caloric expenditure for a 100 kg patient are: walking at 3 miles per hour (350 kcal/hour); bicycling on level ground at 10-12 miles per hour (600 kcal/hour); jogging at 5 miles per hour (800 kcal/hour); swimming freestyle for 1 standard lap (1000 kcal/hour); running 7.5 miles per hour (1200 kcal/hour).

## 2024-06-27 DIAGNOSIS — D50.9 IRON DEFICIENCY ANEMIA, UNSPECIFIED IRON DEFICIENCY ANEMIA TYPE: ICD-10-CM

## 2024-06-27 DIAGNOSIS — E87.6 HYPOKALEMIA: Primary | ICD-10-CM

## 2024-06-27 RX ORDER — POTASSIUM CHLORIDE 20 MEQ/1
20 TABLET, EXTENDED RELEASE ORAL DAILY
Qty: 60 TABLET | Refills: 4 | Status: SHIPPED | OUTPATIENT
Start: 2024-06-27

## 2024-06-27 RX ORDER — FERROUS SULFATE 325(65) MG
325 TABLET ORAL EVERY OTHER DAY
Qty: 45 TABLET | Refills: 2 | Status: SHIPPED | OUTPATIENT
Start: 2024-06-27

## 2024-07-02 ENCOUNTER — PATIENT MESSAGE (OUTPATIENT)
Dept: FAMILY MEDICINE | Facility: CLINIC | Age: 59
End: 2024-07-02
Payer: COMMERCIAL

## 2024-08-26 ENCOUNTER — PATIENT MESSAGE (OUTPATIENT)
Dept: FAMILY MEDICINE | Facility: CLINIC | Age: 59
End: 2024-08-26
Payer: COMMERCIAL

## 2024-08-26 ENCOUNTER — PATIENT MESSAGE (OUTPATIENT)
Dept: SURGERY | Facility: CLINIC | Age: 59
End: 2024-08-26
Payer: COMMERCIAL

## 2024-08-26 DIAGNOSIS — L68.0 HIRSUTISM: ICD-10-CM

## 2024-08-27 RX ORDER — HYDROCORTISONE 25 MG/G
CREAM TOPICAL 2 TIMES DAILY
Qty: 28 G | Refills: 1 | Status: SHIPPED | OUTPATIENT
Start: 2024-08-27

## 2024-08-27 RX ORDER — SPIRONOLACTONE 50 MG/1
TABLET, FILM COATED ORAL
Qty: 60 TABLET | Refills: 3 | Status: SHIPPED | OUTPATIENT
Start: 2024-08-27

## 2024-08-27 NOTE — TELEPHONE ENCOUNTER
Care Due:                  Date            Visit Type   Department     Provider  --------------------------------------------------------------------------------                                             [facility]                                           MED/ INTERNAL  Last Visit: 09-      PRE-OP       MED/ PEDS      Janae Schwartz  Next Visit: None Scheduled  None         None Found                                                            Last  Test          Frequency    Reason                     Performed    Due Date  --------------------------------------------------------------------------------    Office Visit  12 months..  ergocalciferol...........  09- 09-    Vitamin D...  12 months..  ergocalciferol...........  Not Found    Overdue    [employer] Embedded Care Due Messages. Reference number: [id number].   8/27/2024 3:04:13 PM CDT

## 2024-09-10 ENCOUNTER — OFFICE VISIT (OUTPATIENT)
Dept: FAMILY MEDICINE | Facility: CLINIC | Age: 59
End: 2024-09-10
Payer: COMMERCIAL

## 2024-09-10 VITALS
OXYGEN SATURATION: 98 % | TEMPERATURE: 99 F | SYSTOLIC BLOOD PRESSURE: 118 MMHG | BODY MASS INDEX: 36.55 KG/M2 | DIASTOLIC BLOOD PRESSURE: 60 MMHG | HEART RATE: 79 BPM | WEIGHT: 214.06 LBS | HEIGHT: 64 IN
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
## 2024-09-10 DIAGNOSIS — M62.838 MUSCLE SPASM: ICD-10-CM

## 2024-09-10 DIAGNOSIS — E66.01 SEVERE OBESITY (BMI 35.0-39.9) WITH COMORBIDITY: ICD-10-CM

## 2024-09-10 DIAGNOSIS — I10 ESSENTIAL HYPERTENSION: Primary | ICD-10-CM

## 2024-09-10 DIAGNOSIS — Z00.00 GENERAL MEDICAL EXAM: ICD-10-CM

## 2024-09-10 PROCEDURE — 99213 OFFICE O/P EST LOW 20 MIN: CPT | Mod: S$GLB,,, | Performed by: FAMILY MEDICINE

## 2024-09-10 PROCEDURE — 1159F MED LIST DOCD IN RCRD: CPT | Mod: CPTII,S$GLB,, | Performed by: FAMILY MEDICINE

## 2024-09-10 PROCEDURE — 3074F SYST BP LT 130 MM HG: CPT | Mod: CPTII,S$GLB,, | Performed by: FAMILY MEDICINE

## 2024-09-10 PROCEDURE — 1160F RVW MEDS BY RX/DR IN RCRD: CPT | Mod: CPTII,S$GLB,, | Performed by: FAMILY MEDICINE

## 2024-09-10 PROCEDURE — 99999 PR PBB SHADOW E&M-EST. PATIENT-LVL IV: CPT | Mod: PBBFAC,,, | Performed by: FAMILY MEDICINE

## 2024-09-10 PROCEDURE — G2211 COMPLEX E/M VISIT ADD ON: HCPCS | Mod: S$GLB,,, | Performed by: FAMILY MEDICINE

## 2024-09-10 PROCEDURE — 3008F BODY MASS INDEX DOCD: CPT | Mod: CPTII,S$GLB,, | Performed by: FAMILY MEDICINE

## 2024-09-10 PROCEDURE — 3078F DIAST BP <80 MM HG: CPT | Mod: CPTII,S$GLB,, | Performed by: FAMILY MEDICINE

## 2024-09-10 PROCEDURE — 3044F HG A1C LEVEL LT 7.0%: CPT | Mod: CPTII,S$GLB,, | Performed by: FAMILY MEDICINE

## 2024-09-11 ENCOUNTER — TELEPHONE (OUTPATIENT)
Dept: FAMILY MEDICINE | Facility: CLINIC | Age: 59
End: 2024-09-11
Payer: COMMERCIAL

## 2024-09-11 RX ORDER — CYCLOBENZAPRINE HCL 10 MG
10 TABLET ORAL 3 TIMES DAILY PRN
Qty: 60 TABLET | Refills: 2 | Status: SHIPPED | OUTPATIENT
Start: 2024-09-11

## 2024-09-11 NOTE — TELEPHONE ENCOUNTER
----- Message from Andre Reaves sent at 9/10/2024  3:26 PM CDT -----  Regarding: self  Type: Patient Call Back    Who called:self    What is the request in detail:pt is calling in regards of a medication needing to be called,she said the dr didn't send her medication and wanting dr jaime to send it in cyclobenzaprine     Can the clinic reply by [facility]?no    Would the patient rather a call back or a response via [facility]? callback    Best call back number:[phone]    Additional Information:  [id number] - [place] - [address] AT [facility]  [address]  [place] [address]  Phone: [phone] Fax: [phone]
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
## 2024-09-13 ENCOUNTER — OFFICE VISIT (OUTPATIENT)
Dept: FAMILY MEDICINE | Facility: CLINIC | Age: 59
End: 2024-09-13
Payer: COMMERCIAL

## 2024-09-13 VITALS
OXYGEN SATURATION: 99 % | DIASTOLIC BLOOD PRESSURE: 60 MMHG | SYSTOLIC BLOOD PRESSURE: 112 MMHG | BODY MASS INDEX: 36.96 KG/M2 | HEIGHT: 64 IN | WEIGHT: 216.5 LBS | HEART RATE: 65 BPM | TEMPERATURE: 98 F

## 2024-09-13 DIAGNOSIS — R19.04 LEFT LOWER QUADRANT ABDOMINAL MASS: Primary | ICD-10-CM

## 2024-09-13 PROCEDURE — 99999 PR PBB SHADOW E&M-EST. PATIENT-LVL IV: CPT | Mod: PBBFAC,,, | Performed by: FAMILY MEDICINE
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
## 2024-09-16 ENCOUNTER — HOSPITAL ENCOUNTER (OUTPATIENT)
Dept: RADIOLOGY | Facility: HOSPITAL | Age: 59
Discharge: HOME | End: 2024-09-16
Attending: FAMILY MEDICINE
Payer: COMMERCIAL

## 2024-09-16 DIAGNOSIS — R19.04 LEFT LOWER QUADRANT ABDOMINAL MASS: ICD-10-CM

## 2024-09-16 PROCEDURE — 76705 ECHO EXAM OF ABDOMEN: CPT | Mod: 26,,, | Performed by: RADIOLOGY

## 2024-09-16 PROCEDURE — 76705 ECHO EXAM OF ABDOMEN: CPT | Mod: TC

## 2024-10-02 ENCOUNTER — PATIENT MESSAGE (OUTPATIENT)
Dept: FAMILY MEDICINE | Facility: CLINIC | Age: 59
End: 2024-10-02
Payer: COMMERCIAL

## 2024-10-02 DIAGNOSIS — S20.01XD CONTUSION OF RIGHT BREAST, SUBSEQUENT ENCOUNTER: Primary | ICD-10-CM

## 2024-10-21 ENCOUNTER — HOSPITAL ENCOUNTER (OUTPATIENT)
Dept: RADIOLOGY | Facility: HOSPITAL | Age: 59
Discharge: HOME | End: 2024-10-21
Attending: FAMILY MEDICINE
Payer: COMMERCIAL

## 2024-10-21 DIAGNOSIS — S20.01XD CONTUSION OF RIGHT BREAST, SUBSEQUENT ENCOUNTER: ICD-10-CM

## 2024-10-21 PROCEDURE — 76642 ULTRASOUND BREAST LIMITED: CPT | Mod: TC,RT

## 2024-10-21 PROCEDURE — 76642 ULTRASOUND BREAST LIMITED: CPT | Mod: 26,RT,, | Performed by: RADIOLOGY

## 2024-10-21 PROCEDURE — 77065 DX MAMMO INCL CAD UNI: CPT | Mod: 26,RT,, | Performed by: RADIOLOGY

## 2024-10-21 PROCEDURE — 77061 BREAST TOMOSYNTHESIS UNI: CPT | Mod: 26,RT,, | Performed by: RADIOLOGY

## 2024-10-21 PROCEDURE — 77065 DX MAMMO INCL CAD UNI: CPT | Mod: TC,RT

## 2024-11-19 ENCOUNTER — OFFICE VISIT (OUTPATIENT)
Dept: OTOLARYNGOLOGY | Facility: CLINIC | Age: 59
End: 2024-11-19
Payer: COMMERCIAL

## 2024-11-19 VITALS
BODY MASS INDEX: 37.5 KG/M2 | SYSTOLIC BLOOD PRESSURE: 123 MMHG | DIASTOLIC BLOOD PRESSURE: 81 MMHG | HEART RATE: 97 BPM | WEIGHT: 218.5 LBS

## 2024-11-19 DIAGNOSIS — K21.9 LARYNGOPHARYNGEAL REFLUX (LPR): Primary | ICD-10-CM

## 2024-11-19 DIAGNOSIS — R09.89 THROAT CLEARING: ICD-10-CM

## 2024-11-19 PROCEDURE — 99999 PR PBB SHADOW E&M-EST. PATIENT-LVL III: CPT | Mod: PBBFAC,,, | Performed by: PHYSICIAN ASSISTANT

## 2024-11-19 NOTE — PROGRESS NOTES
"Chief Complaint   Patient presents with     Having sore, scratchy throat pain.         59 y.o. female presents for evaluation of scratchy throat for the past few weeks.  "Scratchiness" feels like her entire esophagus. She reports associated increased phlegm, constant throat clearing, post nasal drip. 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 She does use Flonase daily with no significant improvement. She has zyrtec but has not been taking it. Denies globus sensation, dysphagia. She did have an EGD in the past, told she has GERD and a hiatal hernia. She is on pepcid daily, taking nexium PRN.     She has had EGD in the past and has a hiatal hernia. She is on pepcid 40mg. Occasional nexium as needed.     No PSHx head/neck  No radiaton therapy  Never smoker.     Review of Systems     Constitutional: Negative for fatigue and unexpected weight change.   HENT: Per HPI  Eyes: Negative for visual disturbance.   Respiratory: Negative for shortness of breath, hemoptysis  Cardiovascular: Negative for chest pain and palpitations.   Musculoskeletal: Negative for decreased ROM, back pain.     Past Medical History:   Diagnosis Date    GERD (gastroesophageal reflux disease)     Hypertension     Hypokalemic syndrome     Thyroid disease     Thyroid nodule     Vitamin D deficiency disease        Past Surgical History:   Procedure Laterality Date    BREAST BIOPSY       SECTION  2004    X 1    CHOLECYSTECTOMY  2008    HYSTERECTOMY      KNEE SURGERY Left 2023    knee replacement    LAPAROSCOPIC HYSTERECTOMY  2010    LSO       family history includes Breast cancer in her maternal aunt, maternal aunt, paternal aunt, and paternal aunt; Diabetes in her mother; Heart disease in her father; No Known Problems in her brother, brother, maternal grandfather, maternal grandmother, maternal uncle, paternal grandfather, paternal grandmother, paternal uncle, and sister.    Pt  reports that " she has never smoked. She has never used smokeless tobacco. She reports that she does not drink alcohol and does not use drugs.    Review of patient's allergies indicates:  No Known Allergies     Physical Exam    Vitals:    11/19/24 1509   BP: 123/81   Pulse: 97     Body mass index is 37.5 kg/m².    General: AOx3, NAD  Right Ear: External Auditory Canal WNL,TM w/o masses/lesions/perforations  Left Ear:  External Auditory Canal WNL,TM w/o masses/lesions/perforations  Nose: No gross nasal septal deviation.  Inferior Turbinates WNL bilaterally.  No septal perforation.  No masses/lesions.  Oral Cavity: FOM Soft, no masses palpated.  Oral Tongue mobile.  Hard Palate WNL.  Oropharynx: BOT WNL.  No masses/lesions noted.  Tonsillar fossa without lesions.  Soft palate without masses.  Midline uvula.  Neck: No palpable lymphadenopathy at I - VI.    Face: House [place]ackmann I bilaterally.  Eyes: Normal extra ocular motion bilaterally.    Nasopharynx - the torus is clear. There are no lesions of the posterior wall.   Oropharynx - no lesions of the tongue base. There is no obvious fullness or asymmetry.  Hypopharynx - there are no lesions of the pyriform sinuses or postcricoid region   Larynx - there are no lesions of the supraglottic or glottic larynx. Vocal fold mobility is normal with complete closure.     Procedure: Flexible laryngoscopy  In order to fully examine the upper aerodigestive tract, including the larynx, in a patient with a hyperactive gag reflex, flexible endoscopy is required.  After explaining the procedure and obtaining verbal consent, a timeout was performed with the patient's participation according to the universal protocol. Both nasal cavities were anesthetized with 4% Xylocaine spray mixed with Wai-Synephrine. The flexible laryngoscope was inserted into the nasal cavity and advanced to visualize the nasal cavity, nasopharynx, the posterior oropharynx, hypopharynx, and the endolarynx with the above findings  noted. The scope was removed and the procedure terminated. The patient tolerated this procedure well without apparent complication.         Assessment     1. Laryngopharyngeal reflux (LPR)    2. Throat clearing          Plan    Problem List Items Addressed This Visit    None  Visit Diagnoses       Laryngopharyngeal reflux (LPR)    -  Primary    Throat clearing                Scope exam is unremarkable. Symptoms likely due to LPR. Continue pepcid, nexium as prescribed. If symptoms persist - follow up with GI. All questions answered.   Follow up PRN.

## 2024-12-01 DIAGNOSIS — L68.0 HIRSUTISM: ICD-10-CM

## 2024-12-01 NOTE — TELEPHONE ENCOUNTER
Care Due:                  Date            Visit Type   Department     Provider  --------------------------------------------------------------------------------                                [facility]                              PRIMARY      MED/ INTERNAL  Last Visit: 09-      CARE ([facility])   MED/ PEDS      Monet Fisher                              [facility]                              PRIMARY      MED/ INTERNAL  Next Visit: 06-      CARE ([facility])   MED/ PEDS      Janae Schwartz                                                            Last  Test          Frequency    Reason                     Performed    Due Date  --------------------------------------------------------------------------------    Vitamin D...  12 months..  ergocalciferol...........  Not Found    Overdue    [employer] Embedded Care Due Messages. Reference number: [id number].   12/01/2024 5:45:03 AM CST

## 2024-12-01 NOTE — TELEPHONE ENCOUNTER
Refill Routing Note   Medication(s) are not appropriate for processing by [facility] for the following reason(s):        Required labs abnormal    [facility] action(s):  Defer               Appointments  past 12m or future 3m with PCP    Date Provider   Last Visit   9/10/2024 Janae Schwartz MD   Next Visit   Visit date not found Janae Schwartz MD   ED visits in past 90 days: 0        Note composed:5:37 PM 12/01/2024

## 2024-12-02 RX ORDER — SPIRONOLACTONE 50 MG/1
TABLET, FILM COATED ORAL
Qty: 60 TABLET | Refills: 3 | Status: SHIPPED | OUTPATIENT
Start: 2024-12-02

## 2024-12-04 RX ORDER — FAMOTIDINE 40 MG/1
40 TABLET, FILM COATED ORAL DAILY
Qty: 30 TABLET | Refills: 11 | Status: SHIPPED | OUTPATIENT
Start: 2024-12-04 | End: 2025-12-04

## 2025-01-08 ENCOUNTER — OFFICE VISIT (OUTPATIENT)
Dept: PODIATRY | Facility: CLINIC | Age: 60
End: 2025-01-08
Payer: COMMERCIAL

## 2025-01-08 VITALS — HEIGHT: 64 IN | WEIGHT: 218.5 LBS | BODY MASS INDEX: 37.3 KG/M2

## 2025-01-08 DIAGNOSIS — L60.0 INGROWN NAIL: ICD-10-CM

## 2025-01-08 DIAGNOSIS — B35.1 ONYCHOMYCOSIS DUE TO DERMATOPHYTE: Primary | ICD-10-CM

## 2025-01-08 PROCEDURE — 99999 PR PBB SHADOW E&M-EST. PATIENT-LVL III: CPT | Mod: PBBFAC,,, | Performed by: PODIATRIST

## 2025-01-08 PROCEDURE — 3008F BODY MASS INDEX DOCD: CPT | Mod: CPTII,S$GLB,, | Performed by: PODIATRIST

## 2025-01-08 PROCEDURE — 1159F MED LIST DOCD IN RCRD: CPT | Mod: CPTII,S$GLB,, | Performed by: PODIATRIST

## 2025-01-08 PROCEDURE — 99214 OFFICE O/P EST MOD 30 MIN: CPT | Mod: S$GLB,,, | Performed by: PODIATRIST

## 2025-01-08 RX ORDER — CICLOPIROX 80 MG/ML
SOLUTION TOPICAL NIGHTLY
Qty: 6.6 ML | Refills: 3 | Status: SHIPPED | OUTPATIENT
Start: 2025-01-08

## 2025-01-08 NOTE — PATIENT INSTRUCTIONS
Kerasal for fungal nails apply daily to all toenails.  Can be found at [facility]

## 2025-01-10 NOTE — PROGRESS NOTES
Subjective:     Patient ID: Niharika Gutierrez is a 59 y.o. female.    Chief Complaint: Fungus (Left great toe )    Niharika is a 59 y.o. female who presents to the clinic complaining of thick and discolored toenails on the left foot. Niharika is inquiring about treatment options.    Review of Systems   Constitutional: Negative for chills.   Cardiovascular:  Negative for chest pain and claudication.   Respiratory:  Negative for cough.    Skin:  Positive for color change, dry skin and nail changes.   Musculoskeletal:  Positive for joint pain.   Gastrointestinal:  Negative for nausea.   Neurological:  Positive for paresthesias. Negative for numbness.   Psychiatric/Behavioral:  The patient is not nervous/anxious.         Objective:     Physical Exam  Constitutional:       Appearance: She is well-developed.      Comments: Oriented to time, place, and person.   Cardiovascular:      Comments: DP and PT pulses are palpable bilaterally. 3 sec capillary refill time and toes and feet are warm to touch proximally .  There is  hair growth on the feet and toes b/l. There is no edema b/l. No spider veins or varicosities present b/l.     Musculoskeletal:      Comments: Equinus noted b/l ankles with < 10 deg DF noted. MMT 5/5 in DF/PF/Inv/Ev resistance with no reproduction of pain in any direction. Passive range of motion of ankle and pedal joints is painless b/l.     Feet:      Right foot:      Skin integrity: No callus or dry skin.      Left foot:      Skin integrity: No callus or dry skin.   Lymphadenopathy:      Comments: Negative lymphadenopathy bilateral popliteal fossa and tarsal tunnel.   Skin:     Comments: No open lesions, lacerations or wounds noted.Interdigital spaces clean, dry and intact b/l. No erythema noted to b/l foot.    Toenails 1-5 bilaterally are elongated by 2-3 mm, thickened by 2-3 mm, discolored/yellowed, dystrophic, brittle with subungual debris.     Neurological:      Mental Status: She is alert.       Comments: Light touch, proprioception, and sharp/dull sensation are all intact bilaterally. Protective threshold with the [place] monofilament is intact bilaterally.    Psychiatric:         Behavior: Behavior is cooperative.           Assessment:      Encounter Diagnosis   Name Primary?    Onychomycosis due to dermatophyte Yes     Plan:     Niharika was seen today for fungus.    Diagnoses and all orders for this visit:    Onychomycosis due to dermatophyte    Other orders  -     ciclopirox (PENLAC) 8 % Soln; Apply topically nightly.      I counseled the patient on her conditions, their implications and medical management.    At patient's request, I discussed different treatments for toenail fungus. We discussed oral antifungals but I did not recommend them as a first line treatment since the medication is taken internally and can have side effects such as rash, taste disturbances, and liver enzyme elevation. We discussed topical Penlac to be applied daily and removed weekly. Pt. Expresses understanding and would like to try the Penlac. Rx sent to the pharmacy.     Instructed patient on the importance of keeping feet dry. Patient instructed to use absorbent cotton socks and change them if they become sweaty; or wear an open-toe shoe or sandal. Wash the feet at least once a day with soap and water. Patient instructed to use lysol or over-the-counter antifungal powders or sprays to shoes daily and allow them to air dry, switching shoes from every other day would be optimal. Patient is to avoid barefoot walking in high-risk environments (public showers, gyms and locker rooms) may prevent future infections.     With patient's permission Left hallux nail trimmed.    Discussed treatment options with patient. Options included soaking, avulsion and matrixectomy. Risks and benefits discussed and all questions were answered. The patient wishes to proceed with  Nail avulsion at a later date      In depth conversation  on the treatment of ingrown nail; partial nail avulsion vs chemical matrixectomy vs conservative treatment of soaking and nail trimming      RTC PRN

## 2025-01-29 DIAGNOSIS — I10 ESSENTIAL HYPERTENSION: ICD-10-CM

## 2025-01-29 NOTE — TELEPHONE ENCOUNTER
No care due was identified.  [facility] Embedded Care Due Messages. Reference number: [id number].   1/29/2025 5:19:02 PM CST

## 2025-01-30 RX ORDER — AMLODIPINE BESYLATE 10 MG/1
10 TABLET ORAL DAILY
Qty: 90 TABLET | Refills: 3 | Status: SHIPPED | OUTPATIENT
Start: 2025-01-30

## 2025-02-26 ENCOUNTER — PATIENT MESSAGE (OUTPATIENT)
Dept: PODIATRY | Facility: CLINIC | Age: 60
End: 2025-02-26
Payer: COMMERCIAL

## 2025-02-26 ENCOUNTER — PATIENT MESSAGE (OUTPATIENT)
Dept: FAMILY MEDICINE | Facility: CLINIC | Age: 60
End: 2025-02-26
Payer: COMMERCIAL

## 2025-02-26 ENCOUNTER — OFFICE VISIT (OUTPATIENT)
Dept: FAMILY MEDICINE | Facility: CLINIC | Age: 60
End: 2025-02-26
Payer: COMMERCIAL

## 2025-02-26 VITALS — DIASTOLIC BLOOD PRESSURE: 80 MMHG | SYSTOLIC BLOOD PRESSURE: 120 MMHG

## 2025-02-26 DIAGNOSIS — E66.01 SEVERE OBESITY (BMI 35.0-39.9) WITH COMORBIDITY: ICD-10-CM

## 2025-02-26 DIAGNOSIS — E87.6 HYPOKALEMIA: Primary | ICD-10-CM

## 2025-02-26 DIAGNOSIS — I10 ESSENTIAL HYPERTENSION: ICD-10-CM

## 2025-02-26 DIAGNOSIS — E87.6 HYPOKALEMIA: ICD-10-CM

## 2025-02-26 PROCEDURE — 3079F DIAST BP 80-89 MM HG: CPT | Mod: CPTII,95,, | Performed by: NURSE PRACTITIONER

## 2025-02-26 PROCEDURE — 98006 SYNCH AUDIO-VIDEO EST MOD 30: CPT | Mod: 95,,, | Performed by: NURSE PRACTITIONER

## 2025-02-26 PROCEDURE — 3074F SYST BP LT 130 MM HG: CPT | Mod: CPTII,95,, | Performed by: NURSE PRACTITIONER

## 2025-02-26 PROCEDURE — G2211 COMPLEX E/M VISIT ADD ON: HCPCS | Mod: 95,,, | Performed by: NURSE PRACTITIONER

## 2025-02-26 RX ORDER — POTASSIUM BICARBONATE 978 MG/1
25 TABLET, EFFERVESCENT ORAL 3 TIMES DAILY
Qty: 270 TABLET | Refills: 2 | OUTPATIENT
Start: 2025-02-26

## 2025-02-26 RX ORDER — POTASSIUM CHLORIDE 20 MEQ/1
20 TABLET, EXTENDED RELEASE ORAL DAILY
Qty: 60 TABLET | Refills: 4 | Status: SHIPPED | OUTPATIENT
Start: 2025-02-26 | End: 2025-02-26

## 2025-02-26 NOTE — TELEPHONE ENCOUNTER
Refill Decision Note   Niharika Gutierrez  is requesting a refill authorization.  Brief Assessment and Rationale for Refill:  Quick Discontinue     Medication Therapy Plan:  Discontinued by: Maria A Coppola PA-C on 5/23/2024. now on regular 20meq tabs      Comments:     Note composed:12:54 PM 02/26/2025

## 2025-02-26 NOTE — TELEPHONE ENCOUNTER
No care due was identified.  [facility] Embedded Care Due Messages. Reference number: [id number].   2/26/2025 11:32:56 AM CST

## 2025-02-26 NOTE — TELEPHONE ENCOUNTER
Care Due:                  Date            Visit Type   Department     Provider  --------------------------------------------------------------------------------                                [facility]                              PRIMARY      MED/ INTERNAL  Last Visit: 09-      CARE ([facility])   MED/ PEDS      Monet Fisher                              [facility]                              PRIMARY      MED/ INTERNAL  Next Visit: 06-      CARE ([facility])   MED/ PEDS      Janae Schwartz                                                            Last  Test          Frequency    Reason                     Performed    Due Date  --------------------------------------------------------------------------------    Vitamin D...  12 months..  ergocalciferol...........  Not Found    Overdue    [employer] Embedded Care Due Messages. Reference number: [id number].   2/26/2025 8:14:35 AM CST   Recent PHQ 2/9 Score    PHQ 2:  Date Adult PHQ 2 Score   8/29/2019 2       PHQ 9:  Date Adult PHQ 9 Score   6/5/2018 1

## 2025-02-27 ENCOUNTER — LAB VISIT (OUTPATIENT)
Dept: LAB | Facility: HOSPITAL | Age: 60
End: 2025-02-27
Attending: NURSE PRACTITIONER
Payer: COMMERCIAL

## 2025-02-27 ENCOUNTER — RESULTS FOLLOW-UP (OUTPATIENT)
Dept: FAMILY MEDICINE | Facility: CLINIC | Age: 60
End: 2025-02-27

## 2025-02-27 DIAGNOSIS — E87.6 HYPOKALEMIA: ICD-10-CM

## 2025-02-27 LAB
ALBUMIN SERPL BCP-MCNC: 3.3 G/DL (ref 3.5–5.2)
ALP SERPL-CCNC: 73 U/L (ref 40–150)
ALT SERPL W/O P-5'-P-CCNC: 8 U/L (ref 10–44)
ANION GAP SERPL CALC-SCNC: 9 MMOL/L (ref 8–16)
AST SERPL-CCNC: 15 U/L (ref 10–40)
BILIRUB SERPL-MCNC: 0.3 MG/DL (ref 0.1–1)
BUN SERPL-MCNC: 16 MG/DL (ref 6–20)
CALCIUM SERPL-MCNC: 9.1 MG/DL (ref 8.7–10.5)
CHLORIDE SERPL-SCNC: 105 MMOL/L (ref 95–110)
CO2 SERPL-SCNC: 25 MMOL/L (ref 23–29)
CREAT SERPL-MCNC: 0.9 MG/DL (ref 0.5–1.4)
EST. GFR  (NO RACE VARIABLE): >60 ML/MIN/1.73 M^2
GLUCOSE SERPL-MCNC: 95 MG/DL (ref 70–110)
POTASSIUM SERPL-SCNC: 4.3 MMOL/L (ref 3.5–5.1)
PROT SERPL-MCNC: 8 G/DL (ref 6–8.4)
SODIUM SERPL-SCNC: 139 MMOL/L (ref 136–145)

## 2025-02-27 PROCEDURE — 80053 COMPREHEN METABOLIC PANEL: CPT | Performed by: NURSE PRACTITIONER

## 2025-02-27 PROCEDURE — 36415 COLL VENOUS BLD VENIPUNCTURE: CPT | Mod: PO | Performed by: NURSE PRACTITIONER

## 2025-02-27 NOTE — PATIENT INSTRUCTIONS
Medical Fitness--[phone]  Imaging, Xray, CT, MRI, Ultrasound---[phone]  Bariatrics---[phone]  Breast Surgery---[phone]  Case Management---[phone]  Colonoscopy---[phone]  DME---[phone]  Infectious Disease---[phone]  Interventional Radiology---[phone]  Medical Records---[phone]  [facility]---[phone]  Optometry/Ophthalmology---[phone]  [facility]---[phone]  Physical Therapy---[phone]  Psychiatry---[phone] or [phone]  Plastic Surgery---[phone]  Recovery--[phone] option 2, or [phone].  Sleep Study---[phone]  Smoking Cessation---[phone]  Wound Care---[phone]  Referral Desk---[phone]  Patient Education       High Blood Pressure Discharge Instructions   About this topic   The medical name for high blood pressure is hypertension. Your blood pressure is measured with 2 numbers. For example, you may hear the staff say your blood pressure is 130 over 80. High blood pressure cannot be cured. You must control it with drugs and lifestyle changes. High blood pressure puts you at risk for heart attack, stroke, and kidney disease.         What care is needed at home?   Ask your doctor what you need to do when you go home. Make sure you ask questions if you do not understand what the doctor says. This way you will know what you need to do.  Take all your medicines as ordered. Do not stop taking any of your regular medicines without talking to your doctor.  Learn how to check your blood pressure at home, if your doctor suggests you do this.  What follow-up care is needed?   Your doctor may ask you to make visits to the office to check on your progress. Be sure to keep these visits.  What drugs may be needed?   The doctor may order drugs to help control your high blood pressure. Take your drugs as ordered. Do not take other prescription drugs or over-the-counter (OTC) drugs without talking to your doctor first.  Will physical  activity be limited?   Talk to your doctor about the right amount of activity for you. Exercise may help you lose weight and lower your blood pressure.  What changes to diet are needed?   Do not use salt on your food. Use herbs and spices to improve the taste.  Eat less than 1,500 mg of sodium a day. Read food labels to see how much sodium is in a food.  Limit coffee, tea, and soda to 2 cups (480 mL) a day.  Limit beer, wine, and mixed drinks (alcohol) to 1 drink a day for women and 2 drinks a day for men.  Eat lots of fruits, vegetables, and low-fat dairy products.  Avoid fatty foods like fried foods or chips.  Talk with your dietitian about the diet changes you need and the number of calories you should eat each day.  What problems could happen?   Heart attack, heart failure, or other heart problems  Stroke  Swelling of blood vessels  Kidney failure  Loss of eyesight  Memory problems  Fluid in the lungs  Death  What can be done to prevent this health problem?   Exercise regularly. Try to get at least 30 minutes of exercise most days of the week.  Avoid weight gain.  Stop smoking. Your doctor can tell you about stop smoking programs.  Learn to lower stress.  When do I need to call the doctor?   You have signs of a heart attack, which may include:  Severe chest pain, pressure, or discomfort with:  Breathing trouble, sweating, upset stomach, or cold, clammy skin  Pain in your arms, back, or jaw  Worse pain with activity like walking up stairs  Fast or irregular heartbeat  Feeling dizzy, faint, or weak  You have signs of stroke like sudden:  Numbness or weakness of the face, arm, or leg, especially on one side of the body  Confusion, trouble speaking, or understanding  Trouble seeing in one or both eyes  Trouble walking, dizziness, loss of balance, or coordination  Severe headache with no known cause  You have a seizure or pass out.  You have a severe headache with an upset stomach or throwing up.  You have sudden,  severe back pain.  You have 2 home blood pressure readings higher than 180/120.  Your urine is brown or bloody.  Teach Back: Helping You Understand   The Teach Back Method helps you understand the information we are giving you. After you talk with the staff, tell them in your own words what you learned. This helps to make sure the staff has described each thing clearly. It also helps to explain things that may have been confusing. Before going home, make sure you can do these:  I can tell you about my condition.  I can tell you what numbers are too high for my blood pressure.  I can tell you what I will do if I have signs of a heart attack or stroke.  Where can I learn more?   American Academy of Family Physicians  https://familydoctor.org/condition/high-blood-pressure/   American Academy of Family Physicians  https://familydoctor.org/the-dash-diet-healthy-eating-to-control-your-blood-pressure/   American Heart Association  http://www.heart.org/HEARTORG/Conditions/HighBloodPressure/AboutHighBloodPressure/About-High-Blood-Pressure_[facility]_002050_Article.jsp#.[email or web address]   NHS Choices  http://www.nhs.uk/conditions/blood-pressure-(high)/pages/introduction.aspx   [employer]  https://www.[employer].AMS-Qi/contents/high-blood-pressure-in-adults-beyond-the-basics?source=see_link   Last Reviewed Date   2021-06-08  Consumer Information Use and Disclaimer   This information is not specific medical advice and does not replace information you receive from your health care provider. This is only a brief summary of general information. It does NOT include all information about conditions, illnesses, injuries, tests, procedures, treatments, therapies, discharge instructions or life-style choices that may apply to you. You must talk with your health care provider for complete information about your health and treatment options. This information should not be used to decide whether or not to accept your health care providers advice,  instructions or recommendations. Only your health care provider has the knowledge and training to provide advice that is right for you.  Copyright   Copyright © 2021 [employer] and its affiliates and/or licensors. All rights reserved.  Patient Education       DASH Diet   About this topic   DASH stands for Dietary Approaches to Stop Hypertension. The DASH diet may help you lower blood pressure. It may also help keep you from getting high blood pressure. You will eat less fat and more fiber on the DASH diet.  This diet gives you more minerals that fight high blood pressure. Some nutrients in this diet are:  Potassium ? Acts to help you get rid of salt. This may help to lower blood pressure.  Calcium ? Makes blood vessels and muscles work the right way  Magnesium - Helps blood vessels relax  Fiber ? Helps you feel full. It also helps digestion.  What will the results be?   The DASH diet may help you:  Lower your blood pressure and cholesterol  Lower your risk for cancer, heart disease, heart attack, and stroke. It may also lower your risk for heart failure, kidney stones, and diabetes.  Lose weight or keep a healthy weight  What lifestyle changes are needed?   Add regular exercise to get the most help from this diet.  Try to lower stress. Find ways to relax.  Stop smoking. Avoid secondhand smoke.  Limit alcohol intake.  What changes to diet are needed?   Know about poor eating habits. Then, you can fix them as you work with the program.  This diet encourages fruits and vegetables, whole grains, lean meats, healthy fats, and low-fat or fat-free dairy products.  This diet is lower in saturated fats, trans-fats, cholesterol, added sugars, and sodium.  Who should use this diet?   This eating plan is good for the whole family. It is also good for people with high blood pressure and those at risk for high blood pressure.  What foods are good to eat?   Grains: Try to eat 6 to 8 servings of whole grain, high fiber foods  each day. These are bread, cereals, brown rice, or pasta.  Fruits and vegetables: Eat 4 to 5 servings each day. Try to pick many kinds and colors. Fresh or frozen are best. Look for low sodium or salt-free if you choose canned.  Dairy: Try to eat 2 to 3 servings of fat free and low fat milk products each day.  Lean meats, poultry, and seafood: Try to eat 6 servings or less of lean meats, poultry, and seafood each day. Try to choose more low fat or lean meats like chicken and turkey. Eat less red meat. Eat more fish instead.  Nuts, seeds, and legumes (dry beans and peas): Try to eat 4 to 5 servings each week. Try to pick nuts such as almonds and walnuts, sunflower seeds, peanut butter, soy beans, lentils, kidney beans, and split peas.  Fats and oils: Try to eat 2 to 3 servings of fats and oils each day. Eat good fats found in fish, nuts, and avocados. Try using olive oil or vegetable oils such as canola oil. Other good oils to try are corn, safflower, sunflower, or soybean oils. Use low-sodium and low-fat salad dressing and mayonnaise.  Condiments: Pepper, herbs, spices, vinegar, lemon or lime juices are great for seasoning. Be careful to choose low-sodium or salt-free products if you use broths, soups, or soy sauce.  Sweets: Try to eat less than 5 servings each week. Choose low-fat and trans fat-free desserts. These are things like fruit flavored gelatin, sorbet, jelly beans, ko crackers, animal crackers, low-fat fig bars, and ирина snaps. Eat fruit to satisfy your desire for sweets.     What foods should be limited or avoided?   Grains: Salted breads, rolls, crackers, quick breads, self-rising flours, biscuit mixes, regular bread crumbs, instant hot cereals, commercially-prepared rice, pasta, stuffing mixes  Fruits and vegetables: Commercially-prepared potatoes and vegetable mixes, regular canned vegetables and juices, vegetables frozen with sauce or pickled vegetables, processed fruits with salt or  sodium  Milk: Whole milk, malted milk, chocolate milk, buttermilk, cheese, ice cream  Meats and beans: Smoked, cured, salted, or canned fish; meats or poultry such as feliz, sausages, sardines; high-fat cuts of meat like beef, lamb, or pork; chicken with the skin on it  Fats: Cut back on solid fats like butter, lard, and margarine. Eat less food with high saturated fat, cholesterol and total fat.  Condiments and snacks: Salted and canned peas, beans, and olives; salted snack foods; fried foods; soda or other sweetened drinks  Sweets: High-fat baked goods such as muffins, donuts, pastries, commercial baked goods, candy bars  If you choose to drink alcohol, limit the amount you drink. Women should have 1 drink or less per day and men should have 2 drinks or less per day.  Helpful tips   Avoid eating canned vegetables and processed foods. These have a lot of salt in them. Look for a low-salt or low-sodium choice.  Try baking or broiling instead of frying food.  Write down the foods you eat. This will help you track what you have eaten each week.  When you go to a grocery store, have a list or a meal plan. Do not shop when you are hungry to avoid cravings for foods.  Read food labels with care. They will show you how much is in a serving. The amount is given as a percentage of the total amount you need each day. Reading labels will help you make healthy food choices.       Avoid fast foods.  Talk to your doctor or dietitian to see if you need vitamin and mineral supplements to help you balance your diet.  Talk to a dietitian for help.  Where can I learn more?   Academy of Nutrition and Dietetics  https://www.eatright.org/health/wellness/heart-and-cardiovascular-health/dash-diet-reducing-hypertension-through-diet-and-lifestyle   FamilyDoctor.org  http://familydoctor.org/familydoctor/en/prevention-wellness/food-nutrition/weight-loss/the-dash-diet-healthy-eating-to-control-your-blood-pressure.html   Last Reviewed Date    2021-03-15  Consumer Information Use and Disclaimer   This information is not specific medical advice and does not replace information you receive from your health care provider. This is only a brief summary of general information. It does NOT include all information about conditions, illnesses, injuries, tests, procedures, treatments, therapies, discharge instructions or life-style choices that may apply to you. You must talk with your health care provider for complete information about your health and treatment options. This information should not be used to decide whether or not to accept your health care providers advice, instructions or recommendations. Only your health care provider has the knowledge and training to provide advice that is right for you.  Copyright   Copyright © 2021 [employer] and its affiliates and/or licensors. All rights reserved.

## 2025-02-27 NOTE — PROGRESS NOTES
Problem: Pain:  Goal: Control of acute pain  Control of acute pain   Outcome: Met This Shift      Problem: Falls - Risk of:  Goal: Absence of physical injury  Absence of physical injury   Outcome: Met This Shift      Problem: Cardiac:  Goal: Ability to maintain vital signs within normal range will improve  Ability to maintain vital signs within normal range will improve   Outcome: Met This Shift The patient location is: Patient Home  The chief complaint leading to consultation is: as below  Visit type:   Virtual visit with synchronous audio and video    Total time spent with patient: 15 minutes  Each patient to whom he or she provides medical services by telemedicine is:  (1) informed of the relationship between the physician and patient and the respective role of any other health care provider with respect to management of the patient; and (2) notified that she may decline to receive medical services by telemedicine and may withdraw from such care at any time.      HPI     Niharika Gutierrez is a 60 y.o. female with multiple medical diagnoses as listed in the medical history and problem list that presents for hypokalemia.      HPI    Pt has a hx of hypokalemia. She is requesting a refill of potassium bicarbonate (KLOR-CON/EF) disintegrating tablet bid. Pt is unable to tolerate non disintegrating tablets and is requesting disintegrating tablet.      Latest Reference Range & Units 06/19/24 09:53   Potassium 3.5 - 5.1 mmol/L 3.2 (L)   (L): Data is abnormally low        Assessment & Plan     1. Essential hypertension  BP Readings from Last 3 Encounters:   02/26/25 120/80   11/19/24 123/81   09/13/24 112/60     -continue current medication regimen  -DASH diet, regular cardiovascular exercises, portion control  -weight loss  -f/u with BP logs in 2 weeks      2. Hypokalemia  Last potassium was 3.2 in June 2024. Pt requesting medication refill.   Medication as below   Recheck CMP  Discussed DDx, condition, and treatment.   Education sent to patient portal/included in after visit summary.  ED precautions given.   Notify provider if symptoms do not resolve or increase in severity.   Patient verbalizes understanding and agrees with plan of care.   - Comprehensive Metabolic Panel; Future  - potassium bicarbonate (KLOR-CON/EF) disintegrating tablet; Take 1 tablet (25 mEq total) by mouth 2 (two) times a day.   Dispense: 60 tablet; Refill: 11    3. Severe obesity (BMI 35.0-39.9) with comorbidity  We discussed weight issues and safe, effective ways of losing pounds, includin) diet:  low carbohydrate, low fat diet, stay away from fast food, fried and processed food, use whole grain, lot of fruits and vegetables, use healthy fat such as avocado, nuts and olive oil in reasonable quantity, stay away from sodas. Regular meals with lean proteins.  2) physical activity: ideally 150 min a week, with cardiovascular and resistance activity.  Patient was encouraged to set realistic attainable goals for weight loss, and we will follow up periodically.  Mediterranean Diet recommendations (Adopted from Kelle et al, NE, 2018.)  - Eat primarily plant-based foods, such as fruits and vegetables, whole grains, legumes (beans) and nuts  - Limit refined carbohydrates (white pasta, bread, rice).  - Replace butter with healthy fats such as olive oil.  - Use herbs and spices instead of salt to flavor foods.  - Limit red meat and processed meats to no more than a few times a month.  - Avoid sugary sodas, bakery goods, and sweets.  - Eat fish and poultry at least twice a week.       --------------------------------------------      Health Maintenance:  Health Maintenance         Date Due Completion Date    Shingles Vaccine (1 of 2) Never done ---    Pneumococcal Vaccines (Age 50+) (1 of 1 - PCV) Never done ---    Influenza Vaccine (1) 2024    COVID-19 Vaccine ( - - season) 2024 10/18/2023    RSV Vaccine (Age 60+ and Pregnant patients) (1 - Risk 60-74 years 1-dose series) Never done ---    Colorectal Cancer Screening 2025    Mammogram 10/21/2025 10/21/2024    Hemoglobin A1c (Diabetic Prevention Screening) 2027    TETANUS VACCINE 2027    Lipid Panel 10/03/2028 10/3/2023    Override on 2/15/2010: Done            Advised patient on the importance of completing overdue  health maintenance items    Follow Up:  Follow up in about 2 weeks (around 3/12/2025), or if symptoms worsen or fail to improve.    Exam     Review of Systems:  (as noted above)  Review of Systems   Constitutional:  Negative for activity change, fever and unexpected weight change.   HENT:  Negative for hearing loss, rhinorrhea and trouble swallowing.    Eyes:  Negative for discharge and visual disturbance.   Respiratory:  Negative for chest tightness, shortness of breath and wheezing.    Cardiovascular:  Negative for chest pain and palpitations.   Gastrointestinal:  Negative for blood in stool, constipation, diarrhea and vomiting.   Endocrine: Negative for polydipsia and polyuria.   Genitourinary:  Negative for difficulty urinating, dysuria, hematuria and menstrual problem.   Musculoskeletal:  Positive for arthralgias. Negative for joint swelling and neck pain.   Neurological:  Negative for weakness and headaches.   Psychiatric/Behavioral:  Negative for confusion and dysphoric mood.        Physical Exam:   Physical Exam  Constitutional:       General: She is not in acute distress.     Appearance: She is not toxic-appearing or diaphoretic.   Pulmonary:      Effort: Pulmonary effort is normal.   Neurological:      Mental Status: She is alert.   Psychiatric:         Mood and Affect: Mood normal.       There were no vitals filed for this visit.   There is no height or weight on file to calculate BMI.        History     Past Medical History:  Past Medical History:   Diagnosis Date    GERD (gastroesophageal reflux disease)     Hypertension     Hypokalemic syndrome     Thyroid disease     Thyroid nodule     Vitamin D deficiency disease        Past Surgical History:  Past Surgical History:   Procedure Laterality Date    BREAST BIOPSY       SECTION  2004    X 1    CHOLECYSTECTOMY  2008    HYSTERECTOMY      KNEE SURGERY Left 2023    knee replacement    LAPAROSCOPIC HYSTERECTOMY      O       Social  History:  Social History[1]    Family History:  Family History   Problem Relation Name Age of Onset    Diabetes Mother      Heart disease Father          CHF    No Known Problems Sister      No Known Problems Brother      No Known Problems Brother      Breast cancer Maternal Aunt      Breast cancer Maternal Aunt      No Known Problems Maternal Uncle      Breast cancer Paternal Aunt      Breast cancer Paternal Aunt      No Known Problems Paternal Uncle      No Known Problems Maternal Grandmother      No Known Problems Maternal Grandfather      No Known Problems Paternal Grandmother      No Known Problems Paternal Grandfather      Colon cancer Neg Hx      Colon polyps Neg Hx      Amblyopia Neg Hx      Blindness Neg Hx      Cancer Neg Hx      Cataracts Neg Hx      Glaucoma Neg Hx      Hypertension Neg Hx      Macular degeneration Neg Hx      Retinal detachment Neg Hx      Strabismus Neg Hx      Stroke Neg Hx      Thyroid disease Neg Hx      Esophageal cancer Neg Hx         Allergies and Medications: (updated and reviewed)  Review of patient's allergies indicates:  No Known Allergies  Current Medications[2]    Patient Care Team:  Janae Schwartz MD as PCP - General (Family Medicine)  [place]Alejandra LPN as Licensed Practical Nurse       - The patient was sent an After Visit Summary virtually that lists all medications with directions, allergies, education, orders placed during this encounter and follow-up instructions.      - I have reviewed the patient's medical information including past medical, family, and social history sections including the medications and allergies.      - We discussed the patient's current medications.     This note was created by combination of typed  and MModal dictation.  Transcription errors may be present.  If there are any questions, please contact me.                        [1]   Social History  Socioeconomic History    Marital status:    Occupational History      Employer: [employer]   Tobacco Use    Smoking status: Never    Smokeless tobacco: Never   Substance and Sexual Activity    Alcohol use: No    Drug use: No    Sexual activity: Yes     Partners: Male     Birth control/protection: Surgical   Social History Narrative    N/A per the patient.      Social Drivers of Health     Financial Resource Strain: Low Risk  (2/26/2025)    Overall Financial Resource Strain (CARDIA)     Difficulty of Paying Living Expenses: Not hard at all   Food Insecurity: No Food Insecurity (2/26/2025)    Hunger Vital Sign     Worried About Running Out of Food in the Last Year: Never true     Ran Out of Food in the Last Year: Never true   Transportation Needs: No Transportation Needs (2/26/2025)    PRAPARE - Transportation     Lack of Transportation (Medical): No     Lack of Transportation (Non-Medical): No   Physical Activity: Insufficiently Active (2/26/2025)    Exercise Vital Sign     Days of Exercise per Week: 3 days     Minutes of Exercise per Session: 20 min   Stress: No Stress Concern Present (2/26/2025)    [employer] [place] - Occupational Stress Questionnaire     Feeling of Stress : Only a little   Housing Stability: Low Risk  (2/26/2025)    Housing Stability Vital Sign     Unable to Pay for Housing in the Last Year: No     Homeless in the Last Year: No   [2]   Current Outpatient Medications   Medication Sig Dispense Refill    acetaminophen (TYLENOL) 650 MG TbSR Take 650 mg by mouth every 8 (eight) hours.      amLODIPine (NORVASC) 10 MG tablet Take 1 tablet (10 mg total) by mouth once daily. 90 tablet 3    ciclopirox (PENLAC) 8 % Soln Apply topically nightly. 6.6 mL 3    cyclobenzaprine (FLEXERIL) 10 MG tablet Take 1 tablet (10 mg total) by mouth 3 (three) times daily as needed for Muscle spasms. 60 tablet 2    ergocalciferol (ERGOCALCIFEROL) 50,000 unit Cap TAKE 1 CAPSULE BY MOUTH EVERY 7 DAYS 12 capsule 3    esomeprazole (NEXIUM) 40 MG capsule Take 1  capsule (40 mg total) by mouth before breakfast. 30 capsule 0    estradioL (ESTRACE) 0.01 % (0.1 mg/gram) vaginal cream Place 2 g vaginally twice a week. 42.5 g 1    EYSUVIS 0.25 % DrpS SMARTSI-2 Drop(s) In Eye(s) 1-4 Times Daily      famotidine (PEPCID) 40 MG tablet Take 1 tablet (40 mg total) by mouth once daily. 30 tablet 11    ferrous sulfate (FEOSOL) 325 mg (65 mg iron) Tab tablet Take 1 tablet (325 mg total) by mouth every other day. 45 tablet 2    hydrocortisone (ANUSOL-HC) 2.5 % rectal cream Place rectally 2 (two) times daily. 28 g 1    LIDOcaine (LMX) 4 % cream Apply topically 4 (four) times daily as needed (rectal pain). Apply to external hemorrhoids prn 30 g 2    potassium bicarbonate (KLOR-CON/EF) disintegrating tablet Take 1 tablet (25 mEq total) by mouth 2 (two) times a day. 60 tablet 11    spironolactone (ALDACTONE) 50 MG tablet TAKE 1 TABLET BY MOUTH EVERY DAY, INCREASE TO 2 TABLETS BY MOUTH EVERY DAY AS TOLERATED 60 tablet 3     No current facility-administered medications for this visit.

## 2025-04-03 DIAGNOSIS — L68.0 HIRSUTISM: ICD-10-CM

## 2025-04-03 RX ORDER — SPIRONOLACTONE 50 MG/1
TABLET, FILM COATED ORAL
Qty: 180 TABLET | Refills: 1 | Status: SHIPPED | OUTPATIENT
Start: 2025-04-03

## 2025-04-03 NOTE — TELEPHONE ENCOUNTER
Refill Routing Note   Medication(s) are not appropriate for processing by [facility] for the following reason(s):        Clarification of medication (Rx) details    [facility] action(s):  Defer        Medication Therapy Plan: Unclear if patient is still taking one tab or two daily; Please advise      Appointments  past 12m or future 3m with PCP    Date Provider   Last Visit   9/10/2024 Janae Schwartz MD   Next Visit   6/16/2025 Janae Schwartz MD   ED visits in past 90 days: 0        Note composed:8:04 AM 04/03/2025

## 2025-04-03 NOTE — TELEPHONE ENCOUNTER
No care due was identified.  [facility] Embedded Care Due Messages. Reference number: [id number].   4/03/2025 5:40:10 AM CDT

## 2025-04-21 ENCOUNTER — PATIENT MESSAGE (OUTPATIENT)
Dept: SURGERY | Facility: CLINIC | Age: 60
End: 2025-04-21
Payer: COMMERCIAL

## 2025-04-21 RX ORDER — HYDROCORTISONE 25 MG/G
CREAM TOPICAL 2 TIMES DAILY
Qty: 28 G | Refills: 3 | Status: SHIPPED | OUTPATIENT
Start: 2025-04-21 | End: 2025-05-05

## 2025-04-21 RX ORDER — HYDROCORTISONE 25 MG/G
CREAM TOPICAL 2 TIMES DAILY
Qty: 28 G | Refills: 1 | Status: SHIPPED | OUTPATIENT
Start: 2025-04-21

## 2025-04-22 DIAGNOSIS — E55.9 VITAMIN D INSUFFICIENCY: ICD-10-CM

## 2025-04-22 RX ORDER — ERGOCALCIFEROL 1.25 MG/1
50000 CAPSULE ORAL
Qty: 12 CAPSULE | Refills: 3 | Status: SHIPPED | OUTPATIENT
Start: 2025-04-22

## 2025-04-22 NOTE — TELEPHONE ENCOUNTER
Refill Routing Note   Medication(s) are not appropriate for processing by [facility] for the following reason(s):        Outside of protocol    [facility] action(s):  Route             Appointments  past 12m or future 3m with PCP    Date Provider   Last Visit   9/10/2024 Janae Schwartz MD   Next Visit   6/16/2025 Janae Schwartz MD   ED visits in past 90 days: 0        Note composed:4:56 PM 04/22/2025

## 2025-04-22 NOTE — TELEPHONE ENCOUNTER
No care due was identified.  [facility] Embedded Care Due Messages. Reference number: [id number].   4/22/2025 11:47:43 AM CDT

## 2025-04-29 ENCOUNTER — OFFICE VISIT (OUTPATIENT)
Dept: FAMILY MEDICINE | Facility: CLINIC | Age: 60
End: 2025-04-29
Payer: COMMERCIAL

## 2025-04-29 VITALS
WEIGHT: 219.38 LBS | SYSTOLIC BLOOD PRESSURE: 128 MMHG | BODY MASS INDEX: 37.45 KG/M2 | HEART RATE: 80 BPM | OXYGEN SATURATION: 97 % | TEMPERATURE: 99 F | DIASTOLIC BLOOD PRESSURE: 66 MMHG | HEIGHT: 64 IN

## 2025-04-29 DIAGNOSIS — H93.90 EAR LESION: Primary | ICD-10-CM

## 2025-04-29 DIAGNOSIS — I10 ESSENTIAL HYPERTENSION: ICD-10-CM

## 2025-04-29 DIAGNOSIS — E66.01 SEVERE OBESITY (BMI 35.0-39.9) WITH COMORBIDITY: ICD-10-CM

## 2025-04-29 PROCEDURE — 99213 OFFICE O/P EST LOW 20 MIN: CPT | Mod: S$GLB,,,

## 2025-04-29 PROCEDURE — 3008F BODY MASS INDEX DOCD: CPT | Mod: CPTII,S$GLB,,

## 2025-04-29 PROCEDURE — 99999 PR PBB SHADOW E&M-EST. PATIENT-LVL V: CPT | Mod: PBBFAC,,,

## 2025-04-29 PROCEDURE — 1159F MED LIST DOCD IN RCRD: CPT | Mod: CPTII,S$GLB,,

## 2025-04-29 PROCEDURE — 3078F DIAST BP <80 MM HG: CPT | Mod: CPTII,S$GLB,,

## 2025-04-29 PROCEDURE — G2211 COMPLEX E/M VISIT ADD ON: HCPCS | Mod: S$GLB,,,

## 2025-04-29 PROCEDURE — 3074F SYST BP LT 130 MM HG: CPT | Mod: CPTII,S$GLB,,

## 2025-04-29 NOTE — PROGRESS NOTES
HPI     Chief Complaint:  Chief Complaint   Patient presents with    bump in ear       Niharika Gutierrez is a 60 y.o. female with multiple medical diagnoses as listed in the medical history and problem list that presents for   Chief Complaint   Patient presents with    bump in ear    .     Patient is not known to me with her last appointment in this department on 2/26/2025.     Pt presents for ear lesion.  Mass  This is a new problem. The current episode started more than 1 month ago. The problem occurs constantly.   Bump has been present for about 2 months. Has grown in size, is nontender, bleeds occasionally and is not itchy.   Denies recurrent bumps.    Assessment & Plan     Problem List Items Addressed This Visit       Essential hypertension  BP today in clinic 128/66.  The current medical regimen is effective;  continue present plan and medications.        Severe obesity (BMI 35.0-39.9) with comorbidity  Continue healthy balanced diet and increased physical activity for weight loss.       Other Visit Diagnoses         Ear lesion    -  Primary  Ear lesion that has grown in size over the past two months. Lesion non-tender and non-pruritic. Consider topical treatment, though none were appropriate. Will refer to dermatology for treatment.    Relevant Orders    Ambulatory referral/consult to Dermatology              --------------------------------------------      Health Maintenance:  Health Maintenance         Date Due Completion Date    Shingles Vaccine (1 of 2) Never done ---    Pneumococcal Vaccines (Age 50+) (1 of 1 - PCV) Never done ---    Influenza Vaccine (1) 09/01/2024 11/4/2022    COVID-19 Vaccine (5 - 2024-25 season) 09/01/2024 10/18/2023    RSV Vaccine (Age 60+ and Pregnant patients) (1 - Risk 60-74 years 1-dose series) Never done ---    Colorectal Cancer Screening 06/18/2025 6/18/2015    Mammogram 10/21/2025 10/21/2024    Hemoglobin A1c (Diabetic Prevention Screening) 06/19/2027 6/19/2024     "TETANUS VACCINE 2027    Lipid Panel 10/03/2028 10/3/2023    Override on 2/15/2010: Done            Health maintenance reviewed    Follow Up:  Follow up in about 2 months (around 2025).    Exam     Review of Systems:  (as noted above)  Review of Systems    Physical Exam:   Physical Exam    Vitals:    25 1503   BP: 128/66   BP Location: Right arm   Patient Position: Sitting   Pulse: 80   Temp: 99 °F (37.2 °C)   TempSrc: Oral   SpO2: 97%   Weight: 99.5 kg (219 lb 5.7 oz)   Height: 5' 4" (1.626 m)      Body mass index is 37.65 kg/m².        History     Past Medical History:  Past Medical History:   Diagnosis Date    GERD (gastroesophageal reflux disease)     Hypertension     Hypokalemic syndrome     Thyroid disease     Thyroid nodule     Vitamin D deficiency disease        Past Surgical History:  Past Surgical History:   Procedure Laterality Date    BREAST BIOPSY       SECTION  2004    X 1    CHOLECYSTECTOMY  2008    HYSTERECTOMY      KNEE SURGERY Left 2023    knee replacement    LAPAROSCOPIC HYSTERECTOMY  2010    LSO       Social History:  Social History[1]    Family History:  Family History   Problem Relation Name Age of Onset    Diabetes Mother      Heart disease Father          CHF    No Known Problems Sister      No Known Problems Brother      No Known Problems Brother      Breast cancer Maternal Aunt      Breast cancer Maternal Aunt      No Known Problems Maternal Uncle      Breast cancer Paternal Aunt      Breast cancer Paternal Aunt      No Known Problems Paternal Uncle      No Known Problems Maternal Grandmother      No Known Problems Maternal Grandfather      No Known Problems Paternal Grandmother      No Known Problems Paternal Grandfather      Colon cancer Neg Hx      Colon polyps Neg Hx      Amblyopia Neg Hx      Blindness Neg Hx      Cancer Neg Hx      Cataracts Neg Hx      Glaucoma Neg Hx      Hypertension Neg Hx      Macular degeneration Neg Hx      Retinal " detachment Neg Hx      Strabismus Neg Hx      Stroke Neg Hx      Thyroid disease Neg Hx      Esophageal cancer Neg Hx         Allergies and Medications: (updated and reviewed)  Review of patient's allergies indicates:  No Known Allergies  Current Medications[2]    Patient Care Team:  Janae Schwartz MD as PCP - General (Family Medicine)  Alejandra Medina LPN as Licensed Practical Nurse         - The patient is given an After Visit Summary that lists all medications with directions, allergies, education, orders placed during this encounter and follow-up instructions.      - I have reviewed the patient's medical information including past medical, family, and social history sections including the medications and allergies.      - We discussed the patient's current medications.     This note was created by combination of typed  and MModal dictation.  Transcription errors may be present.  If there are any questions, please contact me.       Krista Brock NP                      [1]   Social History  Socioeconomic History    Marital status:    Occupational History     Employer: [employer]   Tobacco Use    Smoking status: Never    Smokeless tobacco: Never   Substance and Sexual Activity    Alcohol use: No    Drug use: No    Sexual activity: Yes     Partners: Male     Birth control/protection: Surgical   Social History Narrative    N/A per the patient.      Social Drivers of Health     Financial Resource Strain: Low Risk  (2/26/2025)    Overall Financial Resource Strain (CARDIA)     Difficulty of Paying Living Expenses: Not hard at all   Food Insecurity: No Food Insecurity (2/26/2025)    Hunger Vital Sign     Worried About Running Out of Food in the Last Year: Never true     Ran Out of Food in the Last Year: Never true   Transportation Needs: No Transportation Needs (2/26/2025)    PRAPARE - Transportation     Lack of Transportation (Medical): No     Lack of Transportation (Non-Medical):  No   Physical Activity: Insufficiently Active (2/26/2025)    Exercise Vital Sign     Days of Exercise per Week: 3 days     Minutes of Exercise per Session: 20 min   Stress: No Stress Concern Present (2/26/2025)    [employer] [place] [employer] - Occupational Stress Questionnaire     Feeling of Stress : Only a little   Housing Stability: Low Risk  (2/26/2025)    Housing Stability Vital Sign     Unable to Pay for Housing in the Last Year: No     Homeless in the Last Year: No   [2]   Current Outpatient Medications   Medication Sig Dispense Refill    acetaminophen (TYLENOL) 650 MG TbSR Take 650 mg by mouth every 8 (eight) hours.      amLODIPine (NORVASC) 10 MG tablet Take 1 tablet (10 mg total) by mouth once daily. 90 tablet 3    ciclopirox (PENLAC) 8 % Soln Apply topically nightly. 6.6 mL 3    cyclobenzaprine (FLEXERIL) 10 MG tablet Take 1 tablet (10 mg total) by mouth 3 (three) times daily as needed for Muscle spasms. 60 tablet 2    ergocalciferol (ERGOCALCIFEROL) 50,000 unit Cap TAKE 1 CAPSULE BY MOUTH EVERY 7 DAYS 12 capsule 3    esomeprazole (NEXIUM) 40 MG capsule Take 1 capsule (40 mg total) by mouth before breakfast. 30 capsule 0    estradioL (ESTRACE) 0.01 % (0.1 mg/gram) vaginal cream Place 2 g vaginally twice a week. 42.5 g 1    famotidine (PEPCID) 40 MG tablet Take 1 tablet (40 mg total) by mouth once daily. 30 tablet 11    ferrous sulfate (FEOSOL) 325 mg (65 mg iron) Tab tablet Take 1 tablet (325 mg total) by mouth every other day. 45 tablet 2    hydrocortisone (ANUSOL-HC) 2.5 % rectal cream Place rectally 2 (two) times daily. 28 g 1    hydrocortisone 2.5 % cream Apply topically 2 (two) times daily. for 14 days 28 g 3    potassium bicarbonate (KLOR-CON/EF) disintegrating tablet Take 1 tablet (25 mEq total) by mouth 2 (two) times a day. 60 tablet 11    spironolactone (ALDACTONE) 50 MG tablet TAKE 1 TABLET BY MOUTH EVERY DAY. INCREASE TO 2 TABLETS BY MOUTH EVERY DAY AS TOLERATED 180 tablet 1     EYSUVIS 0.25 % DrpS SMARTSI-2 Drop(s) In Eye(s) 1-4 Times Daily      LIDOcaine (LMX) 4 % cream Apply topically 4 (four) times daily as needed (rectal pain). Apply to external hemorrhoids prn 30 g 2     No current facility-administered medications for this visit.

## 2025-04-30 ENCOUNTER — PATIENT MESSAGE (OUTPATIENT)
Dept: FAMILY MEDICINE | Facility: CLINIC | Age: 60
End: 2025-04-30
Payer: COMMERCIAL

## 2025-05-09 ENCOUNTER — TELEPHONE (OUTPATIENT)
Dept: FAMILY MEDICINE | Facility: CLINIC | Age: 60
End: 2025-05-09
Payer: COMMERCIAL

## 2025-05-09 NOTE — TELEPHONE ENCOUNTER
Spoke with patient requesting to reschedule annual visit and labs at this , patient verbalized acceptance of new date and time.

## 2025-05-09 NOTE — TELEPHONE ENCOUNTER
----- Message from Med Assistant Vital sent at 5/9/2025  9:03 AM CDT -----  Type:  Sooner Appointment RequestPatient is requesting a sooner appointment.  Patient declined first available appointment listed as well as another facility and provider .  Patient will not accept being placed on the waitlist and is requesting a message be sent to doctor.Name of Caller: SelfWhen is the first available appointment? OctoberSymptoms: needs assistance with rescheduling her appt. States she has jury duty that day.. Would the patient rather a call back or a response via [facility]? Yes, call Best Call Back Number:  [phone] (home)

## 2025-05-23 ENCOUNTER — OFFICE VISIT (OUTPATIENT)
Dept: DERMATOLOGY | Facility: CLINIC | Age: 60
End: 2025-05-23
Payer: COMMERCIAL

## 2025-05-23 DIAGNOSIS — H93.90 EAR LESION: ICD-10-CM

## 2025-05-23 DIAGNOSIS — D22.9 NEVUS OF MULTIPLE SITES: ICD-10-CM

## 2025-05-23 DIAGNOSIS — L82.1 SEBORRHEIC KERATOSES: Primary | ICD-10-CM

## 2025-05-23 PROCEDURE — 99999 PR PBB SHADOW E&M-EST. PATIENT-LVL III: CPT | Mod: PBBFAC,,, | Performed by: DERMATOLOGY

## 2025-05-23 NOTE — PROGRESS NOTES
Subjective:      Patient ID:  Niharika Gutierrez is a 60 y.o. female who presents for   Chief Complaint   Patient presents with    Lesion     Inside right ear     Spot in right ear for several months which was irritated a few weeks ago but now improving.   Also few moles on left leg present for years no change     Lesion - Initial  Affected locations: right ear  Signs / symptoms: asymptomatic    Review of Systems   Constitutional:  Negative for fever, chills, weight loss, weight gain, fatigue, night sweats and malaise.   Skin:  Positive for daily sunscreen use and activity-related sunscreen use. Negative for itching and rash.   Hematologic/Lymphatic: Does not bruise/bleed easily.       Objective:   Physical Exam   Constitutional: She appears well-developed and well-nourished.   Neurological: She is alert and oriented to person, place, and time.   Psychiatric: She has a normal mood and affect.   Skin:   Areas Examined (abnormalities noted in diagram):   Head / Face Inspection Performed  Neck Inspection Performed  LLE Inspection Performed                 Diagram Legend     Erythematous scaling macule/papule c/w actinic keratosis       Vascular papule c/w angioma      Pigmented verrucoid papule/plaque c/w seborrheic keratosis      Yellow umbilicated papule c/w sebaceous hyperplasia      Irregularly shaped tan macule c/w lentigo     1-2 mm smooth white papules consistent with Milia      Movable subcutaneous cyst with punctum c/w epidermal inclusion cyst      Subcutaneous movable cyst c/w pilar cyst      Firm pink to brown papule c/w dermatofibroma      Pedunculated fleshy papule(s) c/w skin tag(s)      Evenly pigmented macule c/w junctional nevus     Mildly variegated pigmented, slightly irregular-bordered macule c/w mildly atypical nevus      Flesh colored to evenly pigmented papule c/w intradermal nevus       Pink pearly papule/plaque c/w basal cell carcinoma      Erythematous hyperkeratotic cursted plaque  "c/w SCC      Surgical scar with no sign of skin cancer recurrence      Open and closed comedones      Inflammatory papules and pustules      Verrucoid papule consistent consistent with wart     Erythematous eczematous patches and plaques     Dystrophic onycholytic nail with subungual debris c/w onychomycosis     Umbilicated papule    Erythematous-base heme-crusted tan verrucoid plaque consistent with inflamed seborrheic keratosis     Erythematous Silvery Scaling Plaque c/w Psoriasis     See annotation      Assessment / Plan:        Seborrheic keratoses  Brochure provided    Cryosurgery procedure note:    Verbal consent from the patient is obtained including, but not limited to, risk of hypopigmentation/hyperpigmentation, scar, recurrence of lesion. Liquid nitrogen cryosurgery is applied to 1 lesion to produce a freeze injury,  is instructed to call if lesion does not completely resolve.        Ear lesion  -     Ambulatory referral/consult to Dermatology    Nevus of multiple sites  The "ABCD" rules to observe pigmented lesions were reviewed.  .bro           Follow up in about 1 year (around 5/23/2026).  "

## 2025-06-02 ENCOUNTER — OFFICE VISIT (OUTPATIENT)
Dept: OBSTETRICS AND GYNECOLOGY | Facility: CLINIC | Age: 60
End: 2025-06-02
Payer: COMMERCIAL

## 2025-06-02 VITALS — BODY MASS INDEX: 37.9 KG/M2 | WEIGHT: 220.81 LBS

## 2025-06-02 DIAGNOSIS — Z01.419 ROUTINE GYNECOLOGICAL EXAMINATION: Primary | ICD-10-CM

## 2025-06-02 DIAGNOSIS — Z90.710 HISTORY OF TOTAL HYSTERECTOMY: ICD-10-CM

## 2025-06-02 DIAGNOSIS — N89.8 VAGINA ITCHING: ICD-10-CM

## 2025-06-02 PROCEDURE — 99999 PR PBB SHADOW E&M-EST. PATIENT-LVL III: CPT | Mod: PBBFAC,,, | Performed by: OBSTETRICS & GYNECOLOGY

## 2025-06-02 PROCEDURE — 81515 NFCT DS BV&VAGINITIS DNA ALG: CPT | Performed by: OBSTETRICS & GYNECOLOGY

## 2025-06-02 PROCEDURE — 3008F BODY MASS INDEX DOCD: CPT | Mod: CPTII,S$GLB,, | Performed by: OBSTETRICS & GYNECOLOGY

## 2025-06-02 PROCEDURE — 1159F MED LIST DOCD IN RCRD: CPT | Mod: CPTII,S$GLB,, | Performed by: OBSTETRICS & GYNECOLOGY

## 2025-06-02 PROCEDURE — 99396 PREV VISIT EST AGE 40-64: CPT | Mod: S$GLB,,, | Performed by: OBSTETRICS & GYNECOLOGY

## 2025-06-03 LAB
BACTERIAL VAGINOSIS DNA (OHS): NOT DETECTED
CANDIDA GLABRATA/KRUSEI DNA (OHS): NOT DETECTED
CANDIDA SPECIES DNA (OHS): NOT DETECTED
TRICHOMONAS VAGINALIS DNA (OHS): NOT DETECTED

## 2025-06-04 ENCOUNTER — RESULTS FOLLOW-UP (OUTPATIENT)
Dept: OBSTETRICS AND GYNECOLOGY | Facility: HOSPITAL | Age: 60
End: 2025-06-04

## 2025-06-24 ENCOUNTER — LAB VISIT (OUTPATIENT)
Dept: LAB | Facility: HOSPITAL | Age: 60
End: 2025-06-24
Attending: FAMILY MEDICINE
Payer: COMMERCIAL

## 2025-06-24 ENCOUNTER — PATIENT OUTREACH (OUTPATIENT)
Dept: ADMINISTRATIVE | Facility: HOSPITAL | Age: 60
End: 2025-06-24
Payer: COMMERCIAL

## 2025-06-24 DIAGNOSIS — Z00.00 GENERAL MEDICAL EXAM: ICD-10-CM

## 2025-06-24 LAB
ABSOLUTE EOSINOPHIL (OHS): 0.11 K/UL
ABSOLUTE MONOCYTE (OHS): 0.57 K/UL (ref 0.3–1)
ABSOLUTE NEUTROPHIL COUNT (OHS): 5.57 K/UL (ref 1.8–7.7)
ALBUMIN SERPL BCP-MCNC: 3.5 G/DL (ref 3.5–5.2)
ALP SERPL-CCNC: 71 UNIT/L (ref 40–150)
ALT SERPL W/O P-5'-P-CCNC: 10 UNIT/L (ref 10–44)
ANION GAP (OHS): 9 MMOL/L (ref 8–16)
AST SERPL-CCNC: 13 UNIT/L (ref 11–45)
BASOPHILS # BLD AUTO: 0.02 K/UL
BASOPHILS NFR BLD AUTO: 0.2 %
BILIRUB SERPL-MCNC: 0.3 MG/DL (ref 0.1–1)
BUN SERPL-MCNC: 14 MG/DL (ref 6–20)
CALCIUM SERPL-MCNC: 9.4 MG/DL (ref 8.7–10.5)
CHLORIDE SERPL-SCNC: 104 MMOL/L (ref 95–110)
CHOLEST SERPL-MCNC: 129 MG/DL (ref 120–199)
CHOLEST/HDLC SERPL: 2.9 {RATIO} (ref 2–5)
CO2 SERPL-SCNC: 26 MMOL/L (ref 23–29)
CREAT SERPL-MCNC: 0.9 MG/DL (ref 0.5–1.4)
EAG (OHS): 105 MG/DL (ref 68–131)
ERYTHROCYTE [DISTWIDTH] IN BLOOD BY AUTOMATED COUNT: 14.5 % (ref 11.5–14.5)
GFR SERPLBLD CREATININE-BSD FMLA CKD-EPI: >60 ML/MIN/1.73/M2
GLUCOSE SERPL-MCNC: 81 MG/DL (ref 70–110)
HBA1C MFR BLD: 5.3 % (ref 4–5.6)
HCT VFR BLD AUTO: 33.7 % (ref 37–48.5)
HDLC SERPL-MCNC: 44 MG/DL (ref 40–75)
HDLC SERPL: 34.1 % (ref 20–50)
HGB BLD-MCNC: 10.6 GM/DL (ref 12–16)
IMM GRANULOCYTES # BLD AUTO: 0.01 K/UL (ref 0–0.04)
IMM GRANULOCYTES NFR BLD AUTO: 0.1 % (ref 0–0.5)
LDLC SERPL CALC-MCNC: 68.2 MG/DL (ref 63–159)
LYMPHOCYTES # BLD AUTO: 3.04 K/UL (ref 1–4.8)
MCH RBC QN AUTO: 28.7 PG (ref 27–31)
MCHC RBC AUTO-ENTMCNC: 31.5 G/DL (ref 32–36)
MCV RBC AUTO: 91 FL (ref 82–98)
NONHDLC SERPL-MCNC: 85 MG/DL
NUCLEATED RBC (/100WBC) (OHS): 0 /100 WBC
PLATELET # BLD AUTO: 349 K/UL (ref 150–450)
PMV BLD AUTO: 9.9 FL (ref 9.2–12.9)
POTASSIUM SERPL-SCNC: 4.2 MMOL/L (ref 3.5–5.1)
PROT SERPL-MCNC: 8.2 GM/DL (ref 6–8.4)
RBC # BLD AUTO: 3.69 M/UL (ref 4–5.4)
RELATIVE EOSINOPHIL (OHS): 1.2 %
RELATIVE LYMPHOCYTE (OHS): 32.6 % (ref 18–48)
RELATIVE MONOCYTE (OHS): 6.1 % (ref 4–15)
RELATIVE NEUTROPHIL (OHS): 59.8 % (ref 38–73)
SODIUM SERPL-SCNC: 139 MMOL/L (ref 136–145)
TRIGL SERPL-MCNC: 84 MG/DL (ref 30–150)
TSH SERPL-ACNC: 1.28 UIU/ML (ref 0.4–4)
WBC # BLD AUTO: 9.32 K/UL (ref 3.9–12.7)

## 2025-06-24 PROCEDURE — 80053 COMPREHEN METABOLIC PANEL: CPT

## 2025-06-24 PROCEDURE — 84443 ASSAY THYROID STIM HORMONE: CPT

## 2025-06-24 PROCEDURE — 85025 COMPLETE CBC W/AUTO DIFF WBC: CPT

## 2025-06-24 PROCEDURE — 80061 LIPID PANEL: CPT

## 2025-06-24 PROCEDURE — 36415 COLL VENOUS BLD VENIPUNCTURE: CPT | Mod: PO

## 2025-06-24 PROCEDURE — 83036 HEMOGLOBIN GLYCOSYLATED A1C: CPT

## 2025-06-25 ENCOUNTER — HOSPITAL ENCOUNTER (OUTPATIENT)
Facility: HOSPITAL | Age: 60
Discharge: HOME | End: 2025-06-26
Attending: EMERGENCY MEDICINE | Admitting: HOSPITALIST
Payer: COMMERCIAL

## 2025-06-25 DIAGNOSIS — I10 ESSENTIAL HYPERTENSION: ICD-10-CM

## 2025-06-25 DIAGNOSIS — R07.9 CHEST PAIN: Primary | ICD-10-CM

## 2025-06-25 DIAGNOSIS — R42 DIZZINESS: ICD-10-CM

## 2025-06-25 DIAGNOSIS — K21.9 GASTROESOPHAGEAL REFLUX DISEASE, UNSPECIFIED WHETHER ESOPHAGITIS PRESENT: ICD-10-CM

## 2025-06-25 LAB
ALBUMIN SERPL BCP-MCNC: 3.6 G/DL (ref 3.5–5.2)
ALBUMIN SERPL-MCNC: 3.8 G/DL (ref 3.3–5.5)
ALLENS TEST: ABNORMAL
ALP SERPL-CCNC: 55 U/L (ref 42–141)
ALP SERPL-CCNC: 71 UNIT/L (ref 40–150)
ALT SERPL W/O P-5'-P-CCNC: 10 UNIT/L (ref 10–44)
ANION GAP (OHS): 9 MMOL/L (ref 8–16)
AST SERPL-CCNC: 13 UNIT/L (ref 11–45)
BILIRUB SERPL-MCNC: 0.4 MG/DL (ref 0.1–1)
BILIRUB SERPL-MCNC: 0.6 MG/DL (ref 0.2–1.6)
BUN SERPL-MCNC: 11 MG/DL (ref 6–20)
BUN SERPL-MCNC: 13 MG/DL (ref 7–22)
CALCIUM SERPL-MCNC: 9.3 MG/DL (ref 8–10.3)
CALCIUM SERPL-MCNC: 9.5 MG/DL (ref 8.7–10.5)
CHLORIDE SERPL-SCNC: 103 MMOL/L (ref 98–108)
CHLORIDE SERPL-SCNC: 105 MMOL/L (ref 95–110)
CO2 SERPL-SCNC: 22 MMOL/L (ref 23–29)
CREAT SERPL-MCNC: 0.7 MG/DL (ref 0.6–1.2)
CREAT SERPL-MCNC: 0.8 MG/DL (ref 0.5–1.4)
ERYTHROCYTE [DISTWIDTH] IN BLOOD BY AUTOMATED COUNT: 14.2 % (ref 11.5–14.5)
GFR SERPLBLD CREATININE-BSD FMLA CKD-EPI: >60 ML/MIN/1.73/M2
GLUCOSE SERPL-MCNC: 106 MG/DL (ref 73–118)
GLUCOSE SERPL-MCNC: 88 MG/DL (ref 70–110)
HCO3 UR-SCNC: 24.3 MMOL/L (ref 24–28)
HCT VFR BLD AUTO: 34.1 % (ref 37–48.5)
HCT, POC: NORMAL
HGB BLD-MCNC: 10.9 GM/DL (ref 12–16)
HGB, POC: NORMAL (ref 14–18)
LDH SERPL L TO P-CCNC: 1.25 MMOL/L (ref 0.5–2.2)
MCH RBC QN AUTO: 29.5 PG (ref 27–31)
MCH, POC: NORMAL
MCHC RBC AUTO-ENTMCNC: 32 G/DL (ref 32–36)
MCHC, POC: NORMAL
MCV RBC AUTO: 92 FL (ref 82–98)
MCV, POC: NORMAL
MPV, POC: NORMAL
OHS QRS DURATION: 92 MS
OHS QTC CALCULATION: 465 MS
PCO2 BLDA: 40.8 MMHG (ref 35–45)
PH SMN: 7.38 [PH] (ref 7.35–7.45)
PLATELET # BLD AUTO: 347 K/UL (ref 150–450)
PMV BLD AUTO: 8.9 FL (ref 9.2–12.9)
PO2 BLDA: 33 MMHG (ref 40–60)
POC ALT (SGPT): 9 U/L (ref 10–47)
POC AST (SGOT): 17 U/L (ref 11–38)
POC B-TYPE NATRIURETIC PEPTIDE: 20.2 PG/ML (ref 0–100)
POC BE: -1 MMOL/L (ref -2–2)
POC CARDIAC TROPONIN I: 0 NG/ML (ref 0–0.08)
POC PLATELET COUNT: NORMAL
POC PTINR: 1.1 (ref 0.9–1.2)
POC PTWBT: 10.9 SEC (ref 9.7–14.3)
POC SATURATED O2: 62 % (ref 95–100)
POC TCO2: 26 MMOL/L (ref 24–29)
POC TCO2: 27 MMOL/L (ref 18–33)
POCT GLUCOSE: 109 MG/DL (ref 70–110)
POTASSIUM BLD-SCNC: 4.4 MMOL/L (ref 3.6–5.1)
POTASSIUM SERPL-SCNC: 4.4 MMOL/L (ref 3.5–5.1)
PROT SERPL-MCNC: 9.1 GM/DL (ref 6–8.4)
PROTEIN, POC: 8.5 G/DL (ref 6.4–8.1)
RBC # BLD AUTO: 3.7 M/UL (ref 4–5.4)
RBC, POC: NORMAL
RDW, POC: NORMAL
SAMPLE: ABNORMAL
SAMPLE: NORMAL
SAMPLE: NORMAL
SITE: ABNORMAL
SODIUM BLD-SCNC: 136 MMOL/L (ref 128–145)
SODIUM SERPL-SCNC: 136 MMOL/L (ref 136–145)
TROPONIN I SERPL DL<=0.01 NG/ML-MCNC: <0.006 NG/ML
TROPONIN I SERPL DL<=0.01 NG/ML-MCNC: <0.006 NG/ML
WBC # BLD AUTO: 11.86 K/UL (ref 3.9–12.7)
WBC, POC: NORMAL

## 2025-06-25 PROCEDURE — 82962 GLUCOSE BLOOD TEST: CPT | Mod: ER

## 2025-06-25 PROCEDURE — 82803 BLOOD GASES ANY COMBINATION: CPT | Mod: ER

## 2025-06-25 PROCEDURE — G0378 HOSPITAL OBSERVATION PER HR: HCPCS | Mod: ER

## 2025-06-25 PROCEDURE — G0425 INPT/ED TELECONSULT30: HCPCS | Mod: GT,,, | Performed by: PSYCHIATRY & NEUROLOGY

## 2025-06-25 PROCEDURE — 93010 ELECTROCARDIOGRAM REPORT: CPT | Mod: ,,, | Performed by: INTERNAL MEDICINE

## 2025-06-25 PROCEDURE — 25000003 PHARM REV CODE 250: Performed by: NURSE PRACTITIONER

## 2025-06-25 PROCEDURE — 36415 COLL VENOUS BLD VENIPUNCTURE: CPT | Performed by: NURSE PRACTITIONER

## 2025-06-25 PROCEDURE — 99285 EMERGENCY DEPT VISIT HI MDM: CPT | Mod: 25,ER

## 2025-06-25 PROCEDURE — 93005 ELECTROCARDIOGRAM TRACING: CPT | Mod: ER

## 2025-06-25 PROCEDURE — 80053 COMPREHEN METABOLIC PANEL: CPT | Performed by: NURSE PRACTITIONER

## 2025-06-25 PROCEDURE — 96375 TX/PRO/DX INJ NEW DRUG ADDON: CPT

## 2025-06-25 PROCEDURE — G0378 HOSPITAL OBSERVATION PER HR: HCPCS

## 2025-06-25 PROCEDURE — 96375 TX/PRO/DX INJ NEW DRUG ADDON: CPT | Mod: ER

## 2025-06-25 PROCEDURE — 85025 COMPLETE CBC W/AUTO DIFF WBC: CPT | Mod: ER

## 2025-06-25 PROCEDURE — 63600175 PHARM REV CODE 636 W HCPCS: Mod: ER | Performed by: EMERGENCY MEDICINE

## 2025-06-25 PROCEDURE — 96374 THER/PROPH/DIAG INJ IV PUSH: CPT | Mod: ER

## 2025-06-25 PROCEDURE — 85027 COMPLETE CBC AUTOMATED: CPT | Performed by: NURSE PRACTITIONER

## 2025-06-25 PROCEDURE — 80053 COMPREHEN METABOLIC PANEL: CPT | Mod: ER

## 2025-06-25 PROCEDURE — 25000003 PHARM REV CODE 250: Mod: ER | Performed by: EMERGENCY MEDICINE

## 2025-06-25 PROCEDURE — 84484 ASSAY OF TROPONIN QUANT: CPT | Mod: ER

## 2025-06-25 PROCEDURE — 84484 ASSAY OF TROPONIN QUANT: CPT | Performed by: NURSE PRACTITIONER

## 2025-06-25 PROCEDURE — 25000242 PHARM REV CODE 250 ALT 637 W/ HCPCS: Mod: ER | Performed by: EMERGENCY MEDICINE

## 2025-06-25 PROCEDURE — 85610 PROTHROMBIN TIME: CPT | Mod: ER

## 2025-06-25 RX ORDER — NITROGLYCERIN 0.4 MG/1
0.4 TABLET SUBLINGUAL EVERY 5 MIN PRN
Status: DISCONTINUED | OUTPATIENT
Start: 2025-06-25 | End: 2025-06-25

## 2025-06-25 RX ORDER — ENOXAPARIN SODIUM 100 MG/ML
40 INJECTION SUBCUTANEOUS EVERY 24 HOURS
Status: DISCONTINUED | OUTPATIENT
Start: 2025-06-26 | End: 2025-06-26 | Stop reason: HOSPADM

## 2025-06-25 RX ORDER — CLOPIDOGREL BISULFATE 75 MG/1
75 TABLET ORAL
Status: COMPLETED | OUTPATIENT
Start: 2025-06-25 | End: 2025-06-25

## 2025-06-25 RX ORDER — LABETALOL HYDROCHLORIDE 5 MG/ML
10 INJECTION, SOLUTION INTRAVENOUS
Status: DISCONTINUED | OUTPATIENT
Start: 2025-06-25 | End: 2025-06-26 | Stop reason: HOSPADM

## 2025-06-25 RX ORDER — ELECTROLYTES/DEXTROSE
200 SOLUTION, ORAL ORAL
Status: DISCONTINUED | OUTPATIENT
Start: 2025-06-25 | End: 2025-06-26 | Stop reason: HOSPADM

## 2025-06-25 RX ORDER — ASPIRIN 325 MG
325 TABLET ORAL
Status: COMPLETED | OUTPATIENT
Start: 2025-06-25 | End: 2025-06-25

## 2025-06-25 RX ORDER — ONDANSETRON HYDROCHLORIDE 2 MG/ML
4 INJECTION, SOLUTION INTRAVENOUS EVERY 6 HOURS PRN
Status: DISCONTINUED | OUTPATIENT
Start: 2025-06-25 | End: 2025-06-26 | Stop reason: HOSPADM

## 2025-06-25 RX ORDER — SODIUM CHLORIDE 0.9 % (FLUSH) 0.9 %
10 SYRINGE (ML) INJECTION
Status: DISCONTINUED | OUTPATIENT
Start: 2025-06-25 | End: 2025-06-26 | Stop reason: HOSPADM

## 2025-06-25 RX ORDER — ALUMINUM HYDROXIDE, MAGNESIUM HYDROXIDE, AND SIMETHICONE 1200; 120; 1200 MG/30ML; MG/30ML; MG/30ML
30 SUSPENSION ORAL ONCE
Status: COMPLETED | OUTPATIENT
Start: 2025-06-25 | End: 2025-06-25

## 2025-06-25 RX ORDER — CLOPIDOGREL BISULFATE 75 MG/1
75 TABLET ORAL DAILY
Status: DISCONTINUED | OUTPATIENT
Start: 2025-06-26 | End: 2025-06-26 | Stop reason: HOSPADM

## 2025-06-25 RX ORDER — ONDANSETRON HYDROCHLORIDE 2 MG/ML
8 INJECTION, SOLUTION INTRAVENOUS
Status: COMPLETED | OUTPATIENT
Start: 2025-06-25 | End: 2025-06-25

## 2025-06-25 RX ORDER — ASPIRIN 81 MG/1
81 TABLET ORAL DAILY
Status: DISCONTINUED | OUTPATIENT
Start: 2025-06-26 | End: 2025-06-26 | Stop reason: HOSPADM

## 2025-06-25 RX ORDER — ATORVASTATIN CALCIUM 40 MG/1
40 TABLET, FILM COATED ORAL DAILY
Status: DISCONTINUED | OUTPATIENT
Start: 2025-06-26 | End: 2025-06-26 | Stop reason: HOSPADM

## 2025-06-25 RX ORDER — FAMOTIDINE 10 MG/ML
40 INJECTION, SOLUTION INTRAVENOUS
Status: COMPLETED | OUTPATIENT
Start: 2025-06-25 | End: 2025-06-25

## 2025-06-25 RX ORDER — LIDOCAINE HYDROCHLORIDE 20 MG/ML
15 SOLUTION OROPHARYNGEAL ONCE
Status: COMPLETED | OUTPATIENT
Start: 2025-06-25 | End: 2025-06-25

## 2025-06-25 RX ORDER — BISACODYL 10 MG/1
10 SUPPOSITORY RECTAL DAILY PRN
Status: DISCONTINUED | OUTPATIENT
Start: 2025-06-25 | End: 2025-06-26 | Stop reason: HOSPADM

## 2025-06-25 RX ADMIN — ASPIRIN 325 MG ORAL TABLET 325 MG: 325 PILL ORAL at 10:06

## 2025-06-25 RX ADMIN — LIDOCAINE HYDROCHLORIDE 15 ML: 20 SOLUTION ORAL at 12:06

## 2025-06-25 RX ADMIN — Medication 200 ML: at 06:06

## 2025-06-25 RX ADMIN — NITROGLYCERIN 0.4 MG: 0.4 TABLET, ORALLY DISINTEGRATING SUBLINGUAL at 10:06

## 2025-06-25 RX ADMIN — ALUMINUM HYDROXIDE, MAGNESIUM HYDROXIDE, AND SIMETHICONE 30 ML: 200; 200; 20 SUSPENSION ORAL at 12:06

## 2025-06-25 RX ADMIN — Medication 200 ML: at 11:06

## 2025-06-25 RX ADMIN — ONDANSETRON 8 MG: 2 INJECTION INTRAMUSCULAR; INTRAVENOUS at 10:06

## 2025-06-25 RX ADMIN — CLOPIDOGREL BISULFATE 75 MG: 75 TABLET, FILM COATED ORAL at 11:06

## 2025-06-25 RX ADMIN — FAMOTIDINE 40 MG: 10 INJECTION, SOLUTION INTRAVENOUS at 10:06

## 2025-06-25 NOTE — ED PROVIDER NOTES
Encounter Date: 6/25/2025    SCRIBE #1 NOTE: I, Caryn Teran, am scribing for, and in the presence of,  Briana Casper DO. I have scribed the following portions of the note - the EKG reading. Other sections scribed: HPI, ROS, PE, MDM.       History     Chief Complaint   Patient presents with    Chest Pain     Patient presents w/ a c/o of midsternum chest pain associated with nausea since last night. Report feels like GERD, and took her meds w/o any relief.    Dizziness     Pt presents w/ a c/o of dizziness worse with head movement. Denies any unilateral weakness, vision complaints, or swallowing complaints.      Niharika Gutierrez is a 60 y.o. female with PMHx of GERD, HTN, thyroid disease, and others who presents to the ED with a chief complaint of room-spinning dizziness that began at approximately 0600 upon waking this morning. Patient reports the dizziness is exacerbated with head movement and positional changes. She reports feeling dizzy upon arrival to the ED but states it has currently eased while she is at rest. Patient reports associated nausea and mid-sternal chest pain that feels similar to GERD since yesterday evening.  Patient states burning chest discomfort started around 6:00 p.m. last night.  She has been unable to sleep or get any good rest do the chest discomfort  Patient attempted treatment with Zofran without improvement, last dose this morning. Patient reports symptoms worsened overnight and she is still feeling nauseated. No other exacerbating or alleviating factors. Denies any blurred vision, unilateral weakness, or other associated symptoms.       The history is provided by the patient. No  was used.     Review of patient's allergies indicates:  No Known Allergies  Past Medical History:   Diagnosis Date    GERD (gastroesophageal reflux disease)     Hypertension     Hypokalemic syndrome     Thyroid disease     Thyroid nodule     Vitamin D deficiency disease      Past  Surgical History:   Procedure Laterality Date    BREAST BIOPSY       SECTION  2004    X 1    CHOLECYSTECTOMY  2008    HYSTERECTOMY      KNEE SURGERY Left 2023    knee replacement    LAPAROSCOPIC HYSTERECTOMY  2010    LSO     Family History   Problem Relation Name Age of Onset    Diabetes Mother      Heart disease Father          CHF    No Known Problems Sister      No Known Problems Brother      No Known Problems Brother      Breast cancer Maternal Aunt      Breast cancer Maternal Aunt      No Known Problems Maternal Uncle      Breast cancer Paternal Aunt      Breast cancer Paternal Aunt      No Known Problems Paternal Uncle      No Known Problems Maternal Grandmother      No Known Problems Maternal Grandfather      No Known Problems Paternal Grandmother      No Known Problems Paternal Grandfather      Colon cancer Neg Hx      Colon polyps Neg Hx      Amblyopia Neg Hx      Blindness Neg Hx      Cancer Neg Hx      Cataracts Neg Hx      Glaucoma Neg Hx      Hypertension Neg Hx      Macular degeneration Neg Hx      Retinal detachment Neg Hx      Strabismus Neg Hx      Stroke Neg Hx      Thyroid disease Neg Hx      Esophageal cancer Neg Hx       Social History[1]  Review of Systems   Constitutional:  Negative for fever.   HENT:  Negative for rhinorrhea and sore throat.    Eyes:  Negative for redness and visual disturbance.   Respiratory:  Negative for shortness of breath.    Cardiovascular:  Positive for chest pain (mid-sternal). Negative for leg swelling.   Gastrointestinal:  Positive for nausea. Negative for abdominal pain, diarrhea and vomiting.   Musculoskeletal:  Negative for back pain.   Skin:  Negative for rash.   Neurological:  Positive for dizziness. Negative for syncope, weakness and headaches.   All other systems reviewed and are negative.      Physical Exam     Initial Vitals [25 1028]   BP Pulse Resp Temp SpO2   (!) 154/70 69 20 97.7 °F (36.5 °C) 99 %      MAP       --         Physical  Exam    Nursing note and vitals reviewed.  Constitutional: She appears well-developed and well-nourished.     Patient gave consent to have physical exam performed.     HENT:   Head: Normocephalic and atraumatic.   Right Ear: External ear normal.   Left Ear: External ear normal.   Nose: Nose normal. Mouth/Throat: Oropharynx is clear and moist.   Eyes: Conjunctivae and EOM are normal. Pupils are equal, round, and reactive to light.   Neck: Phonation normal. Neck supple.   Normal range of motion.  Cardiovascular:  Normal rate, regular rhythm, normal heart sounds and intact distal pulses.     Exam reveals no gallop and no friction rub.       No murmur heard.  Pulmonary/Chest: Effort normal and breath sounds normal. No stridor. No respiratory distress. She has no wheezes. She has no rhonchi. She has no rales. She exhibits no tenderness.   Abdominal: Abdomen is soft. Bowel sounds are normal. She exhibits no distension. There is no abdominal tenderness. There is no rigidity, no rebound and no guarding.   Musculoskeletal:         General: No tenderness or edema. Normal range of motion.      Cervical back: Normal range of motion and neck supple.     Neurological: She is alert and oriented to person, place, and time. She has normal strength. No cranial nerve deficit or sensory deficit. GCS score is 15. GCS eye subscore is 4. GCS verbal subscore is 5. GCS motor subscore is 6.   NHISS of 0. Cranial nerves II-XII intact. Sensation grossly intact. Bilateral  strength equal. Strength 5/5 to all extremities. Deep tendon reflexes normal.     Skin: Skin is warm and dry. Capillary refill takes less than 2 seconds. No rash noted.   Psychiatric: She has a normal mood and affect. Her behavior is normal.         ED Course   Critical Care    Date/Time: 6/25/2025 11:21 AM    Performed by: Briana Casper DO  Authorized by: Briana Casper DO  Direct patient critical care time: 8 minutes  Additional history critical care time: 8  minutes  Ordering / reviewing critical care time: 8 minutes  Documentation critical care time: 8 minutes  Consulting other physicians critical care time: 8 minutes  Total critical care time (exclusive of procedural time) : 40 minutes  Critical care time was exclusive of teaching time and separately billable procedures and treating other patients.  Critical care was necessary to treat or prevent imminent or life-threatening deterioration of the following conditions: CNS failure or compromise.  Critical care was time spent personally by me on the following activities: development of treatment plan with patient or surrogate, interpretation of cardiac output measurements, evaluation of patient's response to treatment, obtaining history from patient or surrogate, examination of patient, ordering and performing treatments and interventions, ordering and review of laboratory studies, ordering and review of radiographic studies, pulse oximetry, re-evaluation of patient's condition, review of old charts and discussions with consultants.        Labs Reviewed   ISTAT PROCEDURE - Abnormal       Result Value    POC PH 7.383      POC PCO2 40.8      POC PO2 33 (*)     POC HCO3 24.3      POC BE -1      POC SATURATED O2 62      POC Lactate 1.25      POC TCO2 26      Sample VENOUS      Site Other      Allens Test N/A     POCT CMP - Abnormal    Albumin, POC 3.8      Alkaline Phosphatase, POC 55      ALT (SGPT), POC 9 (*)     AST (SGOT), POC 17      POC BUN 13      Calcium, POC 9.3      POC Chloride 103      POC Creatinine 0.7      POC Glucose 106      POC Potassium 4.4      POC Sodium 136      Bilirubin, POC 0.6      POC TCO2 27      Protein, POC 8.5 (*)    TROPONIN ISTAT    POC Cardiac Troponin I 0.00      Sample unknown     POCT CBC    Hematocrit        Hemoglobin        RBC        WBC        MCV        MCH, POC        MCHC        RDW-CV        Platelet Count, POC        MPV       POCT GLUCOSE, HAND-HELD DEVICE   POCT CMP   POCT  PROTIME-INR   POCT TROPONIN   POCT B-TYPE NATRIURETIC PEPTIDE (BNP)   POCT GLUCOSE    POCT Glucose 109     ISTAT PROCEDURE    POC PTWBT 10.9      POC PTINR 1.1      Sample unknown     POCT B-TYPE NATRIURETIC PEPTIDE (BNP)    POC B-Type Natriuretic Peptide 20.2       EKG Readings: (Independently Interpreted)   EKG independently interpreted by Dr. Casper reads: No STEMI. Rate of 68. Normal Sinus Rhythm. Leftward Axis. Abnormal EKG. QTc at 465. When compared to prior EKG dated 04/14/24 rate increased by 6 bpm.             Imaging Results              X-Ray Chest AP Portable (Final result)  Result time 06/25/25 10:54:30      Final result by García Lai MD (06/25/25 10:54:30)                   Impression:      See above      Electronically signed by: García Lai MD  Date:    06/25/2025  Time:    10:54               Narrative:    EXAMINATION:  XR CHEST AP PORTABLE    CLINICAL HISTORY:  Chest Pain;    TECHNIQUE:  Single frontal view of the chest was performed.    COMPARISON:  N 09/08/2024 one    FINDINGS:  Heart size normal.  The lungs are clear.  No pleural effusion                                       CT Head Without Contrast (Final result)  Result time 06/25/25 11:00:52      Final result by Ruddy Trivedi MD (06/25/25 11:00:52)                   Impression:      No definite acute intracranial findings by noncontrast CT.      Electronically signed by: Ruddy Trivedi  Date:    06/25/2025  Time:    11:00               Narrative:    EXAMINATION:  CT HEAD WITHOUT CONTRAST    CLINICAL HISTORY:  Dizziness, non-specific;    TECHNIQUE:  Low dose axial images were obtained through the head.  Coronal and sagittal reformations were also performed. Contrast was not administered.    COMPARISON:  None.    FINDINGS:  Blood: No acute intracranial hemorrhage.    Parenchyma: No definite loss of gray-white differentiation to suggest acute or subacute transcortical infarct.    Ventricles/Extra-axial spaces: No abnormal  extra-axial fluid collection. Basal cisterns are patent.    Vessels: Minimal calcifications.    Orbits: Unremarkable.    Scalp: Unremarkable.    Skull: There are no depressed skull fractures or destructive bone lesions.    Sinuses and mastoids: Essentially clear.    Other findings: Partially empty sella configuration.                                       Medications   nitroGLYCERIN SL tablet 0.4 mg (0.4 mg Sublingual Given 6/25/25 1052)   aspirin tablet 325 mg (325 mg Oral Given 6/25/25 1052)   ondansetron injection 8 mg (8 mg Intravenous Given 6/25/25 1053)   famotidine (PF) injection 40 mg (40 mg Intravenous Given 6/25/25 1052)   clopidogreL tablet 75 mg (75 mg Oral Given 6/25/25 1129)   aluminum-magnesium hydroxide-simethicone 200-200-20 mg/5 mL suspension 30 mL (30 mLs Oral Given 6/25/25 1208)     And   LIDOcaine viscous HCl 2% oral solution 15 mL (15 mLs Oral Given 6/25/25 1208)     Medical Decision Making  Amount and/or Complexity of Data Reviewed  Labs: ordered. Decision-making details documented in ED Course.  Radiology: ordered. Decision-making details documented in ED Course.  ECG/medicine tests: ordered and independent interpretation performed. Decision-making details documented in ED Course.    Risk  OTC drugs.  Prescription drug management.    Medical Decision Making  Patient: 60 y.o.-year-old female  Chief Complaint:   Chief Complaint   Patient presents with    Chest Pain     Patient presents w/ a c/o of midsternum chest pain associated with nausea since last night. Report feels like GERD, and took her meds w/o any relief.    Dizziness     Pt presents w/ a c/o of dizziness worse with head movement. Denies any unilateral weakness, vision complaints, or swallowing complaints.        Refer to Physical exam as documented above.     Vital Signs: Reviewed   Nursing Notes: Reviewed.    Differential Diagnosis Considered:  CVA  Intracranial Mass  Intracranial  Hemorrhage  Vertigo  STEMI  NSTEMI  GERD      Disposition Planning  Hospitalization Considered For:  CVA, intracranial hemorrhage, dizziness, STEMI, NSTEMI.  Patient Agreement: Patient agreeable to transfer and admission to [facility] if medically necessary.    Emergency Department Course  Treatment Administered: Physical examination and medications administered:   Medications   nitroGLYCERIN SL tablet 0.4 mg (0.4 mg Sublingual Given 6/25/25 1052)   aspirin tablet 325 mg (325 mg Oral Given 6/25/25 1052)   ondansetron injection 8 mg (8 mg Intravenous Given 6/25/25 1053)   famotidine (PF) injection 40 mg (40 mg Intravenous Given 6/25/25 1052)   clopidogreL tablet 75 mg (75 mg Oral Given 6/25/25 1129)   aluminum-magnesium hydroxide-simethicone 200-200-20 mg/5 mL suspension 30 mL (30 mLs Oral Given 6/25/25 1208)     And   LIDOcaine viscous HCl 2% oral solution 15 mL (15 mLs Oral Given 6/25/25 1208)     Response to Treatment: Patient tolerated PO; reports improvement post-treatment.  External Data Reviewed:  Prior medical records.    Laboratory Studies: Ordered and reviewed.  Initial troponin within normal limits at 0.00.  Radiologic Studies: Ordered and reviewed as indicated.  No pneumothorax.    ECG/Other Diagnostic Testing:  Ordered and independently interpreted by Dr. Briana Casper DO.    Cardiac Monitoring:  Cardiac monitor placed for burning chest discomfort and dizziness. Monitor shows Normal Sinus Rhythm with rate of 64.   Interpretation by Dr. Briana Casper DO.    Risk Assessment  Social determinants of health impacting care: Body mass index is 36.39 kg/m².     Shared Decision-Making  Discussed the following with the patient:  Treatment plan  Medication instructions  Laboratory and imaging results   Recommended Outpatient follow up after emergency department visit  Referral for ongoing care    All questions answered. Patient and son at bedside actively participated in care decisions.     Patient  reports improved chest discomfort after nitroglycerin.  Resolution of nausea.  Resolution of dizziness.    Consultations and Transfer  Utilization Management consulted for admission at 12:04 p.m.  For hospital admission I requested consult with hospitalist . Discussion included the patients current presentation, medical history, physical exam findings, laboratory and radiology results, vital signs, and ED clinical course.  Specialist Contacted:  Initiated contact at noon. Consult with  vascular neurology .  Discussion included the patients current presentation, medical history, physical exam findings, laboratory and radiology results, vital signs, and ED clinical course. Recommendations aspirin, Plavix, admit to Hospital Medicine, and MRI.      Accepting Physician/Facility:  Accepted by a GRISELDA Marshall on-call for Dr. Page for admission at time 1:05 p.m..  Transport Method: Transfer via EMS to accepting facility.    Patient Condition at admission  Awake, alert, oriented x4.  Speaking clearly in full sentences.  Moving all four extremities spontaneously.    Studies Reviewed            I, Dr. Briana Casper, personally performed the services described in this documentation. This document was produced by a scribe under my direction and in my presence. All medical record entries made by the scribe were at my direction and in my presence.  I have reviewed the chart and agree that the record reflects my personal performance and is accurate and complete. Briana Casper, DO.     06/25/2025 11:23 AM  DISCLAIMER: This note was prepared with [employer] voice recognition transcription software. Garbled syntax, mangled pronouns, and other bizarre constructions may be attributed to that software system.           Scribe Attestation:   Scribe #1: I performed the above scribed service and the documentation accurately describes the services I performed. I attest to the accuracy of the note.                                Clinical Impression:  Final diagnoses:  [R07.9] Chest pain  [R42] Dizziness (Primary)  [K21.9] Gastroesophageal reflux disease, unspecified whether esophagitis present          ED Disposition Condition    Observation                       [1]   Social History  Tobacco Use    Smoking status: Never    Smokeless tobacco: Never   Substance Use Topics    Alcohol use: No    Drug use: No        Briana Casper DO  06/25/25 [id number]

## 2025-06-25 NOTE — SUBJECTIVE & OBJECTIVE
HPI:  60 y.o. female with HTN, obesity, vitamin D deficiency, presents with acute onset nausea, vertigo, tiredness, chest pain, and heartburn.      Images personally reviewed and interpreted:  Study: Head CT  Study Interpretation: no acute abnormality.     Assessment and plan:  Acute onset non localizing symptoms as detailed above.  Normal neuro exam and CT head negative for acute abnormality.  Overall, low index of suspicion for an acute neuro vascular insult but consider MRI brain to better elucidate her symptoms.  DAPT without a loading dose.    Lytics recommendation: Thrombolytic therapy not recommended due to Outside of treatment window  and Mild Non-Disabling Symptoms    Thrombectomy recommendation: No; at this time symptoms not suggestive of large vessel occlusion  Placement recommendation: pending further studies

## 2025-06-25 NOTE — HPI
60 year old female with with past medical history of hypertension, thyroid disorder, and GERD present to the ED with complaint of    room-spinning dizziness that began at approximately 0600 upon waking this morning. Patient reports the dizziness is exacerbated with head movement and positional changes. She reports feeling dizzy upon arrival to the ED but states it has currently eased while she is at rest. Patient reports associated nausea and mid-sternal chest pain that feels similar to GERD since yesterday evening.  Patient states burning chest discomfort started around 6:00 p.m. last night.  She has been unable to sleep or get any good rest do the chest discomfort  Patient attempted treatment with Zofran without improvement, last dose this morning. Patient reports symptoms worsened overnight and she is still feeling nauseated.  Code stroke called on arrival.  Vascular neurology recommends aspirin, Plavix, admit for MRI.  Chest discomfort/burning decreased after 1 nitro. Patient transferred to [facility] for further work up. Labs: unremarkable. CT head: No definite acute intracranial findings by noncontrast CT.

## 2025-06-25 NOTE — TELEMEDICINE CONSULT
[facility]  Vascular Neurology  Comprehensive Stroke Center  TeleVascular Neurology Acute Consultation Note        Consult Information  Consults    Consulting Provider: SAMANTHA RODRÍGUEZ   Current Providers  No providers found    Patient Location:  [facility] EMERGENCY DEPARTMENT Emergency Department    Spoke hospital nurse at bedside with patient assisting consultant.  Patient information was obtained from patient, past medical records, and primary team.       Stroke Documentation  Acute Stroke Times   Last Known Normal Date: 06/25/25  Last Known Normal Time: 0600  Symptom Onset Date: 06/26/25  Symptom Onset Time: 0600  Stroke Team Called Date: 06/25/25  Stroke Team Called Time: 1047  Stroke Team Arrival Date: 06/25/25  Stroke Team Arrival Time: 1055  CT Interpretation Time: 1105  Thrombolytic Therapy Recommended: No  Thrombectomy Recommended: No    NIH Scale:  Interval: baseline  1a. Level of Consciousness: 0-->Alert, keenly responsive  1b. LOC Questions: 0-->Answers both questions correctly  1c. LOC Commands: 0-->Performs both tasks correctly  2. Best Gaze: 0-->Normal  3. Visual: 0-->No visual loss  4. Facial Palsy: 0-->Normal symmetrical movements  5a. Motor Arm, Left: 0-->No drift, limb holds 90 (or 45) degrees for full 10 secs  5b. Motor Arm, Right: 0-->No drift, limb holds 90 (or 45) degrees for full 10 secs  6a. Motor Leg, Left: 0-->No drift, leg holds 30 degree position for full 5 secs  6b. Motor Leg, Right: 0-->No drift, leg holds 30 degree position for full 5 secs  7. Limb Ataxia: 0-->Absent  8. Sensory: 0-->Normal, no sensory loss  9. Best Language: 0-->No aphasia, normal  10. Dysarthria: 0-->Normal  11. Extinction and Inattention (formerly Neglect): 0-->No abnormality  Total (NIH Stroke Scale): 0      Modified Blanco Baseline: Score: 0  Modified Blanco Discharge:    Nicole Coma Scale: 15   ABCD2 Score:    [id number] Score:    HAS -BLED Score:    ICH Score:    Hunt & Conway Classification:   "    Blood pressure (!) 154/70, pulse 61, temperature 97.7 °F (36.5 °C), temperature source Oral, resp. rate 20, height 5' 4" (1.626 m), weight 96.2 kg (212 lb), SpO2 100%, not currently breastfeeding.      In my opinion, this was a: Tier 2; VAN Stroke Assessment: Not Applicable     Medical Decision Making  HPI:  60 y.o. female with HTN, obesity, vitamin D deficiency, presents with acute onset nausea, vertigo, tiredness, chest pain, and heartburn.      Images personally reviewed and interpreted:  Study: Head CT  Study Interpretation: no acute abnormality.     Assessment and plan:  Acute onset non localizing symptoms as detailed above.  Normal neuro exam and CT head negative for acute abnormality.  Overall, low index of suspicion for an acute neuro vascular insult but consider MRI brain to better elucidate her symptoms.  DAPT without a loading dose.    Lytics recommendation: Thrombolytic therapy not recommended due to Outside of treatment window  and Mild Non-Disabling Symptoms    Thrombectomy recommendation: No; at this time symptoms not suggestive of large vessel occlusion  Placement recommendation: pending further studies               ROS  Physical Exam  Past Medical History:   Diagnosis Date    GERD (gastroesophageal reflux disease)     Hypertension     Hypokalemic syndrome     Thyroid disease     Thyroid nodule     Vitamin D deficiency disease      Past Surgical History:   Procedure Laterality Date    BREAST BIOPSY       SECTION  2004    X 1    CHOLECYSTECTOMY  2008    HYSTERECTOMY      KNEE SURGERY Left 2023    knee replacement    LAPAROSCOPIC HYSTERECTOMY  2010    LSO     Family History   Problem Relation Name Age of Onset    Diabetes Mother      Heart disease Father          CHF    No Known Problems Sister      No Known Problems Brother      No Known Problems Brother      Breast cancer Maternal Aunt      Breast cancer Maternal Aunt      No Known Problems Maternal Uncle      Breast cancer " Paternal Aunt      Breast cancer Paternal Aunt      No Known Problems Paternal Uncle      No Known Problems Maternal Grandmother      No Known Problems Maternal Grandfather      No Known Problems Paternal Grandmother      No Known Problems Paternal Grandfather      Colon cancer Neg Hx      Colon polyps Neg Hx      Amblyopia Neg Hx      Blindness Neg Hx      Cancer Neg Hx      Cataracts Neg Hx      Glaucoma Neg Hx      Hypertension Neg Hx      Macular degeneration Neg Hx      Retinal detachment Neg Hx      Strabismus Neg Hx      Stroke Neg Hx      Thyroid disease Neg Hx      Esophageal cancer Neg Hx         Diagnoses  Dizziness/Imbalance    Saji Pink MD      Emergent/Acute neurological consultation requested by spoke provider due to critical concerns for possible cerebrovascular event that could result in permanent loss of neurologic/bodily function, severe disability or death of this patient.  Immediate/timely evaluation by a highly prepared expert is paramount for optimal outcomes  High risk for neurological deterioration if not properly diagnosed  High risk for neurological deterioration if not treated promplty/as soon as possible  Complex diagnostic evaluation may be required (advanced imaging)  High risk treatment options (thrombolytics and/or thrombectomy)    Patient care was coordinated with spoke provider, including but not limted to    Discussing likely diagnosis/etiology of symptoms  Making recommendations for further diagnostic studies  Making recommendations for intravenous thrombolytics or other advanced therapies  Making recommendations for disposition (admission/transfer for higher level of care)      Neurology consultation requested by spoke provider. Audiovisual encounter with the patient performed using a secure connection.  Results and impressions from the visit are documented on this note and were communicated to the consulting provider/team via direct communication. The note has been  shared for addition to the patients electronic medical record.

## 2025-06-25 NOTE — PLAN OF CARE
06/25/25 1759   Discharge Planning   Assessment Type Discharge Planning Brief Assessment   Resource/Environmental Concerns none   Support Systems Spouse/significant other   Assistance Needed none   Equipment Currently Used at Home none   Current Living Arrangements home   Care Facility Name n/a   Patient/Family Anticipates Transition to home with family   Patient/Family Anticipated Services at Transition outpatient care   DME Needed Upon Discharge  none   Discharge Plan A Home   Discharge Plan B Home with family

## 2025-06-26 VITALS
DIASTOLIC BLOOD PRESSURE: 68 MMHG | HEART RATE: 70 BPM | BODY MASS INDEX: 36.19 KG/M2 | TEMPERATURE: 98 F | HEIGHT: 64 IN | OXYGEN SATURATION: 99 % | WEIGHT: 212 LBS | RESPIRATION RATE: 20 BRPM | SYSTOLIC BLOOD PRESSURE: 118 MMHG

## 2025-06-26 LAB
ABSOLUTE EOSINOPHIL (OHS): 0.06 K/UL
ABSOLUTE MONOCYTE (OHS): 0.62 K/UL (ref 0.3–1)
ABSOLUTE NEUTROPHIL COUNT (OHS): 6.9 K/UL (ref 1.8–7.7)
ALBUMIN SERPL BCP-MCNC: 3.2 G/DL (ref 3.5–5.2)
ALP SERPL-CCNC: 63 UNIT/L (ref 40–150)
ALT SERPL W/O P-5'-P-CCNC: 10 UNIT/L (ref 10–44)
ANION GAP (OHS): 11 MMOL/L (ref 8–16)
AORTIC ROOT ANNULUS: 3 CM
AORTIC SIZE INDEX (SOV): 1.5 CM/M2
AORTIC SIZE INDEX: 1.5 CM/M2
AORTIC VALVE CUSP SEPERATION: 2.4 CM
APTT PPP: 26.8 SECONDS (ref 21–32)
ASCENDING AORTA: 3.1 CM
AST SERPL-CCNC: 11 UNIT/L (ref 11–45)
AV INDEX (PROSTH): 0.63
AV MEAN GRADIENT: 5 MMHG
AV PEAK GRADIENT: 10 MMHG
AV VALVE AREA BY VELOCITY RATIO: 1.9 CM²
AV VALVE AREA: 2.2 CM²
AV VELOCITY RATIO: 0.56
BASOPHILS # BLD AUTO: 0.02 K/UL
BASOPHILS NFR BLD AUTO: 0.2 %
BILIRUB SERPL-MCNC: 0.3 MG/DL (ref 0.1–1)
BILIRUB UR QL STRIP.AUTO: NEGATIVE
BSA FOR ECHO PROCEDURE: 2.08 M2
BUN SERPL-MCNC: 14 MG/DL (ref 6–20)
CALCIUM SERPL-MCNC: 8.8 MG/DL (ref 8.7–10.5)
CHLORIDE SERPL-SCNC: 104 MMOL/L (ref 95–110)
CLARITY UR: CLEAR
CO2 SERPL-SCNC: 24 MMOL/L (ref 23–29)
COLOR UR AUTO: COLORLESS
CREAT SERPL-MCNC: 1 MG/DL (ref 0.5–1.4)
CV ECHO LV RWT: 0.43 CM
CV STRESS BASE HR: 55 BPM
DIASTOLIC BLOOD PRESSURE: 59 MMHG
DOP CALC AO PEAK VEL: 1.6 M/S
DOP CALC AO VTI: 40.6 CM
DOP CALC LVOT AREA: 3.5 CM2
DOP CALC LVOT DIAMETER: 2.1 CM
DOP CALC LVOT PEAK VEL: 0.9 M/S
DOP CALC LVOT STROKE VOLUME: 88.3 CM3
DOP CALCLVOT PEAK VEL VTI: 25.5 CM
E WAVE DECELERATION TIME: 249 MSEC
E/A RATIO: 0.82
E/E' RATIO: 12 M/S
ECHO LV POSTERIOR WALL: 1 CM (ref 0.6–1.1)
ERYTHROCYTE [DISTWIDTH] IN BLOOD BY AUTOMATED COUNT: 14.1 % (ref 11.5–14.5)
FRACTIONAL SHORTENING: 27.7 % (ref 28–44)
GFR SERPLBLD CREATININE-BSD FMLA CKD-EPI: >60 ML/MIN/1.73/M2
GLUCOSE SERPL-MCNC: 88 MG/DL (ref 70–110)
GLUCOSE UR QL STRIP: NEGATIVE
HCT VFR BLD AUTO: 32.5 % (ref 37–48.5)
HGB BLD-MCNC: 10.5 GM/DL (ref 12–16)
HGB UR QL STRIP: NEGATIVE
IMM GRANULOCYTES # BLD AUTO: 0.03 K/UL (ref 0–0.04)
IMM GRANULOCYTES NFR BLD AUTO: 0.3 % (ref 0–0.5)
INR PPP: 1 (ref 0.8–1.2)
INTERVENTRICULAR SEPTUM: 1.2 CM (ref 0.6–1.1)
IVC DIAMETER: 1.53 CM
IVRT: 154 MSEC
KETONES UR QL STRIP: NEGATIVE
LA MAJOR: 5.9 CM
LA MINOR: 5.6 CM
LA WIDTH: 4.7 CM
LEFT ATRIUM SIZE: 4 CM
LEFT ATRIUM VOLUME INDEX: 46 ML/M2
LEFT ATRIUM VOLUME: 92 CM3
LEFT INTERNAL DIMENSION IN SYSTOLE: 3.4 CM (ref 2.1–4)
LEFT VENTRICLE DIASTOLIC VOLUME INDEX: 50.75 ML/M2
LEFT VENTRICLE DIASTOLIC VOLUME: 102 ML
LEFT VENTRICLE MASS INDEX: 93.3 G/M2
LEFT VENTRICLE SYSTOLIC VOLUME INDEX: 24.4 ML/M2
LEFT VENTRICLE SYSTOLIC VOLUME: 49 ML
LEFT VENTRICULAR INTERNAL DIMENSION IN DIASTOLE: 4.7 CM (ref 3.5–6)
LEFT VENTRICULAR MASS: 187.5 G
LEUKOCYTE ESTERASE UR QL STRIP: NEGATIVE
LV LATERAL E/E' RATIO: 9.3 M/S
LV SEPTAL E/E' RATIO: 16.8 M/S
LVED V (TEICH): 102.03 ML
LVES V (TEICH): 48.83 ML
LVOT MG: 2.12 MMHG
LVOT MV: 0.68 CM/S
LYMPHOCYTES # BLD AUTO: 2.42 K/UL (ref 1–4.8)
MAGNESIUM SERPL-MCNC: 2.1 MG/DL (ref 1.6–2.6)
MCH RBC QN AUTO: 29.3 PG (ref 27–31)
MCHC RBC AUTO-ENTMCNC: 32.3 G/DL (ref 32–36)
MCV RBC AUTO: 91 FL (ref 82–98)
MV PEAK A VEL: 1.02 M/S
MV PEAK E VEL: 0.84 M/S
MV STENOSIS PRESSURE HALF TIME: 72.18 MS
MV VALVE AREA P 1/2 METHOD: 3.05 CM2
NITRITE UR QL STRIP: NEGATIVE
NUC REST EJECTION FRACTION: 50
NUCLEATED RBC (/100WBC) (OHS): 0 /100 WBC
OHS CV CPX 85 PERCENT MAX PREDICTED HEART RATE MALE: 136
OHS CV CPX MAX PREDICTED HEART RATE: 160
OHS CV CPX PATIENT IS FEMALE: 1
OHS CV CPX PATIENT IS MALE: 0
OHS CV CPX PEAK DIASTOLIC BLOOD PRESSURE: 52 MMHG
OHS CV CPX PEAK HEAR RATE: 93 BPM
OHS CV CPX PEAK RATE PRESSURE PRODUCT: 9951
OHS CV CPX PEAK SYSTOLIC BLOOD PRESSURE: 107 MMHG
OHS CV CPX PERCENT MAX PREDICTED HEART RATE ACHIEVED: 61
OHS CV CPX RATE PRESSURE PRODUCT PRESENTING: 7370
OHS CV INITIAL DOSE: 10.5 MCG/KG/MIN
OHS CV PEAK DOSE: 30.1 MCG/KG/MIN
OHS CV RV/LV RATIO: 0.74 CM
PH UR STRIP: 7 [PH]
PHOSPHATE SERPL-MCNC: 3.4 MG/DL (ref 2.7–4.5)
PISA TR MAX VEL: 2.4 M/S
PLATELET # BLD AUTO: 307 K/UL (ref 150–450)
PMV BLD AUTO: 8.8 FL (ref 9.2–12.9)
POTASSIUM SERPL-SCNC: 4 MMOL/L (ref 3.5–5.1)
PROT SERPL-MCNC: 8 GM/DL (ref 6–8.4)
PROT UR QL STRIP: NEGATIVE
PROTHROMBIN TIME: 11.5 SECONDS (ref 9–12.5)
PULM VEIN S/D RATIO: 2.2
PV PEAK D VEL: 0.3 M/S
PV PEAK GRADIENT: 4 MMHG
PV PEAK S VEL: 0.66 M/S
PV PEAK VELOCITY: 1.01 M/S
RA MAJOR: 5.4 CM
RA PRESSURE ESTIMATED: 3 MMHG
RA WIDTH: 4.5 CM
RBC # BLD AUTO: 3.58 M/UL (ref 4–5.4)
RELATIVE EOSINOPHIL (OHS): 0.6 %
RELATIVE LYMPHOCYTE (OHS): 24.1 % (ref 18–48)
RELATIVE MONOCYTE (OHS): 6.2 % (ref 4–15)
RELATIVE NEUTROPHIL (OHS): 68.6 % (ref 38–73)
RIGHT VENTRICLE DIASTOLIC BASEL DIMENSION: 3.5 CM
RIGHT VENTRICULAR END-DIASTOLIC DIMENSION: 3.46 CM
RV TB RVSP: 5 MMHG
RV TISSUE DOPPLER FREE WALL SYSTOLIC VELOCITY 1 (APICAL 4 CHAMBER VIEW): 14.31 CM/S
SINUS: 3 CM
SODIUM SERPL-SCNC: 139 MMOL/L (ref 136–145)
SP GR UR STRIP: 1.01
STJ: 2.4 CM
SYSTOLIC BLOOD PRESSURE: 134 MMHG
TDI LATERAL: 0.09 M/S
TDI SEPTAL: 0.05 M/S
TDI: 0.07 M/S
TR MAX PG: 22 MMHG
TRICUSPID ANNULAR PLANE SYSTOLIC EXCURSION: 2 CM
TROPONIN I SERPL DL<=0.01 NG/ML-MCNC: <0.006 NG/ML
TV REST PULMONARY ARTERY PRESSURE: 26 MMHG
UROBILINOGEN UR STRIP-ACNC: NEGATIVE EU/DL
WBC # BLD AUTO: 10.05 K/UL (ref 3.9–12.7)
Z-SCORE OF LEFT VENTRICULAR DIMENSION IN END DIASTOLE: -2.26
Z-SCORE OF LEFT VENTRICULAR DIMENSION IN END SYSTOLE: -0.48

## 2025-06-26 PROCEDURE — 63600175 PHARM REV CODE 636 W HCPCS: Performed by: HOSPITALIST

## 2025-06-26 PROCEDURE — 25000003 PHARM REV CODE 250: Performed by: HOSPITALIST

## 2025-06-26 PROCEDURE — 85025 COMPLETE CBC W/AUTO DIFF WBC: CPT | Performed by: NURSE PRACTITIONER

## 2025-06-26 PROCEDURE — 63600175 PHARM REV CODE 636 W HCPCS: Performed by: INTERNAL MEDICINE

## 2025-06-26 PROCEDURE — 84100 ASSAY OF PHOSPHORUS: CPT | Performed by: NURSE PRACTITIONER

## 2025-06-26 PROCEDURE — 92610 EVALUATE SWALLOWING FUNCTION: CPT

## 2025-06-26 PROCEDURE — G0378 HOSPITAL OBSERVATION PER HR: HCPCS

## 2025-06-26 PROCEDURE — 25000003 PHARM REV CODE 250: Performed by: NURSE PRACTITIONER

## 2025-06-26 PROCEDURE — A9502 TC99M TETROFOSMIN: HCPCS | Performed by: HOSPITALIST

## 2025-06-26 PROCEDURE — 80053 COMPREHEN METABOLIC PANEL: CPT | Performed by: NURSE PRACTITIONER

## 2025-06-26 PROCEDURE — 84484 ASSAY OF TROPONIN QUANT: CPT | Performed by: NURSE PRACTITIONER

## 2025-06-26 PROCEDURE — 85730 THROMBOPLASTIN TIME PARTIAL: CPT | Performed by: NURSE PRACTITIONER

## 2025-06-26 PROCEDURE — 99214 OFFICE O/P EST MOD 30 MIN: CPT | Mod: ,,, | Performed by: INTERNAL MEDICINE

## 2025-06-26 PROCEDURE — 92523 SPEECH SOUND LANG COMPREHEN: CPT

## 2025-06-26 PROCEDURE — 83735 ASSAY OF MAGNESIUM: CPT | Performed by: NURSE PRACTITIONER

## 2025-06-26 PROCEDURE — 97161 PT EVAL LOW COMPLEX 20 MIN: CPT

## 2025-06-26 PROCEDURE — 97165 OT EVAL LOW COMPLEX 30 MIN: CPT

## 2025-06-26 PROCEDURE — 81003 URINALYSIS AUTO W/O SCOPE: CPT | Performed by: NURSE PRACTITIONER

## 2025-06-26 PROCEDURE — 36415 COLL VENOUS BLD VENIPUNCTURE: CPT | Performed by: NURSE PRACTITIONER

## 2025-06-26 PROCEDURE — 85610 PROTHROMBIN TIME: CPT | Performed by: NURSE PRACTITIONER

## 2025-06-26 RX ORDER — CLOPIDOGREL BISULFATE 75 MG/1
75 TABLET ORAL DAILY
Qty: 30 TABLET | Refills: 11 | Status: SHIPPED | OUTPATIENT
Start: 2025-06-27 | End: 2025-07-01

## 2025-06-26 RX ORDER — ASPIRIN 81 MG/1
81 TABLET ORAL DAILY
Qty: 90 TABLET | Refills: 3 | Status: SHIPPED | OUTPATIENT
Start: 2025-06-27 | End: 2026-06-27

## 2025-06-26 RX ORDER — ALUMINUM HYDROXIDE, MAGNESIUM HYDROXIDE, AND SIMETHICONE 1200; 120; 1200 MG/30ML; MG/30ML; MG/30ML
30 SUSPENSION ORAL EVERY 6 HOURS
Status: DISCONTINUED | OUTPATIENT
Start: 2025-06-26 | End: 2025-06-26 | Stop reason: HOSPADM

## 2025-06-26 RX ORDER — POLYETHYLENE GLYCOL 3350 17 G/17G
17 POWDER, FOR SOLUTION ORAL DAILY
Status: DISCONTINUED | OUTPATIENT
Start: 2025-06-26 | End: 2025-06-26 | Stop reason: HOSPADM

## 2025-06-26 RX ORDER — PANTOPRAZOLE SODIUM 40 MG/10ML
40 INJECTION, POWDER, LYOPHILIZED, FOR SOLUTION INTRAVENOUS DAILY
Status: DISCONTINUED | OUTPATIENT
Start: 2025-06-26 | End: 2025-06-26 | Stop reason: HOSPADM

## 2025-06-26 RX ORDER — ATORVASTATIN CALCIUM 40 MG/1
40 TABLET, FILM COATED ORAL DAILY
Qty: 90 TABLET | Refills: 3 | Status: SHIPPED | OUTPATIENT
Start: 2025-06-27 | End: 2025-07-01

## 2025-06-26 RX ORDER — REGADENOSON 0.08 MG/ML
0.4 INJECTION, SOLUTION INTRAVENOUS ONCE
Status: COMPLETED | OUTPATIENT
Start: 2025-06-26 | End: 2025-06-26

## 2025-06-26 RX ADMIN — Medication 200 ML: at 03:06

## 2025-06-26 RX ADMIN — TETROFOSMIN 30.1 MILLICURIE: 1.38 INJECTION, POWDER, LYOPHILIZED, FOR SOLUTION INTRAVENOUS at 09:06

## 2025-06-26 RX ADMIN — ATORVASTATIN CALCIUM 40 MG: 40 TABLET, FILM COATED ORAL at 11:06

## 2025-06-26 RX ADMIN — ALUMINUM HYDROXIDE, MAGNESIUM HYDROXIDE, AND SIMETHICONE 30 ML: 200; 200; 20 SUSPENSION ORAL at 03:06

## 2025-06-26 RX ADMIN — REGADENOSON 0.4 MG: 0.08 INJECTION, SOLUTION INTRAVENOUS at 09:06

## 2025-06-26 RX ADMIN — ASPIRIN 81 MG: 81 TABLET, COATED ORAL at 11:06

## 2025-06-26 RX ADMIN — TETROFOSMIN 10.5 MILLICURIE: 1.38 INJECTION, POWDER, LYOPHILIZED, FOR SOLUTION INTRAVENOUS at 08:06

## 2025-06-26 RX ADMIN — CLOPIDOGREL 75 MG: 75 TABLET ORAL at 11:06

## 2025-06-26 RX ADMIN — Medication 200 ML: at 11:06

## 2025-06-26 RX ADMIN — PANTOPRAZOLE SODIUM 40 MG: 40 INJECTION, POWDER, FOR SOLUTION INTRAVENOUS at 03:06

## 2025-06-26 NOTE — DISCHARGE SUMMARY
[facility] Medicine  Discharge Summary      Patient Name: Niharika Gutierrez  MRN: [id number]  ANDRE: [id number]  Patient Class: OP- Observation  Admission Date: 6/25/2025  Hospital Length of Stay: 0 days  Discharge Date and Time: 06/26/2025 1:59 PM  Attending Physician: Jacob Page MD   Discharging Provider: Rolando Alonso Jr, NP  Primary Care Provider: Janae Schwartz MD    Primary Care Team: ROLANDO ALONSO    HPI:    60 year old female with with past medical history of hypertension, thyroid disorder, and GERD present to the ED with complaint of    room-spinning dizziness that began at approximately 0600 upon waking this morning. Patient reports the dizziness is exacerbated with head movement and positional changes. She reports feeling dizzy upon arrival to the ED but states it has currently eased while she is at rest. Patient reports associated nausea and mid-sternal chest pain that feels similar to GERD since yesterday evening.  Patient states burning chest discomfort started around 6:00 p.m. last night.  She has been unable to sleep or get any good rest do the chest discomfort  Patient attempted treatment with Zofran without improvement, last dose this morning. Patient reports symptoms worsened overnight and she is still feeling nauseated.  Code stroke called on arrival.  Vascular neurology recommends aspirin, Plavix, admit for MRI.  Chest discomfort/burning decreased after 1 nitro. Patient transferred to [facility] for further work up. Labs: unremarkable. CT head: No definite acute intracranial findings by noncontrast CT.     * No surgery found *      Hospital Course:   Mrs. Gutierrez was placed in observation for ACS rule out after presenting with chest pain.  Troponin negative and no STEMI on EKG.  She was seen and evaluated by Cardiology.  Echo shows preserved EF.  Nuclear stress test abnormal but with mostly fixed defect.  Recommendation for outpatient follow up with PET stress  scan.  Symptoms resolved and she requests discharge.  PT/OT/SLP recommend no need for therapy.  MRI brain negative for acute abnormality.  CTA head and neck recommended, put patient did not want the necessary IV to perform this test.  She opted for carotid US which was negative for hemodynamically significant stenosis.  Instructed to follow up outpatient with clinic providers.  Order placed for PET stress.  Prescriptions sent to pharmacy.  ASA/statin/plavix.  All findings and plan discussed with patient, all questions answered, she verbalizes understanding and agreement.  Discharged home in stable condition.      Goals of Care Treatment Preferences:  Code Status: Full Code         Consults:   Consults (From admission, onward)          Status Ordering Provider     Inpatient consult to Cardiology  Once        Provider:  Buster Currie MD    Completed LITA JEAN     Inpatient Consult to Neurology Services (General Neurology)  Once        Provider:  Amelia Peoples MD    Acknowledged LITA JEAN     IP consult to case management/social work  Once        Provider:  (Not yet assigned)    Completed LITA JEAN     Consult to Telemedicine - Acute Stroke  Once        Provider:  (Not yet assigned)    Acknowledged SAMANTHA RODRÍGUEZ            Assessment & Plan  Dizziness  Consulted neuro  MRI brain pending  Echo pending  PT/OT/Speech   Recs: Asa and plavix, started on statin  NIH scale per unit protocol  Tele monitoring   Permissive HTN   Check orthostatics blood pressure  Essential hypertension  Patient's blood pressure range in the last 24 hours was: BP  Min: 110/68  Max: 147/72.The patient's inpatient anti-hypertensive regimen is listed below:  Current Antihypertensives  labetaloL injection 10 mg, Every 15 min PRN, Intravenous    Plan  Permissive HTN   Gastroesophageal reflux disease  PPI     Severe obesity (BMI 35.0-39.9) with comorbidity  Body mass index is 36.39 kg/m². Morbid obesity complicates  all aspects of disease management from diagnostic modalities to treatment. Weight loss encouraged and health benefits explained to patient.       Atypical chest pain  Consulted cards  Trend troponin  Echo pending   Tele monitoring     Final Active Diagnoses:    Diagnosis Date Noted POA    PRINCIPAL PROBLEM:  Dizziness [R42] 06/25/2025 Yes    Severe obesity (BMI 35.0-39.9) with comorbidity [E66.01] 04/11/2023 Yes    Essential hypertension [I10] 01/19/2023 Yes    Gastroesophageal reflux disease [K21.9] 06/18/2015 Yes    Atypical chest pain [R07.89] 04/02/2008 Yes      Problems Resolved During this Admission:       Discharged Condition: stable    Disposition: Home or Self Care    Follow Up:   Follow-up Information       Buster Currie MD Follow up.    Specialties: Interventional Cardiology, Cardiology  Contact information:  [address]  [address]  [place] [address] [phone]               Janae Schwartz MD Follow up.    Specialties: Family Medicine, Wound Care  Contact information:  [address] [facility] [place] [address] [phone]                           Patient Instructions:      Diet Cardiac     Cardiac PET Scan Stress   Standing Status: Future Standing Exp. Date: 06/26/26     Order Specific Question Answer Comments   Which medicaton for the stress procedure? Regadenoson    Release to patient Immediate      Activity as tolerated       Significant Diagnostic Studies: Labs: CMP   Recent Labs   Lab 06/25/25 1835 06/26/25 0620    139   K 4.4 4.0    104   CO2 22* 24   GLU 88 88   BUN 11 14   CREATININE 0.8 1.0   CALCIUM 9.5 8.8   PROT 9.1* 8.0   ALBUMIN 3.6 3.2*   BILITOT 0.4 0.3   ALKPHOS 71 63   AST 13 11   ALT 10 10   ANIONGAP 9 11   , CBC   Recent Labs   Lab 06/25/25 1835 06/26/25  0620   WBC 11.86 10.05   HGB 10.9* 10.5*   HCT 34.1* 32.5*    307   , and Troponin   Recent Labs   Lab 06/25/25 1835 06/25/25 2303 06/26/25  0620   TROPONINI <0.006 <0.006 <0.006     Cardiac Graphics:  Echocardiogram: Transthoracic echo (TTE) complete (Cupid Only):   Results for orders placed or performed during the hospital encounter of 06/25/25   Echo Saline Bubble? Yes   Result Value Ref Range    BSA 2.08 m2    LVOT stroke volume 88.3 cm3    LVIDd 4.7 3.5 - 6.0 cm    LV Systolic Volume 49 mL    LV Systolic Volume Index 24.4 mL/m2    LVIDs 3.4 2.1 - 4.0 cm    LV Diastolic Volume 102 mL    LV Diastolic Volume Index 50.75 mL/m2    Left Ventricular End Systolic Volume by Teichholz Method 48.83 mL    Left Ventricular End Diastolic Volume by Teichholz Method 102.03 mL    IVS 1.2 (A) 0.6 - 1.1 cm    LVOT diameter 2.1 cm    LVOT area 3.5 cm2    FS 27.7 (A) 28 - 44 %    Left Ventricle Relative Wall Thickness 0.43 cm    PW 1.0 0.6 - 1.1 cm    LV mass 187.5 g    LV Mass Index 93.3 g/m2    MV Peak E Nilesh 0.84 m/s    TDI LATERAL 0.09 m/s    TDI SEPTAL 0.05 m/s    E/E' ratio 12 m/s    MV Peak A Nilesh 1.02 m/s    TR Max Nilesh 2.4 m/s    E/A ratio 0.82     IVRT 154 msec    E wave deceleration time 249 msec    LV SEPTAL E/E' RATIO 16.8 m/s    LV LATERAL E/E' RATIO 9.3 m/s    PV Peak S Nilesh 0.66 m/s    PV Peak D Nilesh 0.30 m/s    Pulm vein S/D ratio 2.20     LVOT peak nilesh 0.9 m/s    Left Ventricular Outflow Tract Mean Velocity 0.68 cm/s    Left Ventricular Outflow Tract Mean Gradient 2.12 mmHg    RV- mcdermott basal diam 3.5 cm    RV S' 14.31 cm/s    TAPSE 2.0 cm    RV/LV Ratio 0.74 cm    LA size 4.0 cm    Left Atrium Minor Axis 5.6 cm    Left Atrium Major Axis 5.9 cm    RA Major Axis 5.40 cm    AV mean gradient 5 mmHg    AV peak gradient 10 mmHg    Ao peak nilesh 1.6 m/s    Ao VTI 40.6 cm    LVOT peak VTI 25.5 cm    AV valve area 2.2 cm²    AV Velocity Ratio 0.56     AV index (prosthetic) 0.63     ASAF by Velocity Ratio 1.9 cm²    MV stenosis pressure 1/2 time 72.18 ms    MV valve area p 1/2 method 3.05 cm2    Triscuspid Valve Regurgitation Peak Gradient 22 mmHg    PV PEAK VELOCITY 1.01 m/s    PV peak gradient 4 mmHg    Ao root annulus 3.0 cm     Sinus 3.0 cm    ASI 1.5 cm/m2    STJ 2.4 cm    Ascending aorta 3.1 cm    ASI 1.5 cm/m2    IVC diameter 1.53 cm    Mean e' 0.07 m/s    ZLVIDS -0.48     ZLVIDD -2.26     RVDD 3.46 cm    AORTIC VALVE CUSP SEPERATION 2.40 cm    MARIZA 46 mL/m2    LA Vol 92 cm3    LA WIDTH 4.7 cm    RA Width 4.5 cm    TV resting pulmonary artery pressure 26 mmHg    RV TB RVSP 5 mmHg    Est. RA pres 3 mmHg    Narrative      Left Ventricle: The left ventricle is normal in size. Normal wall   thickness. There is normal systolic function with a visually estimated   ejection fraction of 55 - 60%. Grade I diastolic dysfunction.    Right Ventricle: The right ventricle is normal in size measuring 3.5   cm. Systolic function is normal.    Left Atrium: The left atrium is severely dilated Agitated saline study   of the atrial septum is negative after vasalva maneuver, suggesting   absence of intracardiac shunt at the atrial level.    Right Atrium: The right atrium is moderately dilated .    Mitral Valve: There is mild regurgitation.    Pulmonary Artery: The estimated pulmonary artery systolic pressure is   26 mmHg.    IVC/SVC: Normal venous pressure at 3 mmHg.      and Stress Test:     Abnormal myocardial perfusion scan.    A PET stress exam is recommended if clinically indicated.    There is a moderate to severe intensity, medium to large sized, mostly fixed perfusion abnormality with some reversibilty in the inferior and inferoapical wall(s).    There are no other significant perfusion abnormalities.    The gated perfusion images showed an ejection fraction of 50% at rest.    The ECG portion of the study is negative for ischemia.    The patient reported no chest pain during the stress test.    There were no arrhythmias during stress.    Pending Diagnostic Studies:       Procedure Component Value Units Date/Time    GREY TOP URINE HOLD [[id number]] Collected: 06/26/25 0214    Order Status: Sent Lab Status: In process Updated: 06/26/25 0216     Specimen: Urine            Medications:  Reconciled Home Medications:      Medication List        START taking these medications      aspirin 81 MG EC tablet  Commonly known as: ECOTRIN  Take 1 tablet (81 mg total) by mouth once daily.  Start taking on: 2025     atorvastatin 40 MG tablet  Commonly known as: LIPITOR  Take 1 tablet (40 mg total) by mouth once daily.  Start taking on: 2025     clopidogreL 75 mg tablet  Commonly known as: PLAVIX  Take 1 tablet (75 mg total) by mouth once daily.  Start taking on: 2025            CHANGE how you take these medications      spironolactone 50 MG tablet  Commonly known as: ALDACTONE  TAKE 1 TABLET BY MOUTH EVERY DAY. INCREASE TO 2 TABLETS BY MOUTH EVERY DAY AS TOLERATED  What changed: See the new instructions.            CONTINUE taking these medications      acetaminophen 650 MG Tbsr  Commonly known as: TYLENOL  Take 650 mg by mouth every 8 (eight) hours.     amLODIPine 10 MG tablet  Commonly known as: NORVASC  Take 1 tablet (10 mg total) by mouth once daily.     ciclopirox 8 % Soln  Commonly known as: PENLAC  Apply topically nightly.     cyclobenzaprine 10 MG tablet  Commonly known as: FLEXERIL  Take 1 tablet (10 mg total) by mouth 3 (three) times daily as needed for Muscle spasms.     ergocalciferol 50,000 unit Cap  Commonly known as: ERGOCALCIFEROL  TAKE 1 CAPSULE BY MOUTH EVERY 7 DAYS     esomeprazole 40 MG capsule  Commonly known as: NEXIUM  Take 1 capsule (40 mg total) by mouth before breakfast.     estradioL 0.01 % (0.1 mg/gram) vaginal cream  Commonly known as: ESTRACE  Place 2 g vaginally twice a week.     EYSUVIS 0.25 % William  Generic drug: loteprednol etabonate  SMARTSI-2 Drop(s) In Eye(s) 1-4 Times Daily     famotidine 40 MG tablet  Commonly known as: PEPCID  Take 1 tablet (40 mg total) by mouth once daily.     ferrous sulfate 325 mg (65 mg iron) Tab tablet  Commonly known as: FEOSOL  Take 1 tablet (325 mg total) by mouth every other  day.     hydrocortisone 2.5 % rectal cream  Commonly known as: ANUSOL-HC  Place rectally 2 (two) times daily.     LIDOcaine 4 % cream  Commonly known as: LMX  Apply topically 4 (four) times daily as needed (rectal pain). Apply to external hemorrhoids prn     potassium bicarbonate disintegrating tablet  Commonly known as: KLOR-CON/EF  Take 1 tablet (25 mEq total) by mouth 2 (two) times a day.              Indwelling Lines/Drains at time of discharge:   Lines/Drains/Airways       None                       Time spent on the discharge of patient: 35 minutes         Rolando Alonso Jr, NP  Department of Hospital Medicine  [facility]

## 2025-06-26 NOTE — DISCHARGE INSTRUCTIONS
Our goal at [facility] is to always give you outstanding care and exceptional service. You may receive a survey from [employer] by mail, text or e-mail in the next 24-48 hours asking about the care you received with us. The survey should only take 5-10 minutes to complete and is very important to us.     Your feedback provides us with a way to recognize our staff who work tirelessly to provide the best care! Also, your responses help us learn how to improve when your experience was below our aspiration of excellence. We are always looking for ways to improve your stay. We WILL use your feedback to continue making improvements to help us provide the highest quality care. We keep your personal information and feedback confidential. We appreciate your time completing this survey and can't wait to hear from you!!!    We look forward to your continued care with us! Thanks so much for choosing [facility] for your healthcare needs!

## 2025-06-26 NOTE — PLAN OF CARE
06/26/25 1404   Final Note   Assessment Type Final Discharge Note   Anticipated Discharge Disposition Home   Hospital Resources/Appts/Education Provided Appointments scheduled and added to AVS   Post-Acute Status   Post-Acute Authorization Other   Other Status No Post-Acute Service Needs     Pts nurse Kennedi notified that the pt can d/c from CM standpoint

## 2025-06-26 NOTE — CONSULTS
"[facility] - [place] - Telemetry  Neurology Consult Note    Patient Name: Niharika Gutierrez  Age: 60 y.o.  MRN: [id number]  Admission Date: 2025  Reason for consult:  Dizziness    CC:  "Dizziness"    HPI:   Niharika Gutierrez is a 60 y.o. year old female with past medical history of hypertension, thyroid disorder, and GERD presented to the ED yesterday with sudden onset of dizziness yesterday morning.  History obtained from patient and chart review.    Patient was experiencing some heartburn and chest pain for last few days.  Yesterday morning she woke up and she states that she had feeling of dizziness upon ambulating and head movement.  She describes the sensation as room spinning.  Denies focal neurological symptoms associated with this.  Denies blurry vision.  Symptoms were also associated with feeling of nausea with abdominal discomfort.  She also had associated chest discomfort and heartburn for which she presented to the ED for further evaluation.    In the ER, /70.  She was noted to be neuro intact.  Tele stroke was called for vertigo, NIHSS 0.  CT head was unremarkable.  Patient was brought in for further evaluation.  MRI negative for acute infarct.  Neurology was consulted for further evaluation.  Dizziness slowly improved throughout the they yesterday with complete return to baseline yesterday evening.  Patient was able to ambulate without any returned of symptoms today.  Patient has a nonsmoker.  Not on antiplatelet at home.      Histories:  Allergies:  Patient has no known allergies.    Current Medications:  Current Medications[1]    Medical:   Past Medical History:   Diagnosis Date    GERD (gastroesophageal reflux disease)     Hypertension     Hypokalemic syndrome     Thyroid disease     Thyroid nodule     Vitamin D deficiency disease         Surgeries:   Past Surgical History:   Procedure Laterality Date    BREAST BIOPSY       SECTION  2004    X 1    CHOLECYSTECTOMY      " "HYSTERECTOMY      KNEE SURGERY Left 11/14/2023    knee replacement    LAPAROSCOPIC HYSTERECTOMY  2010    LSO        Family:   Family History   Problem Relation Name Age of Onset    Diabetes Mother      Heart disease Father          CHF    No Known Problems Sister      No Known Problems Brother      No Known Problems Brother      Breast cancer Maternal Aunt      Breast cancer Maternal Aunt      No Known Problems Maternal Uncle      Breast cancer Paternal Aunt      Breast cancer Paternal Aunt      No Known Problems Paternal Uncle      No Known Problems Maternal Grandmother      No Known Problems Maternal Grandfather      No Known Problems Paternal Grandmother      No Known Problems Paternal Grandfather      Colon cancer Neg Hx      Colon polyps Neg Hx      Amblyopia Neg Hx      Blindness Neg Hx      Cancer Neg Hx      Cataracts Neg Hx      Glaucoma Neg Hx      Hypertension Neg Hx      Macular degeneration Neg Hx      Retinal detachment Neg Hx      Strabismus Neg Hx      Stroke Neg Hx      Thyroid disease Neg Hx      Esophageal cancer Neg Hx         Tobacco Use    Smoking status: Never    Smokeless tobacco: Never   Substance and Sexual Activity    Alcohol use: No    Drug use: No    Sexual activity: Yes     Partners: Male     Birth control/protection: Surgical         Physical Exam:     Physical Examination  /67   Pulse 81   Temp 97.7 °F (36.5 °C)   Resp 20   Ht 5' 4" (1.626 m)   Wt 96.2 kg (212 lb)   SpO2 100%   Breastfeeding No   BMI 36.39 kg/m²   Body mass index is 36.39 kg/m².    Neurological Exam  NIHSS (National [place] [facility] Stroke Scale)   1a  Level of consciousness: 0=alert; keenly responsive   1b. LOC questions:  0 = Answers both questions correctly   1c. LOC commands: 0=Answers both tasks correctly   2.  Best Gaze: 0=normal   3. Visual: 0=No visual loss   4. Facial Palsy: 0=Normal symmetric movement   5a. Motor left arm: 0=No drift, limb holds 90 (or 45) degrees for full 10 seconds   5b.  " Motor right arm: 0=No drift, limb holds 90 (or 45) degrees for full 10 seconds   6a. motor left le=No drift, limb holds 90 (or 45) degrees for full 10 seconds   6b  Motor right le=No drift, limb holds 90 (or 45) degrees for full 10 seconds   7. Limb Ataxia: 0=Absent   8.  Sensory: 0=Normal; no sensory loss   9. Best Language:  0=No aphasia, normal   10. Dysarthria: 0=Normal   11. Extinction and Inattention: 0=No abnormality         Total:   0         Significant Labs:   Recent Lab Results  (Last 5 results in the past 24 hours)        25  0929   25  0904   25  0620   25  0214   25  2303        Systolic blood pressure 134               Albumin     3.2           ALP     63           ALT     10           Anion Gap     11           Ao root annulus   3.0             Ascending aorta   3.1             Ao peak dean   1.6             ASI   1.5             ASI   1.5             Ao VTI   40.6             Appearance, UA       Clear         PTT     26.8  Comment: Refer to local heparin nomogram for intensity/dose specific therapeutic range.           AST     11           AV valve area   2.2             ASAF by Velocity Ratio   1.9             AORTIC VALVE CUSP SEPERATION   2.40             AV mean gradient   5             AV index (prosthetic)   0.63             AV peak gradient   10             AV Velocity Ratio   0.56             Baso #     0.02           Basophil %     0.2           Bilirubin (UA)       Negative         BILIRUBIN TOTAL     0.3  Comment: For infants and newborns, interpretation of results should be based   on gestational age, weight and in agreement with clinical   observations.    Premature Infant recommended reference ranges:   0-24 hours:  <8.0 mg/dL   24-48 hours: <12.0 mg/dL   3-5 days:    <15.0 mg/dL   6-29 days:   <15.0 mg/dL           BSA   2.08             BUN     14           Calcium     8.8           Chloride     104           CO2     24           Color, UA        Colorless         Creatinine     1.0           Left Ventricle Relative Wall Thickness   0.43             dose 10.5               dose 30.1               HR at rest 55               Diastolic blood pressure 59               E/A ratio   0.82             E/E' ratio   12             eGFR     >60  Comment: Estimated GFR calculated using the CKD-EPI creatinine (2021) equation.           Eos #     0.06           Eos %     0.6           E wave deceleration time   249             FS   27.7             Glucose     88           Glucose, UA       Negative         Gran # (ANC)     6.90           Hematocrit     32.5           Hemoglobin     10.5           Immature Grans (Abs)     0.03  Comment: Mild elevation in immature granulocytes is non specific and can be seen in a variety of conditions including stress response, acute inflammation, trauma and pregnancy. Correlation with other laboratory and clinical findings is essential.           Immature Granulocytes     0.3           INR     1.0  Comment: Coumadin Therapy:    2.0 - 3.0 for INR for all indicators except mechanical heart valves    and antiphospholipid syndromes which should use 2.5 - 3.5.           IVC diameter   1.53             IVRT   154             IVSd   1.2             Ketones, UA       Negative         LA WIDTH   4.7             Left Atrium Major Axis   5.9             Left Atrium Minor Axis   5.6             LA size   4.0             LA Vol   92             LA vol index   46             LVOT area   3.5             Leukocyte Esterase, UA       Negative         LV LATERAL E/E' RATIO   9.3             LV SEPTAL E/E' RATIO   16.8             LV EDV BP   102             LV Diastolic Volume Index   50.75             Left Ventricular End Diastolic Volume by Teichholz Method   102.03             Left Ventricular End Systolic Volume by Teichholz Method   48.83             LVIDd   4.7             LVIDs   3.4             LV mass   187.5             LV Mass Index   93.3              Left Ventricular Outflow Tract Mean Gradient   2.12             Left Ventricular Outflow Tract Mean Velocity   0.68             LVOT diameter   2.1             LVOT peak nilesh   0.9             LVOT stroke volume   88.3             LVOT peak VTI   25.5             LV ESV BP   49             LV Systolic Volume Index   24.4             Lymph #     2.42           Lymph %     24.1           Magnesium      2.1           MCH     29.3           MCHC     32.3           MCV     91           Mean e'   0.07             Mono #     0.62           Mono %     6.2           MPV     8.8           MV valve area p 1/2 method   3.05             MV Peak A Nilesh   1.02             MV Peak E Nilesh   0.84             MV stenosis pressure 1/2 time   72.18             Neut %     68.6           NITRITE UA       Negative         nRBC     0           Nuc Rest EF 50               Blood, UA       Negative         85% Max Predicted                Max Predicted                OHS CV CPX PATIENT IS FEMALE 1.0               OHS CV CPX PATIENT IS MALE 0.0               Peak Diatolic BP 52               Peak HR 93               Peak RPP 9,951               Peak Systolic                % Max HR Achieved 61               RPP 7,370               pH, UA       7.0         Phosphorus Level     3.4           Platelet Count     307           Potassium     4.0           PROTEIN TOTAL     8.0           Protein, UA       Negative  Comment: Recommend a 24 hour urine protein or a urine protein/creatinine ratio if globulin induced proteinuria is clinically suspected.         PT     11.5           PV peak gradient   4             PV Peak D Nilesh   0.30             PV Peak S Nilesh   0.66             PV PEAK VELOCITY   1.01             Pulm vein S/D ratio   2.20             PW   1.0             RA Major Axis   5.40             Est. RA pres   3             RA Width   4.5             RBC     3.58           RDW     14.1           RV S'   14.31             RV  TB RVSP   5             RV/LV Ratio   0.74             RVDD   3.46             RV- mcdermott basal diam   3.5             Sinus   3.0             Sodium     139           Spec Grav UA       1.010         STJ   2.4             TAPSE   2.0             TDI SEPTAL   0.05             TDI LATERAL   0.09             Triscuspid Valve Regurgitation Peak Gradient   22             TR Max Nilesh   2.4             Troponin I     <0.006  Comment: The reference interval for Troponin I represents the 99th percentile cutoff for our facility and is consistent with 3rd generation assay performance.     <0.006  Comment: The reference interval for Troponin I represents the 99th percentile cutoff for our facility and is consistent with 3rd generation assay performance.       TV resting pulmonary artery pressure   26             Urobilinogen, UA       Negative         WBC     10.05           ZLVIDD   -2.26             ZLVIDS   -0.48                                    Significant Imaging:   Images were personally reviewed and interpreted:   CTA head and neck pending    MRI brain without contrast:  No acute infarcts    Assessment and Plan:   60 y.o. year old female with past medical history of hypertension, thyroid disorder, and GERD presented to the ED yesterday with sudden onset of dizziness yesterday morning.  She has a associated chest discomfort as well as nausea.  Not associated with focal neurological deficits.  Less likely to be vascular etiology but however can not rule out posterior circulation TIA.  Imaging negative for acute infarct.  Vessel imaging pending at this time.    Plan:   -CTA head and neck to evaluate for vertebrobasilar insufficiency  - after loading with ASA 325mg and Plavix 300mg. Daily aspirin 81 mg /  clopidogrel 75 mg x 21 days followed by ASA 81mg OD monotherapy therafter  - continue on Lipitor 40 mg daily.  -Permissive HTN x 24 hrs post index event / long term < 140/90   -Goal Parameters for TIA/stroke: LDL<70 mg/dl,  HbA1C: <7.0 %,  SBP<130/80 (discussed risk factor optimization in order to reduce the risk of future events with help of PCP)  -PT/OT evaluation and therapy goals per their recommendations  -Diet and Exercise: Discussed aggressive lifestyle modification. Eat primarily plant-based foods, such as fruits and vegetables, whole grains, legumes (beans) and nuts. Discussed Mediterranean diet. Daily exercise (150 minutes per week).  -Provided education regarding future stroke identification: I went over the usual stroke warning signs and symptoms, and the need to activate EMS (call 911) as soon as the symptoms present including-sudden onset numbness or weakness of the face, arm, or leg; especially on one side of the body; sudden confusion, trouble speaking, or understanding; sudden trouble seeing in one or both eyes; sudden trouble walking; dizziness; loss of coordination.  - follow up with PCP within 2-4 weeks.    Thank you for your consult. I will sign off. Please contact us if you have any additional questions.    Time spent on this encounter: 35 minutes. This includes face to face time and non-face to face time preparing to see the patient (eg, review of tests), obtaining and/or reviewing separately obtained history, documenting clinical information in the electronic or other health record, independently interpreting results and communicating results to the patient/family/caregiver, or care coordinator.       Amelia Peoples MD  Neurology  [facility]        [1]   Current Facility-Administered Medications   Medication Dose Route Frequency Provider Last Rate Last Admin    aluminum-magnesium hydroxide-simethicone 200-200-20 mg/5 mL suspension 30 mL  30 mL Oral Q6H Rolando Alonso Jr., NP        aspirin EC tablet 81 mg  81 mg Oral Daily Jorge Noble, NP   81 mg at 06/26/25 1109    atorvastatin tablet 40 mg  40 mg Oral Daily RealSmitaa SElbert, NP   40 mg at 06/26/25 1109    bisacodyL suppository 10  mg  10 mg Rectal Daily PRN Jorge Noble, NP        clopidogreL tablet 75 mg  75 mg Oral Daily Jorge Noble, NP   75 mg at 06/26/25 1109    electrolytes-dextrose (Pedialyte) oral solution 200 mL  200 mL Oral Q4H Jorge Noble, NP   200 mL at 06/26/25 1115    enoxaparin injection 40 mg  40 mg Subcutaneous Q24H (prophylaxis, 1700) Jorge Noble, REBECA        labetaloL injection 10 mg  10 mg Intravenous Q15 Min PRN Jorge Noble, NP        ondansetron injection 4 mg  4 mg Intravenous Q6H PRN Jorge Noble, NP        pantoprazole injection 40 mg  40 mg Intravenous Daily Rolando Alonso Jr., NP        polyethylene glycol packet 17 g  17 g Oral Daily Rolando Alonso Jr., NP        sodium chloride 0.9% flush 10 mL  10 mL Intravenous PRN Jorge Noble, NP

## 2025-06-26 NOTE — HPI
Patient is a pleasant 60-year-old lady.  Patient of Dr. Pena.  Came in with substernal chest pressure and pain associated with GI like symptoms.  She states she has had good for a long time.  She attributed her chest pain to GERD initially, but however started having chest tightness and heaviness and hence decided to come to the hospital.  Currently chest pain-free.  Also complains of some dizziness.  2023:  The left ventricle is normal in size with mild concentric hypertrophy and mildly decreased systolic function.  The estimated ejection fraction is 45%.  There is mild left ventricular global hypokinesis.  Grade I left ventricular diastolic dysfunction.  Normal right ventricular size with normal right ventricular systolic function.  The estimated PA systolic pressure is 21 mmHg.  The stress echo portion of this study is negative for myocardial ischemia.  The ECG portion of this study was positive for myocardial ischemia. 1 mm of horizontal sloping ST depression was noted during stress.  The patient exercised for 5 minutes and 17 seconds on a Davion protocol, corresponding to a functional capacity of 7 METS, achieving a peak heart rate of 151 bpm, which is 97% of the age predicted maximum heart rate. The patient reported no symptoms during stress. The patient experienced no angina during the stress test. The blood pressure response to stress was normal. The patient's exercise capacity was below average.       Results for orders placed or performed during the hospital encounter of 06/25/25   EKG 12-lead    Collection Time: 06/25/25 10:26 AM   Result Value Ref Range    QRS Duration 92 ms    OHS QTC Calculation 465 ms    Narrative    Test Reason : R07.9,    Vent. Rate :  68 BPM     Atrial Rate :  68 BPM     P-R Int : 184 ms          QRS Dur :  92 ms      QT Int : 438 ms       P-R-T Axes :  35 -14   2 degrees    QTcB Int : 465 ms    Normal sinus rhythm  Moderate voltage criteria for LVH, may be normal variant ( R in  aVL ,  [place] product )  Nonspecific T wave abnormality  Prolonged QT  Abnormal ECG  When compared with ECG of 14-Apr-2024 10:33,  No significant change was found  Confirmed by Buster Currie ([id number]) on 6/25/2025 10:30:30 PM    Referred By:            Confirmed By: Buster Currie                         Principal Problem:Dizziness     Chief Complaint:        Chief Complaint   Patient presents with    Chest Pain       Patient presents w/ a c/o of midsternum chest pain associated with nausea since last night. Report feels like GERD, and took her meds w/o any relief.    Dizziness       Pt presents w/ a c/o of dizziness worse with head movement. Denies any unilateral weakness, vision complaints, or swallowing complaints.          HPI:  60 year old female with with past medical history of hypertension, thyroid disorder, and GERD present to the ED with complaint of    room-spinning dizziness that began at approximately 0600 upon waking this morning. Patient reports the dizziness is exacerbated with head movement and positional changes. She reports feeling dizzy upon arrival to the ED but states it has currently eased while she is at rest. Patient reports associated nausea and mid-sternal chest pain that feels similar to GERD since yesterday evening.  Patient states burning chest discomfort started around 6:00 p.m. last night.  She has been unable to sleep or get any good rest do the chest discomfort  Patient attempted treatment with Zofran without improvement, last dose this morning. Patient reports symptoms worsened overnight and she is still feeling nauseated.  Code stroke called on arrival.  Vascular neurology recommends aspirin, Plavix, admit for MRI.  Chest discomfort/burning decreased after 1 nitro. Patient transferred to [facility] for further work up. Labs: unremarkable. CT head: No definite acute intracranial findings by noncontrast CT.

## 2025-06-26 NOTE — CONSULTS
[facility] - Telemetry  Cardiology  Consult Note    Patient Name: Niharika Gutierrez  MRN: [id number]  Admission Date: 6/25/2025  Hospital Length of Stay: 0 days  Code Status: Full Code   Attending Provider: Jacob Page MD   Consulting Provider: Buster Currie MD  Primary Care Physician: Janae Schwartz MD  Principal Problem:Dizziness    Patient information was obtained from patient and ER records.     Inpatient consult to Cardiology  Consult performed by: Buster Currie MD  Consult ordered by: Jorge Noble NP        Subjective:     Chief Complaint:  cp     HPI:   Patient is a pleasant 60-year-old lady.  Patient of Dr. Pena.  Came in with substernal chest pressure and pain associated with GI like symptoms.  She states she has had good for a long time.  She attributed her chest pain to GERD initially, but however started having chest tightness and heaviness and hence decided to come to the hospital.  Currently chest pain-free.  Also complains of some dizziness.  2023:  The left ventricle is normal in size with mild concentric hypertrophy and mildly decreased systolic function.  The estimated ejection fraction is 45%.  There is mild left ventricular global hypokinesis.  Grade I left ventricular diastolic dysfunction.  Normal right ventricular size with normal right ventricular systolic function.  The estimated PA systolic pressure is 21 mmHg.  The stress echo portion of this study is negative for myocardial ischemia.  The ECG portion of this study was positive for myocardial ischemia. 1 mm of horizontal sloping ST depression was noted during stress.  The patient exercised for 5 minutes and 17 seconds on a Davion protocol, corresponding to a functional capacity of 7 METS, achieving a peak heart rate of 151 bpm, which is 97% of the age predicted maximum heart rate. The patient reported no symptoms during stress. The patient experienced no angina during the stress test. The blood pressure response to  stress was normal. The patient's exercise capacity was below average.       Results for orders placed or performed during the hospital encounter of 06/25/25   EKG 12-lead    Collection Time: 06/25/25 10:26 AM   Result Value Ref Range    QRS Duration 92 ms    [facility]HS QTC Calculation 465 ms    Narrative    Test Reason : R07.9,    Vent. Rate :  68 BPM     Atrial Rate :  68 BPM     P-R Int : 184 ms          QRS Dur :  92 ms      QT Int : 438 ms       P-R-T Axes :  35 -14   2 degrees    QTcB Int : 465 ms    Normal sinus rhythm  Moderate voltage criteria for LVH, may be normal variant ( R in aVL ,  Trego product )  Nonspecific T wave abnormality  Prolonged QT  Abnormal ECG  When compared with ECG of 14-Apr-2024 10:33,  No significant change was found  Confirmed by Buster Currie ([id number]) on 6/25/2025 10:30:30 PM    Referred By:            Confirmed By: Buster Currie                         Principal Problem:Dizziness     Chief Complaint:        Chief Complaint   Patient presents with    Chest Pain       Patient presents w/ a c/o of midsternum chest pain associated with nausea since last night. Report feels like GERD, and took her meds w/o any relief.    Dizziness       Pt presents w/ a c/o of dizziness worse with head movement. Denies any unilateral weakness, vision complaints, or swallowing complaints.          HPI:  60 year old female with with past medical history of hypertension, thyroid disorder, and GERD present to the ED with complaint of    room-spinning dizziness that began at approximately 0600 upon waking this morning. Patient reports the dizziness is exacerbated with head movement and positional changes. She reports feeling dizzy upon arrival to the ED but states it has currently eased while she is at rest. Patient reports associated nausea and mid-sternal chest pain that feels similar to GERD since yesterday evening.  Patient states burning chest discomfort started around 6:00 p.m. last night.  She has been unable to  sleep or get any good rest do the chest discomfort  Patient attempted treatment with Zofran without improvement, last dose this morning. Patient reports symptoms worsened overnight and she is still feeling nauseated.  Code stroke called on arrival.  Vascular neurology recommends aspirin, Plavix, admit for MRI.  Chest discomfort/burning decreased after 1 nitro. Patient transferred to [facility] for further work up. Labs: unremarkable. CT head: No definite acute intracranial findings by noncontrast CT.     Past Medical History:   Diagnosis Date    GERD (gastroesophageal reflux disease)     Hypertension     Hypokalemic syndrome     Thyroid disease     Thyroid nodule     Vitamin D deficiency disease        Past Surgical History:   Procedure Laterality Date    BREAST BIOPSY       SECTION  2004    X 1    CHOLECYSTECTOMY  2008    HYSTERECTOMY      KNEE SURGERY Left 2023    knee replacement    LAPAROSCOPIC HYSTERECTOMY  2010    LSO       Review of patient's allergies indicates:  No Known Allergies    No current facility-administered medications on file prior to encounter.     Current Outpatient Medications on File Prior to Encounter   Medication Sig    amLODIPine (NORVASC) 10 MG tablet Take 1 tablet (10 mg total) by mouth once daily.    ergocalciferol (ERGOCALCIFEROL) 50,000 unit Cap TAKE 1 CAPSULE BY MOUTH EVERY 7 DAYS    famotidine (PEPCID) 40 MG tablet Take 1 tablet (40 mg total) by mouth once daily.    ferrous sulfate (FEOSOL) 325 mg (65 mg iron) Tab tablet Take 1 tablet (325 mg total) by mouth every other day.    hydrocortisone (ANUSOL-HC) 2.5 % rectal cream Place rectally 2 (two) times daily.    potassium bicarbonate (KLOR-CON/EF) disintegrating tablet Take 1 tablet (25 mEq total) by mouth 2 (two) times a day. (Patient taking differently: Take 25 mEq by mouth 2 (two) times a day. Takes as needed)    spironolactone (ALDACTONE) 50 MG tablet TAKE 1 TABLET BY MOUTH EVERY DAY. INCREASE TO 2 TABLETS BY  MOUTH EVERY DAY AS TOLERATED (Patient taking differently: Take 50 mg by mouth 2 (two) times daily. TAKE 1 TABLET BY MOUTH bid)    acetaminophen (TYLENOL) 650 MG TbSR Take 650 mg by mouth every 8 (eight) hours.    ciclopirox (PENLAC) 8 % Soln Apply topically nightly.    cyclobenzaprine (FLEXERIL) 10 MG tablet Take 1 tablet (10 mg total) by mouth 3 (three) times daily as needed for Muscle spasms.    esomeprazole (NEXIUM) 40 MG capsule Take 1 capsule (40 mg total) by mouth before breakfast.    estradioL (ESTRACE) 0.01 % (0.1 mg/gram) vaginal cream Place 2 g vaginally twice a week.    EYSUVIS 0.25 % DrpS SMARTSI-2 Drop(s) In Eye(s) 1-4 Times Daily    LIDOcaine (LMX) 4 % cream Apply topically 4 (four) times daily as needed (rectal pain). Apply to external hemorrhoids prn     Family History       Problem Relation (Age of Onset)    Breast cancer Maternal Aunt, Maternal Aunt, Paternal Aunt, Paternal Aunt    Diabetes Mother    Heart disease Father    No Known Problems Sister, Brother, Brother, Maternal Uncle, Paternal Uncle, Maternal Grandmother, Maternal Grandfather, Paternal Grandmother, Paternal Grandfather          Tobacco Use    Smoking status: Never    Smokeless tobacco: Never   Substance and Sexual Activity    Alcohol use: No    Drug use: No    Sexual activity: Yes     Partners: Male     Birth control/protection: Surgical     Review of Systems   Constitutional: Negative.   HENT: Negative.     Eyes: Negative.    Cardiovascular:  Positive for chest pain.   Respiratory: Negative.     Endocrine: Negative.    Hematologic/Lymphatic: Negative.    Skin: Negative.    Musculoskeletal: Negative.    Gastrointestinal:  Positive for abdominal pain and heartburn.   Genitourinary: Negative.    Neurological: Negative.    Psychiatric/Behavioral: Negative.     Allergic/Immunologic: Negative.      Objective:     Vital Signs (Most Recent):  Temp: 98.3 °F (36.8 °C) (25)  Pulse: 71 (25)  Resp: 18 (25  1957)  BP: (!) 144/70 (06/25/25 2327)  SpO2: 100 % (06/25/25 2327) Vital Signs (24h Range):  Temp:  [97.7 °F (36.5 °C)-98.3 °F (36.8 °C)] 98.3 °F (36.8 °C)  Pulse:  [57-79] 71  Resp:  [17-20] 18  SpO2:  [98 %-100 %] 100 %  BP: (110-154)/(62-80) 144/70     Weight: 96.2 kg (212 lb)  Body mass index is 36.39 kg/m².    SpO2: 100 %         Intake/Output Summary (Last 24 hours) at 6/25/2025 2343  Last data filed at 6/25/2025 2254  Gross per 24 hour   Intake 560 ml   Output --   Net 560 ml       Lines/Drains/Airways       Peripheral Intravenous Line  Duration             Peripheral IV 06/25/25 1043 Left Hand <1 day                     Physical Exam  Constitutional:       Appearance: Normal appearance. She is well-developed.   HENT:      Head: Normocephalic.   Eyes:      Pupils: Pupils are equal, round, and reactive to light.   Cardiovascular:      Rate and Rhythm: Normal rate and regular rhythm.   Pulmonary:      Effort: Pulmonary effort is normal.      Breath sounds: Normal breath sounds.   Abdominal:      General: Bowel sounds are normal.      Palpations: Abdomen is soft.      Tenderness: There is no abdominal tenderness.   Musculoskeletal:         General: Normal range of motion.      Cervical back: Normal range of motion and neck supple.   Skin:     General: Skin is warm.   Neurological:      Mental Status: She is alert and oriented to person, place, and time.          Significant Labs:     Laboratory:  CBC:  Recent Labs   Lab 06/19/24  0953 06/24/25  0851 06/25/25  1835   WBC 11.55 9.32 11.86   Hemoglobin 10.6 L  --   --    HGB  --  10.6 L 10.9 L   Hematocrit 33.7 L  --   --    HCT  --  33.7 L 34.1 L   Platelet Count  --  349 347   Platelets 343  --   --        CHEMISTRIES:  Recent Labs   Lab 02/27/25  0830 06/24/25  0851 06/25/25  1835   Glucose 95 81 88   Sodium 139 139 136   Potassium 4.3 4.2 4.4   BUN 16 14 11   Creatinine 0.9 0.9 0.8   Calcium 9.1 9.4 9.5       CARDIAC BIOMARKERS:  Recent Labs   Lab  06/25/25  1835   Troponin-I <0.006       COAGS:  Recent Labs   Lab 09/29/23  1610 06/25/25  1046   INR 1.1 1.1       LIPIDS/LFTS:  Recent Labs   Lab 03/02/23  0856 09/29/23  1610 10/03/23  0830 11/30/23  2345 02/27/25  0830 06/24/25  0851 06/25/25  1835   Cholesterol Total  --   --   --   --   --  129  --    Cholesterol 136  --  138  --   --   --   --    Triglycerides 61  --  93  --   --   --   --    Triglyceride  --   --   --   --   --  84  --    HDL 48  --  46  --   --   --   --    HDL Cholesterol  --   --   --   --   --  44  --    LDL Cholesterol 75.8  --  73.4  --   --  68.2  --    Non-HDL Cholesterol 88  --  92  --   --   --   --    Non HDL Cholesterol  --   --   --   --   --  85  --    AST 12   < > 15   < > 15 13 13   ALT 9 L   < > 13   < > 8 L 10 10    < > = values in this interval not displayed.       Hemoglobin A1C   Date Value Ref Range Status   06/19/2024 5.0 4.0 - 5.6 % Final     Comment:     ADA Screening Guidelines:  5.7-6.4%  Consistent with prediabetes  >or=6.5%  Consistent with diabetes    High levels of fetal hemoglobin interfere with the HbA1C  assay. Heterozygous hemoglobin variants (HbS, HgC, etc)do  not significantly interfere with this assay.   However, presence of multiple variants may affect accuracy.     09/29/2023 5.3 4.0 - 5.6 % Final     Comment:     ADA Screening Guidelines:  5.7-6.4%  Consistent with prediabetes  >or=6.5%  Consistent with diabetes    High levels of fetal hemoglobin interfere with the HbA1C  assay. Heterozygous hemoglobin variants (HbS, HgC, etc)do  not significantly interfere with this assay.   However, presence of multiple variants may affect accuracy.     03/02/2023 5.2 4.0 - 5.6 % Final     Comment:     ADA Screening Guidelines:  5.7-6.4%  Consistent with prediabetes  >or=6.5%  Consistent with diabetes    High levels of fetal hemoglobin interfere with the HbA1C  assay. Heterozygous hemoglobin variants (HbS, HgC, etc)do  not significantly interfere with this assay.  "  However, presence of multiple variants may affect accuracy.       Hemoglobin A1c   Date Value Ref Range Status   06/24/2025 5.3 4.0 - 5.6 % Final     Comment:     ADA Screening Guidelines:  5.7-6.4%  Consistent with prediabetes  >=6.5%  Consistent with diabetes    High levels of fetal hemoglobin interfere with the HbA1C  assay. Heterozygous hemoglobin variants (HbS, HgC, etc)do  not significantly interfere with this assay.   However, presence of multiple variants may affect accuracy.       TSH  Recent Labs   Lab 09/29/23  1610 06/19/24  0953 06/24/25  0851   TSH 0.985 1.628 1.276       The ASCVD Risk score (Justino DK, et al., 2019) failed to calculate for the following reasons:    The valid total cholesterol range is 130 to 320 mg/dL       BNP    No results found for: "BNP"              Medications Discontinued During This Encounter   Medication Reason    nitroGLYCERIN SL tablet 0.4 mg          DATA:       Assessment and Plan:     * Dizziness  Monitor on tele monitoring.  Check orthostatics.    Atypical chest pain  Check stress test and echocardiogram.    Severe obesity (BMI 35.0-39.9) with comorbidity  Body mass index is 36.39 kg/m². Morbid obesity complicates all aspects of disease management from diagnostic modalities to treatment. Weight loss encouraged and health benefits explained to patient.         Gastroesophageal reflux disease  Management per primary        VTE Risk Mitigation (From admission, onward)           Ordered     enoxaparin injection 40 mg  Every 24 hours         06/25/25 1922     IP VTE HIGH RISK PATIENT  Once         06/25/25 1710     Place sequential compression device  Until discontinued         06/25/25 1710                    Thank you for your consult. I will follow-up with patient. Please contact us if you have any additional questions.    Buster Currie MD  Cardiology   [facility] - [place] - Telemetry      "

## 2025-06-26 NOTE — NURSING
PER handoff received from VINI Deleon. Pt resting in bed quietly. NAD noted. No c/o pain. Fall and safety precautions maintained. Bed alarm activated and audible.. Bed locked in lowest position, with side rails up x2. Call bell and personal items within reach

## 2025-06-26 NOTE — PT/OT/SLP EVAL
Physical Therapy Evaluation and Discharge Note    Patient Name:  Niharika Gutierrez   MRN:  [id number]    Recommendations:     Discharge Recommendations: No Therapy Indicated  Discharge Equipment Recommendations: none   Barriers to discharge: None    Assessment:     Niharika Gutierrez is a 60 y.o. female admitted with a medical diagnosis of Dizziness.  At this time, patient is functioning at their prior level of function and does not require further acute PT services.     Recent Surgery: * No surgery found *      Plan:     During this hospitalization, patient does not require further acute PT services.  Please re-consult if situation changes.      Subjective     Chief Complaint: Pt reported that she never really did have CP, it was more like stiffness and heaviness in her chest.  It has now resolved.    Patient/Family Comments/goals: Pt agreeable to ambulation in the hallway.   Pain/Comfort:  Pain Rating 1: 0/10      Living Environment:  Pt lives with spouse and son in a [facility] with no concerns at entry.   Prior to admission, patients level of function was independent, driving, and working part-time.  Pt mostly on the computer at home.  Pt has a stationary bike and pilate machine, she reports needing to start exercising again.  Equipment used at home: none.  Upon discharge, patient will have assistance from family.    Objective:     Patient found standing at the sink with OT with peripheral IV, telemetry upon PT entry to room.    General Precautions: Standard  Orthopedic Precautions:N/A   Braces: N/A  Respiratory Status: Room air    Exams:  Cognitive Exam:  Patient was able to follow multiple commands.   Gross Motor Coordination:  WFL  Postural Exam:  Patient presented with the following abnormalities:    -       No postural abnormalities identified  Sensation:    -       Intact  light/touch BLE  Skin Integrity/Edema:      -       Skin integrity: Visible skin intact  -       Edema: None noted BLE  BLE ROM:  WFL  BLE Strength: WFL    Functional Mobility:  Transfers:     Sit to Stand: independence with no AD  Toilet Transfer: independence with  no AD  using  Step Transfer  Gait: Pt ambulated ~500 ft independently in the hallway, no gait deviations.  Pt reported no dizziness during ambulation.  Pt declined further ambulation.    Balance: Pt with good dynamic standing.    AM-PAC 6 CLICK MOBILITY  Total Score:24       Treatment and Education:  Pt encouraged daily ambulation for exercises, verbalized good understanding.       Patient left sitting edge of bed with all lines intact and call button in reach.  Tray table close by.     GOALS:   Multidisciplinary Problems       Physical Therapy Goals       Not on file              Multidisciplinary Problems (Resolved)          Problem: Physical Therapy    Goal Priority Disciplines Outcome Interventions   Physical Therapy Goal   (Resolved)     PT, PT/OT Met                        History:     Past Medical History:   Diagnosis Date    GERD (gastroesophageal reflux disease)     Hypertension     Hypokalemic syndrome     Thyroid disease     Thyroid nodule     Vitamin D deficiency disease        Past Surgical History:   Procedure Laterality Date    BREAST BIOPSY       SECTION  2004    X 1    CHOLECYSTECTOMY  2008    HYSTERECTOMY      KNEE SURGERY Left 2023    knee replacement    LAPAROSCOPIC HYSTERECTOMY  2010    LSO       Time Tracking:     PT Received On: 25  PT Start Time: 1122     PT Stop Time: 1130  PT Total Time (min): 8 min     Billable Minutes: Evaluation 8 min      2025

## 2025-06-26 NOTE — ASSESSMENT & PLAN NOTE
Consulted neuro  MRI brain pending  Echo pending  PT/OT/Speech   Recs: Asa and plavix, started on statin  NIH scale per unit protocol  Tele monitoring   Permissive HTN   Check orthostatics blood pressure

## 2025-06-26 NOTE — PLAN OF CARE
Problem: Physical Therapy  Goal: Physical Therapy Goal  Outcome: Met     Pt is independent with ambulation in the hallway.  No therapy indicated.

## 2025-06-26 NOTE — PT/OT/SLP EVAL
Speech Language Pathology Evaluation  Cognitive/Bedside Swallow    Patient Name:  Niharika Gutierrez   MRN:  [id number]  Admitting Diagnosis: Dizziness    Recommendations:                  General Recommendations:  Follow-up not indicated  Diet recommendations:  Regular Diet - IDDSI Level 7, Thin   Aspiration Precautions: Alternating bites/sips, HOB to 90 degrees, Meds whole 1 at a time, and Monitor for s/s of aspiration   General Precautions: Standard    Communication strategies:  none  Discharge recommendations:  No Therapy Indicated   Barriers to Discharge:  None    Assessment:     Niharika Gutierrez is a 60 y.o. female diagnosed with dizziness. She presents with adequate swallowing function for continuation of LRD and thin liquids. Pt also with speech intelligibility and cognitive-linguistic function at baseline. No further ST services warranted.    History:     Past Medical History:   Diagnosis Date    GERD (gastroesophageal reflux disease)     Hypertension     Hypokalemic syndrome     Thyroid disease     Thyroid nodule     Vitamin D deficiency disease        Past Surgical History:   Procedure Laterality Date    BREAST BIOPSY       SECTION  2004    X 1    CHOLECYSTECTOMY  2008    HYSTERECTOMY      KNEE SURGERY Left 2023    knee replacement    LAPAROSCOPIC HYSTERECTOMY  2010    LSO     Chest X-Rays: 25  COMPARISON:  2024     FINDINGS:  Heart size normal.  The lungs are clear.  No pleural effusion      Subjective     Pt seated at EOB, in pleasant mood. She was alert and indep oriented x4.     Pain/Comfort:  Pain Rating 1: 0/10    Respiratory Status: Room air    Objective:     Cognitive Status:    Arousal/Alertness Appropriate response to stimuli  Attention No obvious deficits observed -WFL  Orientation Oriented x4  Memory Immediate Recall -WFL and Delayed-WFL      Receptive Language:   Comprehension:      WFL    Pragmatics:    maintenance -WFL, turn taking -WFL, topic maintenance  -WFL, and Eye contact -WFL    Expressive Language:  Verbal:    Conversational speech -WFL  Nonverbal:   Gestures -WFL, Facial Expression -WFL, and Mouth -WFL      Motor Speech:  WFL    Voice:   WFL    Visual-Spatial:  WFL    Oral Musculature Evaluation  Oral Musculature: WFL  Dentition: present and adequate  Secretion Management: adequate  Mucosal Quality: adequate  Mandibular Strength and Mobility: WFL  Oral Labial Strength and Mobility: WFL  Lingual Strength and Mobility: WFL  Velar Elevation: WFL  Buccal Strength and Mobility: WFL  Volitional Cough: WFL  Volitional Swallow: WFL  Voice Prior to PO Intake: Clear    Bedside Swallow Eval:   Consistencies Assessed:  Thin liquids -3oz via cup edge and straw sip   Puree -3tsp   Soft solids -x2     Oral Phase:   WFL    Pharyngeal Phase:   no overt clinical signs/symptoms of aspiration  no overt clinical signs/symptoms of pharyngeal dysphagia    Compensatory Strategies  None        Goals:   Multidisciplinary Problems       SLP Goals          Problem: SLP    Goal Priority Disciplines Outcome   SLP Goal     SLP    Description: 1. Pt will tolerate LRD and thin liquids w/o overt s/s of aspiration. -MET 6/26/2025                       Plan:     Patient to be seen:      Plan of Care expires:     Plan of Care reviewed with:  patient   SLP Follow-Up:  No       Time Tracking:     SLP Treatment Date:   06/26/25  Speech Start Time:  1040  Speech Stop Time:  1100     Speech Total Time (min):  20 min    Billable Minutes: Eval of speech sound production with comprehension 10min   and Eval Swallow and Oral Function 10min    06/26/2025

## 2025-06-26 NOTE — PLAN OF CARE
Problem: SLP  Goal: SLP Goal  Description: 1. Pt will tolerate LRD and thin liquids w/o overt s/s of aspiration. -MET 6/26/2025  Outcome: Met     No further ST warranted at this time.

## 2025-06-26 NOTE — SUBJECTIVE & OBJECTIVE
Past Medical History:   Diagnosis Date    GERD (gastroesophageal reflux disease)     Hypertension     Hypokalemic syndrome     Thyroid disease     Thyroid nodule     Vitamin D deficiency disease        Past Surgical History:   Procedure Laterality Date    BREAST BIOPSY       SECTION  2004    X 1    CHOLECYSTECTOMY  2008    HYSTERECTOMY      KNEE SURGERY Left 2023    knee replacement    LAPAROSCOPIC HYSTERECTOMY  2010    LSO       Review of patient's allergies indicates:  No Known Allergies    No current facility-administered medications on file prior to encounter.     Current Outpatient Medications on File Prior to Encounter   Medication Sig    amLODIPine (NORVASC) 10 MG tablet Take 1 tablet (10 mg total) by mouth once daily.    ergocalciferol (ERGOCALCIFEROL) 50,000 unit Cap TAKE 1 CAPSULE BY MOUTH EVERY 7 DAYS    famotidine (PEPCID) 40 MG tablet Take 1 tablet (40 mg total) by mouth once daily.    ferrous sulfate (FEOSOL) 325 mg (65 mg iron) Tab tablet Take 1 tablet (325 mg total) by mouth every other day.    hydrocortisone (ANUSOL-HC) 2.5 % rectal cream Place rectally 2 (two) times daily.    potassium bicarbonate (KLOR-CON/EF) disintegrating tablet Take 1 tablet (25 mEq total) by mouth 2 (two) times a day. (Patient taking differently: Take 25 mEq by mouth 2 (two) times a day. Takes as needed)    spironolactone (ALDACTONE) 50 MG tablet TAKE 1 TABLET BY MOUTH EVERY DAY. INCREASE TO 2 TABLETS BY MOUTH EVERY DAY AS TOLERATED (Patient taking differently: Take 50 mg by mouth 2 (two) times daily. TAKE 1 TABLET BY MOUTH bid)    acetaminophen (TYLENOL) 650 MG TbSR Take 650 mg by mouth every 8 (eight) hours.    ciclopirox (PENLAC) 8 % Soln Apply topically nightly.    cyclobenzaprine (FLEXERIL) 10 MG tablet Take 1 tablet (10 mg total) by mouth 3 (three) times daily as needed for Muscle spasms.    esomeprazole (NEXIUM) 40 MG capsule Take 1 capsule (40 mg total) by mouth before breakfast.    estradioL (ESTRACE)  0.01 % (0.1 mg/gram) vaginal cream Place 2 g vaginally twice a week.    EYSUVIS 0.25 % DrpS SMARTSI-2 Drop(s) In Eye(s) 1-4 Times Daily    LIDOcaine (LMX) 4 % cream Apply topically 4 (four) times daily as needed (rectal pain). Apply to external hemorrhoids prn     Family History       Problem Relation (Age of Onset)    Breast cancer Maternal Aunt, Maternal Aunt, Paternal Aunt, Paternal Aunt    Diabetes Mother    Heart disease Father    No Known Problems Sister, Brother, Brother, Maternal Uncle, Paternal Uncle, Maternal Grandmother, Maternal Grandfather, Paternal Grandmother, Paternal Grandfather          Tobacco Use    Smoking status: Never    Smokeless tobacco: Never   Substance and Sexual Activity    Alcohol use: No    Drug use: No    Sexual activity: Yes     Partners: Male     Birth control/protection: Surgical     Review of Systems   Constitutional: Negative.   HENT: Negative.     Eyes: Negative.    Cardiovascular:  Positive for chest pain.   Respiratory: Negative.     Endocrine: Negative.    Hematologic/Lymphatic: Negative.    Skin: Negative.    Musculoskeletal: Negative.    Gastrointestinal:  Positive for abdominal pain and heartburn.   Genitourinary: Negative.    Neurological: Negative.    Psychiatric/Behavioral: Negative.     Allergic/Immunologic: Negative.      Objective:     Vital Signs (Most Recent):  Temp: 98.3 °F (36.8 °C) (25)  Pulse: 71 (25)  Resp: 18 (25)  BP: (!) 144/70 (25)  SpO2: 100 % (25) Vital Signs (24h Range):  Temp:  [97.7 °F (36.5 °C)-98.3 °F (36.8 °C)] 98.3 °F (36.8 °C)  Pulse:  [57-79] 71  Resp:  [17-20] 18  SpO2:  [98 %-100 %] 100 %  BP: (110-154)/(62-80) 144/70     Weight: 96.2 kg (212 lb)  Body mass index is 36.39 kg/m².    SpO2: 100 %         Intake/Output Summary (Last 24 hours) at 2025 4900  Last data filed at 2025 0758  Gross per 24 hour   Intake 560 ml   Output --   Net 560 ml       Lines/Drains/Airways        Peripheral Intravenous Line  Duration             Peripheral IV 06/25/25 1043 Left Hand <1 day                     Physical Exam  Constitutional:       Appearance: Normal appearance. She is well-developed.   HENT:      Head: Normocephalic.   Eyes:      Pupils: Pupils are equal, round, and reactive to light.   Cardiovascular:      Rate and Rhythm: Normal rate and regular rhythm.   Pulmonary:      Effort: Pulmonary effort is normal.      Breath sounds: Normal breath sounds.   Abdominal:      General: Bowel sounds are normal.      Palpations: Abdomen is soft.      Tenderness: There is no abdominal tenderness.   Musculoskeletal:         General: Normal range of motion.      Cervical back: Normal range of motion and neck supple.   Skin:     General: Skin is warm.   Neurological:      Mental Status: She is alert and oriented to person, place, and time.          Significant Labs:     Laboratory:  CBC:  Recent Labs   Lab 06/19/24  0953 06/24/25  0851 06/25/25  1835   WBC 11.55 9.32 11.86   Hemoglobin 10.6 L  --   --    HGB  --  10.6 L 10.9 L   Hematocrit 33.7 L  --   --    HCT  --  33.7 L 34.1 L   Platelet Count  --  349 347   Platelets 343  --   --        CHEMISTRIES:  Recent Labs   Lab 02/27/25  0830 06/24/25  0851 06/25/25  1835   Glucose 95 81 88   Sodium 139 139 136   Potassium 4.3 4.2 4.4   BUN 16 14 11   Creatinine 0.9 0.9 0.8   Calcium 9.1 9.4 9.5       CARDIAC BIOMARKERS:  Recent Labs   Lab 06/25/25  1835   Troponin-I <0.006       COAGS:  Recent Labs   Lab 09/29/23  1610 06/25/25  1046   INR 1.1 1.1       LIPIDS/LFTS:  Recent Labs   Lab 03/02/23  0856 09/29/23  1610 10/03/23  0830 11/30/23  2345 02/27/25  0830 06/24/25  0851 06/25/25  1835   Cholesterol Total  --   --   --   --   --  129  --    Cholesterol 136  --  138  --   --   --   --    Triglycerides 61  --  93  --   --   --   --    Triglyceride  --   --   --   --   --  84  --    HDL 48  --  46  --   --   --   --    HDL Cholesterol  --   --   --   --   --  44   --    LDL Cholesterol 75.8  --  73.4  --   --  68.2  --    Non-HDL Cholesterol 88  --  92  --   --   --   --    Non HDL Cholesterol  --   --   --   --   --  85  --    AST 12   < > 15   < > 15 13 13   ALT 9 L   < > 13   < > 8 L 10 10    < > = values in this interval not displayed.       Hemoglobin A1C   Date Value Ref Range Status   06/19/2024 5.0 4.0 - 5.6 % Final     Comment:     ADA Screening Guidelines:  5.7-6.4%  Consistent with prediabetes  >or=6.5%  Consistent with diabetes    High levels of fetal hemoglobin interfere with the HbA1C  assay. Heterozygous hemoglobin variants (HbS, HgC, etc)do  not significantly interfere with this assay.   However, presence of multiple variants may affect accuracy.     09/29/2023 5.3 4.0 - 5.6 % Final     Comment:     ADA Screening Guidelines:  5.7-6.4%  Consistent with prediabetes  >or=6.5%  Consistent with diabetes    High levels of fetal hemoglobin interfere with the HbA1C  assay. Heterozygous hemoglobin variants (HbS, HgC, etc)do  not significantly interfere with this assay.   However, presence of multiple variants may affect accuracy.     03/02/2023 5.2 4.0 - 5.6 % Final     Comment:     ADA Screening Guidelines:  5.7-6.4%  Consistent with prediabetes  >or=6.5%  Consistent with diabetes    High levels of fetal hemoglobin interfere with the HbA1C  assay. Heterozygous hemoglobin variants (HbS, HgC, etc)do  not significantly interfere with this assay.   However, presence of multiple variants may affect accuracy.       Hemoglobin A1c   Date Value Ref Range Status   06/24/2025 5.3 4.0 - 5.6 % Final     Comment:     ADA Screening Guidelines:  5.7-6.4%  Consistent with prediabetes  >=6.5%  Consistent with diabetes    High levels of fetal hemoglobin interfere with the HbA1C  assay. Heterozygous hemoglobin variants (HbS, HgC, etc)do  not significantly interfere with this assay.   However, presence of multiple variants may affect accuracy.       TSH  Recent Labs   Lab 09/29/23  1616  "06/19/24  0953 06/24/25  0851   TSH 0.985 1.628 1.276       The ASCVD Risk score (Justino ABEBE, et al., 2019) failed to calculate for the following reasons:    The valid total cholesterol range is 130 to 320 mg/dL       BNP    No results found for: "BNP"              Medications Discontinued During This Encounter   Medication Reason    nitroGLYCERIN SL tablet 0.4 mg          DATA:       "

## 2025-06-26 NOTE — HOSPITAL COURSE
Mrs. Gutierrez was placed in observation for ACS rule out after presenting with chest pain.  Troponin negative and no STEMI on EKG.  She was seen and evaluated by Cardiology.  Echo shows preserved EF.  Nuclear stress test abnormal but with mostly fixed defect.  Recommendation for outpatient follow up with PET stress scan.  Symptoms resolved and she requests discharge.  PT/OT/SLP recommend no need for therapy.  MRI brain negative for acute abnormality.  CTA head and neck recommended, put patient did not want the necessary IV to perform this test.  She opted for carotid US which was negative for hemodynamically significant stenosis.  Instructed to follow up outpatient with clinic providers.  Order placed for PET stress.  Prescriptions sent to pharmacy.  ASA/statin/plavix.  All findings and plan discussed with patient, all questions answered, she verbalizes understanding and agreement.  Discharged home in stable condition.

## 2025-06-26 NOTE — NURSING
VIRTUAL NURSE: Pt arrived to unit. Permission received per patient to turn camera to view patient. VIP model explained; patient informed this VN will be working with bedside nurse and the rest of the care team. Plan of care reviewed with patient.  Educated patient on VTE, fall risk and fall risk precautions in place. Call light within reach, side rails up x2. Admission questions completed. Patient instucted to ask staff for assistance.  Initiated plan of care.

## 2025-06-26 NOTE — SUBJECTIVE & OBJECTIVE
Past Medical History:   Diagnosis Date    GERD (gastroesophageal reflux disease)     Hypertension     Hypokalemic syndrome     Thyroid disease     Thyroid nodule     Vitamin D deficiency disease        Past Surgical History:   Procedure Laterality Date    BREAST BIOPSY       SECTION  2004    X 1    CHOLECYSTECTOMY  2008    HYSTERECTOMY      KNEE SURGERY Left 2023    knee replacement    LAPAROSCOPIC HYSTERECTOMY  2010    LSO       Review of patient's allergies indicates:  No Known Allergies    No current facility-administered medications on file prior to encounter.     Current Outpatient Medications on File Prior to Encounter   Medication Sig    acetaminophen (TYLENOL) 650 MG TbSR Take 650 mg by mouth every 8 (eight) hours.    amLODIPine (NORVASC) 10 MG tablet Take 1 tablet (10 mg total) by mouth once daily.    ciclopirox (PENLAC) 8 % Soln Apply topically nightly.    cyclobenzaprine (FLEXERIL) 10 MG tablet Take 1 tablet (10 mg total) by mouth 3 (three) times daily as needed for Muscle spasms.    ergocalciferol (ERGOCALCIFEROL) 50,000 unit Cap TAKE 1 CAPSULE BY MOUTH EVERY 7 DAYS    esomeprazole (NEXIUM) 40 MG capsule Take 1 capsule (40 mg total) by mouth before breakfast.    estradioL (ESTRACE) 0.01 % (0.1 mg/gram) vaginal cream Place 2 g vaginally twice a week.    EYSUVIS 0.25 % DrpS SMARTSI-2 Drop(s) In Eye(s) 1-4 Times Daily    famotidine (PEPCID) 40 MG tablet Take 1 tablet (40 mg total) by mouth once daily.    ferrous sulfate (FEOSOL) 325 mg (65 mg iron) Tab tablet Take 1 tablet (325 mg total) by mouth every other day.    hydrocortisone (ANUSOL-HC) 2.5 % rectal cream Place rectally 2 (two) times daily.    LIDOcaine (LMX) 4 % cream Apply topically 4 (four) times daily as needed (rectal pain). Apply to external hemorrhoids prn    potassium bicarbonate (KLOR-CON/EF) disintegrating tablet Take 1 tablet (25 mEq total) by mouth 2 (two) times a day.    spironolactone (ALDACTONE) 50 MG tablet TAKE 1  TABLET BY MOUTH EVERY DAY. INCREASE TO 2 TABLETS BY MOUTH EVERY DAY AS TOLERATED     Family History       Problem Relation (Age of Onset)    Breast cancer Maternal Aunt, Maternal Aunt, Paternal Aunt, Paternal Aunt    Diabetes Mother    Heart disease Father    No Known Problems Sister, Brother, Brother, Maternal Uncle, Paternal Uncle, Maternal Grandmother, Maternal Grandfather, Paternal Grandmother, Paternal Grandfather          Tobacco Use    Smoking status: Never    Smokeless tobacco: Never   Substance and Sexual Activity    Alcohol use: No    Drug use: No    Sexual activity: Yes     Partners: Male     Birth control/protection: Surgical     Review of Systems   Cardiovascular:  Positive for chest pain.   Gastrointestinal:  Positive for nausea.   Neurological:  Positive for dizziness.   All other systems reviewed and are negative.    Objective:     Vital Signs (Most Recent):  Temp: 98.2 °F (36.8 °C) (06/25/25 1704)  Pulse: 76 (06/25/25 1824)  Resp: 20 (06/25/25 1704)  BP: (!) 147/72 (06/25/25 1704)  SpO2: 100 % (06/25/25 1704) Vital Signs (24h Range):  Temp:  [97.7 °F (36.5 °C)-98.2 °F (36.8 °C)] 98.2 °F (36.8 °C)  Pulse:  [57-79] 76  Resp:  [17-20] 20  SpO2:  [98 %-100 %] 100 %  BP: (110-154)/(62-80) 147/72     Weight: 96.2 kg (212 lb)  Body mass index is 36.39 kg/m².     Physical Exam  Constitutional:       General: She is not in acute distress.     Appearance: She is not ill-appearing.   HENT:      Head: Normocephalic and atraumatic.      Mouth/Throat:      Mouth: Mucous membranes are dry.   Eyes:      Extraocular Movements: Extraocular movements intact.   Cardiovascular:      Rate and Rhythm: Normal rate and regular rhythm.   Pulmonary:      Effort: Pulmonary effort is normal.      Breath sounds: Normal breath sounds.   Abdominal:      General: Bowel sounds are normal.      Palpations: Abdomen is soft.   Musculoskeletal:         General: Normal range of motion.   Skin:     General: Skin is warm and dry.    Neurological:      Mental Status: She is alert and oriented to person, place, and time. Mental status is at baseline.   Psychiatric:         Mood and Affect: Mood normal.         Behavior: Behavior normal.         Thought Content: Thought content normal.         Judgment: Judgment normal.                Significant Labs: All pertinent labs within the past 24 hours have been reviewed.    Significant Imaging: I have reviewed all pertinent imaging results/findings within the past 24 hours.

## 2025-06-26 NOTE — H&P
[facility]  History & Physical    Patient Name: Niharika Gutierrez  MRN: [id number]  Patient Class: OP- Observation  Admission Date: 6/25/2025  Attending Physician: Jacob Page MD   Primary Care Provider: Janae Schwartz MD         Patient information was obtained from patient and ER records.     Subjective:     Principal Problem:Dizziness    Chief Complaint:   Chief Complaint   Patient presents with    Chest Pain     Patient presents w/ a c/o of midsternum chest pain associated with nausea since last night. Report feels like GERD, and took her meds w/o any relief.    Dizziness     Pt presents w/ a c/o of dizziness worse with head movement. Denies any unilateral weakness, vision complaints, or swallowing complaints.         HPI:  60 year old female with with past medical history of hypertension, thyroid disorder, and GERD present to the ED with complaint of    room-spinning dizziness that began at approximately 0600 upon waking this morning. Patient reports the dizziness is exacerbated with head movement and positional changes. She reports feeling dizzy upon arrival to the ED but states it has currently eased while she is at rest. Patient reports associated nausea and mid-sternal chest pain that feels similar to GERD since yesterday evening.  Patient states burning chest discomfort started around 6:00 p.m. last night.  She has been unable to sleep or get any good rest do the chest discomfort  Patient attempted treatment with Zofran without improvement, last dose this morning. Patient reports symptoms worsened overnight and she is still feeling nauseated.  Code stroke called on arrival.  Vascular neurology recommends aspirin, Plavix, admit for MRI.  Chest discomfort/burning decreased after 1 nitro. Patient transferred to [facility] for further work up. Labs: unremarkable. CT head: No definite acute intracranial findings by noncontrast CT.     Past Medical History:    Diagnosis Date    GERD (gastroesophageal reflux disease)     Hypertension     Hypokalemic syndrome     Thyroid disease     Thyroid nodule     Vitamin D deficiency disease        Past Surgical History:   Procedure Laterality Date    BREAST BIOPSY       SECTION  2004    X 1    CHOLECYSTECTOMY  2008    HYSTERECTOMY      KNEE SURGERY Left 2023    knee replacement    LAPAROSCOPIC HYSTERECTOMY  2010    LSO       Review of patient's allergies indicates:  No Known Allergies    No current facility-administered medications on file prior to encounter.     Current Outpatient Medications on File Prior to Encounter   Medication Sig    acetaminophen (TYLENOL) 650 MG TbSR Take 650 mg by mouth every 8 (eight) hours.    amLODIPine (NORVASC) 10 MG tablet Take 1 tablet (10 mg total) by mouth once daily.    ciclopirox (PENLAC) 8 % Soln Apply topically nightly.    cyclobenzaprine (FLEXERIL) 10 MG tablet Take 1 tablet (10 mg total) by mouth 3 (three) times daily as needed for Muscle spasms.    ergocalciferol (ERGOCALCIFEROL) 50,000 unit Cap TAKE 1 CAPSULE BY MOUTH EVERY 7 DAYS    esomeprazole (NEXIUM) 40 MG capsule Take 1 capsule (40 mg total) by mouth before breakfast.    estradioL (ESTRACE) 0.01 % (0.1 mg/gram) vaginal cream Place 2 g vaginally twice a week.    EYSUVIS 0.25 % DrpS SMARTSI-2 Drop(s) In Eye(s) 1-4 Times Daily    famotidine (PEPCID) 40 MG tablet Take 1 tablet (40 mg total) by mouth once daily.    ferrous sulfate (FEOSOL) 325 mg (65 mg iron) Tab tablet Take 1 tablet (325 mg total) by mouth every other day.    hydrocortisone (ANUSOL-HC) 2.5 % rectal cream Place rectally 2 (two) times daily.    LIDOcaine (LMX) 4 % cream Apply topically 4 (four) times daily as needed (rectal pain). Apply to external hemorrhoids prn    potassium bicarbonate (KLOR-CON/EF) disintegrating tablet Take 1 tablet (25 mEq total) by mouth 2 (two) times a day.    spironolactone (ALDACTONE) 50 MG tablet TAKE 1 TABLET BY MOUTH EVERY DAY.  INCREASE TO 2 TABLETS BY MOUTH EVERY DAY AS TOLERATED     Family History       Problem Relation (Age of Onset)    Breast cancer Maternal Aunt, Maternal Aunt, Paternal Aunt, Paternal Aunt    Diabetes Mother    Heart disease Father    No Known Problems Sister, Brother, Brother, Maternal Uncle, Paternal Uncle, Maternal Grandmother, Maternal Grandfather, Paternal Grandmother, Paternal Grandfather          Tobacco Use    Smoking status: Never    Smokeless tobacco: Never   Substance and Sexual Activity    Alcohol use: No    Drug use: No    Sexual activity: Yes     Partners: Male     Birth control/protection: Surgical     Review of Systems   Cardiovascular:  Positive for chest pain.   Gastrointestinal:  Positive for nausea.   Neurological:  Positive for dizziness.   All other systems reviewed and are negative.    Objective:     Vital Signs (Most Recent):  Temp: 98.2 °F (36.8 °C) (06/25/25 1704)  Pulse: 76 (06/25/25 1824)  Resp: 20 (06/25/25 1704)  BP: (!) 147/72 (06/25/25 1704)  SpO2: 100 % (06/25/25 1704) Vital Signs (24h Range):  Temp:  [97.7 °F (36.5 °C)-98.2 °F (36.8 °C)] 98.2 °F (36.8 °C)  Pulse:  [57-79] 76  Resp:  [17-20] 20  SpO2:  [98 %-100 %] 100 %  BP: (110-154)/(62-80) 147/72     Weight: 96.2 kg (212 lb)  Body mass index is 36.39 kg/m².     Physical Exam  Constitutional:       General: She is not in acute distress.     Appearance: She is not ill-appearing.   HENT:      Head: Normocephalic and atraumatic.      Mouth/Throat:      Mouth: Mucous membranes are dry.   Eyes:      Extraocular Movements: Extraocular movements intact.   Cardiovascular:      Rate and Rhythm: Normal rate and regular rhythm.   Pulmonary:      Effort: Pulmonary effort is normal.      Breath sounds: Normal breath sounds.   Abdominal:      General: Bowel sounds are normal.      Palpations: Abdomen is soft.   Musculoskeletal:         General: Normal range of motion.   Skin:     General: Skin is warm and dry.   Neurological:      Mental Status:  She is alert and oriented to person, place, and time. Mental status is at baseline.   Psychiatric:         Mood and Affect: Mood normal.         Behavior: Behavior normal.         Thought Content: Thought content normal.         Judgment: Judgment normal.                Significant Labs: All pertinent labs within the past 24 hours have been reviewed.    Significant Imaging: I have reviewed all pertinent imaging results/findings within the past 24 hours.  Assessment/Plan:     Assessment & Plan  Dizziness  Consulted neuro  MRI brain pending  Echo pending  PT/OT/Speech   Recs: Asa and plavix, started on statin  NIH scale per unit protocol  Tele monitoring   Permissive HTN   Check orthostatics blood pressure  Essential hypertension  Patient's blood pressure range in the last 24 hours was: BP  Min: 110/62  Max: 154/70.The patient's inpatient anti-hypertensive regimen is listed below:  Current Antihypertensives  labetaloL injection 10 mg, Every 15 min PRN, Intravenous    Plan  Permissive HTN   Gastroesophageal reflux disease  PPI     Severe obesity (BMI 35.0-39.9) with comorbidity  Body mass index is 36.39 kg/m². Morbid obesity complicates all aspects of disease management from diagnostic modalities to treatment. Weight loss encouraged and health benefits explained to patient.       Atypical chest pain  Consulted cards  Trend troponin  Echo pending   Tele monitoring       VTE Risk Mitigation (From admission, onward)           Ordered     enoxaparin injection 40 mg  Every 24 hours         06/25/25 1922     IP VTE HIGH RISK PATIENT  Once         06/25/25 1710     Place sequential compression device  Until discontinued         06/25/25 1710                                     On 06/25/2025, patient should be placed in hospital observation services under my care in collaboration with Dr. Page.           Jorge Noble NP  Department of [facility]  [facility] [place] [facility]

## 2025-06-26 NOTE — PT/OT/SLP PROGRESS
Physical Therapy      Patient Name:  Niharika Gutierrez   MRN:  [id number]    Patient not seen at this time for PT eval secondary to Testing/imaging (xray/CT/MRI). Will follow-up.

## 2025-06-26 NOTE — PLAN OF CARE
No acute distress noted, patient free from falls or injury this shift.  Bed in low position, wheels locked, call light in reach for assistance, plan of care continued.      Problem: Adult Inpatient Plan of Care  Goal: Plan of Care Review  Outcome: Progressing     Problem: Stroke, Ischemic (Includes Transient Ischemic Attack)  Goal: Optimal Coping  Outcome: Progressing     Problem: Chest Pain  Goal: Resolution of Chest Pain Symptoms  Outcome: Progressing     Problem: Comorbidity Management  Goal: Blood Pressure in Desired Range  Outcome: Progressing     Problem: Fall Injury Risk  Goal: Absence of Fall and Fall-Related Injury  Outcome: Progressing

## 2025-06-26 NOTE — PT/OT/SLP EVAL
Occupational Therapy Evaluation     Name: Niharika Gutierrez  MRN: [id number]  Admitting Diagnosis: Dizziness  Recent Surgery: * No surgery found *      Recommendations:     Discharge Recommendations: No Therapy Indicated  Level of Assistance Recommended: none  Discharge Equipment Recommendations: none  Barriers to discharge: None    Assessment:     Niharika Gutierrez is a 60 y.o. female with a medical diagnosis of Dizziness. She presents with performance deficits affecting function including pain (and dizziness (resolved)).     Pt agreeable to OT treatment. Pt sitting EOB with good balance. Pt performed functional mobility independently with good standing balance noted. UB strength and ROM WNL. Good ax tolerance noted during session.       Plan:     Pt independent with ADLs, functional t/f, functional mobility using no AD. No OT services warranted at this time.     Subjective     Chief Complaint: Chest pain and dizziness (resolved)  Patient Comments/Goals: Return home at [facility]  Pain/Comfort:  Pain Rating 1: 0/10    Patients cultural, spiritual, Oriental orthodox conflicts given the current situation: no    Social History:  Living Environment: Patient lives with their spouse and child(jayce) in a single story home with number of outside stair(s): 0  Prior Level of Function: Prior to admission, patient was independent  Roles and Routines: Patient was performing selfcare, IADLs and driving prior to admission.  Equipment Used at Home: none  DME owned (not currently used): none  Assistance Upon Discharge: none (not needed)    Objective:     Communicated with Nurse prior to session. Patient found sitting edge of bed with peripheral IV, telemetry upon OT entry to room.    General Precautions: Standard, fall   Orthopedic Precautions: N/A   Braces: N/A    Respiratory Status: Room air    Occupational Performance    Bed Mobility:   Not assessed    Functional Mobility/Transfers:  Sit <> Stand Transfer with independence with no  AD  Toilet Transfer Step Transfer technique with independence with no AD  Functional Mobility: Pt independent for functional mob in room with no AD.    Activities of Daily Living:  Grooming: independence    Cognitive/Visual Perceptual:  Cognitive/Psychosocial Skills:    -     Oriented to: Person, Place, Time, Situation  -     Follows Commands/attention: Follows multistep  commands  -     Safety awareness/insight to disability: intact  -     Mood/Affect/Coping skills/emotional control: Appropriate to situation and Cooperative    Physical Exam:  Balance:    -     Sitting: independence  -     Standing: independence  Upper Extremity Range of Motion:     -       Right Upper Extremity: WNL  -       Left Upper Extremity: WNL  Upper Extremity Strength:    -       Right Upper Extremity: WNL  -       Left Upper Extremity: WNL  Gross motor coordination:   WNL    AMPAC 6 Click ADL:  AMPAC Total Score: 24    Treatment & Education:  Patient educated on role of OT, POC, and goals for therapy  Pt independent for ADLs, functional t/fs and functional mobilty.    Patient left in room with all lines intact, call button in reach, and PT  present.    GOALS:   Multidisciplinary Problems       Occupational Therapy Goals          Problem: Occupational Therapy    Goal Priority Disciplines Outcome Interventions   Occupational Therapy Goal     OT, PT/OT Adequate for Care Transition                        DME Justifications:  No DME recommended requiring DME justifications    History:     Past Medical History:   Diagnosis Date    GERD (gastroesophageal reflux disease)     Hypertension     Hypokalemic syndrome     Thyroid disease     Thyroid nodule     Vitamin D deficiency disease          Past Surgical History:   Procedure Laterality Date    BREAST BIOPSY       SECTION  2004    X 1    CHOLECYSTECTOMY  2008    HYSTERECTOMY      KNEE SURGERY Left 2023    knee replacement    LAPAROSCOPIC HYSTERECTOMY  2010    LSO       Time Tracking:      OT Date of Treatment: 06/26/25  OT Start Time: 1113  OT Stop Time: 1124  OT Total Time (min): 11 min    Billable Minutes: Evaluation 11    6/26/2025

## 2025-06-26 NOTE — PLAN OF CARE
Problem: Occupational Therapy  Goal: Occupational Therapy Goal  Outcome: Adequate for Care Transition    Pt independent with ADLs, functional t/f, functional mobility using no AD. No OT services warranted at this time.

## 2025-06-26 NOTE — NURSING
Received report care assumed. Patient lying in bed resting, NAD noted. Safety precautions maintained.    [facility]  Nurses Note -- 4 Eyes      6/25/2025       Skin assessed on: Q Shift      [x] No Pressure Injuries Present    [x]Prevention Measures Documented    [] Yes LDA  for Pressure Injury Previously documented     [] Yes New Pressure Injury Discovered   [] LDA for New Pressure Injury Added      Attending RN:  Adrienne Brian LPN     Second RN:  Kennedi Harman RN

## 2025-06-26 NOTE — ASSESSMENT & PLAN NOTE
Patient's blood pressure range in the last 24 hours was: BP  Min: 110/68  Max: 147/72.The patient's inpatient anti-hypertensive regimen is listed below:  Current Antihypertensives  labetaloL injection 10 mg, Every 15 min PRN, Intravenous    Plan  Permissive HTN

## 2025-06-26 NOTE — ASSESSMENT & PLAN NOTE
Patient's blood pressure range in the last 24 hours was: BP  Min: 110/62  Max: 154/70.The patient's inpatient anti-hypertensive regimen is listed below:  Current Antihypertensives  labetaloL injection 10 mg, Every 15 min PRN, Intravenous    Plan  Permissive HTN

## 2025-06-27 ENCOUNTER — PATIENT OUTREACH (OUTPATIENT)
Dept: ADMINISTRATIVE | Facility: CLINIC | Age: 60
End: 2025-06-27
Payer: COMMERCIAL

## 2025-06-27 LAB — HOLD SPECIMEN: NORMAL

## 2025-06-27 NOTE — PROGRESS NOTES
C3 nurse spoke with Niharika Gutierrez for a TCC post hospital discharge follow up call. The patient has a scheduled [facility] appointment with Janae Schwartz on 07/01/25 @ 1534.

## 2025-07-01 ENCOUNTER — OFFICE VISIT (OUTPATIENT)
Dept: FAMILY MEDICINE | Facility: CLINIC | Age: 60
End: 2025-07-01
Payer: COMMERCIAL

## 2025-07-01 ENCOUNTER — RESULTS FOLLOW-UP (OUTPATIENT)
Dept: FAMILY MEDICINE | Facility: CLINIC | Age: 60
End: 2025-07-01

## 2025-07-01 VITALS
DIASTOLIC BLOOD PRESSURE: 64 MMHG | HEIGHT: 64 IN | OXYGEN SATURATION: 98 % | BODY MASS INDEX: 37.9 KG/M2 | HEART RATE: 92 BPM | SYSTOLIC BLOOD PRESSURE: 118 MMHG | WEIGHT: 222 LBS

## 2025-07-01 DIAGNOSIS — E04.1 THYROID NODULE: Chronic | ICD-10-CM

## 2025-07-01 DIAGNOSIS — K21.9 GASTROESOPHAGEAL REFLUX DISEASE, UNSPECIFIED WHETHER ESOPHAGITIS PRESENT: ICD-10-CM

## 2025-07-01 DIAGNOSIS — I10 ESSENTIAL HYPERTENSION: ICD-10-CM

## 2025-07-01 DIAGNOSIS — E55.9 VITAMIN D DEFICIENCY DISEASE: Chronic | ICD-10-CM

## 2025-07-01 DIAGNOSIS — E66.01 SEVERE OBESITY (BMI 35.0-39.9) WITH COMORBIDITY: ICD-10-CM

## 2025-07-01 DIAGNOSIS — Z00.00 ANNUAL PHYSICAL EXAM: Primary | ICD-10-CM

## 2025-07-01 PROCEDURE — 1160F RVW MEDS BY RX/DR IN RCRD: CPT | Mod: CPTII,S$GLB,, | Performed by: FAMILY MEDICINE

## 2025-07-01 PROCEDURE — 3078F DIAST BP <80 MM HG: CPT | Mod: CPTII,S$GLB,, | Performed by: FAMILY MEDICINE

## 2025-07-01 PROCEDURE — 99999 PR PBB SHADOW E&M-EST. PATIENT-LVL IV: CPT | Mod: PBBFAC,,, | Performed by: FAMILY MEDICINE

## 2025-07-01 PROCEDURE — 99396 PREV VISIT EST AGE 40-64: CPT | Mod: S$GLB,,, | Performed by: FAMILY MEDICINE

## 2025-07-01 PROCEDURE — 3044F HG A1C LEVEL LT 7.0%: CPT | Mod: CPTII,S$GLB,, | Performed by: FAMILY MEDICINE

## 2025-07-01 PROCEDURE — 3074F SYST BP LT 130 MM HG: CPT | Mod: CPTII,S$GLB,, | Performed by: FAMILY MEDICINE

## 2025-07-01 PROCEDURE — 3008F BODY MASS INDEX DOCD: CPT | Mod: CPTII,S$GLB,, | Performed by: FAMILY MEDICINE

## 2025-07-01 PROCEDURE — 1159F MED LIST DOCD IN RCRD: CPT | Mod: CPTII,S$GLB,, | Performed by: FAMILY MEDICINE

## 2025-07-01 RX ORDER — CHLORHEXIDINE GLUCONATE ORAL RINSE 1.2 MG/ML
SOLUTION DENTAL
COMMUNITY
Start: 2025-06-11

## 2025-07-01 RX ORDER — CELECOXIB 200 MG/1
CAPSULE ORAL
COMMUNITY
Start: 2024-09-10

## 2025-07-01 RX ORDER — PANTOPRAZOLE SODIUM 40 MG/1
40 TABLET, DELAYED RELEASE ORAL DAILY
COMMUNITY

## 2025-07-01 NOTE — SUBJECTIVE & OBJECTIVE
Interval History: cp free.     Review of Systems   Constitutional: Negative.   HENT: Negative.     Eyes: Negative.    Respiratory: Negative.     Endocrine: Negative.    Hematologic/Lymphatic: Negative.    Skin: Negative.    Musculoskeletal: Negative.    Genitourinary: Negative.    Neurological: Negative.    Psychiatric/Behavioral: Negative.     Allergic/Immunologic: Negative.      Objective:     Vital Signs (Most Recent):  Temp: 98.4 °F (36.9 °C) (06/26/25 1608)  Pulse: 70 (06/26/25 1608)  Resp: 20 (06/26/25 1608)  BP: 118/68 (06/26/25 1608)  SpO2: 99 % (06/26/25 1608) Vital Signs (24h Range):        Weight: 96.2 kg (212 lb)  Body mass index is 36.39 kg/m².     SpO2: 99 %       No intake or output data in the 24 hours ending 07/01/25 1654    Lines/Drains/Airways       None                      Physical Exam  Constitutional:       Appearance: Normal appearance. She is well-developed.   HENT:      Head: Normocephalic.   Eyes:      Pupils: Pupils are equal, round, and reactive to light.   Cardiovascular:      Rate and Rhythm: Normal rate and regular rhythm.   Pulmonary:      Effort: Pulmonary effort is normal.      Breath sounds: Normal breath sounds.   Abdominal:      General: Bowel sounds are normal.      Palpations: Abdomen is soft.      Tenderness: There is no abdominal tenderness.   Musculoskeletal:         General: Normal range of motion.      Cervical back: Normal range of motion and neck supple.   Skin:     General: Skin is warm.   Neurological:      Mental Status: She is alert and oriented to person, place, and time.            Significant Labs:     Laboratory:  CBC:  Recent Labs   Lab 06/24/25  0851 06/25/25  1835 06/26/25  0620   WBC 9.32 11.86 10.05   HGB 10.6 L 10.9 L 10.5 L   HCT 33.7 L 34.1 L 32.5 L   Platelet Count 349 347 307       CHEMISTRIES:  Recent Labs   Lab 06/24/25  0851 06/25/25  1835 06/26/25  0620   Glucose 81 88 88   Sodium 139 136 139   Potassium 4.2 4.4 4.0   BUN 14 11 14   Creatinine 0.9  0.8 1.0   Calcium 9.4 9.5 8.8   Magnesium   --   --  2.1       CARDIAC BIOMARKERS:  Recent Labs   Lab 06/25/25  1835 06/25/25  2303 06/26/25  0620   Troponin-I <0.006 <0.006 <0.006       COAGS:  Recent Labs   Lab 09/29/23  1610 06/25/25  1046 06/26/25  0620   INR 1.1 1.1 1.0       LIPIDS/LFTS:  Recent Labs   Lab 03/02/23  0856 09/29/23  1610 10/03/23  0830 11/30/23  2345 06/24/25  0851 06/25/25  1835 06/26/25  0620   Cholesterol Total  --   --   --   --  129  --   --    Cholesterol 136  --  138  --   --   --   --    Triglycerides 61  --  93  --   --   --   --    Triglyceride  --   --   --   --  84  --   --    HDL 48  --  46  --   --   --   --    HDL Cholesterol  --   --   --   --  44  --   --    LDL Cholesterol 75.8  --  73.4  --  68.2  --   --    Non-HDL Cholesterol 88  --  92  --   --   --   --    Non HDL Cholesterol  --   --   --   --  85  --   --    AST 12   < > 15   < > 13 13 11   ALT 9 L   < > 13   < > 10 10 10    < > = values in this interval not displayed.       Hemoglobin A1C   Date Value Ref Range Status   06/19/2024 5.0 4.0 - 5.6 % Final     Comment:     ADA Screening Guidelines:  5.7-6.4%  Consistent with prediabetes  >or=6.5%  Consistent with diabetes    High levels of fetal hemoglobin interfere with the HbA1C  assay. Heterozygous hemoglobin variants (HbS, HgC, etc)do  not significantly interfere with this assay.   However, presence of multiple variants may affect accuracy.     09/29/2023 5.3 4.0 - 5.6 % Final     Comment:     ADA Screening Guidelines:  5.7-6.4%  Consistent with prediabetes  >or=6.5%  Consistent with diabetes    High levels of fetal hemoglobin interfere with the HbA1C  assay. Heterozygous hemoglobin variants (HbS, HgC, etc)do  not significantly interfere with this assay.   However, presence of multiple variants may affect accuracy.     03/02/2023 5.2 4.0 - 5.6 % Final     Comment:     ADA Screening Guidelines:  5.7-6.4%  Consistent with prediabetes  >or=6.5%  Consistent with  "diabetes    High levels of fetal hemoglobin interfere with the HbA1C  assay. Heterozygous hemoglobin variants (HbS, HgC, etc)do  not significantly interfere with this assay.   However, presence of multiple variants may affect accuracy.       Hemoglobin A1c   Date Value Ref Range Status   06/24/2025 5.3 4.0 - 5.6 % Final     Comment:     ADA Screening Guidelines:  5.7-6.4%  Consistent with prediabetes  >=6.5%  Consistent with diabetes    High levels of fetal hemoglobin interfere with the HbA1C  assay. Heterozygous hemoglobin variants (HbS, HgC, etc)do  not significantly interfere with this assay.   However, presence of multiple variants may affect accuracy.       TSH  Recent Labs   Lab 09/29/23  1610 06/19/24  0953 06/24/25  0851   TSH 0.985 1.628 1.276       The ASCVD Risk score (Justino ABEBE, et al., 2019) failed to calculate for the following reasons:    The valid total cholesterol range is 130 to 320 mg/dL       BNP    No results found for: "BNP"         ECHO    Results for orders placed during the hospital encounter of 06/25/25    Echo Saline Bubble? Yes    Interpretation Summary    Left Ventricle: The left ventricle is normal in size. Normal wall thickness. There is normal systolic function with a visually estimated ejection fraction of 55 - 60%. Grade I diastolic dysfunction.    Right Ventricle: The right ventricle is normal in size measuring 3.5 cm. Systolic function is normal.    Left Atrium: The left atrium is severely dilated Agitated saline study of the atrial septum is negative after vasalva maneuver, suggesting absence of intracardiac shunt at the atrial level.    Right Atrium: The right atrium is moderately dilated .    Mitral Valve: There is mild regurgitation.    Pulmonary Artery: The estimated pulmonary artery systolic pressure is 26 mmHg.    IVC/SVC: Normal venous pressure at 3 mmHg.      STRESS TEST    Results for orders placed during the hospital encounter of 06/25/25    Nuclear Stress - Cardiology " Interpreted    Interpretation Summary    Abnormal myocardial perfusion scan.    A PET stress exam is recommended if clinically indicated.    There is a moderate to severe intensity, medium to large sized, mostly fixed perfusion abnormality with some reversibilty in the inferior and inferoapical wall(s).    There are no other significant perfusion abnormalities.    The gated perfusion images showed an ejection fraction of 50% at rest.    The ECG portion of the study is negative for ischemia.    The patient reported no chest pain during the stress test.    There were no arrhythmias during stress.        CATH    No results found for this or any previous visit.            Medications Discontinued During This Encounter   Medication Reason    nitroGLYCERIN SL tablet 0.4 mg     pantoprazole injection 40 mg Patient Discharge    aluminum-magnesium hydroxide-simethicone 200-200-20 mg/5 mL suspension 30 mL Patient Discharge    polyethylene glycol packet 17 g Patient Discharge    enoxaparin injection 40 mg Patient Discharge    clopidogreL tablet 75 mg Patient Discharge    electrolytes-dextrose (Pedialyte) oral solution 200 mL Patient Discharge    aspirin EC tablet 81 mg Patient Discharge    atorvastatin tablet 40 mg Patient Discharge    ondansetron injection 4 mg Patient Discharge    bisacodyL suppository 10 mg Patient Discharge    labetaloL injection 10 mg Patient Discharge    sodium chloride 0.9% flush 10 mL Patient Discharge         DATA:

## 2025-07-01 NOTE — PROGRESS NOTES
[facility] Telemetry  Cardiology  Progress Note    Patient Name: Nihraika Gutierrez  MRN: [id number]  Admission Date: 6/25/2025  Hospital Length of Stay: 0 days  Code Status: Prior   Attending Physician: No att. providers found   Primary Care Physician: Janae Schwartz MD  Expected Discharge Date: 6/26/2025  Principal Problem:Dizziness    Subjective:     Interval History: cp free.     Review of Systems   Constitutional: Negative.   HENT: Negative.     Eyes: Negative.    Respiratory: Negative.     Endocrine: Negative.    Hematologic/Lymphatic: Negative.    Skin: Negative.    Musculoskeletal: Negative.    Genitourinary: Negative.    Neurological: Negative.    Psychiatric/Behavioral: Negative.     Allergic/Immunologic: Negative.      Objective:     Vital Signs (Most Recent):  Temp: 98.4 °F (36.9 °C) (06/26/25 1608)  Pulse: 70 (06/26/25 1608)  Resp: 20 (06/26/25 1608)  BP: 118/68 (06/26/25 1608)  SpO2: 99 % (06/26/25 1608) Vital Signs (24h Range):        Weight: 96.2 kg (212 lb)  Body mass index is 36.39 kg/m².     SpO2: 99 %       No intake or output data in the 24 hours ending 07/01/25 1654    Lines/Drains/Airways       None                      Physical Exam  Constitutional:       Appearance: Normal appearance. She is well-developed.   HENT:      Head: Normocephalic.   Eyes:      Pupils: Pupils are equal, round, and reactive to light.   Cardiovascular:      Rate and Rhythm: Normal rate and regular rhythm.   Pulmonary:      Effort: Pulmonary effort is normal.      Breath sounds: Normal breath sounds.   Abdominal:      General: Bowel sounds are normal.      Palpations: Abdomen is soft.      Tenderness: There is no abdominal tenderness.   Musculoskeletal:         General: Normal range of motion.      Cervical back: Normal range of motion and neck supple.   Skin:     General: Skin is warm.   Neurological:      Mental Status: She is alert and oriented to person, place, and time.            Significant Labs:      Laboratory:  CBC:  Recent Labs   Lab 06/24/25  0851 06/25/25  1835 06/26/25  0620   WBC 9.32 11.86 10.05   HGB 10.6 L 10.9 L 10.5 L   HCT 33.7 L 34.1 L 32.5 L   Platelet Count 349 347 307       CHEMISTRIES:  Recent Labs   Lab 06/24/25  0851 06/25/25  1835 06/26/25  0620   Glucose 81 88 88   Sodium 139 136 139   Potassium 4.2 4.4 4.0   BUN 14 11 14   Creatinine 0.9 0.8 1.0   Calcium 9.4 9.5 8.8   Magnesium   --   --  2.1       CARDIAC BIOMARKERS:  Recent Labs   Lab 06/25/25  1835 06/25/25  2303 06/26/25  0620   Troponin-I <0.006 <0.006 <0.006       COAGS:  Recent Labs   Lab 09/29/23  1610 06/25/25  1046 06/26/25  0620   INR 1.1 1.1 1.0       LIPIDS/LFTS:  Recent Labs   Lab 03/02/23  0856 09/29/23  1610 10/03/23  0830 11/30/23  2345 06/24/25  0851 06/25/25  1835 06/26/25  0620   Cholesterol Total  --   --   --   --  129  --   --    Cholesterol 136  --  138  --   --   --   --    Triglycerides 61  --  93  --   --   --   --    Triglyceride  --   --   --   --  84  --   --    HDL 48  --  46  --   --   --   --    HDL Cholesterol  --   --   --   --  44  --   --    LDL Cholesterol 75.8  --  73.4  --  68.2  --   --    Non-HDL Cholesterol 88  --  92  --   --   --   --    Non HDL Cholesterol  --   --   --   --  85  --   --    AST 12   < > 15   < > 13 13 11   ALT 9 L   < > 13   < > 10 10 10    < > = values in this interval not displayed.       Hemoglobin A1C   Date Value Ref Range Status   06/19/2024 5.0 4.0 - 5.6 % Final     Comment:     ADA Screening Guidelines:  5.7-6.4%  Consistent with prediabetes  >or=6.5%  Consistent with diabetes    High levels of fetal hemoglobin interfere with the HbA1C  assay. Heterozygous hemoglobin variants (HbS, HgC, etc)do  not significantly interfere with this assay.   However, presence of multiple variants may affect accuracy.     09/29/2023 5.3 4.0 - 5.6 % Final     Comment:     ADA Screening Guidelines:  5.7-6.4%  Consistent with prediabetes  >or=6.5%  Consistent with diabetes    High  "levels of fetal hemoglobin interfere with the HbA1C  assay. Heterozygous hemoglobin variants (HbS, HgC, etc)do  not significantly interfere with this assay.   However, presence of multiple variants may affect accuracy.     03/02/2023 5.2 4.0 - 5.6 % Final     Comment:     ADA Screening Guidelines:  5.7-6.4%  Consistent with prediabetes  >or=6.5%  Consistent with diabetes    High levels of fetal hemoglobin interfere with the HbA1C  assay. Heterozygous hemoglobin variants (HbS, HgC, etc)do  not significantly interfere with this assay.   However, presence of multiple variants may affect accuracy.       Hemoglobin A1c   Date Value Ref Range Status   06/24/2025 5.3 4.0 - 5.6 % Final     Comment:     ADA Screening Guidelines:  5.7-6.4%  Consistent with prediabetes  >=6.5%  Consistent with diabetes    High levels of fetal hemoglobin interfere with the HbA1C  assay. Heterozygous hemoglobin variants (HbS, HgC, etc)do  not significantly interfere with this assay.   However, presence of multiple variants may affect accuracy.       TSH  Recent Labs   Lab 09/29/23  1610 06/19/24  0953 06/24/25  0851   TSH 0.985 1.628 1.276       The ASCVD Risk score (Justino DK, et al., 2019) failed to calculate for the following reasons:    The valid total cholesterol range is 130 to 320 mg/dL       BNP    No results found for: "BNP"         ECHO    Results for orders placed during the hospital encounter of 06/25/25    Echo Saline Bubble? Yes    Interpretation Summary    Left Ventricle: The left ventricle is normal in size. Normal wall thickness. There is normal systolic function with a visually estimated ejection fraction of 55 - 60%. Grade I diastolic dysfunction.    Right Ventricle: The right ventricle is normal in size measuring 3.5 cm. Systolic function is normal.    Left Atrium: The left atrium is severely dilated Agitated saline study of the atrial septum is negative after vasalva maneuver, suggesting absence of intracardiac shunt at the " atrial level.    Right Atrium: The right atrium is moderately dilated .    Mitral Valve: There is mild regurgitation.    Pulmonary Artery: The estimated pulmonary artery systolic pressure is 26 mmHg.    IVC/SVC: Normal venous pressure at 3 mmHg.      STRESS TEST    Results for orders placed during the hospital encounter of 06/25/25    Nuclear Stress - Cardiology Interpreted    Interpretation Summary    Abnormal myocardial perfusion scan.    A PET stress exam is recommended if clinically indicated.    There is a moderate to severe intensity, medium to large sized, mostly fixed perfusion abnormality with some reversibilty in the inferior and inferoapical wall(s).    There are no other significant perfusion abnormalities.    The gated perfusion images showed an ejection fraction of 50% at rest.    The ECG portion of the study is negative for ischemia.    The patient reported no chest pain during the stress test.    There were no arrhythmias during stress.        CATH    No results found for this or any previous visit.            Medications Discontinued During This Encounter   Medication Reason    nitroGLYCERIN SL tablet 0.4 mg     pantoprazole injection 40 mg Patient Discharge    aluminum-magnesium hydroxide-simethicone 200-200-20 mg/5 mL suspension 30 mL Patient Discharge    polyethylene glycol packet 17 g Patient Discharge    enoxaparin injection 40 mg Patient Discharge    clopidogreL tablet 75 mg Patient Discharge    electrolytes-dextrose (Pedialyte) oral solution 200 mL Patient Discharge    aspirin EC tablet 81 mg Patient Discharge    atorvastatin tablet 40 mg Patient Discharge    ondansetron injection 4 mg Patient Discharge    bisacodyL suppository 10 mg Patient Discharge    labetaloL injection 10 mg Patient Discharge    sodium chloride 0.9% flush 10 mL Patient Discharge         DATA:       Assessment and Plan:     Brief HPI:     * Dizziness  Monitor on tele monitoring.  Check orthostatics.    Atypical chest  pain  Results for orders placed during the hospital encounter of 06/25/25    Nuclear Stress - Cardiology Interpreted    Interpretation Summary    Abnormal myocardial perfusion scan.    A PET stress exam is recommended if clinically indicated.    There is a moderate to severe intensity, medium to large sized, mostly fixed perfusion abnormality with some reversibilty in the inferior and inferoapical wall(s).    There are no other significant perfusion abnormalities.    The gated perfusion images showed an ejection fraction of 50% at rest.    The ECG portion of the study is negative for ischemia.    The patient reported no chest pain during the stress test.    There were no arrhythmias during stress.      Abn spect. ? Body habitus? PET stress scan as out patient. No cp anymore    Severe obesity (BMI 35.0-39.9) with comorbidity  Body mass index is 36.39 kg/m². Morbid obesity complicates all aspects of disease management from diagnostic modalities to treatment. Weight loss encouraged and health benefits explained to patient.         Gastroesophageal reflux disease  Management per primary        VTE Risk Mitigation (From admission, onward)           Ordered     IP VTE HIGH RISK PATIENT  Once         06/25/25 9029                    Buster Currie MD  Cardiology  [facility] [place] [facility]

## 2025-07-01 NOTE — ASSESSMENT & PLAN NOTE
Results for orders placed during the hospital encounter of 06/25/25    Nuclear Stress - Cardiology Interpreted    Interpretation Summary    Abnormal myocardial perfusion scan.    A PET stress exam is recommended if clinically indicated.    There is a moderate to severe intensity, medium to large sized, mostly fixed perfusion abnormality with some reversibilty in the inferior and inferoapical wall(s).    There are no other significant perfusion abnormalities.    The gated perfusion images showed an ejection fraction of 50% at rest.    The ECG portion of the study is negative for ischemia.    The patient reported no chest pain during the stress test.    There were no arrhythmias during stress.      Abn spect. ? Body habitus? PET stress scan as out patient. No cp anymore

## 2025-07-02 NOTE — PROGRESS NOTES
Routine Office Visit     Patient Name: Niharika Gutierrez    : 1965  MRN: [id number]    Subjective     History of Present Illness    CHIEF COMPLAINT:  Annual physical   Follow up after recent hospitalization  GERD  Hypertension     HISTORY OF PRESENT ILLNESS:  She reports recent hospitalization for severe heartburn, which differed from her typical episodes. She experienced persistent heartburn lasting over a day, accompanied by significant nausea, hot flashes, and dizziness with vertigo. The heartburn was characterized by tightness and discomfort when eating, rather than sharp pain. During hospitalization, neck ultrasound, MRI, and chest XR were all normal. She was discharged on Plavix, Aspirin, and atorvastatin, though she has not taken these medications as she has no history of stroke or high cholesterol. She denies current chest pain, stroke symptoms, or high cholesterol. The dizziness and hot flashes resolved during hospitalization. She plans to follow up with a gastroenterologist for potential endoscopic evaluation.    GERD:  She continues to experience tightness with eating. Her heartburn symptoms are exacerbated by dietary triggers, specifically fried foods and butter cream icing. She manages symptoms by taking daily heartburn medication and avoiding trigger foods.    CURRENT MEDICATIONS:  She takes Famotidine for heartburn management, Amlodipine 10mg for blood pressure control, and Spironolactone to regulate potassium levels. She has pantoprazole (old bottle) which she uses as needed for additional heartburn control. She discontinued potassium supplements after starting spironolactone. She reports good medication adherence and well-controlled blood pressure on current Amlodipine dosage.    LABS:  She has chronic anemia with persistently low MCH and RBCs, and is not taking iron supplements. TSH is within normal limits. A1C is 5.3. Potassium level is 4.2. Cholesterol levels are lower compared to  "previous testing.    SOCIAL HISTORY:  She is the primary caregiver for her  who experienced a stroke approximately 1.5 months ago and is simultaneously undergoing colon cancer treatment. She reports caregiver stress due to the emotional and physical demands of this role.      ROS:  General: no fever, no chills, no fatigue, no weight gain, no weight loss, +feeling of increased warmth, +heat intolerance  Eyes: no vision changes, no redness, no discharge  ENT: no ear pain, no nasal congestion, no sore throat  Cardiovascular: no chest pain, no palpitations, no lower extremity edema, +chest tightness  Respiratory: no cough, no shortness of breath  Gastrointestinal: no abdominal pain, +nausea, no vomiting, no diarrhea, no constipation, no blood in stool, +heartburn  Genitourinary: no dysuria, no hematuria, no frequency  Musculoskeletal: no joint pain, no muscle pain  Skin: no rash, no lesion  Neurological: no headache, +dizziness, no numbness, no tingling  Psychiatric: no anxiety, no depression, no sleep difficulty           Objective     /64 (BP Location: Left arm, Patient Position: Sitting)   Pulse 92   Ht 5' 4" (1.626 m)   Wt 100.7 kg (222 lb 0.1 oz)   SpO2 98%   BMI 38.11 kg/m²   Physical Exam  Constitutional:       Appearance: She is well-developed.   HENT:      Head: Normocephalic and atraumatic.   Eyes:      Conjunctiva/sclera: Conjunctivae normal.      Pupils: Pupils are equal, round, and reactive to light.   Cardiovascular:      Rate and Rhythm: Normal rate and regular rhythm.      Heart sounds: Normal heart sounds. No murmur heard.     No friction rub. No gallop.   Pulmonary:      Effort: No respiratory distress.      Breath sounds: Normal breath sounds.   Abdominal:      General: Bowel sounds are normal. There is no distension.      Palpations: Abdomen is soft.      Tenderness: There is no abdominal tenderness.   Musculoskeletal:         General: Normal range of motion.      Cervical back: " Normal range of motion and neck supple.   Lymphadenopathy:      Cervical: No cervical adenopathy.   Skin:     General: Skin is warm.   Neurological:      Mental Status: She is alert and oriented to person, place, and time.           Assessment           CAREGIVER STRESS AND FAMILY HEALTH ISSUES:   Patient's  has colon cancer and had a stroke, requiring chemotherapy and therapy for speech and right arm sensory issues.   Patient is experiencing stress due to 's health issues and starting a new job.   Advised to monitor 's blood pressure nightly.   Discussed caregiver stress as a potential contributing factor to recent symptoms and its impact on patient's overall health.            Problem List Items Addressed This Visit          Cardiac/Vascular    Essential hypertension   Advised to continue amlodipine and spironolactone, which helps regulate potassium levels.   Discussed possibly reducing amlodipine dosage from 10 mg to 5 mg.   Instructed to monitor BP at home at least once weekly.   Determined cholesterol medication not required based on normal lipid panel results.         Endocrine    Thyroid nodule (Chronic)    Overview   Last visit with endo 9/2023, notes reviewed. Thyroid nodule  --Patient found to have thyroid nodules  --No risk factors for thyroid cancer  --No compressive symptoms  --TSH WNL  --Has had 3 benign FNA's of the left lobe dominant nodule (reviewed with her that future malignancy risk is near zero with multiple benign FNA's)  --Has 2 additional nodules that have not met FNA criteria  --Repeat thyroid ultrasound now and if stable will plan to repeat in 2 years     Hypokalemia  --Continue spironolactone 25 mg PO BID  --Continue potassium supplements     Essential hypertension  -- Controlled on current medications.         Needs ultrasound with radiology when able, follow up in 2 years         Vitamin D deficiency disease (Chronic)  The current medical regimen is effective;   continue present plan and medications.       Severe obesity (BMI 35.0-39.9) with comorbidity  The patient is asked to make an attempt to improve diet and exercise patterns to aid in medical management of this problem.        Gastroesophageal reflux disease, unspecified whether esophagitis present    Discussed need for gastroenterology appointment and possible endoscopy to evaluate cause of heartburn.   Explained importance of dietary modifications and relationship between stress and increased gastric acid production.   Continued Famotidine 40 mg daily and Pantoprazole 40 mg (as needed, interchangeable with Famotidine).   Started 81 mg aspirin (enteric coated) daily or every other day if causing heartburn.    Other Visit Diagnoses         Annual physical exam    -  Primary    Relevant Orders    CBC Auto Differential    Comprehensive Metabolic Panel    Lipid Panel    Hemoglobin A1C    TSH   Patient has chronic low RBC count and MCH, stable over the past year with normal platelet count and clotting factor.   Advised to continue taking iron pills every other day as prescribed.   Assessed recent hospital admission for suspected cardiac event, which was ruled out with negative tests.   Reviewed recent lab results, including CBC, metabolic panel, and thyroid function, all within normal limits.                 This note was generated with the assistance of ambient listening technology. Verbal consent was obtained by the patient and accompanying visitor(s) for the recording of patient appointment to facilitate this note. I attest to having reviewed and edited the generated note for accuracy, though some syntax or spelling errors may persist. Please contact the author of this note for any clarification.

## 2025-07-03 NOTE — PROGRESS NOTES
[facility] Consultation Note    Reason for Consult:  The primary encounter diagnosis was Gastroesophageal reflux disease without esophagitis. A diagnosis of Stricture and stenosis of esophagus was also pertinent to this visit.    PCP:   Janae Schwartz       Referring MD:  No referring provider defined for this encounter.    Initial History of Present Illness (HPI):  This is a 60 y.o. female here for evaluation of dysphagia  Has been taking OTC Nexium 40 mg QAM since 2015. Tried to wean off. Takes pepcid PRN in the evenings. Weaned herself off the Nexium bc she   Getting chest tightness.     Has been to the ER around march for GI cocktail.  Currently :  Abdominal pain - no  Reflux - well controlled now on Taking pepcid for a couple weeks and sucralfate QID and has not had any symptoms.  Dysphagia - not right now.   Bowel habits - of she eats fried foods may have diarrhea but other than that okay. S/p briana. She may have rectal leakage and unaware it is happening. Occasional pain and hemorrhoids  GI bleeding - none  NSAID usage - rarely    Interval history 07/07/2025    History of Present Illness    CHIEF COMPLAINT:  Patient presents today for follow up of heartburn after recent ER visit.    HISTORY OF PRESENT ILLNESS:  She recently visited the ER for heartburn symptoms occurring with every meal during her hospital stay. Symptoms were triggered by rich foods including birthday cake, fried chicken, and buttercream icing. She describes symptoms as tightness and discomfort, characterized by a feeling of stiffness, rather than pain. Symptoms were most prominent with heavy, rich foods and persisted despite taking famotidine and Pepto-Bismol. ER workup ruled out heart attack and stroke.    GASTROINTESTINAL HISTORY:  She has a history of esophageal narrowing revealed by previous endoscopy. She experiences difficulty swallowing large pills and food items, describing a sensation of them feeling stuck in  her throat. She reports regular issues with gas and bloating, which she partially attributes to diet. Her most recent endoscopy was performed in  by Dr. Rico.    MEDICATIONS:  She takes Famotidine 40 mg daily one hour before meals, which she has been on for the past couple years. She previously took Pantoprazole but discontinued due to insurance issues.    FAMILY HISTORY:  Her  has colon cancer and is currently undergoing chemotherapy. He experienced a stroke within the past month.      ROS:  General: -fever, -chills, -fatigue, -weight gain, -weight loss  Eyes: -vision changes, -redness, -discharge  ENT: -ear pain, -nasal congestion, -sore throat  Cardiovascular: -chest pain, -palpitations, -lower extremity edema, +chest tightness  Respiratory: -cough, -shortness of breath  Gastrointestinal: -abdominal pain, -nausea, -vomiting, -diarrhea, -constipation, -blood in stool, +sense of lump/mass in throat when swallowing, +difficulty swallowing, +heartburn, +bloating  Genitourinary: -dysuria, -hematuria, -frequency  Musculoskeletal: -joint pain, -muscle pain  Skin: -rash, -lesion  Neurological: -headache, -dizziness, -numbness, -tingling  Psychiatric: -anxiety, -depression, -sleep difficulty         Medical History:  has a past medical history of GERD (gastroesophageal reflux disease), Hypertension, Hypokalemic syndrome, Thyroid disease, Thyroid nodule, and Vitamin D deficiency disease.    Surgical History:  has a past surgical history that includes Laparoscopic hysterectomy (); Cholecystectomy ();  section (); Breast biopsy; Hysterectomy; and Knee surgery (Left, 2023).      Review of patient's allergies indicates:  No Known Allergies    Medication List with Changes/Refills   New Medications    SODIUM,POTASSIUM,MAG SULFATES (SUPREP BOWEL PREP KIT) 17.5-3.13-1.6 GRAM SOLR    Take 177 mLs by mouth once daily. Take as instructed by Endoscopy nurse  for 2 days   Current  "Medications    ACETAMINOPHEN (TYLENOL) 650 MG TBSR    Take 650 mg by mouth every 8 (eight) hours.    AMLODIPINE (NORVASC) 10 MG TABLET    Take 1 tablet (10 mg total) by mouth once daily.    ASPIRIN (ECOTRIN) 81 MG EC TABLET    Take 1 tablet (81 mg total) by mouth once daily.    CELECOXIB (CELEBREX) 200 MG CAPSULE        CHLORHEXIDINE (PERIDEX) 0.12 % SOLUTION        CICLOPIROX (PENLAC) 8 % SOLN    Apply topically nightly.    CYCLOBENZAPRINE (FLEXERIL) 10 MG TABLET    Take 1 tablet (10 mg total) by mouth 3 (three) times daily as needed for Muscle spasms.    ERGOCALCIFEROL (ERGOCALCIFEROL) 50,000 UNIT CAP    TAKE 1 CAPSULE BY MOUTH EVERY 7 DAYS    ESTRADIOL (ESTRACE) 0.01 % (0.1 MG/GRAM) VAGINAL CREAM    Place 2 g vaginally twice a week.    EYSUVIS 0.25 % DRPS        FAMOTIDINE (PEPCID) 40 MG TABLET    Take 1 tablet (40 mg total) by mouth once daily.    FERROUS SULFATE (FEOSOL) 325 MG (65 MG IRON) TAB TABLET    Take 1 tablet (325 mg total) by mouth every other day.    HYDROCORTISONE (ANUSOL-HC) 2.5 % RECTAL CREAM    Place rectally 2 (two) times daily.    LIDOCAINE (LMX) 4 % CREAM    Apply topically 4 (four) times daily as needed (rectal pain). Apply to external hemorrhoids prn    PANTOPRAZOLE (PROTONIX) 40 MG TABLET    Take 40 mg by mouth once daily.    SPIRONOLACTONE (ALDACTONE) 50 MG TABLET    TAKE 1 TABLET BY MOUTH EVERY DAY. INCREASE TO 2 TABLETS BY MOUTH EVERY DAY AS TOLERATED         Objective Findings:    Vital Signs:  /65 (Patient Position: Sitting)   Pulse 69   Ht 5' 4" (1.626 m)   Wt 103.3 kg (227 lb 11.8 oz)   BMI 39.09 kg/m²   Body mass index is 39.09 kg/m².    Physical Exam:  General Appearance: Well appearing, NAD noted  Eyes:    No scleral icterus  ENT:  No lesions or masses   Lungs: BBS CTA ,  no wheezes  Heart:  HRRR, S1 & S2 normal, no murmurs heard  Abdomen:  Non distended, soft, no guarding, no rebound, no tenderness, no appreciated ascites  Musculoskeletal:  No major joint " deformities  Skin: No petechiae or rash on exposed skin areas  Neurologic:  Alert and oriented x4  Psychiatric:  Normal speech mentation and affect    Labs reviewed:  Lab Results   Component Value Date    WBC 10.05 06/26/2025    HGB 10.5 (L) 06/26/2025    HCT 32.5 (L) 06/26/2025     06/26/2025    CHOL 129 06/24/2025    TRIG 84 06/24/2025    HDL 44 06/24/2025    ALT 10 06/26/2025    AST 11 06/26/2025     06/26/2025    K 4.0 06/26/2025     06/26/2025    CREATININE 1.0 06/26/2025    BUN 14 06/26/2025    CO2 24 06/26/2025    TSH 1.276 06/24/2025    INR 1.0 06/26/2025    GLUF 85 12/30/2008    HGBA1C 5.3 06/24/2025           Imaging reviewed:  None    Endoscopy reviewed:    Had another EGD btwn now and 2015 with dilation at [facility] and     Screening colonoscopy 2015, good prep to cecum. No specimens removed    EGD for dysphagia 2015  - Small hiatus hernia.                         - Mild Schatzki ring. Dilated.                         - A single gastric polyp. Resected and retrieved.                         - Normal examined duodenum.     EGD 2018 - with Dr Langford 54 Fr Dilation      Medical Decision Making:    Assessment and Recommendations:    Assessment & Plan    K21.9 Gastro-esophageal reflux disease without esophagitis  K22.2 Esophageal obstruction  R13.11 Dysphagia, oral phase  R14.0 Abdominal distension (gaseous)  R94.30 Abnormal result of cardiovascular function study, unspecified  Z80.0 Family history of malignant neoplasm of digestive organs    GASTRO-ESOPHAGEAL REFLUX DISEASE:  - Considered recent ER visit for significant heartburn, likely due to dietary indiscretion.  - Consider switching from Famotidine 40 mg to OTC Esomeprazole 20 mg for heartburn management.    ESOPHAGEAL OBSTRUCTION AND DYSPHAGIA:  - Previous endoscopy showed narrowing at bottom of esophagus, potentially causing swallowing difficulties.  - Plan upper endoscopy with possible esophageal dilation to address narrowing.  -  Ordered upper endoscopy with esophageal dilation.    ABDOMINAL DISTENSION AND GAS:  - GI symptoms, including gas and bloating, potentially related to FODMAP-containing foods.  - Explained FODMAP content in various fruits and their potential impact on gas and bloating.  - Reduce high FODMAP fruits, particularly melons, to help manage gas and bloating.  - Continue eating berries (strawberries, blackberries, blueberries, raspberries) as they are lower in FODMAPs.    ABNORMAL CARDIOVASCULAR FUNCTION STUDY:  - Reviewed abnormal cardiac stress test results, requiring cardiology clearance before endoscopic procedures.  - Consider follow up with Dr. Currie or Dr. Pena regarding abnormal stress test results.    FAMILY HISTORY OF DIGESTIVE ORGAN MALIGNANCY:  - Patient due for colonoscopy based on previous procedure in 2015.  - Ordered colonoscopy.           Order summary:         Thank you for allowing me to participate in the care of Niharika Black MD

## 2025-07-07 ENCOUNTER — OFFICE VISIT (OUTPATIENT)
Facility: CLINIC | Age: 60
End: 2025-07-07
Payer: COMMERCIAL

## 2025-07-07 ENCOUNTER — TELEPHONE (OUTPATIENT)
Dept: ENDOSCOPY | Facility: HOSPITAL | Age: 60
End: 2025-07-07
Payer: COMMERCIAL

## 2025-07-07 ENCOUNTER — TELEPHONE (OUTPATIENT)
Dept: CARDIOLOGY | Facility: CLINIC | Age: 60
End: 2025-07-07
Payer: COMMERCIAL

## 2025-07-07 VITALS
DIASTOLIC BLOOD PRESSURE: 65 MMHG | BODY MASS INDEX: 38.88 KG/M2 | WEIGHT: 227.75 LBS | HEIGHT: 64 IN | HEART RATE: 69 BPM | SYSTOLIC BLOOD PRESSURE: 132 MMHG

## 2025-07-07 DIAGNOSIS — R13.10 DYSPHAGIA, UNSPECIFIED TYPE: ICD-10-CM

## 2025-07-07 DIAGNOSIS — K22.2 STRICTURE AND STENOSIS OF ESOPHAGUS: ICD-10-CM

## 2025-07-07 DIAGNOSIS — K21.9 GASTROESOPHAGEAL REFLUX DISEASE WITHOUT ESOPHAGITIS: Primary | ICD-10-CM

## 2025-07-07 DIAGNOSIS — Z12.11 COLON CANCER SCREENING: Primary | ICD-10-CM

## 2025-07-07 PROCEDURE — 3008F BODY MASS INDEX DOCD: CPT | Mod: CPTII,S$GLB,, | Performed by: INTERNAL MEDICINE

## 2025-07-07 PROCEDURE — 3044F HG A1C LEVEL LT 7.0%: CPT | Mod: CPTII,S$GLB,, | Performed by: INTERNAL MEDICINE

## 2025-07-07 PROCEDURE — 99214 OFFICE O/P EST MOD 30 MIN: CPT | Mod: S$GLB,,, | Performed by: INTERNAL MEDICINE

## 2025-07-07 PROCEDURE — 99999 PR PBB SHADOW E&M-EST. PATIENT-LVL IV: CPT | Mod: PBBFAC,,, | Performed by: INTERNAL MEDICINE

## 2025-07-07 PROCEDURE — 3078F DIAST BP <80 MM HG: CPT | Mod: CPTII,S$GLB,, | Performed by: INTERNAL MEDICINE

## 2025-07-07 PROCEDURE — 3075F SYST BP GE 130 - 139MM HG: CPT | Mod: CPTII,S$GLB,, | Performed by: INTERNAL MEDICINE

## 2025-07-07 RX ORDER — SODIUM, POTASSIUM,MAG SULFATES 17.5-3.13G
1 SOLUTION, RECONSTITUTED, ORAL ORAL DAILY
Qty: 1 KIT | Refills: 0 | Status: SHIPPED | OUTPATIENT
Start: 2025-07-07 | End: 2025-07-09

## 2025-07-07 NOTE — TELEPHONE ENCOUNTER
"----- Message from Wil Black MD sent at 7/7/2025  2:52 PM CDT -----  Procedure: EGD/Colonoscopy    Diagnosis: Dysphagia    Procedure Timing: Within 12 weeks    #If within 4 weeks selected, please ayden as high priority#    #If greater than 12 weeks, please select "5-12 weeks" and delay sending until 3 months prior to requested date#     Location: Any Site    Additional Scheduling Information: No scheduling concerns    Prep Specifications:Standard prep    Is the patient taking a GLP-1 Agonist:no    Have you attached a patient to this message: yes  "

## 2025-07-07 NOTE — TELEPHONE ENCOUNTER
Patient is scheduled for a Colonoscopy/EGD on 8/22/25 with Dr. ASPEN Black.  Referral for procedure from Clinic visit.

## 2025-08-13 ENCOUNTER — PATIENT MESSAGE (OUTPATIENT)
Facility: CLINIC | Age: 60
End: 2025-08-13
Payer: COMMERCIAL

## 2025-08-13 RX ORDER — PANTOPRAZOLE SODIUM 40 MG/1
40 TABLET, DELAYED RELEASE ORAL DAILY
Qty: 90 TABLET | Refills: 5 | Status: SHIPPED | OUTPATIENT
Start: 2025-08-13

## 2025-08-18 ENCOUNTER — PATIENT MESSAGE (OUTPATIENT)
Dept: ENDOSCOPY | Facility: HOSPITAL | Age: 60
End: 2025-08-18
Payer: COMMERCIAL

## 2025-08-21 ENCOUNTER — ANESTHESIA EVENT (OUTPATIENT)
Dept: ENDOSCOPY | Facility: HOSPITAL | Age: 60
End: 2025-08-21
Payer: COMMERCIAL

## 2025-08-22 ENCOUNTER — HOSPITAL ENCOUNTER (OUTPATIENT)
Facility: HOSPITAL | Age: 60
Discharge: HOME | End: 2025-08-22
Attending: INTERNAL MEDICINE | Admitting: INTERNAL MEDICINE
Payer: COMMERCIAL

## 2025-08-22 ENCOUNTER — ANESTHESIA (OUTPATIENT)
Dept: ENDOSCOPY | Facility: HOSPITAL | Age: 60
End: 2025-08-22
Payer: COMMERCIAL

## 2025-08-22 ENCOUNTER — RESULTS FOLLOW-UP (OUTPATIENT)
Dept: FAMILY MEDICINE | Facility: CLINIC | Age: 60
End: 2025-08-22
Payer: COMMERCIAL

## 2025-08-22 VITALS
OXYGEN SATURATION: 100 % | WEIGHT: 219.38 LBS | SYSTOLIC BLOOD PRESSURE: 146 MMHG | TEMPERATURE: 98 F | HEIGHT: 64 IN | DIASTOLIC BLOOD PRESSURE: 69 MMHG | HEART RATE: 67 BPM | BODY MASS INDEX: 37.45 KG/M2 | RESPIRATION RATE: 16 BRPM

## 2025-08-22 DIAGNOSIS — R13.10 DYSPHAGIA, UNSPECIFIED TYPE: ICD-10-CM

## 2025-08-22 DIAGNOSIS — R53.1 WEAKNESS: Primary | ICD-10-CM

## 2025-08-22 DIAGNOSIS — Z12.11 COLON CANCER SCREENING: ICD-10-CM

## 2025-08-22 PROCEDURE — 45380 COLONOSCOPY AND BIOPSY: CPT | Mod: 33 | Performed by: INTERNAL MEDICINE

## 2025-08-22 PROCEDURE — 27201012 HC FORCEPS, HOT/COLD, DISP: Performed by: INTERNAL MEDICINE

## 2025-08-22 PROCEDURE — 25000003 PHARM REV CODE 250: Performed by: NURSE ANESTHETIST, CERTIFIED REGISTERED

## 2025-08-22 PROCEDURE — 45380 COLONOSCOPY AND BIOPSY: CPT | Mod: 33,,, | Performed by: INTERNAL MEDICINE

## 2025-08-22 PROCEDURE — 37000009 HC ANESTHESIA EA ADD 15 MINS: Performed by: INTERNAL MEDICINE

## 2025-08-22 PROCEDURE — 43249 ESOPH EGD DILATION <30 MM: CPT | Performed by: INTERNAL MEDICINE

## 2025-08-22 PROCEDURE — 37000008 HC ANESTHESIA 1ST 15 MINUTES: Performed by: INTERNAL MEDICINE

## 2025-08-22 PROCEDURE — 43249 ESOPH EGD DILATION <30 MM: CPT | Mod: 51,,, | Performed by: INTERNAL MEDICINE

## 2025-08-22 PROCEDURE — C1726 CATH, BAL DIL, NON-VASCULAR: HCPCS | Performed by: INTERNAL MEDICINE

## 2025-08-22 PROCEDURE — 88305 TISSUE EXAM BY PATHOLOGIST: CPT | Mod: TC | Performed by: INTERNAL MEDICINE

## 2025-08-22 PROCEDURE — 63600175 PHARM REV CODE 636 W HCPCS: Performed by: NURSE ANESTHETIST, CERTIFIED REGISTERED

## 2025-08-22 RX ORDER — LIDOCAINE HYDROCHLORIDE 20 MG/ML
INJECTION INTRAVENOUS
Status: DISCONTINUED | OUTPATIENT
Start: 2025-08-22 | End: 2025-08-22

## 2025-08-22 RX ORDER — SODIUM CHLORIDE 9 MG/ML
INJECTION, SOLUTION INTRAVENOUS CONTINUOUS
Status: DISCONTINUED | OUTPATIENT
Start: 2025-08-22 | End: 2025-08-22 | Stop reason: HOSPADM

## 2025-08-22 RX ORDER — PROPOFOL 10 MG/ML
VIAL (ML) INTRAVENOUS
Status: DISCONTINUED | OUTPATIENT
Start: 2025-08-22 | End: 2025-08-22

## 2025-08-22 RX ORDER — PROPOFOL 10 MG/ML
VIAL (ML) INTRAVENOUS CONTINUOUS PRN
Status: DISCONTINUED | OUTPATIENT
Start: 2025-08-22 | End: 2025-08-22

## 2025-08-22 RX ADMIN — SODIUM CHLORIDE: 9 INJECTION, SOLUTION INTRAVENOUS at 07:08

## 2025-08-22 RX ADMIN — PROPOFOL 150 MCG/KG/MIN: 10 INJECTION, EMULSION INTRAVENOUS at 07:08

## 2025-08-22 RX ADMIN — LIDOCAINE HYDROCHLORIDE 100 MG: 20 INJECTION INTRAVENOUS at 07:08

## 2025-08-22 RX ADMIN — PROPOFOL 100 MG: 10 INJECTION, EMULSION INTRAVENOUS at 07:08

## 2025-08-25 LAB
ESTROGEN SERPL-MCNC: NORMAL PG/ML
INSULIN SERPL-ACNC: NORMAL U[IU]/ML
LAB AP CLINICAL INFORMATION: NORMAL
LAB AP GROSS DESCRIPTION: NORMAL
LAB AP PERFORMING LOCATION(S): NORMAL
LAB AP REPORT FOOTNOTES: NORMAL

## 2025-08-26 ENCOUNTER — OFFICE VISIT (OUTPATIENT)
Dept: URGENT CARE | Facility: CLINIC | Age: 60
End: 2025-08-26
Payer: COMMERCIAL

## 2025-08-26 VITALS
TEMPERATURE: 98 F | HEART RATE: 69 BPM | BODY MASS INDEX: 38.12 KG/M2 | SYSTOLIC BLOOD PRESSURE: 151 MMHG | HEIGHT: 64 IN | RESPIRATION RATE: 16 BRPM | WEIGHT: 223.31 LBS | DIASTOLIC BLOOD PRESSURE: 77 MMHG | OXYGEN SATURATION: 98 %

## 2025-08-26 DIAGNOSIS — U07.1 COVID-19 VIRUS DETECTED: ICD-10-CM

## 2025-08-26 DIAGNOSIS — I10 ELEVATED BLOOD PRESSURE READING IN OFFICE WITH DIAGNOSIS OF HYPERTENSION: ICD-10-CM

## 2025-08-26 DIAGNOSIS — U07.1 COVID-19 VIRUS INFECTION: Primary | ICD-10-CM

## 2025-08-26 DIAGNOSIS — R05.9 COUGH, UNSPECIFIED TYPE: ICD-10-CM

## 2025-08-26 DIAGNOSIS — J02.9 SORE THROAT: ICD-10-CM

## 2025-08-26 LAB
CTP QC/QA: YES
SARS-COV+SARS-COV-2 AG RESP QL IA.RAPID: POSITIVE

## 2025-08-26 PROCEDURE — 87811 SARS-COV-2 COVID19 W/OPTIC: CPT | Mod: QW,S$GLB,, | Performed by: PHYSICIAN ASSISTANT

## 2025-08-26 PROCEDURE — 99214 OFFICE O/P EST MOD 30 MIN: CPT | Mod: S$GLB,,, | Performed by: PHYSICIAN ASSISTANT

## 2025-08-26 RX ORDER — NIRMATRELVIR AND RITONAVIR 300-100 MG
KIT ORAL
Qty: 30 TABLET | Refills: 0 | Status: SHIPPED | OUTPATIENT
Start: 2025-08-26 | End: 2025-08-26

## 2025-08-26 RX ORDER — NIRMATRELVIR AND RITONAVIR 300-100 MG
KIT ORAL
Qty: 30 TABLET | Refills: 0 | Status: SHIPPED | OUTPATIENT
Start: 2025-08-26 | End: 2025-08-31

## 2025-08-26 RX ORDER — PROMETHAZINE HYDROCHLORIDE AND DEXTROMETHORPHAN HYDROBROMIDE 6.25; 15 MG/5ML; MG/5ML
5 SYRUP ORAL NIGHTLY PRN
Qty: 118 ML | Refills: 0 | Status: SHIPPED | OUTPATIENT
Start: 2025-08-26 | End: 2025-09-05

## 2025-08-26 RX ORDER — BENZONATATE 200 MG/1
200 CAPSULE ORAL 3 TIMES DAILY PRN
Qty: 30 CAPSULE | Refills: 0 | Status: SHIPPED | OUTPATIENT
Start: 2025-08-26 | End: 2025-09-05